# Patient Record
Sex: MALE | Race: WHITE | HISPANIC OR LATINO
[De-identification: names, ages, dates, MRNs, and addresses within clinical notes are randomized per-mention and may not be internally consistent; named-entity substitution may affect disease eponyms.]

---

## 2020-02-06 PROBLEM — Z00.00 ENCOUNTER FOR PREVENTIVE HEALTH EXAMINATION: Noted: 2020-02-06

## 2020-02-10 ENCOUNTER — RESULT CHARGE (OUTPATIENT)
Age: 55
End: 2020-02-10

## 2020-02-10 ENCOUNTER — OUTPATIENT (OUTPATIENT)
Dept: OUTPATIENT SERVICES | Facility: HOSPITAL | Age: 55
LOS: 1 days | End: 2020-02-10
Payer: SELF-PAY

## 2020-02-10 ENCOUNTER — MED ADMIN CHARGE (OUTPATIENT)
Age: 55
End: 2020-02-10

## 2020-02-10 ENCOUNTER — APPOINTMENT (OUTPATIENT)
Dept: FAMILY MEDICINE | Facility: HOSPITAL | Age: 55
End: 2020-02-10

## 2020-02-10 VITALS
RESPIRATION RATE: 14 BRPM | BODY MASS INDEX: 26.08 KG/M2 | WEIGHT: 149 LBS | DIASTOLIC BLOOD PRESSURE: 93 MMHG | SYSTOLIC BLOOD PRESSURE: 143 MMHG | HEART RATE: 88 BPM | HEIGHT: 63.5 IN | TEMPERATURE: 98 F | OXYGEN SATURATION: 98 %

## 2020-02-10 DIAGNOSIS — Z78.9 OTHER SPECIFIED HEALTH STATUS: ICD-10-CM

## 2020-02-10 DIAGNOSIS — Z00.00 ENCOUNTER FOR GENERAL ADULT MEDICAL EXAMINATION WITHOUT ABNORMAL FINDINGS: ICD-10-CM

## 2020-02-10 DIAGNOSIS — Z83.3 FAMILY HISTORY OF DIABETES MELLITUS: ICD-10-CM

## 2020-02-10 LAB
BILIRUB UR QL STRIP: NORMAL
CLARITY UR: CLEAR
COLLECTION METHOD: NORMAL
GLUCOSE UR-MCNC: NORMAL
HCG UR QL: 0.2 EU/DL
HGB UR QL STRIP.AUTO: NORMAL
KETONES UR-MCNC: NORMAL
LEUKOCYTE ESTERASE UR QL STRIP: NORMAL
NITRITE UR QL STRIP: NORMAL
PH UR STRIP: 5.5
PROT UR STRIP-MCNC: >=300
SP GR UR STRIP: 1.02

## 2020-02-10 PROCEDURE — 90471 IMMUNIZATION ADMIN: CPT

## 2020-02-10 PROCEDURE — G0463: CPT

## 2020-02-10 PROCEDURE — 87086 URINE CULTURE/COLONY COUNT: CPT

## 2020-02-10 NOTE — PHYSICAL EXAM
[No Acute Distress] : no acute distress [Well Developed] : well developed [Well Nourished] : well nourished [Normal Sclera/Conjunctiva] : normal sclera/conjunctiva [Well-Appearing] : well-appearing [Normal Oropharynx] : the oropharynx was normal [EOMI] : extraocular movements intact [PERRL] : pupils equal round and reactive to light [No Lymphadenopathy] : no lymphadenopathy [Supple] : supple [Thyroid Normal, No Nodules] : the thyroid was normal and there were no nodules present [No Respiratory Distress] : no respiratory distress  [No Accessory Muscle Use] : no accessory muscle use [Normal Rate] : normal rate  [Clear to Auscultation] : lungs were clear to auscultation bilaterally [Regular Rhythm] : with a regular rhythm [Normal S1, S2] : normal S1 and S2 [No Murmur] : no murmur heard [No Edema] : there was no peripheral edema [Non Tender] : non-tender [Soft] : abdomen soft [Non-distended] : non-distended [No Masses] : no abdominal mass palpated [Normal Bowel Sounds] : normal bowel sounds [Normal Posterior Cervical Nodes] : no posterior cervical lymphadenopathy [Normal Anterior Cervical Nodes] : no anterior cervical lymphadenopathy [Grossly Normal Strength/Tone] : grossly normal strength/tone [No Spinal Tenderness] : no spinal tenderness [Coordination Grossly Intact] : coordination grossly intact [No Focal Deficits] : no focal deficits [Normal Affect] : the affect was normal [Normal Gait] : normal gait [Normal Insight/Judgement] : insight and judgment were intact

## 2020-02-11 DIAGNOSIS — N18.3 CHRONIC KIDNEY DISEASE, STAGE 3 (MODERATE): ICD-10-CM

## 2020-02-11 DIAGNOSIS — R07.89 OTHER CHEST PAIN: ICD-10-CM

## 2020-02-11 DIAGNOSIS — R13.10 DYSPHAGIA, UNSPECIFIED: ICD-10-CM

## 2020-02-11 DIAGNOSIS — Z23 ENCOUNTER FOR IMMUNIZATION: ICD-10-CM

## 2020-02-11 DIAGNOSIS — R30.0 DYSURIA: ICD-10-CM

## 2020-02-14 LAB
BACTERIA UR CULT: NORMAL
C TRACH RRNA SPEC QL NAA+PROBE: NOT DETECTED
N GONORRHOEA RRNA SPEC QL NAA+PROBE: NOT DETECTED
SOURCE AMPLIFICATION: NORMAL

## 2020-02-19 ENCOUNTER — APPOINTMENT (OUTPATIENT)
Dept: FAMILY MEDICINE | Facility: HOSPITAL | Age: 55
End: 2020-02-19

## 2020-03-19 ENCOUNTER — RESULT CHARGE (OUTPATIENT)
Age: 55
End: 2020-03-19

## 2020-03-20 ENCOUNTER — APPOINTMENT (OUTPATIENT)
Dept: CARDIOLOGY | Facility: HOSPITAL | Age: 55
End: 2020-03-20
Payer: COMMERCIAL

## 2020-03-20 ENCOUNTER — OUTPATIENT (OUTPATIENT)
Dept: OUTPATIENT SERVICES | Facility: HOSPITAL | Age: 55
LOS: 1 days | End: 2020-03-20
Payer: SELF-PAY

## 2020-03-20 VITALS
TEMPERATURE: 98.6 F | DIASTOLIC BLOOD PRESSURE: 82 MMHG | RESPIRATION RATE: 15 BRPM | SYSTOLIC BLOOD PRESSURE: 124 MMHG | WEIGHT: 146 LBS | HEART RATE: 76 BPM | OXYGEN SATURATION: 98 % | BODY MASS INDEX: 25.46 KG/M2

## 2020-03-20 DIAGNOSIS — R45.89 OTHER SYMPTOMS AND SIGNS INVOLVING EMOTIONAL STATE: ICD-10-CM

## 2020-03-20 DIAGNOSIS — Z00.00 ENCOUNTER FOR GENERAL ADULT MEDICAL EXAMINATION WITHOUT ABNORMAL FINDINGS: ICD-10-CM

## 2020-03-23 DIAGNOSIS — R07.89 OTHER CHEST PAIN: ICD-10-CM

## 2020-03-31 ENCOUNTER — OUTPATIENT (OUTPATIENT)
Dept: OUTPATIENT SERVICES | Facility: HOSPITAL | Age: 55
LOS: 1 days | End: 2020-03-31
Payer: SELF-PAY

## 2020-03-31 ENCOUNTER — RESULT REVIEW (OUTPATIENT)
Age: 55
End: 2020-03-31

## 2020-03-31 DIAGNOSIS — R07.89 OTHER CHEST PAIN: ICD-10-CM

## 2020-03-31 PROCEDURE — G0463: CPT

## 2020-03-31 PROCEDURE — 93017 CV STRESS TEST TRACING ONLY: CPT

## 2020-03-31 PROCEDURE — 71045 X-RAY EXAM CHEST 1 VIEW: CPT | Mod: 26

## 2020-03-31 PROCEDURE — 93005 ELECTROCARDIOGRAM TRACING: CPT

## 2020-03-31 PROCEDURE — C8929: CPT

## 2020-03-31 PROCEDURE — 71045 X-RAY EXAM CHEST 1 VIEW: CPT

## 2020-03-31 RX ORDER — NITROGLYCERIN 0.4 MG/1
0.4 TABLET SUBLINGUAL
Qty: 30 | Refills: 0 | Status: ACTIVE | COMMUNITY

## 2020-05-27 NOTE — HISTORY OF PRESENT ILLNESS
[[employer] ] : provided by [employer]   [TWNoteComboBox1] : [place] [FreeTextEntry1] : [id number] [de-identified] : 55M w/ hx of heart problems which he is referring to the chest pain and prostate problem (pt unable to elaborate)presenting for his annual visit. Pt has complaint states that he was sent in by pcp to get a referral for the cardiologist. Pt states that2 years ago the pt started getting substernal chest pain that radiates up to his throat and burns. The pain is worse when he lays flat and the pain wakes him up at night. The medication that his pcp gave him made it better(likely nitroglycerin but pt is unsure) and does not know if anything makes it worse.\par Complaint of headache that began 3 weeks ago, frontal, no associated photophobia, blurry vision, or aura, no neurological symptoms, denies nausea or vomiting\par dysuria-comes and goes with associated difficulty urinating at night\par Dysphasia of solid food for 2 years as per pt. Pt states he had endoscopy done when he had his gallbladder removed but none since

## 2020-05-27 NOTE — ASSESSMENT
[FreeTextEntry1] : Health maintenance\par -Pt has his own PCP and brought in blood work. Will scan blood work into system\par -blood work showed ckd stage 3, tsh wnl, psa wnl\par \par atypical chest pain\par -likely 2/2 GERD but will give cardio referral as well as GI referral as pt complaining of things getting stuck in his throat\par -ekg done on 2/6. Will scan in ekg\par -cardiology referral given\par \par dysuria\par -poct UA trace blood and protein\par -sent for GC chlamydia and urine culture\par \par PHQ9 \par -12 on sheet\par -denies SI or HI \par -social work referral given\par \par Spoke to pcp who states that the patient was having chest pain and they sent them here for the cardiology referral. States pt is currently on losartan 25mg, amlodipine 5mg, atorvatstatin 20mg and nitroglycerin 0.4mg.\par \par rtc 2 weeks after seeing cardiologist, nephrologist, ophthalmologist, and gastroenterologist \par \par  above discussed w/ Dr. Russell

## 2024-01-03 ENCOUNTER — INPATIENT (INPATIENT)
Facility: HOSPITAL | Age: 59
LOS: 8 days | Discharge: ANOTHER HOSPITAL | DRG: 682 | End: 2024-01-12
Attending: FAMILY MEDICINE | Admitting: INTERNAL MEDICINE
Payer: MEDICAID

## 2024-01-03 VITALS
HEIGHT: 64 IN | TEMPERATURE: 98 F | RESPIRATION RATE: 18 BRPM | HEART RATE: 102 BPM | DIASTOLIC BLOOD PRESSURE: 107 MMHG | SYSTOLIC BLOOD PRESSURE: 215 MMHG | WEIGHT: 140.43 LBS | OXYGEN SATURATION: 97 %

## 2024-01-03 DIAGNOSIS — Z71.89 OTHER SPECIFIED COUNSELING: ICD-10-CM

## 2024-01-03 DIAGNOSIS — N17.9 ACUTE KIDNEY FAILURE, UNSPECIFIED: ICD-10-CM

## 2024-01-03 DIAGNOSIS — I16.1 HYPERTENSIVE EMERGENCY: ICD-10-CM

## 2024-01-03 DIAGNOSIS — R19.7 DIARRHEA, UNSPECIFIED: ICD-10-CM

## 2024-01-03 DIAGNOSIS — R11.2 NAUSEA WITH VOMITING, UNSPECIFIED: ICD-10-CM

## 2024-01-03 DIAGNOSIS — Z29.9 ENCOUNTER FOR PROPHYLACTIC MEASURES, UNSPECIFIED: ICD-10-CM

## 2024-01-03 DIAGNOSIS — I24.9 ACUTE ISCHEMIC HEART DISEASE, UNSPECIFIED: ICD-10-CM

## 2024-01-03 DIAGNOSIS — A41.9 SEPSIS, UNSPECIFIED ORGANISM: ICD-10-CM

## 2024-01-03 DIAGNOSIS — D64.9 ANEMIA, UNSPECIFIED: ICD-10-CM

## 2024-01-03 LAB
A1C WITH ESTIMATED AVERAGE GLUCOSE RESULT: 5.6 % — SIGNIFICANT CHANGE UP (ref 4–5.6)
A1C WITH ESTIMATED AVERAGE GLUCOSE RESULT: 5.6 % — SIGNIFICANT CHANGE UP (ref 4–5.6)
ALBUMIN SERPL ELPH-MCNC: 2.3 G/DL — LOW (ref 3.3–5)
ALBUMIN SERPL ELPH-MCNC: 2.3 G/DL — LOW (ref 3.3–5)
ALBUMIN SERPL ELPH-MCNC: 2.7 G/DL — LOW (ref 3.3–5)
ALBUMIN SERPL ELPH-MCNC: 2.7 G/DL — LOW (ref 3.3–5)
ALP SERPL-CCNC: 108 U/L — SIGNIFICANT CHANGE UP (ref 40–120)
ALP SERPL-CCNC: 108 U/L — SIGNIFICANT CHANGE UP (ref 40–120)
ALP SERPL-CCNC: 99 U/L — SIGNIFICANT CHANGE UP (ref 40–120)
ALP SERPL-CCNC: 99 U/L — SIGNIFICANT CHANGE UP (ref 40–120)
ALT FLD-CCNC: 16 U/L — SIGNIFICANT CHANGE UP (ref 10–45)
ALT FLD-CCNC: 16 U/L — SIGNIFICANT CHANGE UP (ref 10–45)
ALT FLD-CCNC: 19 U/L — SIGNIFICANT CHANGE UP (ref 10–45)
ALT FLD-CCNC: 19 U/L — SIGNIFICANT CHANGE UP (ref 10–45)
ANION GAP SERPL CALC-SCNC: 15 MMOL/L — SIGNIFICANT CHANGE UP (ref 5–17)
ANION GAP SERPL CALC-SCNC: 15 MMOL/L — SIGNIFICANT CHANGE UP (ref 5–17)
ANION GAP SERPL CALC-SCNC: 18 MMOL/L — HIGH (ref 5–17)
ANION GAP SERPL CALC-SCNC: 18 MMOL/L — HIGH (ref 5–17)
APPEARANCE UR: CLEAR — SIGNIFICANT CHANGE UP
APTT BLD: 30.9 SEC — SIGNIFICANT CHANGE UP (ref 24.5–35.6)
APTT BLD: 30.9 SEC — SIGNIFICANT CHANGE UP (ref 24.5–35.6)
AST SERPL-CCNC: 10 U/L — SIGNIFICANT CHANGE UP (ref 10–40)
AST SERPL-CCNC: 10 U/L — SIGNIFICANT CHANGE UP (ref 10–40)
AST SERPL-CCNC: 12 U/L — SIGNIFICANT CHANGE UP (ref 10–40)
AST SERPL-CCNC: 12 U/L — SIGNIFICANT CHANGE UP (ref 10–40)
BACTERIA # UR AUTO: NEGATIVE /HPF — SIGNIFICANT CHANGE UP
BASOPHILS # BLD AUTO: 0 K/UL — SIGNIFICANT CHANGE UP (ref 0–0.2)
BASOPHILS # BLD AUTO: 0 K/UL — SIGNIFICANT CHANGE UP (ref 0–0.2)
BASOPHILS # BLD AUTO: 0.02 K/UL — SIGNIFICANT CHANGE UP (ref 0–0.2)
BASOPHILS # BLD AUTO: 0.02 K/UL — SIGNIFICANT CHANGE UP (ref 0–0.2)
BASOPHILS NFR BLD AUTO: 0 % — SIGNIFICANT CHANGE UP (ref 0–2)
BASOPHILS NFR BLD AUTO: 0 % — SIGNIFICANT CHANGE UP (ref 0–2)
BASOPHILS NFR BLD AUTO: 0.3 % — SIGNIFICANT CHANGE UP (ref 0–2)
BASOPHILS NFR BLD AUTO: 0.3 % — SIGNIFICANT CHANGE UP (ref 0–2)
BILIRUB SERPL-MCNC: 0.4 MG/DL — SIGNIFICANT CHANGE UP (ref 0.2–1.2)
BILIRUB UR-MCNC: NEGATIVE — SIGNIFICANT CHANGE UP
BLD GP AB SCN SERPL QL: SIGNIFICANT CHANGE UP
BLD GP AB SCN SERPL QL: SIGNIFICANT CHANGE UP
BUN SERPL-MCNC: 92 MG/DL — HIGH (ref 7–23)
BUN SERPL-MCNC: 92 MG/DL — HIGH (ref 7–23)
BUN SERPL-MCNC: 94 MG/DL — HIGH (ref 7–23)
BUN SERPL-MCNC: 94 MG/DL — HIGH (ref 7–23)
C3 SERPL-MCNC: 83 MG/DL — SIGNIFICANT CHANGE UP (ref 81–157)
C3 SERPL-MCNC: 83 MG/DL — SIGNIFICANT CHANGE UP (ref 81–157)
C4 SERPL-MCNC: 26 MG/DL — SIGNIFICANT CHANGE UP (ref 13–39)
C4 SERPL-MCNC: 26 MG/DL — SIGNIFICANT CHANGE UP (ref 13–39)
CALCIUM SERPL-MCNC: 6.6 MG/DL — LOW (ref 8.4–10.5)
CALCIUM SERPL-MCNC: 6.6 MG/DL — LOW (ref 8.4–10.5)
CALCIUM SERPL-MCNC: 6.7 MG/DL — LOW (ref 8.4–10.5)
CALCIUM SERPL-MCNC: 6.7 MG/DL — LOW (ref 8.4–10.5)
CHLORIDE SERPL-SCNC: 97 MMOL/L — SIGNIFICANT CHANGE UP (ref 96–108)
CHLORIDE SERPL-SCNC: 97 MMOL/L — SIGNIFICANT CHANGE UP (ref 96–108)
CHLORIDE SERPL-SCNC: 98 MMOL/L — SIGNIFICANT CHANGE UP (ref 96–108)
CHLORIDE SERPL-SCNC: 98 MMOL/L — SIGNIFICANT CHANGE UP (ref 96–108)
CHOLEST SERPL-MCNC: 103 MG/DL — SIGNIFICANT CHANGE UP
CHOLEST SERPL-MCNC: 103 MG/DL — SIGNIFICANT CHANGE UP
CO2 SERPL-SCNC: 16 MMOL/L — LOW (ref 22–31)
CO2 SERPL-SCNC: 16 MMOL/L — LOW (ref 22–31)
CO2 SERPL-SCNC: 19 MMOL/L — LOW (ref 22–31)
CO2 SERPL-SCNC: 19 MMOL/L — LOW (ref 22–31)
COLOR SPEC: YELLOW — SIGNIFICANT CHANGE UP
CREAT SERPL-MCNC: 16.41 MG/DL — HIGH (ref 0.5–1.3)
CREAT SERPL-MCNC: 16.41 MG/DL — HIGH (ref 0.5–1.3)
CREAT SERPL-MCNC: 17.09 MG/DL — HIGH (ref 0.5–1.3)
CREAT SERPL-MCNC: 17.09 MG/DL — HIGH (ref 0.5–1.3)
DIFF PNL FLD: ABNORMAL
EGFR: 3 ML/MIN/1.73M2 — LOW
EOSINOPHIL # BLD AUTO: 0 K/UL — SIGNIFICANT CHANGE UP (ref 0–0.5)
EOSINOPHIL # BLD AUTO: 0 K/UL — SIGNIFICANT CHANGE UP (ref 0–0.5)
EOSINOPHIL # BLD AUTO: 0.1 K/UL — SIGNIFICANT CHANGE UP (ref 0–0.5)
EOSINOPHIL # BLD AUTO: 0.1 K/UL — SIGNIFICANT CHANGE UP (ref 0–0.5)
EOSINOPHIL NFR BLD AUTO: 0 % — SIGNIFICANT CHANGE UP (ref 0–6)
EOSINOPHIL NFR BLD AUTO: 0 % — SIGNIFICANT CHANGE UP (ref 0–6)
EOSINOPHIL NFR BLD AUTO: 1.3 % — SIGNIFICANT CHANGE UP (ref 0–6)
EOSINOPHIL NFR BLD AUTO: 1.3 % — SIGNIFICANT CHANGE UP (ref 0–6)
EPI CELLS # UR: 4 — SIGNIFICANT CHANGE UP
EPI CELLS # UR: 4 — SIGNIFICANT CHANGE UP
EPI CELLS # UR: 5 — SIGNIFICANT CHANGE UP
EPI CELLS # UR: 5 — SIGNIFICANT CHANGE UP
ESTIMATED AVERAGE GLUCOSE: 114 MG/DL — SIGNIFICANT CHANGE UP (ref 68–114)
ESTIMATED AVERAGE GLUCOSE: 114 MG/DL — SIGNIFICANT CHANGE UP (ref 68–114)
FERRITIN SERPL-MCNC: 478 NG/ML — HIGH (ref 30–400)
FERRITIN SERPL-MCNC: 478 NG/ML — HIGH (ref 30–400)
GLUCOSE SERPL-MCNC: 110 MG/DL — HIGH (ref 70–99)
GLUCOSE SERPL-MCNC: 110 MG/DL — HIGH (ref 70–99)
GLUCOSE SERPL-MCNC: 116 MG/DL — HIGH (ref 70–99)
GLUCOSE SERPL-MCNC: 116 MG/DL — HIGH (ref 70–99)
GLUCOSE UR QL: 100 MG/DL
HBV CORE IGM SER-ACNC: SIGNIFICANT CHANGE UP
HBV CORE IGM SER-ACNC: SIGNIFICANT CHANGE UP
HBV SURFACE AG SER-ACNC: SIGNIFICANT CHANGE UP
HBV SURFACE AG SER-ACNC: SIGNIFICANT CHANGE UP
HCT VFR BLD CALC: 19.1 % — CRITICAL LOW (ref 39–50)
HCT VFR BLD CALC: 19.1 % — CRITICAL LOW (ref 39–50)
HCT VFR BLD CALC: 19.4 % — CRITICAL LOW (ref 39–50)
HCT VFR BLD CALC: 19.4 % — CRITICAL LOW (ref 39–50)
HCT VFR BLD CALC: 20.2 % — CRITICAL LOW (ref 39–50)
HCT VFR BLD CALC: 20.2 % — CRITICAL LOW (ref 39–50)
HCT VFR BLD CALC: 22.3 % — LOW (ref 39–50)
HCT VFR BLD CALC: 22.3 % — LOW (ref 39–50)
HCV AB S/CO SERPL IA: 0.2 S/CO — SIGNIFICANT CHANGE UP (ref 0–0.99)
HCV AB S/CO SERPL IA: 0.2 S/CO — SIGNIFICANT CHANGE UP (ref 0–0.99)
HCV AB SERPL-IMP: SIGNIFICANT CHANGE UP
HCV AB SERPL-IMP: SIGNIFICANT CHANGE UP
HDLC SERPL-MCNC: 60 MG/DL — SIGNIFICANT CHANGE UP
HDLC SERPL-MCNC: 60 MG/DL — SIGNIFICANT CHANGE UP
HGB BLD-MCNC: 7 G/DL — CRITICAL LOW (ref 13–17)
HGB BLD-MCNC: 7 G/DL — CRITICAL LOW (ref 13–17)
HGB BLD-MCNC: 7.2 G/DL — LOW (ref 13–17)
HGB BLD-MCNC: 8.2 G/DL — LOW (ref 13–17)
HGB BLD-MCNC: 8.2 G/DL — LOW (ref 13–17)
HYALINE CASTS # UR AUTO: PRESENT
HYALINE CASTS # UR AUTO: PRESENT
HYALINE CASTS # UR AUTO: SIGNIFICANT CHANGE UP
HYALINE CASTS # UR AUTO: SIGNIFICANT CHANGE UP
HYPOCHROMIA BLD QL: SIGNIFICANT CHANGE UP
HYPOCHROMIA BLD QL: SIGNIFICANT CHANGE UP
IMM GRANULOCYTES NFR BLD AUTO: 0.3 % — SIGNIFICANT CHANGE UP (ref 0–0.9)
IMM GRANULOCYTES NFR BLD AUTO: 0.3 % — SIGNIFICANT CHANGE UP (ref 0–0.9)
INR BLD: 1.03 RATIO — SIGNIFICANT CHANGE UP (ref 0.85–1.18)
INR BLD: 1.03 RATIO — SIGNIFICANT CHANGE UP (ref 0.85–1.18)
IRON SATN MFR SERPL: 20 UG/DL — LOW (ref 45–165)
IRON SATN MFR SERPL: 20 UG/DL — LOW (ref 45–165)
KETONES UR-MCNC: NEGATIVE MG/DL — SIGNIFICANT CHANGE UP
LACTATE SERPL-SCNC: 0.7 MMOL/L — SIGNIFICANT CHANGE UP (ref 0.7–2)
LACTATE SERPL-SCNC: 0.7 MMOL/L — SIGNIFICANT CHANGE UP (ref 0.7–2)
LEUKOCYTE ESTERASE UR-ACNC: NEGATIVE — SIGNIFICANT CHANGE UP
LIDOCAIN IGE QN: 137 U/L — HIGH (ref 16–77)
LIDOCAIN IGE QN: 137 U/L — HIGH (ref 16–77)
LIPID PNL WITH DIRECT LDL SERPL: 30 MG/DL — SIGNIFICANT CHANGE UP
LIPID PNL WITH DIRECT LDL SERPL: 30 MG/DL — SIGNIFICANT CHANGE UP
LYMPHOCYTES # BLD AUTO: 0.3 K/UL — LOW (ref 1–3.3)
LYMPHOCYTES # BLD AUTO: 0.3 K/UL — LOW (ref 1–3.3)
LYMPHOCYTES # BLD AUTO: 0.37 K/UL — LOW (ref 1–3.3)
LYMPHOCYTES # BLD AUTO: 0.37 K/UL — LOW (ref 1–3.3)
LYMPHOCYTES # BLD AUTO: 4 % — LOW (ref 13–44)
LYMPHOCYTES # BLD AUTO: 4 % — LOW (ref 13–44)
LYMPHOCYTES # BLD AUTO: 6 % — LOW (ref 13–44)
LYMPHOCYTES # BLD AUTO: 6 % — LOW (ref 13–44)
MANUAL SMEAR VERIFICATION: SIGNIFICANT CHANGE UP
MANUAL SMEAR VERIFICATION: SIGNIFICANT CHANGE UP
MCHC RBC-ENTMCNC: 28.1 PG — SIGNIFICANT CHANGE UP (ref 27–34)
MCHC RBC-ENTMCNC: 28.1 PG — SIGNIFICANT CHANGE UP (ref 27–34)
MCHC RBC-ENTMCNC: 28.5 PG — SIGNIFICANT CHANGE UP (ref 27–34)
MCHC RBC-ENTMCNC: 28.5 PG — SIGNIFICANT CHANGE UP (ref 27–34)
MCHC RBC-ENTMCNC: 36.8 GM/DL — HIGH (ref 32–36)
MCHC RBC-ENTMCNC: 36.8 GM/DL — HIGH (ref 32–36)
MCHC RBC-ENTMCNC: 37.7 GM/DL — HIGH (ref 32–36)
MCHC RBC-ENTMCNC: 37.7 GM/DL — HIGH (ref 32–36)
MCV RBC AUTO: 75.5 FL — LOW (ref 80–100)
MCV RBC AUTO: 75.5 FL — LOW (ref 80–100)
MCV RBC AUTO: 76.4 FL — LOW (ref 80–100)
MCV RBC AUTO: 76.4 FL — LOW (ref 80–100)
MICROCYTES BLD QL: SIGNIFICANT CHANGE UP
MICROCYTES BLD QL: SIGNIFICANT CHANGE UP
MONOCYTES # BLD AUTO: 0.19 K/UL — SIGNIFICANT CHANGE UP (ref 0–0.9)
MONOCYTES # BLD AUTO: 0.19 K/UL — SIGNIFICANT CHANGE UP (ref 0–0.9)
MONOCYTES # BLD AUTO: 0.97 K/UL — HIGH (ref 0–0.9)
MONOCYTES # BLD AUTO: 0.97 K/UL — HIGH (ref 0–0.9)
MONOCYTES NFR BLD AUTO: 12.8 % — SIGNIFICANT CHANGE UP (ref 2–14)
MONOCYTES NFR BLD AUTO: 12.8 % — SIGNIFICANT CHANGE UP (ref 2–14)
MONOCYTES NFR BLD AUTO: 3 % — SIGNIFICANT CHANGE UP (ref 2–14)
MONOCYTES NFR BLD AUTO: 3 % — SIGNIFICANT CHANGE UP (ref 2–14)
NEUTROPHILS # BLD AUTO: 5.65 K/UL — SIGNIFICANT CHANGE UP (ref 1.8–7.4)
NEUTROPHILS # BLD AUTO: 5.65 K/UL — SIGNIFICANT CHANGE UP (ref 1.8–7.4)
NEUTROPHILS # BLD AUTO: 6.17 K/UL — SIGNIFICANT CHANGE UP (ref 1.8–7.4)
NEUTROPHILS # BLD AUTO: 6.17 K/UL — SIGNIFICANT CHANGE UP (ref 1.8–7.4)
NEUTROPHILS NFR BLD AUTO: 81.3 % — HIGH (ref 43–77)
NEUTROPHILS NFR BLD AUTO: 81.3 % — HIGH (ref 43–77)
NEUTROPHILS NFR BLD AUTO: 91 % — HIGH (ref 43–77)
NEUTROPHILS NFR BLD AUTO: 91 % — HIGH (ref 43–77)
NITRITE UR-MCNC: NEGATIVE — SIGNIFICANT CHANGE UP
NON HDL CHOLESTEROL: 43 MG/DL — SIGNIFICANT CHANGE UP
NON HDL CHOLESTEROL: 43 MG/DL — SIGNIFICANT CHANGE UP
NRBC # BLD: 0 /100 WBCS — SIGNIFICANT CHANGE UP (ref 0–0)
PH UR: 5.5 — SIGNIFICANT CHANGE UP (ref 5–8)
PH UR: 5.5 — SIGNIFICANT CHANGE UP (ref 5–8)
PH UR: 6 — SIGNIFICANT CHANGE UP (ref 5–8)
PH UR: 6 — SIGNIFICANT CHANGE UP (ref 5–8)
PLAT MORPH BLD: NORMAL — SIGNIFICANT CHANGE UP
PLAT MORPH BLD: NORMAL — SIGNIFICANT CHANGE UP
PLATELET # BLD AUTO: 204 K/UL — SIGNIFICANT CHANGE UP (ref 150–400)
PLATELET # BLD AUTO: 204 K/UL — SIGNIFICANT CHANGE UP (ref 150–400)
PLATELET # BLD AUTO: 244 K/UL — SIGNIFICANT CHANGE UP (ref 150–400)
PLATELET # BLD AUTO: 244 K/UL — SIGNIFICANT CHANGE UP (ref 150–400)
POTASSIUM SERPL-MCNC: 4.2 MMOL/L — SIGNIFICANT CHANGE UP (ref 3.5–5.3)
POTASSIUM SERPL-MCNC: 4.2 MMOL/L — SIGNIFICANT CHANGE UP (ref 3.5–5.3)
POTASSIUM SERPL-MCNC: 4.6 MMOL/L — SIGNIFICANT CHANGE UP (ref 3.5–5.3)
POTASSIUM SERPL-MCNC: 4.6 MMOL/L — SIGNIFICANT CHANGE UP (ref 3.5–5.3)
POTASSIUM SERPL-SCNC: 4.2 MMOL/L — SIGNIFICANT CHANGE UP (ref 3.5–5.3)
POTASSIUM SERPL-SCNC: 4.2 MMOL/L — SIGNIFICANT CHANGE UP (ref 3.5–5.3)
POTASSIUM SERPL-SCNC: 4.6 MMOL/L — SIGNIFICANT CHANGE UP (ref 3.5–5.3)
POTASSIUM SERPL-SCNC: 4.6 MMOL/L — SIGNIFICANT CHANGE UP (ref 3.5–5.3)
PROT SERPL-MCNC: 5.8 G/DL — LOW (ref 6–8.3)
PROT SERPL-MCNC: 5.8 G/DL — LOW (ref 6–8.3)
PROT SERPL-MCNC: 6.4 G/DL — SIGNIFICANT CHANGE UP (ref 6–8.3)
PROT SERPL-MCNC: 6.4 G/DL — SIGNIFICANT CHANGE UP (ref 6–8.3)
PROT SERPL-MCNC: 7.1 G/DL — SIGNIFICANT CHANGE UP (ref 6–8.3)
PROT SERPL-MCNC: 7.1 G/DL — SIGNIFICANT CHANGE UP (ref 6–8.3)
PROT UR-MCNC: 300 MG/DL
PROT UR-MCNC: 300 MG/DL
PROT UR-MCNC: >=1000 MG/DL
PROT UR-MCNC: >=1000 MG/DL
PROTHROM AB SERPL-ACNC: 12 SEC — SIGNIFICANT CHANGE UP (ref 9.5–13)
PROTHROM AB SERPL-ACNC: 12 SEC — SIGNIFICANT CHANGE UP (ref 9.5–13)
RBC # BLD: 2.53 M/UL — LOW (ref 4.2–5.8)
RBC # BLD: 2.53 M/UL — LOW (ref 4.2–5.8)
RBC # BLD: 2.92 M/UL — LOW (ref 4.2–5.8)
RBC # BLD: 2.92 M/UL — LOW (ref 4.2–5.8)
RBC # FLD: 12.8 % — SIGNIFICANT CHANGE UP (ref 10.3–14.5)
RBC # FLD: 12.8 % — SIGNIFICANT CHANGE UP (ref 10.3–14.5)
RBC # FLD: 12.9 % — SIGNIFICANT CHANGE UP (ref 10.3–14.5)
RBC # FLD: 12.9 % — SIGNIFICANT CHANGE UP (ref 10.3–14.5)
RBC BLD AUTO: ABNORMAL
RBC BLD AUTO: ABNORMAL
RBC CASTS # UR COMP ASSIST: 12 /HPF — HIGH (ref 0–4)
RBC CASTS # UR COMP ASSIST: 12 /HPF — HIGH (ref 0–4)
RBC CASTS # UR COMP ASSIST: 5 /HPF — HIGH (ref 0–4)
RBC CASTS # UR COMP ASSIST: 5 /HPF — HIGH (ref 0–4)
SODIUM SERPL-SCNC: 131 MMOL/L — LOW (ref 135–145)
SODIUM SERPL-SCNC: 131 MMOL/L — LOW (ref 135–145)
SODIUM SERPL-SCNC: 132 MMOL/L — LOW (ref 135–145)
SODIUM SERPL-SCNC: 132 MMOL/L — LOW (ref 135–145)
SP GR SPEC: 1.01 — SIGNIFICANT CHANGE UP (ref 1–1.03)
SP GR SPEC: 1.01 — SIGNIFICANT CHANGE UP (ref 1–1.03)
SP GR SPEC: 1.02 — SIGNIFICANT CHANGE UP (ref 1–1.03)
SP GR SPEC: 1.02 — SIGNIFICANT CHANGE UP (ref 1–1.03)
TRANSFERRIN SERPL-MCNC: 157 MG/DL — LOW (ref 200–360)
TRANSFERRIN SERPL-MCNC: 157 MG/DL — LOW (ref 200–360)
TRIGL SERPL-MCNC: 57 MG/DL — SIGNIFICANT CHANGE UP
TRIGL SERPL-MCNC: 57 MG/DL — SIGNIFICANT CHANGE UP
TROPONIN I, HIGH SENSITIVITY RESULT: 221 NG/L — HIGH
TROPONIN I, HIGH SENSITIVITY RESULT: 221 NG/L — HIGH
TROPONIN I, HIGH SENSITIVITY RESULT: 246.3 NG/L — HIGH
TROPONIN I, HIGH SENSITIVITY RESULT: 246.3 NG/L — HIGH
TROPONIN I, HIGH SENSITIVITY RESULT: 401.6 NG/L — HIGH
TROPONIN I, HIGH SENSITIVITY RESULT: 401.6 NG/L — HIGH
TROPONIN I, HIGH SENSITIVITY RESULT: 466.2 NG/L — HIGH
TROPONIN I, HIGH SENSITIVITY RESULT: 466.2 NG/L — HIGH
TROPONIN I, HIGH SENSITIVITY RESULT: 529.1 NG/L — HIGH
TROPONIN I, HIGH SENSITIVITY RESULT: 529.1 NG/L — HIGH
UROBILINOGEN FLD QL: 0.2 MG/DL — SIGNIFICANT CHANGE UP (ref 0.2–1)
WBC # BLD: 6.21 K/UL — SIGNIFICANT CHANGE UP (ref 3.8–10.5)
WBC # BLD: 6.21 K/UL — SIGNIFICANT CHANGE UP (ref 3.8–10.5)
WBC # BLD: 7.58 K/UL — SIGNIFICANT CHANGE UP (ref 3.8–10.5)
WBC # BLD: 7.58 K/UL — SIGNIFICANT CHANGE UP (ref 3.8–10.5)
WBC # FLD AUTO: 6.21 K/UL — SIGNIFICANT CHANGE UP (ref 3.8–10.5)
WBC # FLD AUTO: 6.21 K/UL — SIGNIFICANT CHANGE UP (ref 3.8–10.5)
WBC # FLD AUTO: 7.58 K/UL — SIGNIFICANT CHANGE UP (ref 3.8–10.5)
WBC # FLD AUTO: 7.58 K/UL — SIGNIFICANT CHANGE UP (ref 3.8–10.5)
WBC UR QL: 1 /HPF — SIGNIFICANT CHANGE UP (ref 0–5)
WBC UR QL: 1 /HPF — SIGNIFICANT CHANGE UP (ref 0–5)
WBC UR QL: 4 /HPF — SIGNIFICANT CHANGE UP (ref 0–5)
WBC UR QL: 4 /HPF — SIGNIFICANT CHANGE UP (ref 0–5)

## 2024-01-03 PROCEDURE — 74177 CT ABD & PELVIS W/CONTRAST: CPT | Mod: 26,ME

## 2024-01-03 PROCEDURE — 99223 1ST HOSP IP/OBS HIGH 75: CPT

## 2024-01-03 PROCEDURE — G1004: CPT

## 2024-01-03 PROCEDURE — 93010 ELECTROCARDIOGRAM REPORT: CPT

## 2024-01-03 PROCEDURE — 93308 TTE F-UP OR LMTD: CPT | Mod: 26

## 2024-01-03 PROCEDURE — 99223 1ST HOSP IP/OBS HIGH 75: CPT | Mod: GC

## 2024-01-03 PROCEDURE — 99222 1ST HOSP IP/OBS MODERATE 55: CPT

## 2024-01-03 PROCEDURE — 99285 EMERGENCY DEPT VISIT HI MDM: CPT

## 2024-01-03 PROCEDURE — 71045 X-RAY EXAM CHEST 1 VIEW: CPT | Mod: 26

## 2024-01-03 RX ORDER — HEPARIN SODIUM 5000 [USP'U]/ML
5000 INJECTION INTRAVENOUS; SUBCUTANEOUS EVERY 12 HOURS
Refills: 0 | Status: DISCONTINUED | OUTPATIENT
Start: 2024-01-03 | End: 2024-01-09

## 2024-01-03 RX ORDER — ACETAMINOPHEN 500 MG
650 TABLET ORAL EVERY 6 HOURS
Refills: 0 | Status: DISCONTINUED | OUTPATIENT
Start: 2024-01-03 | End: 2024-01-12

## 2024-01-03 RX ORDER — LANOLIN ALCOHOL/MO/W.PET/CERES
3 CREAM (GRAM) TOPICAL AT BEDTIME
Refills: 0 | Status: DISCONTINUED | OUTPATIENT
Start: 2024-01-03 | End: 2024-01-12

## 2024-01-03 RX ORDER — AMLODIPINE BESYLATE 2.5 MG/1
5 TABLET ORAL AT BEDTIME
Refills: 0 | Status: DISCONTINUED | OUTPATIENT
Start: 2024-01-03 | End: 2024-01-04

## 2024-01-03 RX ORDER — MORPHINE SULFATE 50 MG/1
2 CAPSULE, EXTENDED RELEASE ORAL ONCE
Refills: 0 | Status: DISCONTINUED | OUTPATIENT
Start: 2024-01-03 | End: 2024-01-03

## 2024-01-03 RX ORDER — ONDANSETRON 8 MG/1
4 TABLET, FILM COATED ORAL ONCE
Refills: 0 | Status: COMPLETED | OUTPATIENT
Start: 2024-01-03 | End: 2024-01-03

## 2024-01-03 RX ORDER — LABETALOL HCL 100 MG
100 TABLET ORAL
Refills: 0 | Status: DISCONTINUED | OUTPATIENT
Start: 2024-01-03 | End: 2024-01-04

## 2024-01-03 RX ORDER — SODIUM CHLORIDE 9 MG/ML
1000 INJECTION, SOLUTION INTRAVENOUS
Refills: 0 | Status: DISCONTINUED | OUTPATIENT
Start: 2024-01-03 | End: 2024-01-06

## 2024-01-03 RX ORDER — METRONIDAZOLE 500 MG
TABLET ORAL
Refills: 0 | Status: DISCONTINUED | OUTPATIENT
Start: 2024-01-03 | End: 2024-01-04

## 2024-01-03 RX ORDER — SODIUM CHLORIDE 9 MG/ML
1000 INJECTION INTRAMUSCULAR; INTRAVENOUS; SUBCUTANEOUS ONCE
Refills: 0 | Status: COMPLETED | OUTPATIENT
Start: 2024-01-03 | End: 2024-01-03

## 2024-01-03 RX ORDER — ONDANSETRON 8 MG/1
4 TABLET, FILM COATED ORAL EVERY 8 HOURS
Refills: 0 | Status: DISCONTINUED | OUTPATIENT
Start: 2024-01-03 | End: 2024-01-12

## 2024-01-03 RX ORDER — CEFTRIAXONE 500 MG/1
1000 INJECTION, POWDER, FOR SOLUTION INTRAMUSCULAR; INTRAVENOUS EVERY 24 HOURS
Refills: 0 | Status: DISCONTINUED | OUTPATIENT
Start: 2024-01-03 | End: 2024-01-04

## 2024-01-03 RX ORDER — METRONIDAZOLE 500 MG
500 TABLET ORAL EVERY 8 HOURS
Refills: 0 | Status: DISCONTINUED | OUTPATIENT
Start: 2024-01-03 | End: 2024-01-04

## 2024-01-03 RX ORDER — LABETALOL HCL 100 MG
10 TABLET ORAL ONCE
Refills: 0 | Status: COMPLETED | OUTPATIENT
Start: 2024-01-03 | End: 2024-01-03

## 2024-01-03 RX ORDER — HYDRALAZINE HCL 50 MG
10 TABLET ORAL ONCE
Refills: 0 | Status: COMPLETED | OUTPATIENT
Start: 2024-01-03 | End: 2024-01-03

## 2024-01-03 RX ORDER — METRONIDAZOLE 500 MG
500 TABLET ORAL ONCE
Refills: 0 | Status: COMPLETED | OUTPATIENT
Start: 2024-01-03 | End: 2024-01-03

## 2024-01-03 RX ADMIN — Medication 650 MILLIGRAM(S): at 09:45

## 2024-01-03 RX ADMIN — Medication 650 MILLIGRAM(S): at 19:42

## 2024-01-03 RX ADMIN — CEFTRIAXONE 100 MILLIGRAM(S): 500 INJECTION, POWDER, FOR SOLUTION INTRAMUSCULAR; INTRAVENOUS at 07:03

## 2024-01-03 RX ADMIN — MORPHINE SULFATE 2 MILLIGRAM(S): 50 CAPSULE, EXTENDED RELEASE ORAL at 02:05

## 2024-01-03 RX ADMIN — Medication 100 MILLIGRAM(S): at 09:44

## 2024-01-03 RX ADMIN — SODIUM CHLORIDE 1000 MILLILITER(S): 9 INJECTION INTRAMUSCULAR; INTRAVENOUS; SUBCUTANEOUS at 02:35

## 2024-01-03 RX ADMIN — Medication 100 MILLIGRAM(S): at 17:26

## 2024-01-03 RX ADMIN — AMLODIPINE BESYLATE 5 MILLIGRAM(S): 2.5 TABLET ORAL at 22:57

## 2024-01-03 RX ADMIN — HEPARIN SODIUM 5000 UNIT(S): 5000 INJECTION INTRAVENOUS; SUBCUTANEOUS at 07:08

## 2024-01-03 RX ADMIN — ONDANSETRON 4 MILLIGRAM(S): 8 TABLET, FILM COATED ORAL at 01:35

## 2024-01-03 RX ADMIN — SODIUM CHLORIDE 50 MILLILITER(S): 9 INJECTION, SOLUTION INTRAVENOUS at 16:16

## 2024-01-03 RX ADMIN — SODIUM CHLORIDE 50 MILLILITER(S): 9 INJECTION, SOLUTION INTRAVENOUS at 19:42

## 2024-01-03 RX ADMIN — Medication 650 MILLIGRAM(S): at 10:45

## 2024-01-03 RX ADMIN — Medication 10 MILLIGRAM(S): at 05:42

## 2024-01-03 RX ADMIN — HEPARIN SODIUM 5000 UNIT(S): 5000 INJECTION INTRAVENOUS; SUBCUTANEOUS at 17:26

## 2024-01-03 RX ADMIN — Medication 100 MILLIGRAM(S): at 22:57

## 2024-01-03 RX ADMIN — MORPHINE SULFATE 2 MILLIGRAM(S): 50 CAPSULE, EXTENDED RELEASE ORAL at 01:35

## 2024-01-03 RX ADMIN — Medication 10 MILLIGRAM(S): at 04:39

## 2024-01-03 RX ADMIN — Medication 100 MILLIGRAM(S): at 09:45

## 2024-01-03 RX ADMIN — Medication 650 MILLIGRAM(S): at 22:55

## 2024-01-03 RX ADMIN — SODIUM CHLORIDE 1000 MILLILITER(S): 9 INJECTION INTRAMUSCULAR; INTRAVENOUS; SUBCUTANEOUS at 01:35

## 2024-01-03 NOTE — CONSULT NOTE ADULT - SUBJECTIVE AND OBJECTIVE BOX
INTERVAL HPI/OVERNIGHT EVENTS:  HPI:    Patient is a 59yo M with pmhx CKD 3, HTN, lost to follow up with PCP (per chart review used to take medications for HTN, but patient denies) presenting for vomiting times 3 weeks. Per patient, he began having subjective fevers and vomiting 2-4x/ day for 3 weeks with occasional small amounts of blood  with inability to hold down solids or liquids along with diarrhea for 2 days x8 episodes. Patient also notes that for the past 4 years he has been getting chest pain while walking, which is getting worse. Currently endorsing worsening central chest  pain in the ED along with a headache. Denies shortness of breath or palpitations. Denies changes in urine output , frequency or dysuria.   Notes hx of renal failure in his brother at 71yo requiring transplant   Denied recent traveling, sick exposure, NSAID use, exposure to Hep C, Hep B, HIV, new foods.     GI consult:  Patient admits to nausea and vomiting for 3 weeks, with 1-3 episodes of emesis with trace of blood. Patient admits to having diarrhea for a "few days" but denies blood in stool. Patient has been unable to tolerate po intake for about 3 weeks. Denies abdominal pain. Denies having a PCP, described having an "endoscopy" for gallbladder stones and has never had a colonoscopy.     MEDICATIONS  (STANDING):  cefTRIAXone   IVPB 1000 milliGRAM(s) IV Intermittent every 24 hours  dextrose 5% 1000 milliLiter(s) (50 mL/Hr) IV Continuous <Continuous>  heparin   Injectable 5000 Unit(s) SubCutaneous every 12 hours  labetalol 100 milliGRAM(s) Oral two times a day  metroNIDAZOLE  IVPB      metroNIDAZOLE  IVPB 500 milliGRAM(s) IV Intermittent every 8 hours    MEDICATIONS  (PRN):  acetaminophen     Tablet .. 650 milliGRAM(s) Oral every 6 hours PRN Temp greater or equal to 38C (100.4F), Mild Pain (1 - 3)  aluminum hydroxide/magnesium hydroxide/simethicone Suspension 30 milliLiter(s) Oral every 4 hours PRN Dyspepsia  melatonin 3 milliGRAM(s) Oral at bedtime PRN Insomnia  ondansetron Injectable 4 milliGRAM(s) IV Push every 8 hours PRN Nausea and/or Vomiting      Allergies  No Known Allergies  Intolerances  PAST MEDICAL & SURGICAL HISTORY:  H/O gastroesophageal reflux (GERD)  Hypertension  S/P Cholecystectomy    REVIEW OF SYSTEMS- See HPI  PHYSICAL EXAM:   Vital Signs:  Vital Signs Last 24 Hrs  T(C): 39.4 (2024 10:28), Max: 39.4 (2024 10:28)  T(F): 102.9 (2024 10:28), Max: 102.9 (2024 10:28)  HR: 97 (2024 13:28) (97 - 107)  BP: 152/92 (2024 13:28) (151/89 - 218/113)  BP(mean): --  RR: 18 (2024 13:28) (18 - 21)  SpO2: 94% (2024 13:28) (91% - 99%)    Parameters below as of 2024 13:28  Patient On (Oxygen Delivery Method): room air      Daily Height in cm: 162.56 (2024 00:45)    Daily I&O's Summary      GENERAL:  Appears stated age, well-groomed, well-nourished, no distress  HEENT:  Moist and clear sclera and conjunctivae  CHEST:  Full & symmetric excursion, no increased effort, breath sounds clear  HEART:  Regular rhythm, S1, S2  ABDOMEN:  Soft, non-tender, non-distended, normoactive bowel sounds,  no masses   EXTREMITIES:  no edema  SKIN:  No rash  NEURO:  Alert, oriented, no tremor, no encephalopathy, Japanese speaking      LABS:                        7.0    x     )-----------( x        ( 2024 13:35 )             19.4     01-03    131<L>  |  97  |  92<H>  ----------------------------<  110<H>  4.6   |  16<L>  |  16.41<H>    Ca    6.6<L>      2024 11:15    TPro  6.4  /  Alb  2.3<L>  /  TBili  0.4  /  DBili  x   /  AST  10  /  ALT  16  /  AlkPhos  99  01-03    PT/INR - ( 2024 11:15 )   PT: 12.0 sec;   INR: 1.03 ratio         PTT - ( 2024 11:15 )  PTT:30.9 sec  Urinalysis Basic - ( 2024 12:05 )    Color: Yellow / Appearance: Clear / S.017 / pH: x  Gluc: x / Ketone: Negative mg/dL  / Bili: Negative / Urobili: 0.2 mg/dL   Blood: x / Protein: >=1000 mg/dL / Nitrite: Negative   Leuk Esterase: Negative / RBC: 12 /HPF / WBC 1 /HPF   Sq Epi: x / Non Sq Epi: x / Bacteria: Negative /HPF    Lipase: 137 U/L ( @ 01:30)    RADIOLOGY & ADDITIONAL TESTS:  CT abdomen 24  IMPRESSION:  Inadequately distended proximal colonic loop or query long segmental   colitis. No bowel obstruction. Nonemergent colonoscopy suggested.   INTERVAL HPI/OVERNIGHT EVENTS:  HPI:    Patient is a 59yo M with pmhx CKD 3, HTN, lost to follow up with PCP (per chart review used to take medications for HTN, but patient denies) presenting for vomiting times 3 weeks. Per patient, he began having subjective fevers and vomiting 2-4x/ day for 3 weeks with occasional small amounts of blood  with inability to hold down solids or liquids along with diarrhea for 2 days x8 episodes. Patient also notes that for the past 4 years he has been getting chest pain while walking, which is getting worse. Currently endorsing worsening central chest  pain in the ED along with a headache. Denies shortness of breath or palpitations. Denies changes in urine output , frequency or dysuria.   Notes hx of renal failure in his brother at 71yo requiring transplant   Denied recent traveling, sick exposure, NSAID use, exposure to Hep C, Hep B, HIV, new foods.     GI consult:  Patient admits to nausea and vomiting for 3 weeks, with 1-3 episodes of emesis with trace of blood. Patient admits to having diarrhea for a "few days" but denies blood in stool. Patient has been unable to tolerate po intake for about 3 weeks. Denies abdominal pain. Denies having a PCP, described having an "endoscopy" for gallbladder stones and has never had a colonoscopy.     MEDICATIONS  (STANDING):  cefTRIAXone   IVPB 1000 milliGRAM(s) IV Intermittent every 24 hours  dextrose 5% 1000 milliLiter(s) (50 mL/Hr) IV Continuous <Continuous>  heparin   Injectable 5000 Unit(s) SubCutaneous every 12 hours  labetalol 100 milliGRAM(s) Oral two times a day  metroNIDAZOLE  IVPB      metroNIDAZOLE  IVPB 500 milliGRAM(s) IV Intermittent every 8 hours    MEDICATIONS  (PRN):  acetaminophen     Tablet .. 650 milliGRAM(s) Oral every 6 hours PRN Temp greater or equal to 38C (100.4F), Mild Pain (1 - 3)  aluminum hydroxide/magnesium hydroxide/simethicone Suspension 30 milliLiter(s) Oral every 4 hours PRN Dyspepsia  melatonin 3 milliGRAM(s) Oral at bedtime PRN Insomnia  ondansetron Injectable 4 milliGRAM(s) IV Push every 8 hours PRN Nausea and/or Vomiting      Allergies  No Known Allergies  Intolerances  PAST MEDICAL & SURGICAL HISTORY:  H/O gastroesophageal reflux (GERD)  Hypertension  S/P Cholecystectomy    REVIEW OF SYSTEMS- See HPI  PHYSICAL EXAM:   Vital Signs:  Vital Signs Last 24 Hrs  T(C): 39.4 (2024 10:28), Max: 39.4 (2024 10:28)  T(F): 102.9 (2024 10:28), Max: 102.9 (2024 10:28)  HR: 97 (2024 13:28) (97 - 107)  BP: 152/92 (2024 13:28) (151/89 - 218/113)  BP(mean): --  RR: 18 (2024 13:28) (18 - 21)  SpO2: 94% (2024 13:28) (91% - 99%)    Parameters below as of 2024 13:28  Patient On (Oxygen Delivery Method): room air      Daily Height in cm: 162.56 (2024 00:45)    Daily I&O's Summary      GENERAL:  Appears stated age, well-groomed, well-nourished, no distress  HEENT:  Moist and clear sclera and conjunctivae  CHEST:  Full & symmetric excursion, no increased effort, breath sounds clear  HEART:  Regular rhythm, S1, S2  ABDOMEN:  Soft, non-tender, non-distended, normoactive bowel sounds,  no masses   EXTREMITIES:  no edema  SKIN:  No rash  NEURO:  Alert, oriented, no tremor, no encephalopathy, Tajik speaking      LABS:                        7.0    x     )-----------( x        ( 2024 13:35 )             19.4     01-03    131<L>  |  97  |  92<H>  ----------------------------<  110<H>  4.6   |  16<L>  |  16.41<H>    Ca    6.6<L>      2024 11:15    TPro  6.4  /  Alb  2.3<L>  /  TBili  0.4  /  DBili  x   /  AST  10  /  ALT  16  /  AlkPhos  99  01-03    PT/INR - ( 2024 11:15 )   PT: 12.0 sec;   INR: 1.03 ratio         PTT - ( 2024 11:15 )  PTT:30.9 sec  Urinalysis Basic - ( 2024 12:05 )    Color: Yellow / Appearance: Clear / S.017 / pH: x  Gluc: x / Ketone: Negative mg/dL  / Bili: Negative / Urobili: 0.2 mg/dL   Blood: x / Protein: >=1000 mg/dL / Nitrite: Negative   Leuk Esterase: Negative / RBC: 12 /HPF / WBC 1 /HPF   Sq Epi: x / Non Sq Epi: x / Bacteria: Negative /HPF    Lipase: 137 U/L ( @ 01:30)    RADIOLOGY & ADDITIONAL TESTS:  CT abdomen 24  IMPRESSION:  Inadequately distended proximal colonic loop or query long segmental   colitis. No bowel obstruction. Nonemergent colonoscopy suggested.   INTERVAL HPI / OVERNIGHT EVENTS:  HPI:    Patient is a 57yo M with pmhx CKD 3, HTN, lost to follow up with PCP (per chart review used to take medications for HTN, but patient denies) presenting for vomiting times 3 weeks. Per patient, he began having subjective fevers and vomiting 2-4x/ day for 3 weeks with occasional small amounts of blood  with inability to hold down solids or liquids along with diarrhea for 2 days x8 episodes. Patient also notes that for the past 4 years he has been getting chest pain while walking, which is getting worse. Currently endorsing worsening central chest  pain in the ED along with a headache. Denies shortness of breath or palpitations. Denies changes in urine output , frequency or dysuria.   Notes hx of renal failure in his brother at 67yo requiring transplant   Denied recent traveling, sick exposure, NSAID use, exposure to Hep C, Hep B, HIV, new foods.     GI consult:  Patient admits to nausea and vomiting for 3 weeks, with 1-3 episodes of emesis with trace of blood. Patient admits to having diarrhea for a "few days" but denies blood in stool. Patient has been unable to tolerate po intake for about 3 weeks. Denies abdominal pain. Denies having a PCP, described having an "endoscopy" for gallbladder stones and has never had a colonoscopy.     MEDICATIONS  (STANDING):  cefTRIAXone   IVPB 1000 milliGRAM(s) IV Intermittent every 24 hours  dextrose 5% 1000 milliLiter(s) (50 mL/Hr) IV Continuous <Continuous>  heparin   Injectable 5000 Unit(s) SubCutaneous every 12 hours  labetalol 100 milliGRAM(s) Oral two times a day  metroNIDAZOLE  IVPB      metroNIDAZOLE  IVPB 500 milliGRAM(s) IV Intermittent every 8 hours    MEDICATIONS  (PRN):  acetaminophen     Tablet .. 650 milliGRAM(s) Oral every 6 hours PRN Temp greater or equal to 38C (100.4F), Mild Pain (1 - 3)  aluminum hydroxide/magnesium hydroxide/simethicone Suspension 30 milliLiter(s) Oral every 4 hours PRN Dyspepsia  melatonin 3 milliGRAM(s) Oral at bedtime PRN Insomnia  ondansetron Injectable 4 milliGRAM(s) IV Push every 8 hours PRN Nausea and/or Vomiting      Allergies  No Known Allergies  Intolerances  PAST MEDICAL & SURGICAL HISTORY:  H/O gastroesophageal reflux (GERD)  Hypertension  S/P Cholecystectomy    REVIEW OF SYSTEMS- See HPI  PHYSICAL EXAM:   Vital Signs:  Vital Signs Last 24 Hrs  T(C): 39.4 (2024 10:28), Max: 39.4 (2024 10:28)  T(F): 102.9 (2024 10:28), Max: 102.9 (2024 10:28)  HR: 97 (2024 13:28) (97 - 107)  BP: 152/92 (2024 13:28) (151/89 - 218/113)  BP(mean): --  RR: 18 (2024 13:28) (18 - 21)  SpO2: 94% (2024 13:28) (91% - 99%)    Parameters below as of 2024 13:28  Patient On (Oxygen Delivery Method): room air      Daily Height in cm: 162.56 (2024 00:45)    Daily I&O's Summary      GENERAL:  Appears stated age, well-groomed, well-nourished, no distress  HEENT:  Moist and clear sclera and conjunctivae  CHEST:  Full & symmetric excursion, no increased effort, breath sounds clear  HEART:  Regular rhythm, S1, S2  ABDOMEN:  Soft, non-tender, non-distended, normoactive bowel sounds,  no masses   EXTREMITIES:  no edema  SKIN:  No rash  NEURO:  Alert, oriented, no tremor, no encephalopathy, Upper sorbian speaking      LABS:                        7.0    x     )-----------( x        ( 2024 13:35 )             19.4     01-03    131<L>  |  97  |  92<H>  ----------------------------<  110<H>  4.6   |  16<L>  |  16.41<H>    Ca    6.6<L>      2024 11:15    TPro  6.4  /  Alb  2.3<L>  /  TBili  0.4  /  DBili  x   /  AST  10  /  ALT  16  /  AlkPhos  99  01-03    PT/INR - ( 2024 11:15 )   PT: 12.0 sec;   INR: 1.03 ratio         PTT - ( 2024 11:15 )  PTT:30.9 sec  Urinalysis Basic - ( 2024 12:05 )    Color: Yellow / Appearance: Clear / S.017 / pH: x  Gluc: x / Ketone: Negative mg/dL  / Bili: Negative / Urobili: 0.2 mg/dL   Blood: x / Protein: >=1000 mg/dL / Nitrite: Negative   Leuk Esterase: Negative / RBC: 12 /HPF / WBC 1 /HPF   Sq Epi: x / Non Sq Epi: x / Bacteria: Negative /HPF    Lipase: 137 U/L ( @ 01:30)    RADIOLOGY & ADDITIONAL TESTS:  CT abdomen 24  IMPRESSION:  Inadequately distended proximal colonic loop or query long segmental   colitis. No bowel obstruction. Nonemergent colonoscopy suggested.   INTERVAL HPI / OVERNIGHT EVENTS:  HPI:    Patient is a 57yo M with pmhx CKD 3, HTN, lost to follow up with PCP (per chart review used to take medications for HTN, but patient denies) presenting for vomiting times 3 weeks. Per patient, he began having subjective fevers and vomiting 2-4x/ day for 3 weeks with occasional small amounts of blood  with inability to hold down solids or liquids along with diarrhea for 2 days x8 episodes. Patient also notes that for the past 4 years he has been getting chest pain while walking, which is getting worse. Currently endorsing worsening central chest  pain in the ED along with a headache. Denies shortness of breath or palpitations. Denies changes in urine output , frequency or dysuria.   Notes hx of renal failure in his brother at 71yo requiring transplant   Denied recent traveling, sick exposure, NSAID use, exposure to Hep C, Hep B, HIV, new foods.     GI consult:  Patient admits to nausea and vomiting for 3 weeks, with 1-3 episodes of emesis with trace of blood. Patient admits to having diarrhea for a "few days" but denies blood in stool. Patient has been unable to tolerate po intake for about 3 weeks. Denies abdominal pain. Denies having a PCP, described having an "endoscopy" for gallbladder stones and has never had a colonoscopy.     MEDICATIONS  (STANDING):  cefTRIAXone   IVPB 1000 milliGRAM(s) IV Intermittent every 24 hours  dextrose 5% 1000 milliLiter(s) (50 mL/Hr) IV Continuous <Continuous>  heparin   Injectable 5000 Unit(s) SubCutaneous every 12 hours  labetalol 100 milliGRAM(s) Oral two times a day  metroNIDAZOLE  IVPB      metroNIDAZOLE  IVPB 500 milliGRAM(s) IV Intermittent every 8 hours    MEDICATIONS  (PRN):  acetaminophen     Tablet .. 650 milliGRAM(s) Oral every 6 hours PRN Temp greater or equal to 38C (100.4F), Mild Pain (1 - 3)  aluminum hydroxide/magnesium hydroxide/simethicone Suspension 30 milliLiter(s) Oral every 4 hours PRN Dyspepsia  melatonin 3 milliGRAM(s) Oral at bedtime PRN Insomnia  ondansetron Injectable 4 milliGRAM(s) IV Push every 8 hours PRN Nausea and/or Vomiting      Allergies  No Known Allergies  Intolerances  PAST MEDICAL & SURGICAL HISTORY:  H/O gastroesophageal reflux (GERD)  Hypertension  S/P Cholecystectomy    REVIEW OF SYSTEMS- See HPI  PHYSICAL EXAM:   Vital Signs:  Vital Signs Last 24 Hrs  T(C): 39.4 (2024 10:28), Max: 39.4 (2024 10:28)  T(F): 102.9 (2024 10:28), Max: 102.9 (2024 10:28)  HR: 97 (2024 13:28) (97 - 107)  BP: 152/92 (2024 13:28) (151/89 - 218/113)  BP(mean): --  RR: 18 (2024 13:28) (18 - 21)  SpO2: 94% (2024 13:28) (91% - 99%)    Parameters below as of 2024 13:28  Patient On (Oxygen Delivery Method): room air      Daily Height in cm: 162.56 (2024 00:45)    Daily I&O's Summary      GENERAL:  Appears stated age, well-groomed, well-nourished, no distress  HEENT:  Moist and clear sclera and conjunctivae  CHEST:  Full & symmetric excursion, no increased effort, breath sounds clear  HEART:  Regular rhythm, S1, S2  ABDOMEN:  Soft, non-tender, non-distended, normoactive bowel sounds,  no masses   EXTREMITIES:  no edema  SKIN:  No rash  NEURO:  Alert, oriented, no tremor, no encephalopathy, Albanian speaking      LABS:                        7.0    x     )-----------( x        ( 2024 13:35 )             19.4     01-03    131<L>  |  97  |  92<H>  ----------------------------<  110<H>  4.6   |  16<L>  |  16.41<H>    Ca    6.6<L>      2024 11:15    TPro  6.4  /  Alb  2.3<L>  /  TBili  0.4  /  DBili  x   /  AST  10  /  ALT  16  /  AlkPhos  99  01-03    PT/INR - ( 2024 11:15 )   PT: 12.0 sec;   INR: 1.03 ratio         PTT - ( 2024 11:15 )  PTT:30.9 sec  Urinalysis Basic - ( 2024 12:05 )    Color: Yellow / Appearance: Clear / S.017 / pH: x  Gluc: x / Ketone: Negative mg/dL  / Bili: Negative / Urobili: 0.2 mg/dL   Blood: x / Protein: >=1000 mg/dL / Nitrite: Negative   Leuk Esterase: Negative / RBC: 12 /HPF / WBC 1 /HPF   Sq Epi: x / Non Sq Epi: x / Bacteria: Negative /HPF    Lipase: 137 U/L ( @ 01:30)    RADIOLOGY & ADDITIONAL TESTS:  CT abdomen 24  IMPRESSION:  Inadequately distended proximal colonic loop or query long segmental   colitis. No bowel obstruction. Nonemergent colonoscopy suggested.   GI Consult    Patient is a 59yo M with pmhx CKD 3, HTN, lost to follow up with PCP (per chart review used to take medications for HTN, but patient denies) presenting for vomiting times 3 weeks.   Per patient, he began having subjective fevers and vomiting 2-4x/ day for 3 weeks with occasional small amounts of blood  with inability to hold down solids or liquids along with diarrhea for 2 days x8 episodes. Patient also notes that for the past 4 years he has been getting chest pain while walking, which is getting worse. Currently endorsing worsening central chest  pain in the ED along with a headache. Denies shortness of breath or palpitations. Denies changes in urine output , frequency or dysuria.   Notes hx of renal failure in his brother at 71yo requiring transplant   Denied recent traveling, sick exposure, NSAID use, exposure to Hep C, Hep B, HIV, new foods.     GI consult:  Nausea and vomiting for 3 weeks, with 1-3 episodes of emesis with trace of blood. Patient admits to having diarrhea for a "few days" but denies blood in stool. Patient has been unable to tolerate po intake for about 3 weeks. Denies abdominal pain. Denies having a PCP, described having an "endoscopy" for gallbladder stones ([place] review showed ERCP report) and he denies ever having a colonoscopy.         MEDICATIONS  (STANDING):  cefTRIAXone   IVPB 1000 milliGRAM(s) IV Intermittent every 24 hours  dextrose 5% 1000 milliLiter(s) (50 mL/Hr) IV Continuous <Continuous>  heparin   Injectable 5000 Unit(s) SubCutaneous every 12 hours  labetalol 100 milliGRAM(s) Oral two times a day  metroNIDAZOLE  IVPB      metroNIDAZOLE  IVPB 500 milliGRAM(s) IV Intermittent every 8 hours    MEDICATIONS  (PRN):  acetaminophen     Tablet .. 650 milliGRAM(s) Oral every 6 hours PRN Temp greater or equal to 38C (100.4F), Mild Pain (1 - 3)  aluminum hydroxide/magnesium hydroxide/simethicone Suspension 30 milliLiter(s) Oral every 4 hours PRN Dyspepsia  melatonin 3 milliGRAM(s) Oral at bedtime PRN Insomnia  ondansetron Injectable 4 milliGRAM(s) IV Push every 8 hours PRN Nausea and/or Vomiting      Allergies  No Known Allergies  Intolerances      PAST MEDICAL & SURGICAL HISTORY:  H/O gastroesophageal reflux (GERD)  Hypertension  S/P Cholecystectomy        REVIEW OF SYSTEMS- See HPI        PHYSICAL EXAM:   Vital Signs:  Vital Signs Last 24 Hrs  T(C): 39.4 (2024 10:28), Max: 39.4 (2024 10:28)  T(F): 102.9 (2024 10:28), Max: 102.9 (2024 10:28)  HR: 97 (2024 13:28) (97 - 107)  BP: 152/92 (2024 13:28) (151/89 - 218/113)  BP(mean): --  RR: 18 (2024 13:28) (18 - 21)  SpO2: 94% (2024 13:28) (91% - 99%)    Parameters below as of 2024 13:28  Patient On (Oxygen Delivery Method): room air      Daily Height in cm: 162.56 (2024 00:45)    Daily I&O's Summary      GENERAL:  NAD  HEENT:  MMM. EOMI. Anicteric sclerae  CHEST:  Full & symmetric excursion, no increased effort, breath sounds clear  HEART:  Regular rhythm, S1, S2  ABDOMEN:  Soft, non-tender, non-distended, + bowel sounds,  no masses   EXTREMITIES:  no edema  SKIN:  No rash or jaundice  NEURO:  Alert, oriented, no tremor, no encephalopathy, French speaking      LABS:                        7.0    x     )-----------( x        ( 2024 13:35 )             19.4     01-03    131<L>  |  97  |  92<H>  ----------------------------<  110<H>  4.6   |  16<L>  |  16.41<H>    Ca    6.6<L>      2024 11:15      TPro  6.4  /  Alb  2.3<L>  /  TBili  0.4  /  DBili  x   /  AST  10  /  ALT  16  /  AlkPhos  99  01-03      PT/INR - ( 2024 11:15 )   PT: 12.0 sec;   INR: 1.03 ratio    PTT - ( 2024 11:15 )  PTT:30.9 sec        Urinalysis Basic - ( 2024 12:05 )    Color: Yellow / Appearance: Clear / S.017 / pH: x  Gluc: x / Ketone: Negative mg/dL  / Bili: Negative / Urobili: 0.2 mg/dL   Blood: x / Protein: >=1000 mg/dL / Nitrite: Negative   Leuk Esterase: Negative / RBC: 12 /HPF / WBC 1 /HPF   Sq Epi: x / Non Sq Epi: x / Bacteria: Negative /HPF    Lipase: 137 U/L ( @ 01:30)        RADIOLOGY & ADDITIONAL TESTS:  CT abdomen 24  IMPRESSION:  Inadequately distended proximal colonic loop or query long segmental   colitis. No bowel obstruction. Nonemergent colonoscopy suggested.   GI Consult    Patient is a 59yo M with pmhx CKD 3, HTN, lost to follow up with PCP (per chart review used to take medications for HTN, but patient denies) presenting for vomiting times 3 weeks.   Per patient, he began having subjective fevers and vomiting 2-4x/ day for 3 weeks with occasional small amounts of blood  with inability to hold down solids or liquids along with diarrhea for 2 days x8 episodes. Patient also notes that for the past 4 years he has been getting chest pain while walking, which is getting worse. Currently endorsing worsening central chest  pain in the ED along with a headache. Denies shortness of breath or palpitations. Denies changes in urine output , frequency or dysuria.   Notes hx of renal failure in his brother at 69yo requiring transplant   Denied recent traveling, sick exposure, NSAID use, exposure to Hep C, Hep B, HIV, new foods.     GI consult:  Nausea and vomiting for 3 weeks, with 1-3 episodes of emesis with trace of blood. Patient admits to having diarrhea for a "few days" but denies blood in stool. Patient has been unable to tolerate po intake for about 3 weeks. Denies abdominal pain. Denies having a PCP, described having an "endoscopy" for gallbladder stones ([place] review showed ERCP report) and he denies ever having a colonoscopy.         MEDICATIONS  (STANDING):  cefTRIAXone   IVPB 1000 milliGRAM(s) IV Intermittent every 24 hours  dextrose 5% 1000 milliLiter(s) (50 mL/Hr) IV Continuous <Continuous>  heparin   Injectable 5000 Unit(s) SubCutaneous every 12 hours  labetalol 100 milliGRAM(s) Oral two times a day  metroNIDAZOLE  IVPB      metroNIDAZOLE  IVPB 500 milliGRAM(s) IV Intermittent every 8 hours    MEDICATIONS  (PRN):  acetaminophen     Tablet .. 650 milliGRAM(s) Oral every 6 hours PRN Temp greater or equal to 38C (100.4F), Mild Pain (1 - 3)  aluminum hydroxide/magnesium hydroxide/simethicone Suspension 30 milliLiter(s) Oral every 4 hours PRN Dyspepsia  melatonin 3 milliGRAM(s) Oral at bedtime PRN Insomnia  ondansetron Injectable 4 milliGRAM(s) IV Push every 8 hours PRN Nausea and/or Vomiting      Allergies  No Known Allergies  Intolerances      PAST MEDICAL & SURGICAL HISTORY:  H/O gastroesophageal reflux (GERD)  Hypertension  S/P Cholecystectomy        REVIEW OF SYSTEMS- See HPI        PHYSICAL EXAM:   Vital Signs:  Vital Signs Last 24 Hrs  T(C): 39.4 (2024 10:28), Max: 39.4 (2024 10:28)  T(F): 102.9 (2024 10:28), Max: 102.9 (2024 10:28)  HR: 97 (2024 13:28) (97 - 107)  BP: 152/92 (2024 13:28) (151/89 - 218/113)  BP(mean): --  RR: 18 (2024 13:28) (18 - 21)  SpO2: 94% (2024 13:28) (91% - 99%)    Parameters below as of 2024 13:28  Patient On (Oxygen Delivery Method): room air      Daily Height in cm: 162.56 (2024 00:45)    Daily I&O's Summary      GENERAL:  NAD  HEENT:  MMM. EOMI. Anicteric sclerae  CHEST:  Full & symmetric excursion, no increased effort, breath sounds clear  HEART:  Regular rhythm, S1, S2  ABDOMEN:  Soft, non-tender, non-distended, + bowel sounds,  no masses   EXTREMITIES:  no edema  SKIN:  No rash or jaundice  NEURO:  Alert, oriented, no tremor, no encephalopathy, Turkish speaking      LABS:                        7.0    x     )-----------( x        ( 2024 13:35 )             19.4     01-03    131<L>  |  97  |  92<H>  ----------------------------<  110<H>  4.6   |  16<L>  |  16.41<H>    Ca    6.6<L>      2024 11:15      TPro  6.4  /  Alb  2.3<L>  /  TBili  0.4  /  DBili  x   /  AST  10  /  ALT  16  /  AlkPhos  99  01-03      PT/INR - ( 2024 11:15 )   PT: 12.0 sec;   INR: 1.03 ratio    PTT - ( 2024 11:15 )  PTT:30.9 sec        Urinalysis Basic - ( 2024 12:05 )    Color: Yellow / Appearance: Clear / S.017 / pH: x  Gluc: x / Ketone: Negative mg/dL  / Bili: Negative / Urobili: 0.2 mg/dL   Blood: x / Protein: >=1000 mg/dL / Nitrite: Negative   Leuk Esterase: Negative / RBC: 12 /HPF / WBC 1 /HPF   Sq Epi: x / Non Sq Epi: x / Bacteria: Negative /HPF    Lipase: 137 U/L ( @ 01:30)        RADIOLOGY & ADDITIONAL TESTS:  CT abdomen 24  IMPRESSION:  Inadequately distended proximal colonic loop or query long segmental   colitis. No bowel obstruction. Nonemergent colonoscopy suggested.

## 2024-01-03 NOTE — CONSULT NOTE ADULT - ASSESSMENT
Patient is a 57yo M with pmhx CKD 3, HTN, no PCP. Currently, last vomiting episode overnight, no N/D at this time, NPO but tolerating po meds as per RN.    GI consult:  sepsis, vomiting x 3 weeks  Colonoscopy- Never  ERCP- 2006 as per patient.    Hemoglobin: 7.0 g/dL (01.03.24 @ 13:35)   Hemoglobin: 7.2 g/dL (01.03.24 @ 11:15)   Hemoglobin: 8.2 g/dL (01.03.24 @ 01:30)     Hematocrit: 19.4 % (01.03.24 @ 13:35)   Hematocrit: 19.1 % (01.03.24 @ 11:15)   Hematocrit: 22.3 % (01.03.24 @ 01:30)

## 2024-01-03 NOTE — PROGRESS NOTE ADULT - PROBLEM SELECTOR PLAN 3
-Pt not meeting sepsis criteria on admission, found to be meeting sepsis criteria today with , temp 102.9, RR 22, and suspected GI infection  -Blood cultures  -UA  -urine cultures  -lactate  -pt already on ceftriaxone and metronidazole, continue in setting of suspected GI source  -consulted nephrology Dr. Laird regarding fluids in setting of worsening CKD, recommended holding fluids for now  -Tylenol PRN for fever  -Will continue to monitor labs and vitals

## 2024-01-03 NOTE — PATIENT PROFILE ADULT - FALL HARM RISK - HARM RISK INTERVENTIONS
Assistance with ambulation/Assistance OOB with selected safe patient handling equipment/Communicate Risk of Fall with Harm to all staff/Reinforce activity limits and safety measures with patient and family/Tailored Fall Risk Interventions/Visual Cue: Yellow wristband and red socks/Bed in lowest position, wheels locked, appropriate side rails in place/Call bell, personal items and telephone in reach/Instruct patient to call for assistance before getting out of bed or chair/Non-slip footwear when patient is out of bed/[place] to call system/Physically safe environment - no spills, clutter or unnecessary equipment/Purposeful Proactive Rounding/Room/bathroom lighting operational, light cord in reach Assistance with ambulation/Assistance OOB with selected safe patient handling equipment/Communicate Risk of Fall with Harm to all staff/Reinforce activity limits and safety measures with patient and family/Tailored Fall Risk Interventions/Visual Cue: Yellow wristband and red socks/Bed in lowest position, wheels locked, appropriate side rails in place/Call bell, personal items and telephone in reach/Instruct patient to call for assistance before getting out of bed or chair/Non-slip footwear when patient is out of bed/[place] to call system/Physically safe environment - no spills, clutter or unnecessary equipment/Purposeful Proactive Rounding/Room/bathroom lighting operational, light cord in reach

## 2024-01-03 NOTE — PROGRESS NOTE ADULT - PROBLEM SELECTOR PLAN 1
-S/p CTAP with IV contrast  -History of HTN, not on medications (poor medical follow up)  -urine with 300 protein, small blood, 100 glucose  -S/p 1L fluid in ED  -S/p 10mg hydralazine IV push  -C/w 100mg BID labetalol  -F/u A1c, lipid panel  -Appreciate cardiology recommendations

## 2024-01-03 NOTE — ED PROVIDER NOTE - CLINICAL SUMMARY MEDICAL DECISION MAKING FREE TEXT BOX
58-year-old male presenting with 3 weeks of epigastric abdominal pain.  Differential is broad and includes but not limited to liver disease, pancreatitis, kidney disease, atypical ACS.  Based on the degree of symptoms we will get a CT scan as well as laboratory studies to help differentiate the diagnosis.  Will give some IV fluids as well.

## 2024-01-03 NOTE — H&P ADULT - NSHPPHYSICALEXAM_GEN_ALL_CORE
Vital Signs Last 24 Hrs  T(C): 36.9 (03 Jan 2024 00:45), Max: 36.9 (03 Jan 2024 00:45)  T(F): 98.5 (03 Jan 2024 00:45), Max: 98.5 (03 Jan 2024 00:45)  HR: 100 (03 Jan 2024 04:36) (100 - 102)  BP: 206/115 (03 Jan 2024 04:36) (206/115 - 218/113)  RR: 18 (03 Jan 2024 04:36) (18 - 18)  SpO2: 99% (03 Jan 2024 04:36) (97% - 99%)    O2 Parameters below as of 03 Jan 2024 04:36  Patient On (Oxygen Delivery Method): room air    PHYSICAL EXAM:    GENERAL: Appears diaphoretic, confused per niece  HEAD:  Atraumatic, Normocephalic  EYES: EOMI, PERRLA, conjunctiva and sclera clear  ENT: Moist mucous membranes  NECK: Supple  CHEST/LUNG: Clear to auscultation bilaterally, good air entry bilaterally; No wheezing, rales, or rhonchi. Unlabored respirations  HEART: Regular rate and rhythm. S1 and S2. No murmurs, rubs, or gallops  ABDOMEN: Soft,  Nondistended. Bowel sounds present x4 quadrants; No palpable hepatomegaly.   EXTREMITIES:  2+ Peripheral Pulses. Capillary refill <2 seconds. No clubbing, cyanosis, or edema  NERVOUS SYSTEM:  Alert & Oriented X3, speech clear. No deficits. No asterixis   MSK: FROM all 4 extremities, full and equal strength  SKIN: No rashes, bruises, or other lesions

## 2024-01-03 NOTE — H&P ADULT - NSHPREVIEWOFSYSTEMS_GEN_ALL_CORE
Gen: + fever, decr appetite  Eyes: No eye irritation or discharge  ENT: No ear pain, congestion, sore throat  Resp: No cough or trouble breathing  Cardiovascular: + chest pain No palpitation  Gastroenteric: + nausea/vomiting, diarrhea  :  No change in urine output; no dysuria  MS: No joint or muscle pain  Skin: No rashes  Neuro: + headache; no abnormal movements  Remainder negative, except as per the HPI

## 2024-01-03 NOTE — H&P ADULT - ATTENDING COMMENTS
I have personally seen and examined patient on the above date.  I discussed the case with Dr Lentz and I agree with findings and plan as detailed per note above, which I have amended where appropriate.    Superseding the above.  58 Malawian speaking M with hx of HTN, HLD (off meds, no PCP) with hx of CKD3 [facility] pw nausea, vomiting of small amts, abd pain for the past several weeks. Diarrhea with some loose stools for the past 2 days. +subjective fevers. +chronic HAs, +CP after walking 5-6 blocks. Denied SOB, lower extremity edema. +normal urine output. +occ dysuria. No NSAID use. Pt poor historian, Niece Jen Garcia at bedside states he is more confused than usual. In ED afebrile P: 102 BP: 215/107 sat 97% on RA.   FMhx: +brother with HTN, DM with ESRD s/p renal tx.   Sochx: denied tob, ETOH or illicit drug use.   PShx: cholecytectomy  NKDA  PE:   NAD  CV: S1S2, RRR, no mgr  CL: CTA bl   Abd: soft some epigastric tenderness on palp. no eliel, no rigidity.   Ext; neg CCE  Labs:   WBC: 7.58 hgb: 8.2 plt: 244 81%N CO2: 19 BUN/cr: 94/17/1 B lip: 137  EKG: personally rev. sinus tachy at 102bpm LAD, inc RBBB.no acute ST changes  CXR: personally rev. NAPD but somewhat increased markings throughout  CTAP:   IMPRESSION:    Inadequately distended proximal colonic loop or query long segmental   colitis. No bowel obstruction. Nonemergent colonoscopy suggested.    AP: 58M hx of HTN, HLD noncompliant with FU off meds with renal failure, hypertensive urgency, chest pain.    #hypertensive urgency with chest pain  IV hydralazine x 1  start labetalol 100mg bid and titrate upwards  trend trops, serial EKGs, ECHO, cardiology consult.   #Renal failure with uremic sxs of mild confusion, NV  s/p 1L IVFs in ED  probably likely sec to longstanding uncontrolled HTN  CTAP with reported normal kidneys, UA with proteinuria.   check 24hr urine for prot/cr, UPEP, UIF  check SPEP, SIF, C3, C4, DAVID, dsDNA, ANCAs, HIV, HepB, HCV  to be complete.   check hgbA1c, lipid panel  renal consult. s/p CTAP with IV contrast  #?colitis vs underdistention.  IV cipro and flagyl for now  GI pcr panel, stool cxs, o/p   #Anemia ?sec to renal disease  check anemia profile.     Niece Jen Garcia at bedside updated.   Shelbi SHEPHERD reviewed  D/W Dr Villanueva I have personally seen and examined patient on the above date.  I discussed the case with Dr Lentz and I agree with findings and plan as detailed per note above, which I have amended where appropriate.    Superseding the above.  58 Slovak speaking M with hx of HTN, HLD (off meds, no PCP) with hx of CKD3 as per [facility] pw nausea, vomiting of small amts, abd pain for the past several weeks. Diarrhea with some loose stools for the past 2 days. +subjective fevers. +chronic HAs, +CP after walking 5-6 blocks. Denied SOB, lower extremity edema. +normal urine output. +occ dysuria. No NSAID use. Pt poor historian, Niece Jen Garcia at bedside states he is more confused than usual. In ED afebrile P: 102 BP: 215/107 sat 97% on RA.   FMhx: +brother with HTN, DM with ESRD s/p renal tx.   Sochx: denied tob, ETOH or illicit drug use.   PShx: cholecytectomy  NKDA  PE:   NAD  CV: S1S2, RRR, no mgr  CL: CTA bl   Abd: soft some epigastric tenderness on palp. no eliel, no rigidity.   Ext; neg CCE  Labs:   WBC: 7.58 hgb: 8.2 plt: 244 81%N CO2: 19 BUN/cr: 94/17/1 B lip: 137  EKG: personally rev. sinus tachy at 102bpm LAD, inc RBBB.no acute ST changes  CXR: personally rev. NAPD but somewhat increased markings throughout  CTAP:   IMPRESSION:    Inadequately distended proximal colonic loop or query long segmental   colitis. No bowel obstruction. Nonemergent colonoscopy suggested.    AP: 58M hx of HTN, HLD noncompliant with FU off meds with renal failure, hypertensive urgency, chest pain.    #hypertensive urgency with chest pain  IV hydralazine x 1  start labetalol 100mg bid and titrate upwards  trend trops, serial EKGs, ECHO, cardiology consult.   #Renal failure with uremic sxs of mild confusion, NV  s/p 1L IVFs in ED  probably likely sec to longstanding uncontrolled HTN  CTAP with reported normal kidneys, UA with proteinuria.   check 24hr urine for prot/cr, UPEP, UIF  check SPEP, SIF, C3, C4, DAVID, dsDNA, ANCAs, HIV, HepB, HCV  to be complete.   check hgbA1c, lipid panel  renal consult. s/p CTAP with IV contrast  #?colitis vs underdistention.  IV cipro and flagyl for now  GI pcr panel, stool cxs, o/p   #Anemia ?sec to renal disease  check anemia profile.     Niece Jen Garcia at bedside updated.   Shelbi HSEPHERD reviewed  D/W Dr Villanueva I have personally seen and examined patient on the above date.  I discussed the case with Dr Lentz and I agree with findings and plan as detailed per note above, which I have amended where appropriate.    Superseding the above.  58 Lithuanian speaking M with hx of HTN, HLD (off meds, no PCP) with [facility]x of CKD3 [facility] pw nausea, vomiting of small amts, abd pain for the past several weeks. Diarrhea with some loose stools for the past 2 days. +subjective fevers. +chronic HAs, +CP after walking 5-6 blocks. Denied SOB, lower extremity edema. +normal urine output. +occ dysuria. No NSAID use. Pt poor historian, Niece Jen Garcia at bedside states he is more confused than usual. In ED afebrile P: 102 BP: 215/107 sat 97% on RA.   FMhx: +brother with HTN, DM with ESRD s/p renal tx.   Sochx: denied tob, ETOH or illicit drug use.   PShx: cholecytectomy  NKDA  PE:   NAD  CV: S1S2, RRR, no mgr  CL: CTA bl   Abd: soft some epigastric tenderness on palp. no eliel, no rigidity.   Ext; neg CCE  Labs:   WBC: 7.58 hgb: 8.2 plt: 244 81%N CO2: 19 BUN/cr: 94/17/1 B lip: 137  EKG: personally rev. sinus tachy at 102bpm LAD, inc RBBB.no acute ST changes  CXR: personally rev. NAPD but somewhat increased markings throughout  CTAP:   IMPRESSION:    Inadequately distended proximal colonic loop or query long segmental   colitis. No bowel obstruction. Nonemergent colonoscopy suggested.    AP: 58M hx of HTN, HLD noncompliant with FU off meds with renal failure, hypertensive urgency, chest pain.    #hypertensive urgency with chest pain  IV hydralazine x 1  start labetalol 100mg bid and titrate upwards  trend trops, serial EKGs, ECHO, cardiology consult.   #Renal failure with uremic sxs of mild confusion, NV  s/p 1L IVFs in ED  probably likely sec to longstanding uncontrolled HTN  CTAP with reported normal kidneys, UA with proteinuria.   check 24hr urine for prot/cr, UPEP, UIF  check SPEP, SIF, C3, C4, DAVID, dsDNA, ANCAs, HIV, HepB, HCV  to be complete.   check hgbA1c, lipid panel  renal consult. s/p CTAP with IV contrast  #?colitis vs underdistention.  IV ceftriaxone and flagyl for now  GI pcr panel, stool cxs, o/p   #Anemia ?sec to renal disease  check anemia profile.     Niece Jen Garcia at bedside updated.   Shelbi SHEPHERD reviewed  D/W Dr Villanueva I have personally seen and examined patient on the above date.  I discussed the case with Dr Lentz and I agree with findings and plan as detailed per note above, which I have amended where appropriate.    Superseding the above.  58 Qatari speaking M with hx of HTN, HLD (off meds, no PCP) with [facility]x of CKD[facility] pw nausea, vomiting of small amts, abd pain for the past several weeks. Diarrhea with some loose stools for the past 2 days. +subjective fevers. +chronic HAs, +CP after walking 5-6 blocks. Denied SOB, lower extremity edema. +normal urine output. +occ dysuria. No NSAID use. Pt poor historian, Niece Jen Garcia at bedside states he is more confused than usual. In ED afebrile P: 102 BP: 215/107 sat 97% on RA.   FMhx: +brother with HTN, DM with ESRD s/p renal tx.   Sochx: denied tob, ETOH or illicit drug use.   PShx: cholecytectomy  NKDA  PE:   NAD  CV: S1S2, RRR, no mgr  CL: CTA bl   Abd: soft some epigastric tenderness on palp. no eliel, no rigidity.   Ext; neg CCE  Labs:   WBC: 7.58 hgb: 8.2 plt: 244 81%N CO2: 19 BUN/cr: 94/17/1 B lip: 137  EKG: personally rev. sinus tachy at 102bpm LAD, inc RBBB.no acute ST changes  CXR: personally rev. NAPD but somewhat increased markings throughout  CTAP:   IMPRESSION:    Inadequately distended proximal colonic loop or query long segmental   colitis. No bowel obstruction. Nonemergent colonoscopy suggested.    AP: 58M hx of HTN, HLD noncompliant with FU off meds with renal failure, hypertensive urgency, chest pain.    #hypertensive urgency with chest pain  IV hydralazine x 1  start labetalol 100mg bid and titrate upwards  trend trops, serial EKGs, ECHO, cardiology consult.   #Renal failure with uremic sxs of mild confusion, NV  s/p 1L IVFs in ED  probably likely sec to longstanding uncontrolled HTN  CTAP with reported normal kidneys, UA with proteinuria.   check 24hr urine for prot/cr, UPEP, UIF  check SPEP, SIF, C3, C4, DAVID, dsDNA, ANCAs, HIV, HepB, HCV  to be complete.   check hgbA1c, lipid panel  renal consult. s/p CTAP with IV contrast  #?colitis vs underdistention.  IV ceftriaxone and flagyl for now  GI pcr panel, stool cxs, o/p   #Anemia ?sec to renal disease  check anemia profile.     Niece Jen Garcia at bedside updated.   Shelbi SHEPHERD reviewed  D/W Dr Villanueva

## 2024-01-03 NOTE — H&P ADULT - HISTORY OF PRESENT ILLNESS
Patient is a 57yo M with pmhx CKD 3, HTN, lost to follow up with PCP (per chart review used to take medications for HTN, but patient denies) presenting for vomiting times 3 weeks. Per patient, he began having subjective fevers and vomiting 2-4x/ day for 3 weeks with occasional small amounts of blood  with inability to hold down solids or liquids along with diarrhea for 2 days x8 episodes. Patient also notes that for the past 4 years he has been getting chest pain while walking, which is getting worse. Currently endorsing worsening central chest  pain in the ED along with a headache. Denies shortness of breath or palpitations. Denies changes in urine output , frequency or dysuria.   Notes hx of renal failure in his brother at 71yo requiring transplant   Denied recent traveling, sick exposure, NSAID use, exposure to Hep C, Hep B, HIV, new foods.     In ED afebrile, /115, , RR 18 satting 99%RA  WBC 7.58, Hb 8.2, BUN 94, Cr 17.09  CTAP with Inadequately distended proximal colonic loop or query long segmental   colitis. No bowel obstruction. Nonemergent colonoscopy suggested.  1L fluids given in ED  Shelbi SHEPHERD personally reviewed

## 2024-01-03 NOTE — CONSULT NOTE ADULT - NS ATTEND AMEND GEN_ALL_CORE FT
Patient seen and examined independently.    patient presents with n/v and chronic exertional chest discomfort    markedly elevated bp on admission at 210/105    troponin elevations most probably due to renal insufficiency    initial ekg no acute abn      echo without wall motion abnormalities      suggest    repeat ekg  troponin today and tomw    currently on labetolol 100 mg q 12 with some improvement in bp to 160/100  continue to titrate b blocker with guidance from renal service eepending on whether or not dialysis to be considered
Patient seen and examined at the bedside. Agree with the assessment and plan of PETER Coombs.

## 2024-01-03 NOTE — ED PROVIDER NOTE - OBJECTIVE STATEMENT
58-year-old male presents emerged from today with 3 weeks of epigastric abdominal pain.  It is associated with some nausea vomiting and there is some diarrhea as well.  However it is mostly related to the epigastric abdominal pain.  The patient was found to be hypertensive here in the emergency department however he has no chest pain no shortness of breath no visual changes and no headache.  States he has not seen a doctor in over 4 years and is not taking any medication for his known blood pressure problems.

## 2024-01-03 NOTE — ED PROVIDER NOTE - PHYSICAL EXAMINATION
Vitals: I have reviewed the patients vital signs  General: nontoxic appearing  HEENT: Atraumatic, normocephalic, airway patent  Eyes: EOMI, tracking appropriately  Neck: no tracheal deviation  Chest/Lungs: no trauma, symmetric chest rise, speaking in complete sentences,  no resp distress  Heart: skin and extremities well perfused, regular rate and rhythm  Neuro: A+Ox3, appears non focal  MSK: no deformities  Skin: no cyanosis, no jaundice   Psych:  Normal mood and affect  Abdomen: Soft mild tenderness to palpation in the epigastric region no rebound no guarding

## 2024-01-03 NOTE — PROGRESS NOTE ADULT - ASSESSMENT
Mr. Garcia is a 57yo M with PMHx of CKD 3, HTN, lost to follow up with PCP (per chart review used to take medications for HTN, but patient denies) presenting for vomiting times 3 weeks, admitted for renal failure and r/o ACS.       Nichristy Jen Garcia, updated [phone].       Case d/w Dr. Peralta Mr. Garcia is a 57yo M with PMHx of CKD 3, HTN, lost to follow up with PCP (per chart review used to take medications for HTN, but patient denies) presenting for vomiting times 3 weeks, admitted for renal failure and r/o ACS.       Nichristy Jen Garcia, updated [phone].       Case d/w Dr. Peralta

## 2024-01-03 NOTE — CONSULT NOTE ADULT - SUBJECTIVE AND OBJECTIVE BOX
MARLEY TRIMBLE  [id number]      HPI: 57yo M with pmhx CKD 3, HTN, lost to follow up with PCP (per chart review used to take medications for HTN, but patient denies) presenting for vomiting times 3 weeks. Per patient, he began having subjective fevers and vomiting 2-4x/ day for 3 weeks with occasional small amounts of blood  with inability to hold down solids or liquids along with diarrhea for 2 days x8 episodes. Patient also notes that for the past 4 years he has been getting chest pain while walking, which is getting worse. Currently endorsing worsening central chest  pain in the ED along with a headache. Denies shortness of breath or palpitations. Denies changes in urine output , frequency or dysuria.   Notes hx of renal failure in his brother at 71yo requiring transplant   Denied recent traveling, sick exposure, NSAID use, exposure to Hep C, Hep B, HIV, new foods.     ALLERGIES:  No Known Allergies      PAST MEDICAL & SURGICAL HISTORY:  H/O gastroesophageal reflux (GERD)      Hypertension      S/P Cholecystectomy            CURRENT MEDICATIONS:  MEDICATIONS  (STANDING):  cefTRIAXone   IVPB 1000 milliGRAM(s) IV Intermittent every 24 hours  heparin   Injectable 5000 Unit(s) SubCutaneous every 12 hours  labetalol 100 milliGRAM(s) Oral two times a day  metroNIDAZOLE  IVPB      metroNIDAZOLE  IVPB 500 milliGRAM(s) IV Intermittent every 8 hours    MEDICATIONS  (PRN):  acetaminophen     Tablet .. 650 milliGRAM(s) Oral every 6 hours PRN Temp greater or equal to 38C (100.4F), Mild Pain (1 - 3)  aluminum hydroxide/magnesium hydroxide/simethicone Suspension 30 milliLiter(s) Oral every 4 hours PRN Dyspepsia  melatonin 3 milliGRAM(s) Oral at bedtime PRN Insomnia  ondansetron Injectable 4 milliGRAM(s) IV Push every 8 hours PRN Nausea and/or Vomiting      SOCIAL HISTORY:  denies etoh and tobacco use    FAMILY HISTORY:      ROS:  All 10 systems reviewed and positives noted in HPI    OBJECTIVE:    VITAL SIGNS:  Vital Signs Last 24 Hrs  T(C): 39.4 (03 Jan 2024 10:28), Max: 39.4 (03 Jan 2024 10:28)  T(F): 102.9 (03 Jan 2024 10:28), Max: 102.9 (03 Jan 2024 10:28)  HR: 107 (03 Jan 2024 10:28) (100 - 107)  BP: 177/96 (03 Jan 2024 10:28) (177/96 - 218/113)  BP(mean): --  RR: 21 (03 Jan 2024 10:28) (18 - 21)  SpO2: 91% (03 Jan 2024 10:28) (91% - 99%)    Parameters below as of 03 Jan 2024 10:28  Patient On (Oxygen Delivery Method): room air        PHYSICAL EXAM:  General: well appearing, no distress  HEENT: sclera anicteric  Neck: supple, no carotid bruits b/l  CVS: JVP ~ 7 cm H20, RRR, s1, s2, no murmurs/rubs/gallops  Chest: unlabored respirations, clear to auscultation b/l  Abdomen: non-distended  Extremities: no lower extremity edema b/l  Neuro: awake, alert & oriented x 3  Psych: normal affect      LABS:                        8.2    7.58  )-----------( 244      ( 03 Jan 2024 01:30 )             22.3     01-03    132<L>  |  98  |  94<H>  ----------------------------<  116<H>  4.2   |  19<L>  |  17.09<H>    Ca    6.7<L>      03 Jan 2024 01:30    TPro  7.1  /  Alb  2.7<L>  /  TBili  0.4  /  DBili  x   /  AST  12  /  ALT  19  /  AlkPhos  108  01-03              ECG: Sinus tach       TTE: < from: TTE Echo Limited or F/U (01.03.24 @ 08:02) >   1. Biatrial enlargement   2. Left ventricular ejection fraction, by visual estimation, is 65 to   70%.   3. Normal global left ventricular systolic function.   4. Increased LV wall thickness.   5. Normal left ventricular internal cavity size.   6. Spectral Doppler shows impaired relaxation pattern of left   ventricular myocardial filling (Grade I diastolic dysfunction).   7. Trivial pericardial effusion.   8. Mild to moderate mitral valve regurgitation.   9. Mild-moderate tricuspid regurgitation.  10. Estimated pulmonary artery systolic pressure is 54.8 mmHg assuming a   right atrial pressure of 3 mmHg, which is consistent with moderate   pulmonary hypertension.       MARLEY TRIMBLE  [id number]      HPI: 57yo M with pmhx CKD 3, HTN, lost to follow up with PCP (per chart review used to take medications for HTN, but patient denies) presenting for vomiting times 3 weeks. Per patient, he began having subjective fevers and vomiting 2-4x/ day for 3 weeks with occasional small amounts of blood  with inability to hold down solids or liquids along with diarrhea for 2 days x8 episodes. Patient also notes that for the past 4 years he has been getting chest pain while walking, which is getting worse. Currently endorsing worsening central chest  pain in the ED along with a headache. Denies shortness of breath or palpitations. Denies changes in urine output , frequency or dysuria.   Notes hx of renal failure in his brother at 69yo requiring transplant   Denied recent traveling, sick exposure, NSAID use, exposure to Hep C, Hep B, HIV, new foods.     ALLERGIES:  No Known Allergies      PAST MEDICAL & SURGICAL HISTORY:  H/O gastroesophageal reflux (GERD)      Hypertension      S/P Cholecystectomy            CURRENT MEDICATIONS:  MEDICATIONS  (STANDING):  cefTRIAXone   IVPB 1000 milliGRAM(s) IV Intermittent every 24 hours  heparin   Injectable 5000 Unit(s) SubCutaneous every 12 hours  labetalol 100 milliGRAM(s) Oral two times a day  metroNIDAZOLE  IVPB      metroNIDAZOLE  IVPB 500 milliGRAM(s) IV Intermittent every 8 hours    MEDICATIONS  (PRN):  acetaminophen     Tablet .. 650 milliGRAM(s) Oral every 6 hours PRN Temp greater or equal to 38C (100.4F), Mild Pain (1 - 3)  aluminum hydroxide/magnesium hydroxide/simethicone Suspension 30 milliLiter(s) Oral every 4 hours PRN Dyspepsia  melatonin 3 milliGRAM(s) Oral at bedtime PRN Insomnia  ondansetron Injectable 4 milliGRAM(s) IV Push every 8 hours PRN Nausea and/or Vomiting      SOCIAL HISTORY:  denies etoh and tobacco use    FAMILY HISTORY:      ROS:  All 10 systems reviewed and positives noted in HPI    OBJECTIVE:    VITAL SIGNS:  Vital Signs Last 24 Hrs  T(C): 39.4 (03 Jan 2024 10:28), Max: 39.4 (03 Jan 2024 10:28)  T(F): 102.9 (03 Jan 2024 10:28), Max: 102.9 (03 Jan 2024 10:28)  HR: 107 (03 Jan 2024 10:28) (100 - 107)  BP: 177/96 (03 Jan 2024 10:28) (177/96 - 218/113)  BP(mean): --  RR: 21 (03 Jan 2024 10:28) (18 - 21)  SpO2: 91% (03 Jan 2024 10:28) (91% - 99%)    Parameters below as of 03 Jan 2024 10:28  Patient On (Oxygen Delivery Method): room air        PHYSICAL EXAM:  General: well appearing, no distress  HEENT: sclera anicteric  Neck: supple, no carotid bruits b/l  CVS: JVP ~ 7 cm H20, RRR, s1, s2, no murmurs/rubs/gallops  Chest: unlabored respirations, clear to auscultation b/l  Abdomen: non-distended  Extremities: no lower extremity edema b/l  Neuro: awake, alert & oriented x 3  Psych: normal affect      LABS:                        8.2    7.58  )-----------( 244      ( 03 Jan 2024 01:30 )             22.3     01-03    132<L>  |  98  |  94<H>  ----------------------------<  116<H>  4.2   |  19<L>  |  17.09<H>    Ca    6.7<L>      03 Jan 2024 01:30    TPro  7.1  /  Alb  2.7<L>  /  TBili  0.4  /  DBili  x   /  AST  12  /  ALT  19  /  AlkPhos  108  01-03              ECG: Sinus tach       TTE: < from: TTE Echo Limited or F/U (01.03.24 @ 08:02) >   1. Biatrial enlargement   2. Left ventricular ejection fraction, by visual estimation, is 65 to   70%.   3. Normal global left ventricular systolic function.   4. Increased LV wall thickness.   5. Normal left ventricular internal cavity size.   6. Spectral Doppler shows impaired relaxation pattern of left   ventricular myocardial filling (Grade I diastolic dysfunction).   7. Trivial pericardial effusion.   8. Mild to moderate mitral valve regurgitation.   9. Mild-moderate tricuspid regurgitation.  10. Estimated pulmonary artery systolic pressure is 54.8 mmHg assuming a   right atrial pressure of 3 mmHg, which is consistent with moderate   pulmonary hypertension.

## 2024-01-03 NOTE — PROGRESS NOTE ADULT - SUBJECTIVE AND OBJECTIVE BOX
Mr. Garcia is a 57yo M with PMHx of CKD 3, HTN, lost to follow up with PCP (per chart review used to take medications for HTN, but patient denies) presenting for vomiting times 3 weeks. Per patient, he began having subjective fevers and vomiting 2-4x/ day for 3 weeks with occasional small amounts of blood  with inability to hold down solids or liquids along with diarrhea for 2 days x8 episodes. Patient also notes that for the past 4 years he has been getting chest pain while walking, which is getting worse. Currently endorsing worsening central chest  pain in the ED along with a headache. Denies shortness of breath or palpitations. Denies changes in urine output , frequency or dysuria. Notes hx of renal failure in his brother at 71yo requiring transplant. Denied recent traveling, sick exposure, NSAID use, exposure to Hep C, Hep B, HIV, new foods.     In ED vitals: afebrile, /115, , RR 18 satting 99%RA. ED Labs were notable for: WBC 7.58, Hb 8.2, BUN 94, Cr 17.09  CTAP: Inadequately distended proximal colonic loop or query long segmental colitis. No bowel obstruction. Nonemergent colonoscopy suggested.  1L fluids given in ED.    1/3/24 AM: Pt seen and examined at bedside by me. Laying bed. Denies abdominal pain. States that he feels better than yesterday.    REVIEW OF SYSTEMS:  CONSTITUTIONAL: (-) weakness, (-) fevers, (-) chills  RESPIRATORY:  (-) shortness of breath, (-) cough,  (-) wheezing,  (-) hemoptysis   CARDIOVASCULAR:  (-) chest pain, (-) palpitations  GASTROINTESTINAL:  (-) abdominal or epigastric pain, (-) nausea, (-) vomiting, (-) diarrhea, (-) constipation, (-) melena,  (-) hematemesis,  (-) hematochezia  GENITOURINARY: (-) dysuria, (-) frequency, (-) hematuria  NEUROLOGICAL: (-) numbness, (-) weakness  SKIN: (-) itching, (-) rashes, (-) lesions    PHYSICAL EXAM:  Vital Signs Last 24 Hrs  T(C): 39.4 (2024 10:28), Max: 39.4 (2024 10:28)  T(F): 102.9 (2024 10:28), Max: 102.9 (2024 10:28)  HR: 97 (:) (97 - 107)  BP: 152/92 (2024 13:28) (151/89 - 218/113)  RR: 18 (2024 13:28) (18 - 21)  SpO2: 94% (:) (91% - 99%)    Parameters below as of 2024 13:28  Patient On (Oxygen Delivery Method): room air      PHYSICAL EXAM:  GENERAL: NAD, lying in bed comfortably  HEAD:  Atraumatic, Normocephalic  RESP: Clear to auscultation bilaterally, good air entry bilaterally; No wheezing, rales, or rhonchi. Unlabored respirations  CARDIAC: Regular rate and rhythm. S1 and S2. No murmurs, rubs, or gallops  GI:  Soft, Nontender, Nondistended. Bowel sounds present x4 quadrants; No hepatomegaly. No splenomegaly.  EXTREMITIES:  2+ Peripheral Pulses. Capillary refill <2 seconds. No clubbing, cyanosis, or edema  NERVOUS SYSTEM:  Alert & Oriented X3, speech clear. No deficits   MSK: FROM all 4 extremities, full and equal strength  SKIN: No rashes, bruises, or other lesions      MEDICATIONS  (STANDING):  cefTRIAXone   IVPB 1000 milliGRAM(s) IV Intermittent every 24 hours  dextrose 5% 1000 milliLiter(s) (50 mL/Hr) IV Continuous <Continuous>  heparin   Injectable 5000 Unit(s) SubCutaneous every 12 hours  labetalol 100 milliGRAM(s) Oral two times a day  metroNIDAZOLE  IVPB 500 milliGRAM(s) IV Intermittent every 8 hours  metroNIDAZOLE  IVPB        MEDICATIONS  (PRN):  acetaminophen     Tablet .. 650 milliGRAM(s) Oral every 6 hours PRN Temp greater or equal to 38C (100.4F), Mild Pain (1 - 3)  aluminum hydroxide/magnesium hydroxide/simethicone Suspension 30 milliLiter(s) Oral every 4 hours PRN Dyspepsia  melatonin 3 milliGRAM(s) Oral at bedtime PRN Insomnia  ondansetron Injectable 4 milliGRAM(s) IV Push every 8 hours PRN Nausea and/or Vomiting                                7.0    x     )-----------( x        ( 2024 13:35 )             19.4     2024 11:15    131    |  97     |  92     ----------------------------<  110    4.6     |  16     |  16.41    Ca    6.6        2024 11:15    TPro  6.4    /  Alb  2.3    /  TBili  0.4    /  DBili  x      /  AST  10     /  ALT  16     /  AlkPhos  99     2024 11:15    PT/INR - ( 2024 11:15 )   PT: 12.0 sec;   INR: 1.03 ratio         PTT - ( 2024 11:15 )  PTT:30.9 sec  CAPILLARY BLOOD GLUCOSE        LIVER FUNCTIONS - ( 2024 11:15 )  Alb: 2.3 g/dL / Pro: 6.4 g/dL / ALK PHOS: 99 U/L / ALT: 16 U/L / AST: 10 U/L / GGT: x           Urinalysis Basic - ( 2024 12:05 )    Color: Yellow / Appearance: Clear / S.017 / pH: x  Gluc: x / Ketone: Negative mg/dL  / Bili: Negative / Urobili: 0.2 mg/dL   Blood: x / Protein: >=1000 mg/dL / Nitrite: Negative   Leuk Esterase: Negative / RBC: 12 /HPF / WBC 1 /HPF   Sq Epi: x / Non Sq Epi: x / Bacteria: Negative /HPF      CT ABDOMEN AND PELVIS  IMPRESSION:    Inadequately distended proximal colonic loop or query long segmental   colitis. No bowel obstruction. Nonemergent colonoscopy suggested.    --- End of Report ---    MARIANO COPELAND MD; Attending Radiologist  This document has been electronically signed. Froy  3 2024  2:58AM         Mr. Garcia is a 57yo M with PMHx of CKD 3, HTN, lost to follow up with PCP (per chart review used to take medications for HTN, but patient denies) presenting for vomiting times 3 weeks. Per patient, he began having subjective fevers and vomiting 2-4x/ day for 3 weeks with occasional small amounts of blood  with inability to hold down solids or liquids along with diarrhea for 2 days x8 episodes. Patient also notes that for the past 4 years he has been getting chest pain while walking, which is getting worse. Currently endorsing worsening central chest  pain in the ED along with a headache. Denies shortness of breath or palpitations. Denies changes in urine output , frequency or dysuria. Notes hx of renal failure in his brother at 69yo requiring transplant. Denied recent traveling, sick exposure, NSAID use, exposure to Hep C, Hep B, HIV, new foods.     In ED vitals: afebrile, /115, , RR 18 satting 99%RA. ED Labs were notable for: WBC 7.58, Hb 8.2, BUN 94, Cr 17.09  CTAP: Inadequately distended proximal colonic loop or query long segmental colitis. No bowel obstruction. Nonemergent colonoscopy suggested.  1L fluids given in ED.    1/3/24 AM: Pt seen and examined at bedside by me. Laying bed. Denies abdominal pain. States that he feels better than yesterday.    REVIEW OF SYSTEMS:  CONSTITUTIONAL: (-) weakness, (-) fevers, (-) chills  RESPIRATORY:  (-) shortness of breath, (-) cough,  (-) wheezing,  (-) hemoptysis   CARDIOVASCULAR:  (-) chest pain, (-) palpitations  GASTROINTESTINAL:  (-) abdominal or epigastric pain, (-) nausea, (-) vomiting, (-) diarrhea, (-) constipation, (-) melena,  (-) hematemesis,  (-) hematochezia  GENITOURINARY: (-) dysuria, (-) frequency, (-) hematuria  NEUROLOGICAL: (-) numbness, (-) weakness  SKIN: (-) itching, (-) rashes, (-) lesions    PHYSICAL EXAM:  Vital Signs Last 24 Hrs  T(C): 39.4 (2024 10:28), Max: 39.4 (2024 10:28)  T(F): 102.9 (2024 10:28), Max: 102.9 (2024 10:28)  HR: 97 (:) (97 - 107)  BP: 152/92 (2024 13:28) (151/89 - 218/113)  RR: 18 (2024 13:28) (18 - 21)  SpO2: 94% (:) (91% - 99%)    Parameters below as of 2024 13:28  Patient On (Oxygen Delivery Method): room air      PHYSICAL EXAM:  GENERAL: NAD, lying in bed comfortably  HEAD:  Atraumatic, Normocephalic  RESP: Clear to auscultation bilaterally, good air entry bilaterally; No wheezing, rales, or rhonchi. Unlabored respirations  CARDIAC: Regular rate and rhythm. S1 and S2. No murmurs, rubs, or gallops  GI:  Soft, Nontender, Nondistended. Bowel sounds present x4 quadrants; No hepatomegaly. No splenomegaly.  EXTREMITIES:  2+ Peripheral Pulses. Capillary refill <2 seconds. No clubbing, cyanosis, or edema  NERVOUS SYSTEM:  Alert & Oriented X3, speech clear. No deficits   MSK: FROM all 4 extremities, full and equal strength  SKIN: No rashes, bruises, or other lesions      MEDICATIONS  (STANDING):  cefTRIAXone   IVPB 1000 milliGRAM(s) IV Intermittent every 24 hours  dextrose 5% 1000 milliLiter(s) (50 mL/Hr) IV Continuous <Continuous>  heparin   Injectable 5000 Unit(s) SubCutaneous every 12 hours  labetalol 100 milliGRAM(s) Oral two times a day  metroNIDAZOLE  IVPB 500 milliGRAM(s) IV Intermittent every 8 hours  metroNIDAZOLE  IVPB        MEDICATIONS  (PRN):  acetaminophen     Tablet .. 650 milliGRAM(s) Oral every 6 hours PRN Temp greater or equal to 38C (100.4F), Mild Pain (1 - 3)  aluminum hydroxide/magnesium hydroxide/simethicone Suspension 30 milliLiter(s) Oral every 4 hours PRN Dyspepsia  melatonin 3 milliGRAM(s) Oral at bedtime PRN Insomnia  ondansetron Injectable 4 milliGRAM(s) IV Push every 8 hours PRN Nausea and/or Vomiting                                7.0    x     )-----------( x        ( 2024 13:35 )             19.4     2024 11:15    131    |  97     |  92     ----------------------------<  110    4.6     |  16     |  16.41    Ca    6.6        2024 11:15    TPro  6.4    /  Alb  2.3    /  TBili  0.4    /  DBili  x      /  AST  10     /  ALT  16     /  AlkPhos  99     2024 11:15    PT/INR - ( 2024 11:15 )   PT: 12.0 sec;   INR: 1.03 ratio         PTT - ( 2024 11:15 )  PTT:30.9 sec  CAPILLARY BLOOD GLUCOSE        LIVER FUNCTIONS - ( 2024 11:15 )  Alb: 2.3 g/dL / Pro: 6.4 g/dL / ALK PHOS: 99 U/L / ALT: 16 U/L / AST: 10 U/L / GGT: x           Urinalysis Basic - ( 2024 12:05 )    Color: Yellow / Appearance: Clear / S.017 / pH: x  Gluc: x / Ketone: Negative mg/dL  / Bili: Negative / Urobili: 0.2 mg/dL   Blood: x / Protein: >=1000 mg/dL / Nitrite: Negative   Leuk Esterase: Negative / RBC: 12 /HPF / WBC 1 /HPF   Sq Epi: x / Non Sq Epi: x / Bacteria: Negative /HPF      CT ABDOMEN AND PELVIS  IMPRESSION:    Inadequately distended proximal colonic loop or query long segmental   colitis. No bowel obstruction. Nonemergent colonoscopy suggested.    --- End of Report ---    MARIANO COPELAND MD; Attending Radiologist  This document has been electronically signed. Froy  3 2024  2:58AM

## 2024-01-03 NOTE — PROGRESS NOTE ADULT - PROBLEM SELECTOR PLAN 4
-Likely 2/2 kidney failure  -F/u iron panel  -H&H trending down  -Continue to monitor  -Type and screen   -Transfuse Hgb <7.0

## 2024-01-03 NOTE — CONSULT NOTE ADULT - PROBLEM SELECTOR RECOMMENDATION 3
- Zofran prn   - Monitor Electrolytes    - Monitor hydration status. - Zofran prn   - Monitor electrolytes    - Monitor hydration status.

## 2024-01-03 NOTE — PROGRESS NOTE ADULT - PROBLEM SELECTOR PLAN 5
-Chest pain likely 2/2 hypertension, r/o ACS  -Trops trending up [id number], will continue to trend  - TTE with normal EF 65-70 and mod pulm htn  -Appreciate cardiology recommendations, continue to trend troponin, cw labetolol for BP control   -F/up nephrology consult for HTN and CKD

## 2024-01-03 NOTE — CONSULT NOTE ADULT - PROBLEM SELECTOR RECOMMENDATION 9
r/o pathogenic cause: CTAP Inadequately distended proximal colonic loop or query long segmental   colitis. No bowel obstruction. Nonemergent colonoscopy suggested.  - GI PCR was ordered   - f/u stool culture, ova + parasite  - Continue antibiotic   - Monitor stool movements r/o pathogenic cause: CTAP Inadequately distended proximal colonic loop or query long segmental   colitis. No bowel obstruction. Nonemergent colonoscopy suggested.  - f/u GI PCR   - f/u stool culture, ova + parasite   - Continue antibiotic   - Monitor stool movements

## 2024-01-03 NOTE — CONSULT NOTE ADULT - SUBJECTIVE AND OBJECTIVE BOX
NEPHROLOGY CONSULTATION    CHIEF COMPLAINT: N/V    HPI:  Patient is a 59 yo M with pmh CKD, HTN, lost to follow up with PCP (per chart review used to take medications for HTN, but patient denies) presenting for vomiting times 3 weeks. No shortness of breath or palpitations. No changes in urine output , frequency or dysuria. Hx of renal failure in his brother at 69 yo requiring transplant. No recent travel, sick exposure, NSAID use. Got CT A/P w/IV dye in ED, noted to have fever and HTN.     ROS:  as above    Allergies:  No Known Allergies    PAST MEDICAL & SURGICAL HISTORY:  H/O gastroesophageal reflux (GERD)  Hypertension  S/P Cholecystectomy    SOCIAL HISTORY:  negative    FAMILY HISTORY:  as above    MEDICATIONS  (STANDING):  cefTRIAXone   IVPB 1000 milliGRAM(s) IV Intermittent every 24 hours  dextrose 5% 1000 milliLiter(s) (50 mL/Hr) IV Continuous <Continuous>  heparin   Injectable 5000 Unit(s) SubCutaneous every 12 hours  labetalol 100 milliGRAM(s) Oral two times a day  metroNIDAZOLE  IVPB      metroNIDAZOLE  IVPB 500 milliGRAM(s) IV Intermittent every 8 hours    Vital Signs Last 24 Hrs  T(C): 39.4 (24 @ 10:28), Max: 39.4 (24 @ 10:28)  T(F): 102.9 (24 @ 10:28), Max: 102.9 (24 @ 10:28)  HR: 97 (24 @ 13:28) (97 - 107)  BP: 152/92 (24 @ 13:28) (152/92 - 218/113)  RR: 18 (24 @ 13:28) (18 - 21)  SpO2: 94% (24 @ 13:28) (91% - 99%)    LABS:                        7.2    6.21  )-----------( 204      ( 2024 11:15 )             19.1         131<L>  |  97  |  92<H>  ----------------------------<  110<H>  4.6   |  16<L>  |  16.41<H>    Ca    6.6<L>      2024 11:15    TPro  6.4  /  Alb  2.3<L>  /  TBili  0.4  /  DBili  x   /  AST  10  /  ALT  16  /  AlkPhos  99  01-03    Urinalysis Basic - ( 2024 12:05 )    Color: Yellow / Appearance: Clear / S.017 / pH: x  Gluc: x / Ketone: Negative mg/dL  / Bili: Negative / Urobili: 0.2 mg/dL   Blood: x / Protein: >=1000 mg/dL / Nitrite: Negative   Leuk Esterase: Negative / RBC: 12 /HPF / WBC 1 /HPF   Sq Epi: x / Non Sq Epi: x / Bacteria: Negative /HPF    LIVER FUNCTIONS - ( 2024 11:15 )  Alb: 2.3 g/dL / Pro: 6.4 g/dL / ALK PHOS: 99 U/L / ALT: 16 U/L / AST: 10 U/L / GGT: x           PT/INR - ( 2024 11:15 )   PT: 12.0 sec;   INR: 1.03 ratio      A/P:    full consult to follow    [phone]       NEPHROLOGY CONSULTATION    CHIEF COMPLAINT: N/V    HPI:  Patient is a 57 yo M with pmh CKD, HTN, lost to follow up with PCP (per chart review used to take medications for HTN, but patient denies) presenting for vomiting times 3 weeks. No shortness of breath or palpitations. No changes in urine output , frequency or dysuria. Hx of renal failure in his brother at 69 yo requiring transplant. No recent travel, sick exposure, NSAID use. Got CT A/P w/IV dye in ED, noted to have fever and HTN.     ROS:  as above    Allergies:  No Known Allergies    PAST MEDICAL & SURGICAL HISTORY:  H/O gastroesophageal reflux (GERD)  Hypertension  S/P Cholecystectomy    SOCIAL HISTORY:  negative    FAMILY HISTORY:  as above    MEDICATIONS  (STANDING):  cefTRIAXone   IVPB 1000 milliGRAM(s) IV Intermittent every 24 hours  dextrose 5% 1000 milliLiter(s) (50 mL/Hr) IV Continuous <Continuous>  heparin   Injectable 5000 Unit(s) SubCutaneous every 12 hours  labetalol 100 milliGRAM(s) Oral two times a day  metroNIDAZOLE  IVPB      metroNIDAZOLE  IVPB 500 milliGRAM(s) IV Intermittent every 8 hours    Vital Signs Last 24 Hrs  T(C): 39.4 (24 @ 10:28), Max: 39.4 (24 @ 10:28)  T(F): 102.9 (24 @ 10:28), Max: 102.9 (24 @ 10:28)  HR: 97 (24 @ 13:28) (97 - 107)  BP: 152/92 (24 @ 13:28) (152/92 - 218/113)  RR: 18 (24 @ 13:28) (18 - 21)  SpO2: 94% (24 @ 13:28) (91% - 99%)    LABS:                        7.2    6.21  )-----------( 204      ( 2024 11:15 )             19.1         131<L>  |  97  |  92<H>  ----------------------------<  110<H>  4.6   |  16<L>  |  16.41<H>    Ca    6.6<L>      2024 11:15    TPro  6.4  /  Alb  2.3<L>  /  TBili  0.4  /  DBili  x   /  AST  10  /  ALT  16  /  AlkPhos  99  01-03    Urinalysis Basic - ( 2024 12:05 )    Color: Yellow / Appearance: Clear / S.017 / pH: x  Gluc: x / Ketone: Negative mg/dL  / Bili: Negative / Urobili: 0.2 mg/dL   Blood: x / Protein: >=1000 mg/dL / Nitrite: Negative   Leuk Esterase: Negative / RBC: 12 /HPF / WBC 1 /HPF   Sq Epi: x / Non Sq Epi: x / Bacteria: Negative /HPF    LIVER FUNCTIONS - ( 2024 11:15 )  Alb: 2.3 g/dL / Pro: 6.4 g/dL / ALK PHOS: 99 U/L / ALT: 16 U/L / AST: 10 U/L / GGT: x           PT/INR - ( 2024 11:15 )   PT: 12.0 sec;   INR: 1.03 ratio      A/P:    full consult to follow    [phone]       NEPHROLOGY CONSULTATION    CHIEF COMPLAINT: N/V    HPI:  Patient is a 57 yo M with pmh CKD, HTN, lost to follow up with PCP (per chart review used to take medications for HTN, but patient denies) presenting for vomiting times 3 weeks. No shortness of breath or palpitations. No changes in urine output , frequency or dysuria. Hx of renal failure in his brother at 69 yo requiring transplant. No recent travel, sick exposure, NSAID use. Got CT A/P w/IV dye in ED, noted to have fever and HTN.     ROS:  as above    Allergies:  No Known Allergies    PAST MEDICAL & SURGICAL HISTORY:  H/O gastroesophageal reflux (GERD)  Hypertension  S/P Cholecystectomy    SOCIAL HISTORY:  negative    FAMILY HISTORY:  as above    MEDICATIONS  (STANDING):  cefTRIAXone   IVPB 1000 milliGRAM(s) IV Intermittent every 24 hours  dextrose 5% 1000 milliLiter(s) (50 mL/Hr) IV Continuous <Continuous>  heparin   Injectable 5000 Unit(s) SubCutaneous every 12 hours  labetalol 100 milliGRAM(s) Oral two times a day  metroNIDAZOLE  IVPB      metroNIDAZOLE  IVPB 500 milliGRAM(s) IV Intermittent every 8 hours    Vital Signs Last 24 Hrs  T(C): 38 (24 @ 19:37), Max: 39.4 (24 @ 10:28)  T(F): 100.4 (24 @ 19:37), Max: 102.9 (24 @ 10:28)  HR: 88 (24 @ 19:37) (88 - 107)  BP: 174/85 (24 @ 19:37) (151/89 - 218/113)  RR: 17 (24 @ 19:37) (16 - 21)  SpO2: 95% (24 @ 19:37) (91% - 99%)    I&O's Detail    2024 07:01  -  2024 21:57  --------------------------------------------------------  IN:    Oral Fluid: 240 mL  Total IN: 240 mL    OUT:    Indwelling Catheter - Urethral (mL): 500 mL  Total OUT: 500 mL    s1s2  b/l air entry  soft, ND  no edema    LABS:                        7.2    6.21  )-----------( 204      ( 2024 11:15 )             19.1     -    131<L>  |  97  |  92<H>  ----------------------------<  110<H>  4.6   |  16<L>  |  16.41<H>    Ca    6.6<L>      2024 11:15    TPro  6.4  /  Alb  2.3<L>  /  TBili  0.4  /  DBili  x   /  AST  10  /  ALT  16  /  AlkPhos  99      Urinalysis Basic - ( 2024 12:05 )    Color: Yellow / Appearance: Clear / S.017 / pH: x  Gluc: x / Ketone: Negative mg/dL  / Bili: Negative / Urobili: 0.2 mg/dL   Blood: x / Protein: >=1000 mg/dL / Nitrite: Negative   Leuk Esterase: Negative / RBC: 12 /HPF / WBC 1 /HPF   Sq Epi: x / Non Sq Epi: x / Bacteria: Negative /HPF    LIVER FUNCTIONS - ( 2024 11:15 )  Alb: 2.3 g/dL / Pro: 6.4 g/dL / ALK PHOS: 99 U/L / ALT: 16 U/L / AST: 10 U/L / GGT: x           PT/INR - ( 2024 11:15 )   PT: 12.0 sec;   INR: 1.03 ratio      A/P:    Adm w/N/V, fever, anemia, markedly abnormal renal fx (baseline renal fx is unknown)  S/p CT w/IV dye today, which places him at risk of further deterioration of renal fx  Pro, I/Os  Met acidosis iso severe renal dysfx  Gentle IVF w/Bicarb  Will send SPEP, renal serologies  CT A/P w/o hydro   Will add Norvasc for HTN  May need to start RRT on this adm   No urgent indications today  Will follow     [phone]       NEPHROLOGY CONSULTATION    CHIEF COMPLAINT: N/V    HPI:  Patient is a 57 yo M with pmh CKD, HTN, lost to follow up with PCP (per chart review used to take medications for HTN, but patient denies) presenting for vomiting times 3 weeks. No shortness of breath or palpitations. No changes in urine output , frequency or dysuria. Hx of renal failure in his brother at 71 yo requiring transplant. No recent travel, sick exposure, NSAID use. Got CT A/P w/IV dye in ED, noted to have fever and HTN.     ROS:  as above    Allergies:  No Known Allergies    PAST MEDICAL & SURGICAL HISTORY:  H/O gastroesophageal reflux (GERD)  Hypertension  S/P Cholecystectomy    SOCIAL HISTORY:  negative    FAMILY HISTORY:  as above    MEDICATIONS  (STANDING):  cefTRIAXone   IVPB 1000 milliGRAM(s) IV Intermittent every 24 hours  dextrose 5% 1000 milliLiter(s) (50 mL/Hr) IV Continuous <Continuous>  heparin   Injectable 5000 Unit(s) SubCutaneous every 12 hours  labetalol 100 milliGRAM(s) Oral two times a day  metroNIDAZOLE  IVPB      metroNIDAZOLE  IVPB 500 milliGRAM(s) IV Intermittent every 8 hours    Vital Signs Last 24 Hrs  T(C): 38 (24 @ 19:37), Max: 39.4 (24 @ 10:28)  T(F): 100.4 (24 @ 19:37), Max: 102.9 (24 @ 10:28)  HR: 88 (24 @ 19:37) (88 - 107)  BP: 174/85 (24 @ 19:37) (151/89 - 218/113)  RR: 17 (24 @ 19:37) (16 - 21)  SpO2: 95% (24 @ 19:37) (91% - 99%)    I&O's Detail    2024 07:01  -  2024 21:57  --------------------------------------------------------  IN:    Oral Fluid: 240 mL  Total IN: 240 mL    OUT:    Indwelling Catheter - Urethral (mL): 500 mL  Total OUT: 500 mL    s1s2  b/l air entry  soft, ND  no edema    LABS:                        7.2    6.21  )-----------( 204      ( 2024 11:15 )             19.1     -    131<L>  |  97  |  92<H>  ----------------------------<  110<H>  4.6   |  16<L>  |  16.41<H>    Ca    6.6<L>      2024 11:15    TPro  6.4  /  Alb  2.3<L>  /  TBili  0.4  /  DBili  x   /  AST  10  /  ALT  16  /  AlkPhos  99      Urinalysis Basic - ( 2024 12:05 )    Color: Yellow / Appearance: Clear / S.017 / pH: x  Gluc: x / Ketone: Negative mg/dL  / Bili: Negative / Urobili: 0.2 mg/dL   Blood: x / Protein: >=1000 mg/dL / Nitrite: Negative   Leuk Esterase: Negative / RBC: 12 /HPF / WBC 1 /HPF   Sq Epi: x / Non Sq Epi: x / Bacteria: Negative /HPF    LIVER FUNCTIONS - ( 2024 11:15 )  Alb: 2.3 g/dL / Pro: 6.4 g/dL / ALK PHOS: 99 U/L / ALT: 16 U/L / AST: 10 U/L / GGT: x           PT/INR - ( 2024 11:15 )   PT: 12.0 sec;   INR: 1.03 ratio      A/P:    Adm w/N/V, fever, anemia, markedly abnormal renal fx (baseline renal fx is unknown)  S/p CT w/IV dye today, which places him at risk of further deterioration of renal fx  Pro, I/Os  Met acidosis iso severe renal dysfx  Gentle IVF w/Bicarb  Will send SPEP, renal serologies  CT A/P w/o hydro   Will add Norvasc for HTN  May need to start RRT on this adm   No urgent indications today  Will follow     [phone]

## 2024-01-03 NOTE — PROGRESS NOTE ADULT - PROBLEM SELECTOR PLAN 6
-Pt reporting 3 weeks of nausea and vomiting   -CTAP Inadequately distended proximal colonic loop or query long segmental   colitis. No bowel obstruction. Nonemergent colonoscopy suggested  -F/up GI consult  -Holding hydration in setting of DESIREE on CKD

## 2024-01-03 NOTE — ED PROVIDER NOTE - PROGRESS NOTE DETAILS
Patient found to be in renal failure.  Did have a CT scan prior to creatinine having resulted.  Patient is not in need of emergent dialysis at this time but will need dialysis.  Will be admitted to the hospital at this time

## 2024-01-03 NOTE — PROGRESS NOTE ADULT - PROBLEM SELECTOR PLAN 7
-Diarrhea likely 2/2 uremia  -R/o pathogenic cause  -CTAP Inadequately distended proximal colonic loop or query long segmental   colitis. No bowel obstruction. Nonemergent colonoscopy suggested.  -S/p 1 L fluid in ED, due to severe hypertension no further fluids  -F/u stool culture, ova + parasite, GI PCR  -Add ceftriaxone and flagyl, low threshold to discontinue  -F/up GI consult

## 2024-01-03 NOTE — PROGRESS NOTE ADULT - PROBLEM SELECTOR PLAN 2
-r/o other causes of other intrinsic renal disease ie nephritic/nephrotic syndrome, hepatitis  -s/p CTAP with IV contrast  -History of HTN, not on medications (poor medical follow up)  -BUN 94, Cr 17.09  -urine with 300 protein, small blood, 100 glucose  -s/p 1L fluid in ED  -s/p 10mg hydralazine IV push  -c/w 100mg BID labetalol  -f/u SPEP, SIF, C3, C4, DAVID, dsDNA, ANCAs, HIV, HepB, HCV  to be complete  -f/u 24hr urine for prot/cr, UPEP, UIF  -f/u A1c, lipid panel  -F/up nephrology consult

## 2024-01-03 NOTE — ED PROVIDER NOTE - NS ED MD TWO NIGHTS YN
-CT ab/pelvis showed perihepatic fluid collection, mild hepatomegaly, diffuse fatty infiltration  -cont zosyn for fluid collection around the liver seen on CT. Has elevated wbc count, which might also be from her betamethasone usage but last dose was last Friday.   -consult ID tomorrow  - IR Yes -CT ab/pelvis showed perihepatic fluid collection, mild hepatomegaly, diffuse fatty infiltration  -cont zosyn for fluid collection around the liver seen on CT. Has elevated wbc count, which might also be from her betamethasone usage but last dose was last Friday.   -consult ID tomorrow  - IR defer drainage, f/u o/p to monitor size and for sx, available if becomes sx

## 2024-01-03 NOTE — PROGRESS NOTE ADULT - ATTENDING COMMENTS
59yo M with PMHx of CKD 3, HTN, lost to follow up with PCP (per chart review used to take medications for HTN, but patient denies) presenting for vomiting times 3 weeks, admitted for renal failure and r/o ACS. Plan: dehydration likely led to renal failure, apprec renal recs, consider GI for vomiting etiology may need scope, monitor clinical course 57yo M with PMHx of CKD 3, HTN, lost to follow up with PCP (per chart review used to take medications for HTN, but patient denies) presenting for vomiting times 3 weeks, admitted for renal failure and r/o ACS. Plan: dehydration likely led to renal failure, apprec renal recs, consider GI for vomiting etiology may need scope, monitor clinical course

## 2024-01-03 NOTE — PATIENT PROFILE ADULT - MST SCORE
Consultation - [facility]  Jerardo Paredes,  4/3/1928, MRN [id number]    PCP: Gloria Deshpande MD, [phone]    Assessment and Plan: Daytime sleepiness, frequent ventricular ectopy and nonsustained VT with normal left ventricular function.  Recommendations: Patient is asymptomatic with regards to his rhythm.  He has no symptoms of lightheadedness or presyncope.  The daytime sleepiness I do not think of is a cardiac arrhythmia associated symptoms but could possibly reflect underlying sleep apnea.  Remarkably left ventricular function is normal and so he seems to be well tolerating his ectopy.  He is symptomatically completely unaware of the arrhythmia.  For now continue with current medical therapy and observation.  My suggestion would be to repeat a cardiac echo and Holter monitor in 6 months.  If he should develop symptoms lightheadedness or weakness he should contact us for evaluation at that point.  A sleep study could be helpful in assessing whether or not apnea might be creating daytime sleepiness.  At present he is elected not to take aspirin therapy because of difficulty taking the medication and his current age    Chief Complaint: Ventricular ectopy    HPI:  We have been requested by daytime sleepiness to evaluate Jerardo Paredes for consultation who is a  89 y.o. year old male for Dr. Deshpande.   Hx: 89 M fatigue, recent irregular, told requent GABRIELA.  No presyncope, syncope, lightheaded.  Primarily faigue sleepy for 2 months.  No known history of sleep apnea.  No history of coronary artery disease or myocardial infarction no chest pain pressure tightness or anginal symptom.  Breathing has been stable orthopnea or peripheral edema.  His daughter is a nurse who worked at [employer] for many years.  She is well familiar with issues with cardiac care and symptoms and signs of heart disease.  We discussed many of these and she has been unaware of observing any of these  manifestations of heart disease  BBB hx in past.  Previous CVA.  No hx HLD, HTN, MI CAD.    Medical History  Active Ambulatory (Non-Hospital) Problems    Diagnosis     Cough     MICHELLE (acute kidney injury) (H)     Past Medical History:   Diagnosis Date     Anemia      BPH (benign prostatic hyperplasia)      Cataract      Chronic kidney disease      CVA (cerebral vascular accident) (H) 2014     Finger amputation, traumatic      Hearing loss      Osteoarthritis      Overactive bladder      Skin cancer        Surgical History  He  has a past surgical history that includes Transurethral resection of prostate; Cholecystectomy (2005); Skin cancer excision; Cervical laminectomy (1972); Tibia fracture surgery (Left, 1948); Stage 1 Interstim (05/17/2017); and pr implant periph/gastric neurostim/ (N/A, 6/5/2017).    Social History  Reviewed, and he  reports that he quit smoking about 38 years ago. He has never used smokeless tobacco. He reports that he drinks alcohol. He reports that he does not use illicit drugs.  Smoking status reviewed.  Social history othrwise not contributory to HPI.  Allergies  No Known Allergies    Family History  Reviewed, and family history includes Alcoholism in his father; Stroke in his mother.  Extended Emergency Contact Information  Primary Emergency Contact: Jose Miguel Smith   [facility]  Home Phone: [phone]  Relation: Child  Family history otherwise negative or not conributory to HPI.    Psychosocial Needs  Social History     Social History Narrative     Additional psychosocial needs reviewed per nursing assessment.    Prior to Admission Medications    (Not in a hospital admission)    Review of Systems:  Pertinent items are noted in HPI.  A 12 point comprehensive review of systems was negative except as noted.  Review of systems is negative except for HPI  Physical Exam:  Less than 10 mL of urine  Vitals:    08/23/18 1053   BP: 110/60   Pulse: (!) 58   Resp: 24      Head and neck without focal cranial neurologic defects.  JVD not distended.  Carotid upstroke normal without bruit.  External eye exam normal without icterus.  External ear exam normal.  Neck without cervical lymphadenopathy or thyromegaly.  Lungs: clear to auscultation bilaterally  Heart: S1, S2 normal frequent ectopy identified on exam with a 1/6 mild systolic murmur at the upper left sternal border  Abdomen with normal bowel tones.  Skin without rash, ecchymosis, lesions.  Neuromuscular tone normal.  Peripheral pulse intact and equal.  Joints without swelling or erythema.      [unfilled]    Pertinent Labs  Lab Results: personally reviewed.   Lab Results   Component Value Date     08/01/2018     04/19/2016     06/24/2014    K 4.3 08/01/2018    K 4.2 05/17/2017    K 3.6 04/19/2016    CO2 29 08/01/2018    CO2 27 04/19/2016    CO2 30 06/24/2014    BUN 20 08/01/2018    BUN 18 04/19/2016    BUN 15 06/24/2014    CREATININE 1.25 08/01/2018    CREATININE 0.92 04/19/2016    CREATININE 1.05 06/24/2014    CALCIUM 8.7 08/01/2018    CALCIUM 8.0 (L) 04/19/2016    CALCIUM 9.5 06/24/2014     Lab Results   Component Value Date    CHOL 156 03/13/2014    TRIG 78 03/13/2014    HDL 31 (L) 03/13/2014       Pertinent Radiology  Radiology Results: See Report  EKG Results: See Report     No current outpatient prescriptions on file.  No current facility-administered medications for this visit.                2

## 2024-01-03 NOTE — CONSULT NOTE ADULT - ASSESSMENT
Assessment: 57yo M with pmhx CKD 3, HTN, lost to follow up with PCP (per chart review used to take medications for HTN, but patient denies) presenting for vomiting times 3 weeks, admitted for renal failure, htn urgency and r/o ACS.     Recommendations:  - ekg non ischemic, trop x 2 pos however with Cr 17, likely iso CKD +/- demand iso htn urgency, doubt ACS  - pt s/p hydralazine and labetolol ivp in ED, currently on labetolol BID with good result  - recc renal consult for htn and CKD  - TTE with normal EF 65-70 and mod pulm htn  - continue tele monitoring    Nohelia SEGURA-BC

## 2024-01-03 NOTE — H&P ADULT - ASSESSMENT
Patient is a 59yo M with pmhx CKD 3, HTN, lost to follow up with PCP (per chart review used to take medications for HTN, but patient denies) presenting for vomiting times 3 weeks, admitted for renal failure and r/o ACS.     #renal failure on CKD stage 3 likely 2/2 dehydration and longstanding hypertensive nephropathy  #hypertensive emergency   -r/o other causes of other intrinsic renal disease ie nephritic/nephrotic syndrome, hepatitis  -s/p CTAP with IV contrast  -History of HTN, not on medications (poor medical follow up)  -BUN 94, Cr 17.09  -urine with 300 protein, small blood, 100 glucose  -s/p 1L fluid in ED  -s/p 10mg hydralazine IV push  -c/w 100mg BID labetalol  -f/u SPEP, SIF, C3, C4, DAVID, dsDNA, ANCAs, HIV, HepB, HCV  to be complete  -f/u 24hr urine for prot/cr, UPEP, UIF  -f/u A1c, lipid panel  -renal consult    #Chest pain likely 2/2 hypertension, r/o ACS  -Trops 221>246, f/u AM trop  -f/u AM echo  -cardiology consult    #Diarrhea likely 2/2 uremia  r/o pathogenic cause  -CTAP Inadequately distended proximal colonic loop or query long segmental   colitis. No bowel obstruction. Nonemergent colonoscopy suggested.  -Does not meet sepsis criteria  -s/p 1 L fluid in ED, due to severe hypertension no further fluids  -f/u stool culture, ova + parasite, GI PCR  -add ciprofloxacin, renal dosing    #Anemia likely 2/2 renal failure  -f/u iron panel  -Monitor H+H    #DVT PPx   -Heparin    FULL CODE  Niece Jen Garcia at bedside updated [phone] (for future)  Shelbi SHEPHERD reviewed  d/w Dr. Mcfarland  Patient is a 57yo M with pmhx CKD 3, HTN, lost to follow up with PCP (per chart review used to take medications for HTN, but patient denies) presenting for vomiting times 3 weeks, admitted for renal failure and r/o ACS.     #renal failure on CKD stage 3 likely 2/2 dehydration and longstanding hypertensive nephropathy  #hypertensive emergency   -r/o other causes of other intrinsic renal disease ie nephritic/nephrotic syndrome, hepatitis  -s/p CTAP with IV contrast  -History of HTN, not on medications (poor medical follow up)  -BUN 94, Cr 17.09  -urine with 300 protein, small blood, 100 glucose  -s/p 1L fluid in ED  -s/p 10mg hydralazine IV push  -c/w 100mg BID labetalol  -f/u SPEP, SIF, C3, C4, DAVID, dsDNA, ANCAs, HIV, HepB, HCV  to be complete  -f/u 24hr urine for prot/cr, UPEP, UIF  -f/u A1c, lipid panel  -renal consult    #Chest pain likely 2/2 hypertension, r/o ACS  -Trops 221>246, f/u AM trop  -f/u AM echo  -cardiology consult    #Diarrhea likely 2/2 uremia  r/o pathogenic cause  -CTAP Inadequately distended proximal colonic loop or query long segmental   colitis. No bowel obstruction. Nonemergent colonoscopy suggested.  -Does not meet sepsis criteria  -s/p 1 L fluid in ED, due to severe hypertension no further fluids  -f/u stool culture, ova + parasite, GI PCR  -add ciprofloxacin, renal dosing    #Anemia likely 2/2 renal failure  -f/u iron panel  -Monitor H+H    #DVT PPx   -Heparin    FULL CODE  Niece Jen Garcia at bedside updated [phone] (for future)  Shelbi SHEPHERD reviewed  d/w Dr. Mcfarland  Patient is a 59yo M with pmhx CKD 3, HTN, lost to follow up with PCP (per chart review used to take medications for HTN, but patient denies) presenting for vomiting times 3 weeks, admitted for renal failure and r/o ACS.     #renal failure on CKD stage 3 likely 2/2 dehydration and longstanding hypertensive nephropathy  #hypertensive emergency   -r/o other causes of other intrinsic renal disease ie nephritic/nephrotic syndrome, hepatitis  -s/p CTAP with IV contrast  -History of HTN, not on medications (poor medical follow up)  -BUN 94, Cr 17.09  -urine with 300 protein, small blood, 100 glucose  -s/p 1L fluid in ED  -s/p 10mg hydralazine IV push  -c/w 100mg BID labetalol  -f/u SPEP, SIF, C3, C4, DAVID, dsDNA, ANCAs, HIV, HepB, HCV  to be complete  -f/u 24hr urine for prot/cr, UPEP, UIF  -f/u A1c, lipid panel  -renal consult    #Chest pain likely 2/2 hypertension, r/o ACS  -Trops 221>246, f/u AM trop  -f/u AM echo  -cardiology consult    #Diarrhea likely 2/2 uremia  r/o pathogenic cause  -CTAP Inadequately distended proximal colonic loop or query long segmental   colitis. No bowel obstruction. Nonemergent colonoscopy suggested.  -Does not meet sepsis criteria  -s/p 1 L fluid in ED, due to severe hypertension no further fluids  -f/u stool culture, ova + parasite, GI PCR  -add ceftriaxone and flagyl, low threshold to discontinue.     #Anemia likely 2/2 renal failure  -f/u iron panel  -Monitor H+H    #DVT PPx   -Heparin    FULL CODE  Nichristy Garcia at bedside updated [phone] (for future)  Shelbi SHEPHERD reviewed  d/w Dr. Mcfarland  Patient is a 59yo M with pmhx CKD 3, HTN, lost to follow up with PCP (per chart review used to take medications for HTN, but patient denies) presenting for vomiting times 3 weeks, admitted for renal failure and r/o ACS.     #renal failure on CKD stage 3 likely 2/2 dehydration and longstanding hypertensive nephropathy  #hypertensive emergency   -r/o other causes of other intrinsic renal disease ie nephritic/nephrotic syndrome, hepatitis  -s/p CTAP with IV contrast  -History of HTN, not on medications (poor medical follow up)  -BUN 94, Cr 17.09  -urine with 300 protein, small blood, 100 glucose  -s/p 1L fluid in ED  -s/p 10mg hydralazine IV push  -c/w 100mg BID labetalol  -f/u SPEP, SIF, C3, C4, DAVID, dsDNA, ANCAs, HIV, HepB, HCV  to be complete  -f/u 24hr urine for prot/cr, UPEP, UIF  -f/u A1c, lipid panel  -renal consult    #Chest pain likely 2/2 hypertension, r/o ACS  -Trops 221>246, f/u AM trop  -f/u AM echo  -cardiology consult    #Diarrhea likely 2/2 uremia  r/o pathogenic cause  -CTAP Inadequately distended proximal colonic loop or query long segmental   colitis. No bowel obstruction. Nonemergent colonoscopy suggested.  -Does not meet sepsis criteria  -s/p 1 L fluid in ED, due to severe hypertension no further fluids  -f/u stool culture, ova + parasite, GI PCR  -add ceftriaxone and flagyl, low threshold to discontinue.     #Anemia likely 2/2 renal failure  -f/u iron panel  -Monitor H+H    #DVT PPx   -Heparin    FULL CODE  Nichristy Garcia at bedside updated [phone] (for future)  Shelbi SHEPHERD reviewed  d/w Dr. Mcfarland

## 2024-01-03 NOTE — CONSULT NOTE ADULT - PROBLEM SELECTOR RECOMMENDATION 2
Most likely renal pathology   - Iron panel ordered   - Monitor H+H  - Keep active T/S   - Most likely renal pathology   - f/u Iron panel   - Monitor H+H  - Keep active T/S   - Monitor s/s of active bleeding Most likely secondary to chronic disease, likely renal origin   - f/u Iron panel   - Monitor H+H  - Keep active T/S   - Monitor s/s of active bleeding

## 2024-01-04 LAB
ABO RH CONFIRMATION: SIGNIFICANT CHANGE UP
ABO RH CONFIRMATION: SIGNIFICANT CHANGE UP
ALBUMIN SERPL ELPH-MCNC: 2.3 G/DL — LOW (ref 3.3–5)
ALBUMIN SERPL ELPH-MCNC: 2.3 G/DL — LOW (ref 3.3–5)
ALP SERPL-CCNC: 89 U/L — SIGNIFICANT CHANGE UP (ref 40–120)
ALP SERPL-CCNC: 89 U/L — SIGNIFICANT CHANGE UP (ref 40–120)
ALT FLD-CCNC: 15 U/L — SIGNIFICANT CHANGE UP (ref 10–45)
ALT FLD-CCNC: 15 U/L — SIGNIFICANT CHANGE UP (ref 10–45)
ANA PAT FLD IF-IMP: ABNORMAL
ANA PAT FLD IF-IMP: ABNORMAL
ANA TITR SER: ABNORMAL
ANA TITR SER: ABNORMAL
ANION GAP SERPL CALC-SCNC: 19 MMOL/L — HIGH (ref 5–17)
ANION GAP SERPL CALC-SCNC: 19 MMOL/L — HIGH (ref 5–17)
AST SERPL-CCNC: 10 U/L — SIGNIFICANT CHANGE UP (ref 10–40)
AST SERPL-CCNC: 10 U/L — SIGNIFICANT CHANGE UP (ref 10–40)
AUTO DIFF PNL BLD: ABNORMAL
AUTO DIFF PNL BLD: ABNORMAL
BILIRUB SERPL-MCNC: 0.4 MG/DL — SIGNIFICANT CHANGE UP (ref 0.2–1.2)
BILIRUB SERPL-MCNC: 0.4 MG/DL — SIGNIFICANT CHANGE UP (ref 0.2–1.2)
BUN SERPL-MCNC: 105 MG/DL — HIGH (ref 7–23)
BUN SERPL-MCNC: 105 MG/DL — HIGH (ref 7–23)
C-ANCA SER-ACNC: NEGATIVE — SIGNIFICANT CHANGE UP
C-ANCA SER-ACNC: NEGATIVE — SIGNIFICANT CHANGE UP
C3 SERPL-MCNC: 83 MG/DL — SIGNIFICANT CHANGE UP (ref 81–157)
C3 SERPL-MCNC: 83 MG/DL — SIGNIFICANT CHANGE UP (ref 81–157)
C4 SERPL-MCNC: 23 MG/DL — SIGNIFICANT CHANGE UP (ref 13–39)
C4 SERPL-MCNC: 23 MG/DL — SIGNIFICANT CHANGE UP (ref 13–39)
CALCIUM SERPL-MCNC: 6.3 MG/DL — CRITICAL LOW (ref 8.4–10.5)
CALCIUM SERPL-MCNC: 6.3 MG/DL — CRITICAL LOW (ref 8.4–10.5)
CHLORIDE SERPL-SCNC: 93 MMOL/L — LOW (ref 96–108)
CHLORIDE SERPL-SCNC: 93 MMOL/L — LOW (ref 96–108)
CO2 SERPL-SCNC: 18 MMOL/L — LOW (ref 22–31)
CO2 SERPL-SCNC: 18 MMOL/L — LOW (ref 22–31)
CREAT SERPL-MCNC: 17.88 MG/DL — HIGH (ref 0.5–1.3)
CREAT SERPL-MCNC: 17.88 MG/DL — HIGH (ref 0.5–1.3)
DSDNA AB SER-ACNC: <12 IU/ML — SIGNIFICANT CHANGE UP
DSDNA AB SER-ACNC: <12 IU/ML — SIGNIFICANT CHANGE UP
EGFR: 3 ML/MIN/1.73M2 — LOW
EGFR: 3 ML/MIN/1.73M2 — LOW
FLUAV H3 RNA SPEC QL NAA+PROBE: DETECTED
FLUAV H3 RNA SPEC QL NAA+PROBE: DETECTED
GLUCOSE SERPL-MCNC: 116 MG/DL — HIGH (ref 70–99)
GLUCOSE SERPL-MCNC: 116 MG/DL — HIGH (ref 70–99)
HAPTOGLOB SERPL-MCNC: 256 MG/DL — HIGH (ref 34–200)
HAPTOGLOB SERPL-MCNC: 256 MG/DL — HIGH (ref 34–200)
HCT VFR BLD CALC: 18.8 % — CRITICAL LOW (ref 39–50)
HCT VFR BLD CALC: 18.8 % — CRITICAL LOW (ref 39–50)
HGB BLD-MCNC: 6.8 G/DL — CRITICAL LOW (ref 13–17)
HGB BLD-MCNC: 6.8 G/DL — CRITICAL LOW (ref 13–17)
LDH SERPL L TO P-CCNC: 250 U/L — HIGH (ref 50–242)
LDH SERPL L TO P-CCNC: 250 U/L — HIGH (ref 50–242)
MCHC RBC-ENTMCNC: 27.8 PG — SIGNIFICANT CHANGE UP (ref 27–34)
MCHC RBC-ENTMCNC: 27.8 PG — SIGNIFICANT CHANGE UP (ref 27–34)
MCHC RBC-ENTMCNC: 36.2 GM/DL — HIGH (ref 32–36)
MCHC RBC-ENTMCNC: 36.2 GM/DL — HIGH (ref 32–36)
MCV RBC AUTO: 76.7 FL — LOW (ref 80–100)
MCV RBC AUTO: 76.7 FL — LOW (ref 80–100)
NRBC # BLD: 0 /100 WBCS — SIGNIFICANT CHANGE UP (ref 0–0)
NRBC # BLD: 0 /100 WBCS — SIGNIFICANT CHANGE UP (ref 0–0)
P-ANCA SER-ACNC: NEGATIVE — SIGNIFICANT CHANGE UP
P-ANCA SER-ACNC: NEGATIVE — SIGNIFICANT CHANGE UP
PLATELET # BLD AUTO: 223 K/UL — SIGNIFICANT CHANGE UP (ref 150–400)
PLATELET # BLD AUTO: 223 K/UL — SIGNIFICANT CHANGE UP (ref 150–400)
POTASSIUM SERPL-MCNC: 4.3 MMOL/L — SIGNIFICANT CHANGE UP (ref 3.5–5.3)
POTASSIUM SERPL-MCNC: 4.3 MMOL/L — SIGNIFICANT CHANGE UP (ref 3.5–5.3)
POTASSIUM SERPL-SCNC: 4.3 MMOL/L — SIGNIFICANT CHANGE UP (ref 3.5–5.3)
POTASSIUM SERPL-SCNC: 4.3 MMOL/L — SIGNIFICANT CHANGE UP (ref 3.5–5.3)
PROT SERPL-MCNC: 5.4 G/DL — LOW (ref 6–8.3)
PROT SERPL-MCNC: 5.4 G/DL — LOW (ref 6–8.3)
PROT SERPL-MCNC: 6.2 G/DL — SIGNIFICANT CHANGE UP (ref 6–8.3)
PROT SERPL-MCNC: 6.2 G/DL — SIGNIFICANT CHANGE UP (ref 6–8.3)
RAPID RVP RESULT: DETECTED
RAPID RVP RESULT: DETECTED
RBC # BLD: 2.45 M/UL — LOW (ref 4.2–5.8)
RBC # BLD: 2.45 M/UL — LOW (ref 4.2–5.8)
RBC # FLD: 13.1 % — SIGNIFICANT CHANGE UP (ref 10.3–14.5)
RBC # FLD: 13.1 % — SIGNIFICANT CHANGE UP (ref 10.3–14.5)
SARS-COV-2 RNA SPEC QL NAA+PROBE: SIGNIFICANT CHANGE UP
SARS-COV-2 RNA SPEC QL NAA+PROBE: SIGNIFICANT CHANGE UP
SODIUM SERPL-SCNC: 130 MMOL/L — LOW (ref 135–145)
SODIUM SERPL-SCNC: 130 MMOL/L — LOW (ref 135–145)
WBC # BLD: 4.29 K/UL — SIGNIFICANT CHANGE UP (ref 3.8–10.5)
WBC # BLD: 4.29 K/UL — SIGNIFICANT CHANGE UP (ref 3.8–10.5)
WBC # FLD AUTO: 4.29 K/UL — SIGNIFICANT CHANGE UP (ref 3.8–10.5)
WBC # FLD AUTO: 4.29 K/UL — SIGNIFICANT CHANGE UP (ref 3.8–10.5)

## 2024-01-04 PROCEDURE — 99233 SBSQ HOSP IP/OBS HIGH 50: CPT

## 2024-01-04 PROCEDURE — 93010 ELECTROCARDIOGRAM REPORT: CPT

## 2024-01-04 RX ORDER — ASPIRIN/CALCIUM CARB/MAGNESIUM 324 MG
81 TABLET ORAL DAILY
Refills: 0 | Status: DISCONTINUED | OUTPATIENT
Start: 2024-01-04 | End: 2024-01-09

## 2024-01-04 RX ORDER — CLOPIDOGREL BISULFATE 75 MG/1
75 TABLET, FILM COATED ORAL DAILY
Refills: 0 | Status: DISCONTINUED | OUTPATIENT
Start: 2024-01-04 | End: 2024-01-09

## 2024-01-04 RX ORDER — AMLODIPINE BESYLATE 2.5 MG/1
10 TABLET ORAL AT BEDTIME
Refills: 0 | Status: DISCONTINUED | OUTPATIENT
Start: 2024-01-04 | End: 2024-01-06

## 2024-01-04 RX ORDER — ATORVASTATIN CALCIUM 80 MG/1
40 TABLET, FILM COATED ORAL AT BEDTIME
Refills: 0 | Status: DISCONTINUED | OUTPATIENT
Start: 2024-01-04 | End: 2024-01-12

## 2024-01-04 RX ORDER — LABETALOL HCL 100 MG
200 TABLET ORAL EVERY 8 HOURS
Refills: 0 | Status: DISCONTINUED | OUTPATIENT
Start: 2024-01-04 | End: 2024-01-12

## 2024-01-04 RX ADMIN — Medication 100 MILLIGRAM(S): at 17:49

## 2024-01-04 RX ADMIN — AMLODIPINE BESYLATE 10 MILLIGRAM(S): 2.5 TABLET ORAL at 23:01

## 2024-01-04 RX ADMIN — SODIUM CHLORIDE 50 MILLILITER(S): 9 INJECTION, SOLUTION INTRAVENOUS at 21:09

## 2024-01-04 RX ADMIN — Medication 650 MILLIGRAM(S): at 15:45

## 2024-01-04 RX ADMIN — Medication 200 MILLIGRAM(S): at 23:00

## 2024-01-04 RX ADMIN — CEFTRIAXONE 100 MILLIGRAM(S): 500 INJECTION, POWDER, FOR SOLUTION INTRAMUSCULAR; INTRAVENOUS at 05:35

## 2024-01-04 RX ADMIN — HEPARIN SODIUM 5000 UNIT(S): 5000 INJECTION INTRAVENOUS; SUBCUTANEOUS at 05:36

## 2024-01-04 RX ADMIN — AMLODIPINE BESYLATE 5 MILLIGRAM(S): 2.5 TABLET ORAL at 21:09

## 2024-01-04 RX ADMIN — ATORVASTATIN CALCIUM 40 MILLIGRAM(S): 80 TABLET, FILM COATED ORAL at 21:09

## 2024-01-04 RX ADMIN — Medication 100 MILLIGRAM(S): at 05:35

## 2024-01-04 RX ADMIN — HEPARIN SODIUM 5000 UNIT(S): 5000 INJECTION INTRAVENOUS; SUBCUTANEOUS at 17:53

## 2024-01-04 RX ADMIN — Medication 650 MILLIGRAM(S): at 14:49

## 2024-01-04 NOTE — PROGRESS NOTE ADULT - SUBJECTIVE AND OBJECTIVE BOX
INTERVAL HPI / OVERNIGHT EVENTS:  Patient seen and examined at bedside. Denies abdominal pain, denies N/V/D, no hematemesis and hematochezia   Last bm:1/4, tolerating DASH diet      MEDICATIONS  (STANDING):  amLODIPine   Tablet 5 milliGRAM(s) Oral at bedtime  aspirin  chewable 81 milliGRAM(s) Oral daily  atorvastatin 40 milliGRAM(s) Oral at bedtime  clopidogrel Tablet 75 milliGRAM(s) Oral daily  dextrose 5% 1000 milliLiter(s) (50 mL/Hr) IV Continuous <Continuous>  heparin   Injectable 5000 Unit(s) SubCutaneous every 12 hours  labetalol 100 milliGRAM(s) Oral two times a day    MEDICATIONS  (PRN):  acetaminophen     Tablet .. 650 milliGRAM(s) Oral every 6 hours PRN Temp greater or equal to 38C (100.4F), Mild Pain (1 - 3)  aluminum hydroxide/magnesium hydroxide/simethicone Suspension 30 milliLiter(s) Oral every 4 hours PRN Dyspepsia  melatonin 3 milliGRAM(s) Oral at bedtime PRN Insomnia  ondansetron Injectable 4 milliGRAM(s) IV Push every 8 hours PRN Nausea and/or Vomiting      Allergies    No Known Allergies    Intolerances          Vital Signs Last 24 Hrs  T(C): 38.2 (04 Jan 2024 14:55), Max: 39.2 (03 Jan 2024 19:01)  T(F): 100.8 (04 Jan 2024 14:55), Max: 102.5 (03 Jan 2024 19:01)  HR: 82 (04 Jan 2024 14:55) (81 - 91)  BP: 158/82 (04 Jan 2024 14:55) (152/77 - 197/93)  BP(mean): --  RR: 16 (04 Jan 2024 14:55) (16 - 18)  SpO2: 95% (04 Jan 2024 14:55) (93% - 95%)    Parameters below as of 04 Jan 2024 14:55  Patient On (Oxygen Delivery Method): room air        PHYSICAL EXAM:  Constitutional: Well-developed  ENTT: clear conjunctivae and sclera  Respiratory: clear to auscultation  Cardiovascular: S1 and S2  Gastrointestinal: +Bowel Sounds all quadrants, soft, Non tender, non distended  Extremities: No peripheral edema, neg clubbing, cyanosis  Neurological: A/O x 3  Skin: warm and dry      LABS:                        6.8    4.29  )-----------( 223      ( 04 Jan 2024 06:55 )             18.8     01-04    130<L>  |  93<L>  |  105<H>  ----------------------------<  116<H>  4.3   |  18<L>  |  17.88<H>    Ca    6.3<LL>      04 Jan 2024 06:55    TPro  5.4<L>  /  Alb  2.3<L>  /  TBili  0.4  /  DBili  x   /  AST  10  /  ALT  15  /  AlkPhos  89  01-04    PT/INR - ( 03 Jan 2024 11:15 )   PT: 12.0 sec;   INR: 1.03 ratio         PTT - ( 03 Jan 2024 11:15 )  PTT:30.9 sec  Urinalysis Basic - ( 04 Jan 2024 06:55 )    Color: x / Appearance: x / SG: x / pH: x  Gluc: 116 mg/dL / Ketone: x  / Bili: x / Urobili: x   Blood: x / Protein: x / Nitrite: x   Leuk Esterase: x / RBC: x / WBC x   Sq Epi: x / Non Sq Epi: x / Bacteria: x      LIVER FUNCTIONS - ( 04 Jan 2024 06:55 )  Alb: 2.3 g/dL / Pro: 5.4 g/dL / ALK PHOS: 89 U/L / ALT: 15 U/L / AST: 10 U/L / GGT: x             RADIOLOGY & ADDITIONAL TESTS:   GI follow up  Patient seen and examined at bedside. Denies abdominal pain, denies N/V/D, hematemesis and hematochezia   Last bm:1/4, tolerating DASH diet      MEDICATIONS  (STANDING):  amLODIPine   Tablet 5 milliGRAM(s) Oral at bedtime  aspirin  chewable 81 milliGRAM(s) Oral daily  atorvastatin 40 milliGRAM(s) Oral at bedtime  clopidogrel Tablet 75 milliGRAM(s) Oral daily  dextrose 5% 1000 milliLiter(s) (50 mL/Hr) IV Continuous <Continuous>  heparin   Injectable 5000 Unit(s) SubCutaneous every 12 hours  labetalol 100 milliGRAM(s) Oral two times a day    MEDICATIONS  (PRN):  acetaminophen     Tablet .. 650 milliGRAM(s) Oral every 6 hours PRN Temp greater or equal to 38C (100.4F), Mild Pain (1 - 3)  aluminum hydroxide/magnesium hydroxide/simethicone Suspension 30 milliLiter(s) Oral every 4 hours PRN Dyspepsia  melatonin 3 milliGRAM(s) Oral at bedtime PRN Insomnia  ondansetron Injectable 4 milliGRAM(s) IV Push every 8 hours PRN Nausea and/or Vomiting      Allergies    No Known Allergies    Intolerances          Vital Signs Last 24 Hrs  T(C): 38.2 (04 Jan 2024 14:55), Max: 39.2 (03 Jan 2024 19:01)  T(F): 100.8 (04 Jan 2024 14:55), Max: 102.5 (03 Jan 2024 19:01)  HR: 82 (04 Jan 2024 14:55) (81 - 91)  BP: 158/82 (04 Jan 2024 14:55) (152/77 - 197/93)  BP(mean): --  RR: 16 (04 Jan 2024 14:55) (16 - 18)  SpO2: 95% (04 Jan 2024 14:55) (93% - 95%)    Parameters below as of 04 Jan 2024 14:55  Patient On (Oxygen Delivery Method): room air        PHYSICAL EXAM:  Constitutional: Well-developed, NAD  ENTT: clear conjunctivae and sclera  Respiratory: clear to auscultation  Cardiovascular: S1 and S2  Gastrointestinal: +Bowel Sounds all quadrants, soft, Non tender, non distended  Extremities: No peripheral edema, neg clubbing, cyanosis  Neurological: A/O x 3  Skin: warm and dry      LABS:                        6.8    4.29  )-----------( 223      ( 04 Jan 2024 06:55 )             18.8     01-04    130<L>  |  93<L>  |  105<H>  ----------------------------<  116<H>  4.3   |  18<L>  |  17.88<H>    Ca    6.3<LL>      04 Jan 2024 06:55    TPro  5.4<L>  /  Alb  2.3<L>  /  TBili  0.4  /  DBili  x   /  AST  10  /  ALT  15  /  AlkPhos  89  01-04    PT/INR - ( 03 Jan 2024 11:15 )   PT: 12.0 sec;   INR: 1.03 ratio    PTT - ( 03 Jan 2024 11:15 )  PTT:30.9 sec      Urinalysis Basic - ( 04 Jan 2024 06:55 )    Color: x / Appearance: x / SG: x / pH: x  Gluc: 116 mg/dL / Ketone: x  / Bili: x / Urobili: x   Blood: x / Protein: x / Nitrite: x   Leuk Esterase: x / RBC: x / WBC x   Sq Epi: x / Non Sq Epi: x / Bacteria: x      LIVER FUNCTIONS - ( 04 Jan 2024 06:55 )  Alb: 2.3 g/dL / Pro: 5.4 g/dL / ALK PHOS: 89 U/L / ALT: 15 U/L / AST: 10 U/L / GGT: x

## 2024-01-04 NOTE — PROGRESS NOTE ADULT - PROBLEM SELECTOR PLAN 7
-Diarrhea likely 2/2 uremia  -R/o pathogenic cause  -CTAP Inadequately distended proximal colonic loop or query long segmental   colitis. No bowel obstruction. Nonemergent colonoscopy suggested.  -S/p 1 L fluid in ED, due to severe hypertension no further fluids  -F/u stool culture, ova + parasite, GI PCR  -Add ceftriaxone and flagyl, low threshold to discontinue  -F/up GI consult -Diarrhea likely 2/2 uremia  -R/o pathogenic cause  -CTAP Inadequately distended proximal colonic loop or query long segmental   colitis. No bowel obstruction. Nonemergent colonoscopy suggested.  -S/p 1 L fluid in ED, due to severe hypertension no further fluids  -F/u stool culture, ova + parasite, GI PCR  -Add ceftriaxone and flagyl  -Appreciate GI recommendations, will cw abx -Diarrhea likely 2/2 uremia  -R/o pathogenic cause  -CTAP: Inadequately distended proximal colonic loop or query long segmental   colitis. No bowel obstruction. Nonemergent colonoscopy suggested.  -S/p 1 L fluid in ED, due to severe hypertension no further fluids  -F/u stool culture, ova + parasite, GI PCR  -Add ceftriaxone and flagyl  -Appreciate GI recommendations, will cw abx

## 2024-01-04 NOTE — DIETITIAN INITIAL EVALUATION ADULT - PERSON TAUGHT/METHOD
optimal nutrition, adequate hydration, HTN nutrition therapy/verbal instruction/teach back - (Patient repeats in own words)/patient instructed

## 2024-01-04 NOTE — PROGRESS NOTE ADULT - PROBLEM SELECTOR PLAN 2
-r/o other causes of other intrinsic renal disease ie nephritic/nephrotic syndrome, hepatitis  -s/p CTAP with IV contrast  -History of HTN, not on medications (poor medical follow up)  -BUN 94, Cr 17.09  -urine with 300 protein, small blood, 100 glucose  -a1c and lipid panel wnl  -s/p 1L fluid in ED  -s/p 10mg hydralazine IV push  -c/w 100mg BID labetalol  - C3, C4 wnl, f/u SPEP, SIF,  DAVID, dsDNA, ANCAs, HIV, HepB, HCV  to be complete  -f/u 24hr urine for prot/cr, UPEP, UIF  -Nephrology following -r/o other causes of other intrinsic renal disease ie nephritic/nephrotic syndrome, hepatitis  -s/p CTAP with IV contrast  -History of HTN, not on medications (poor medical follow up)  -BUN 94, Cr 17.09  -urine with 300 protein, small blood, 100 glucose  -a1c and lipid panel wnl  -s/p 1L fluid in ED  -s/p 10mg hydralazine IV push  -c/w 100mg BID labetalol  - C3, C4 wnl, f/u SPEP, SIF,  DAVID, dsDNA, ANCAs, HIV, HepB, HCV  to be complete  -f/u 24hr urine for prot/cr, UPEP, UIF  -Nephrology following, recommend gentle hydration with IVF and bicarb

## 2024-01-04 NOTE — PROGRESS NOTE ADULT - SUBJECTIVE AND OBJECTIVE BOX
MARLEY TRIMBLE  [id number]    Chief Complaint: HTN emergency  Interval events:  [id number] used. pt seen and examined, labs and chart reviewed. pt denies cp or ha. Sinus 80s bpm on tele    ALLERGIES:  No Known Allergies      PAST MEDICAL & SURGICAL HISTORY:  H/O gastroesophageal reflux (GERD)      Hypertension      S/P Cholecystectomy            CURRENT MEDICATIONS:  MEDICATIONS  (STANDING):  cefTRIAXone   IVPB 1000 milliGRAM(s) IV Intermittent every 24 hours  heparin   Injectable 5000 Unit(s) SubCutaneous every 12 hours  labetalol 100 milliGRAM(s) Oral two times a day  metroNIDAZOLE  IVPB      metroNIDAZOLE  IVPB 500 milliGRAM(s) IV Intermittent every 8 hours    MEDICATIONS  (PRN):  acetaminophen     Tablet .. 650 milliGRAM(s) Oral every 6 hours PRN Temp greater or equal to 38C (100.4F), Mild Pain (1 - 3)  aluminum hydroxide/magnesium hydroxide/simethicone Suspension 30 milliLiter(s) Oral every 4 hours PRN Dyspepsia  melatonin 3 milliGRAM(s) Oral at bedtime PRN Insomnia  ondansetron Injectable 4 milliGRAM(s) IV Push every 8 hours PRN Nausea and/or Vomiting      SOCIAL HISTORY:  denies etoh and tobacco use    FAMILY HISTORY:      ROS:  All 10 systems reviewed and positives noted in HPI    OBJECTIVE:    VITAL SIGNS:  Vital Signs Last 24 Hrs  T(C): 39.4 (03 Jan 2024 10:28), Max: 39.4 (03 Jan 2024 10:28)  T(F): 102.9 (03 Jan 2024 10:28), Max: 102.9 (03 Jan 2024 10:28)  HR: 107 (03 Jan 2024 10:28) (100 - 107)  BP: 177/96 (03 Jan 2024 10:28) (177/96 - 218/113)  BP(mean): --  RR: 21 (03 Jan 2024 10:28) (18 - 21)  SpO2: 91% (03 Jan 2024 10:28) (91% - 99%)    Parameters below as of 03 Jan 2024 10:28  Patient On (Oxygen Delivery Method): room air        PHYSICAL EXAM:  General: well appearing, no distress  HEENT: sclera anicteric  Neck: supple, no carotid bruits b/l  CVS: JVP ~ 7 cm H20, RRR, s1, s2, no murmurs/rubs/gallops  Chest: unlabored respirations, clear to auscultation b/l  Abdomen: non-distended  Extremities: no lower extremity edema b/l  Neuro: awake, alert & oriented x 3  Psych: normal affect      LABS:                        8.2    7.58  )-----------( 244      ( 03 Jan 2024 01:30 )             22.3     01-03    132<L>  |  98  |  94<H>  ----------------------------<  116<H>  4.2   |  19<L>  |  17.09<H>    Ca    6.7<L>      03 Jan 2024 01:30    TPro  7.1  /  Alb  2.7<L>  /  TBili  0.4  /  DBili  x   /  AST  12  /  ALT  19  /  AlkPhos  108  01-03              ECG: Sinus tach       TTE: < from: TTE Echo Limited or F/U (01.03.24 @ 08:02) >   1. Biatrial enlargement   2. Left ventricular ejection fraction, by visual estimation, is 65 to   70%.   3. Normal global left ventricular systolic function.   4. Increased LV wall thickness.   5. Normal left ventricular internal cavity size.   6. Spectral Doppler shows impaired relaxation pattern of left   ventricular myocardial filling (Grade I diastolic dysfunction).   7. Trivial pericardial effusion.   8. Mild to moderate mitral valve regurgitation.   9. Mild-moderate tricuspid regurgitation.  10. Estimated pulmonary artery systolic pressure is 54.8 mmHg assuming a   right atrial pressure of 3 mmHg, which is consistent with moderate   pulmonary hypertension.       MARLEY TRIMBLE  [id number]    Chief Complaint: HTN emergency  Interval events:  [id number] used. pt seen and examined, labs and chart reviewed. pt denies cp or ha. Sinus 80s bpm on tele    ALLERGIES:  No Known Allergies      PAST MEDICAL & SURGICAL HISTORY:  H/O gastroesophageal reflux (GERD)      Hypertension      S/P Cholecystectomy            CURRENT MEDICATIONS:  MEDICATIONS  (STANDING):  cefTRIAXone   IVPB 1000 milliGRAM(s) IV Intermittent every 24 hours  heparin   Injectable 5000 Unit(s) SubCutaneous every 12 hours  labetalol 100 milliGRAM(s) Oral two times a day  metroNIDAZOLE  IVPB      metroNIDAZOLE  IVPB 500 milliGRAM(s) IV Intermittent every 8 hours    MEDICATIONS  (PRN):  acetaminophen     Tablet .. 650 milliGRAM(s) Oral every 6 hours PRN Temp greater or equal to 38C (100.4F), Mild Pain (1 - 3)  aluminum hydroxide/magnesium hydroxide/simethicone Suspension 30 milliLiter(s) Oral every 4 hours PRN Dyspepsia  melatonin 3 milliGRAM(s) Oral at bedtime PRN Insomnia  ondansetron Injectable 4 milliGRAM(s) IV Push every 8 hours PRN Nausea and/or Vomiting      SOCIAL HISTORY:  denies etoh and tobacco use    FAMILY HISTORY:      ROS:  All 10 systems reviewed and positives noted in HPI    OBJECTIVE:    VITAL SIGNS:  Vital Signs Last 24 Hrs  T(C): 39.4 (03 Jan 2024 10:28), Max: 39.4 (03 Jan 2024 10:28)  T(F): 102.9 (03 Jan 2024 10:28), Max: 102.9 (03 Jan 2024 10:28)  HR: 107 (03 Jan 2024 10:28) (100 - 107)  BP: 177/96 (03 Jan 2024 10:28) (177/96 - 218/113)  BP(mean): --  RR: 21 (03 Jan 2024 10:28) (18 - 21)  SpO2: 91% (03 Jan 2024 10:28) (91% - 99%)    Parameters below as of 03 Jan 2024 10:28  Patient On (Oxygen Delivery Method): room air        PHYSICAL EXAM:  General: well appearing, no distress  HEENT: sclera anicteric  Neck: supple, no carotid bruits b/l  CVS: JVP ~ 7 cm H20, RRR, s1, s2, no murmurs/rubs/gallops  Chest: unlabored respirations, clear to auscultation b/l  Abdomen: non-distended  Extremities: no lower extremity edema b/l  Neuro: awake, alert & oriented x 3  Psych: normal affect      LABS:                        8.2    7.58  )-----------( 244      ( 03 Jan 2024 01:30 )             22.3     01-03    132<L>  |  98  |  94<H>  ----------------------------<  116<H>  4.2   |  19<L>  |  17.09<H>    Ca    6.7<L>      03 Jan 2024 01:30    TPro  7.1  /  Alb  2.7<L>  /  TBili  0.4  /  DBili  x   /  AST  12  /  ALT  19  /  AlkPhos  108  01-03              ECG: Sinus tach       TTE: < from: TTE Echo Limited or F/U (01.03.24 @ 08:02) >   1. Biatrial enlargement   2. Left ventricular ejection fraction, by visual estimation, is 65 to   70%.   3. Normal global left ventricular systolic function.   4. Increased LV wall thickness.   5. Normal left ventricular internal cavity size.   6. Spectral Doppler shows impaired relaxation pattern of left   ventricular myocardial filling (Grade I diastolic dysfunction).   7. Trivial pericardial effusion.   8. Mild to moderate mitral valve regurgitation.   9. Mild-moderate tricuspid regurgitation.  10. Estimated pulmonary artery systolic pressure is 54.8 mmHg assuming a   right atrial pressure of 3 mmHg, which is consistent with moderate   pulmonary hypertension.

## 2024-01-04 NOTE — PROGRESS NOTE ADULT - NS ATTEND AMEND GEN_ALL_CORE FT
Seen and d/w patient and Ms. Villanueva    Has had c/p prior to admission. The rise Tn should be considered as Non STemi in this situation.  He has ESRd and no also FLu + diagnosed,   No chest pain for 2 days  Today's ekg without changes and lv function normal    would rx for non stemi including statin, b blocker aspirin plavix.  is receiving PRBC currently    would consider for tx for cath as well as RRT non urgently Seen and d/w patient and Ms. Walton    Has had c/p prior to admission. The rise Tn should be considered as Non STemi in this situation.  He has ESRd and no also FLu + diagnosed,   No chest pain for 2 days  Today's ekg without changes and lv function normal    would rx for non stemi including statin, b blocker aspirin plavix.  is receiving PRBC currently    would consider for tx for cath as well as RRT non urgently    d/w Dr. Jimenez.  Will need nephrology input as well.

## 2024-01-04 NOTE — PROGRESS NOTE ADULT - PROBLEM SELECTOR PLAN 2
Most likely secondary to chronic disease, likely renal origin   - f/u Iron panel   - Monitor Hb  - Keep active T/S   - Monitor s/s of active bleeding.

## 2024-01-04 NOTE — DIETITIAN INITIAL EVALUATION ADULT - ORAL INTAKE PTA/DIET HISTORY
[employer] used for Khmer translation. Pt endorses poor appetite x 3 weeks due to N/V. Typically tolerates a regular diet with report of good appetite. Does not eat most meat (small amount of beef). Avoids added salt. Denies food allergies. No chewing/swallowing issues.  [employer] used for Czech translation. Pt endorses poor appetite x 3 weeks due to N/V. Typically tolerates a regular diet with report of good appetite. Does not eat most meat (small amount of beef). Avoids added salt. Denies food allergies. No chewing/swallowing issues.

## 2024-01-04 NOTE — PROGRESS NOTE ADULT - ASSESSMENT
Mr. Garcia is a 59yo M with PMHx of CKD 3, HTN, lost to follow up with PCP (per chart review used to take medications for HTN, but patient denies) presenting for vomiting times 3 weeks, admitted for renal failure and r/o ACS.       Nichristy Jen Garcia, updated [phone].       Case d/w Dr. Peralta Mr. Garcia is a 59yo M with PMHx of CKD 3, HTN, lost to follow up with PCP (per chart review used to take medications for HTN, but patient denies) presenting for vomiting times 3 weeks, admitted for renal failure and r/o ACS.       Nichristy Jen Garcia, updated [phone].       Case d/w Dr. Peralta Mr. Garcia is a 59yo M with PMHx of CKD 3, HTN, lost to follow up with PCP (per chart review used to take medications for HTN, but patient denies) presenting for vomiting times 3 weeks, admitted for renal failure and r/o ACS.       Nichristy Li Jose, updated [phone].       Case d/w Barbara. Mr. Garcia is a 59yo M with PMHx of CKD 3, HTN, lost to follow up with PCP (per chart review used to take medications for HTN, but patient denies) presenting for vomiting times 3 weeks, admitted for renal failure and r/o ACS.       Nichristy Li Jose, updated [phone].       Case d/w Barbara.

## 2024-01-04 NOTE — DIETITIAN INITIAL EVALUATION ADULT - PROBLEM SELECTOR PROBLEM 2
Acute renal failure superimposed on chronic kidney disease, unspecified acute renal failure type, unspecified CKD stage

## 2024-01-04 NOTE — PROGRESS NOTE ADULT - ASSESSMENT
Assessment: 59yo M with pmhx CKD 3, HTN, lost to follow up with PCP (per chart review used to take medications for HTN, but patient denies) presenting for vomiting times 3 weeks, admitted for renal failure, htn urgency and r/o ACS.     Recommendations:  - ekg non ischemic, trops +, however with Cr 17, likely iso CKD +/- demand iso htn urgency, doubt ACS  - can consider ischemic eval if approved with renal  - currently on labetolol BID with good result  - TTE with normal EF 65-70 and mod pulm htn  - continue tele monitoring    Nohelia VELÁSQUEZCNP-BC Assessment: 57yo M with pmhx CKD 3, HTN, lost to follow up with PCP (per chart review used to take medications for HTN, but patient denies) presenting for vomiting times 3 weeks, admitted for renal failure, htn urgency and r/o ACS.     Recommendations:  - ekg non ischemic, trops +, however with Cr 17, likely iso CKD +/- demand iso htn urgency  - can consider ischemic eval if approved with renal  - currently on labetolol BID with good result  - TTE with normal EF 65-70 and mod pulm htn  - continue tele monitoring    Nohelia VELÁSQUEZCNP-BC Assessment: 59yo M with pmhx CKD 3, HTN, lost to follow up with PCP (per chart review used to take medications for HTN, but patient denies) presenting for vomiting times 3 weeks, admitted for renal failure, htn urgency and r/o ACS.     Recommendations:  - ekg non ischemic, trops +, however with Cr 17, likely iso CKD +/- demand iso htn urgency  - can consider ischemic eval if approved with renal  - currently on labetolol BID with good result  - TTE with normal EF 65-70 and mod pulm htn  - continue tele monitoring    Nohelia VELÁSQUEZCNP-BC

## 2024-01-04 NOTE — DIETITIAN INITIAL EVALUATION ADULT - NS FNS DIET ORDER
Diet, DASH/TLC:   Sodium & Cholesterol Restricted  For patients receiving Renal Replacement - No Protein Restr, No Conc K, No Conc Phos, Low Sodium (01-03-24 @ 19:08)

## 2024-01-04 NOTE — DIETITIAN INITIAL EVALUATION ADULT - PERTINENT MEDS FT
MEDICATIONS  (STANDING):  amLODIPine   Tablet 5 milliGRAM(s) Oral at bedtime  cefTRIAXone   IVPB 1000 milliGRAM(s) IV Intermittent every 24 hours  dextrose 5% 1000 milliLiter(s) (50 mL/Hr) IV Continuous <Continuous>  heparin   Injectable 5000 Unit(s) SubCutaneous every 12 hours  labetalol 100 milliGRAM(s) Oral two times a day  metroNIDAZOLE  IVPB      metroNIDAZOLE  IVPB 500 milliGRAM(s) IV Intermittent every 8 hours    MEDICATIONS  (PRN):  acetaminophen     Tablet .. 650 milliGRAM(s) Oral every 6 hours PRN Temp greater or equal to 38C (100.4F), Mild Pain (1 - 3)  aluminum hydroxide/magnesium hydroxide/simethicone Suspension 30 milliLiter(s) Oral every 4 hours PRN Dyspepsia  melatonin 3 milliGRAM(s) Oral at bedtime PRN Insomnia  ondansetron Injectable 4 milliGRAM(s) IV Push every 8 hours PRN Nausea and/or Vomiting

## 2024-01-04 NOTE — PROGRESS NOTE ADULT - ASSESSMENT
Patient is a 57yo M with pmhx CKD 3, HTN, no PCP.   Currently, last vomiting episode 1/3, no N/D at this time  NO active s/s of active bleeding    GI consult:  sepsis, vomiting x 3 weeks  Colonoscopy- Never  ERCP- 2006 as per patient.  One unit PRBC to be given 1/4    Hemoglobin: 6.8 g/dL (01.04.24 @ 06:55)  Hemoglobin: 7.0 g/dL (01.03.24 @ 13:35)   Hemoglobin: 7.2 g/dL (01.03.24 @ 11:15)     Hematocrit: 18.8 % (01.04.24 @ 06:55)   Hematocrit: 19.4 % (01.03.24 @ 13:35)   Hematocrit: 19.1 % (01.03.24 @ 11:15)      Patient is a 59yo M with pmhx CKD 3, HTN, no PCP.   Currently, last vomiting episode 1/3, no N/D at this time  NO active s/s of active bleeding    GI consult:  sepsis, vomiting x 3 weeks  Colonoscopy- Never  ERCP- 2006 as per patient.  One unit PRBC to be given 1/4    Hemoglobin: 6.8 g/dL (01.04.24 @ 06:55)  Hemoglobin: 7.0 g/dL (01.03.24 @ 13:35)   Hemoglobin: 7.2 g/dL (01.03.24 @ 11:15)     Hematocrit: 18.8 % (01.04.24 @ 06:55)   Hematocrit: 19.4 % (01.03.24 @ 13:35)   Hematocrit: 19.1 % (01.03.24 @ 11:15)      Patient is a 57yo M with pmhx CKD 3, HTN, no PCP.   Currently, last vomiting episode 1/3, no N/D at this time  NO active s/s of active bleeding, Hb noted to be decreased    GI consult:  sepsis, vomiting x 3 weeks  Colonoscopy- Never  ERCP- 2006 as per patient.       Patient is a 59yo M with pmhx CKD 3, HTN, no PCP.   Currently, last vomiting episode 1/3, no N/D at this time  NO active s/s of active bleeding, Hb noted to be decreased    GI consult:  sepsis, vomiting x 3 weeks  Colonoscopy- Never  ERCP- 2006 as per patient.

## 2024-01-04 NOTE — PROGRESS NOTE ADULT - SUBJECTIVE AND OBJECTIVE BOX
Not in distress, getting PRBCs    Vital Signs Last 24 Hrs  T(C): 36.7 (01-04-24 @ 20:23), Max: 38.2 (01-04-24 @ 14:55)  T(F): 98.1 (01-04-24 @ 20:23), Max: 100.8 (01-04-24 @ 14:55)  HR: 78 (01-04-24 @ 20:23) (78 - 88)  BP: 164/84 (01-04-24 @ 20:23) (152/77 - 164/84)  RR: 17 (01-04-24 @ 20:23) (16 - 18)  SpO2: 96% (01-04-24 @ 20:23) (94% - 96%)    I&O's Detail    03 Jan 2024 07:01  -  04 Jan 2024 07:00  --------------------------------------------------------  IN:    dextrose 5% w/ Additives: 650 mL    IV PiggyBack: 200 mL    IV PiggyBack: 50 mL    Oral Fluid: 240 mL  Total IN: 1140 mL    OUT:    Indwelling Catheter - Urethral (mL): 800 mL  Total OUT: 800 mL    04 Jan 2024 07:01  -  04 Jan 2024 21:03  --------------------------------------------------------  IN:    dextrose 5% w/ Additives: 50 mL    Oral Fluid: 460 mL  Total IN: 510 mL    OUT:    Indwelling Catheter - Urethral (mL): 1200 mL  Total OUT: 1200 mL    s1s2  b/l air entry  soft, ND  no edema                        6.8    4.29  )-----------( 223      ( 04 Jan 2024 06:55 )             18.8     04 Jan 2024 06:55    130    |  93     |  105    ----------------------------<  116    4.3     |  18     |  17.88    Ca    6.3        04 Jan 2024 06:55    TPro  5.4    /  Alb  2.3    /  TBili  0.4    /  DBili  x      /  AST  10     /  ALT  15     /  AlkPhos  89     04 Jan 2024 06:55    LIVER FUNCTIONS - ( 04 Jan 2024 06:55 )  Alb: 2.3 g/dL / Pro: 5.4 g/dL / ALK PHOS: 89 U/L / ALT: 15 U/L / AST: 10 U/L / GGT: x           PT/INR - ( 03 Jan 2024 11:15 )   PT: 12.0 sec;   INR: 1.03 ratio  \    Culture - Blood (collected 03 Jan 2024 12:01)  Source: .Blood Blood-Peripheral  Preliminary Report (04 Jan 2024 15:02):    No growth at 24 hours    Culture - Blood (collected 03 Jan 2024 11:56)  Source: .Blood Blood-Peripheral  Preliminary Report (04 Jan 2024 15:02):    No growth at 24 hours    acetaminophen     Tablet .. 650 milliGRAM(s) Oral every 6 hours PRN  aluminum hydroxide/magnesium hydroxide/simethicone Suspension 30 milliLiter(s) Oral every 4 hours PRN  amLODIPine   Tablet 5 milliGRAM(s) Oral at bedtime  aspirin  chewable 81 milliGRAM(s) Oral daily  atorvastatin 40 milliGRAM(s) Oral at bedtime  clopidogrel Tablet 75 milliGRAM(s) Oral daily  dextrose 5% 1000 milliLiter(s) IV Continuous <Continuous>  heparin   Injectable 5000 Unit(s) SubCutaneous every 12 hours  labetalol 100 milliGRAM(s) Oral two times a day  melatonin 3 milliGRAM(s) Oral at bedtime PRN  ondansetron Injectable 4 milliGRAM(s) IV Push every 8 hours PRN    A/P:    Pt w/no known PMH, no following w/doctors   Adm w/N/V, fever, anemia, markedly abnormal renal fx (baseline renal fx is unknown)  S/p CT w/IV dye yesterday, which places him at risk of further deterioration of renal fx  Pro w/good UO  Met acidosis iso severe renal dysfx  Gentle IVF w/Bicarb  Bld tx as needed  Will f/u SPEP, renal serologies  CT A/P w/o hydro   Norvasc for HTN  NSTEMI, per d/w cardiology plan for eventual cath  From renal pov, plan for HD access and to start RRT tomorrow   Will follow     [phone]       Not in distress, getting PRBCs    Vital Signs Last 24 Hrs  T(C): 36.7 (01-04-24 @ 20:23), Max: 38.2 (01-04-24 @ 14:55)  T(F): 98.1 (01-04-24 @ 20:23), Max: 100.8 (01-04-24 @ 14:55)  HR: 78 (01-04-24 @ 20:23) (78 - 88)  BP: 164/84 (01-04-24 @ 20:23) (152/77 - 164/84)  RR: 17 (01-04-24 @ 20:23) (16 - 18)  SpO2: 96% (01-04-24 @ 20:23) (94% - 96%)    I&O's Detail    03 Jan 2024 07:01  -  04 Jan 2024 07:00  --------------------------------------------------------  IN:    dextrose 5% w/ Additives: 650 mL    IV PiggyBack: 200 mL    IV PiggyBack: 50 mL    Oral Fluid: 240 mL  Total IN: 1140 mL    OUT:    Indwelling Catheter - Urethral (mL): 800 mL  Total OUT: 800 mL    04 Jan 2024 07:01  -  04 Jan 2024 21:03  --------------------------------------------------------  IN:    dextrose 5% w/ Additives: 50 mL    Oral Fluid: 460 mL  Total IN: 510 mL    OUT:    Indwelling Catheter - Urethral (mL): 1200 mL  Total OUT: 1200 mL    s1s2  b/l air entry  soft, ND  no edema                        6.8    4.29  )-----------( 223      ( 04 Jan 2024 06:55 )             18.8     04 Jan 2024 06:55    130    |  93     |  105    ----------------------------<  116    4.3     |  18     |  17.88    Ca    6.3        04 Jan 2024 06:55    TPro  5.4    /  Alb  2.3    /  TBili  0.4    /  DBili  x      /  AST  10     /  ALT  15     /  AlkPhos  89     04 Jan 2024 06:55    LIVER FUNCTIONS - ( 04 Jan 2024 06:55 )  Alb: 2.3 g/dL / Pro: 5.4 g/dL / ALK PHOS: 89 U/L / ALT: 15 U/L / AST: 10 U/L / GGT: x           PT/INR - ( 03 Jan 2024 11:15 )   PT: 12.0 sec;   INR: 1.03 ratio  \    Culture - Blood (collected 03 Jan 2024 12:01)  Source: .Blood Blood-Peripheral  Preliminary Report (04 Jan 2024 15:02):    No growth at 24 hours    Culture - Blood (collected 03 Jan 2024 11:56)  Source: .Blood Blood-Peripheral  Preliminary Report (04 Jan 2024 15:02):    No growth at 24 hours    acetaminophen     Tablet .. 650 milliGRAM(s) Oral every 6 hours PRN  aluminum hydroxide/magnesium hydroxide/simethicone Suspension 30 milliLiter(s) Oral every 4 hours PRN  amLODIPine   Tablet 5 milliGRAM(s) Oral at bedtime  aspirin  chewable 81 milliGRAM(s) Oral daily  atorvastatin 40 milliGRAM(s) Oral at bedtime  clopidogrel Tablet 75 milliGRAM(s) Oral daily  dextrose 5% 1000 milliLiter(s) IV Continuous <Continuous>  heparin   Injectable 5000 Unit(s) SubCutaneous every 12 hours  labetalol 100 milliGRAM(s) Oral two times a day  melatonin 3 milliGRAM(s) Oral at bedtime PRN  ondansetron Injectable 4 milliGRAM(s) IV Push every 8 hours PRN    A/P:    Pt w/no known PMH, no following w/doctors   Adm w/N/V, fever, anemia, markedly abnormal renal fx (baseline renal fx is unknown)  S/p CT w/IV dye yesterday, which places him at risk of further deterioration of renal fx  Pro w/good UO  Met acidosis iso severe renal dysfx  Gentle IVF w/Bicarb  Bld tx as needed  Will f/u SPEP, renal serologies  CT A/P w/o hydro   Norvasc for HTN  NSTEMI, per d/w cardiology plan for eventual cath  From renal pov, plan for HD access and to start RRT tomorrow   Will follow     [phone]       Not in distress, getting PRBCs    Vital Signs Last 24 Hrs  T(C): 36.7 (01-04-24 @ 20:23), Max: 38.2 (01-04-24 @ 14:55)  T(F): 98.1 (01-04-24 @ 20:23), Max: 100.8 (01-04-24 @ 14:55)  HR: 78 (01-04-24 @ 20:23) (78 - 88)  BP: 164/84 (01-04-24 @ 20:23) (152/77 - 164/84)  RR: 17 (01-04-24 @ 20:23) (16 - 18)  SpO2: 96% (01-04-24 @ 20:23) (94% - 96%)    I&O's Detail    03 Jan 2024 07:01  -  04 Jan 2024 07:00  --------------------------------------------------------  IN:    dextrose 5% w/ Additives: 650 mL    IV PiggyBack: 200 mL    IV PiggyBack: 50 mL    Oral Fluid: 240 mL  Total IN: 1140 mL    OUT:    Indwelling Catheter - Urethral (mL): 800 mL  Total OUT: 800 mL    04 Jan 2024 07:01  -  04 Jan 2024 21:03  --------------------------------------------------------  IN:    dextrose 5% w/ Additives: 50 mL    Oral Fluid: 460 mL  Total IN: 510 mL    OUT:    Indwelling Catheter - Urethral (mL): 1200 mL  Total OUT: 1200 mL    s1s2  b/l air entry  soft, ND  no edema                        6.8    4.29  )-----------( 223      ( 04 Jan 2024 06:55 )             18.8     04 Jan 2024 06:55    130    |  93     |  105    ----------------------------<  116    4.3     |  18     |  17.88    Ca    6.3        04 Jan 2024 06:55    TPro  5.4    /  Alb  2.3    /  TBili  0.4    /  DBili  x      /  AST  10     /  ALT  15     /  AlkPhos  89     04 Jan 2024 06:55    LIVER FUNCTIONS - ( 04 Jan 2024 06:55 )  Alb: 2.3 g/dL / Pro: 5.4 g/dL / ALK PHOS: 89 U/L / ALT: 15 U/L / AST: 10 U/L / GGT: x           PT/INR - ( 03 Jan 2024 11:15 )   PT: 12.0 sec;   INR: 1.03 ratio  \    Culture - Blood (collected 03 Jan 2024 12:01)  Source: .Blood Blood-Peripheral  Preliminary Report (04 Jan 2024 15:02):    No growth at 24 hours    Culture - Blood (collected 03 Jan 2024 11:56)  Source: .Blood Blood-Peripheral  Preliminary Report (04 Jan 2024 15:02):    No growth at 24 hours    acetaminophen     Tablet .. 650 milliGRAM(s) Oral every 6 hours PRN  aluminum hydroxide/magnesium hydroxide/simethicone Suspension 30 milliLiter(s) Oral every 4 hours PRN  amLODIPine   Tablet 5 milliGRAM(s) Oral at bedtime  aspirin  chewable 81 milliGRAM(s) Oral daily  atorvastatin 40 milliGRAM(s) Oral at bedtime  clopidogrel Tablet 75 milliGRAM(s) Oral daily  dextrose 5% 1000 milliLiter(s) IV Continuous <Continuous>  heparin   Injectable 5000 Unit(s) SubCutaneous every 12 hours  labetalol 100 milliGRAM(s) Oral two times a day  melatonin 3 milliGRAM(s) Oral at bedtime PRN  ondansetron Injectable 4 milliGRAM(s) IV Push every 8 hours PRN    A/P:    Pt w/no known PMH, no following w/doctors   Adm w/N/V, fever, anemia, markedly abnormal renal fx (baseline renal fx is unknown)  Dx w/Flu A  S/p CT w/IV dye yesterday, rising Cr can be due to dye injury as well   Pro w/good UO  Met acidosis iso severe renal dysfx  Gentle IVF w/Bicarb  Bld tx as needed  Will f/u SPEP, renal serologies  CT A/P w/o hydro   Norvasc for HTN  NSTEMI, per d/w cardiology plan for eventual cath  From renal pov, if rising BUN/Cr plan for HD access and to start RRT tomorrow   Will follow     [phone]       Not in distress, getting PRBCs    Vital Signs Last 24 Hrs  T(C): 36.7 (01-04-24 @ 20:23), Max: 38.2 (01-04-24 @ 14:55)  T(F): 98.1 (01-04-24 @ 20:23), Max: 100.8 (01-04-24 @ 14:55)  HR: 78 (01-04-24 @ 20:23) (78 - 88)  BP: 164/84 (01-04-24 @ 20:23) (152/77 - 164/84)  RR: 17 (01-04-24 @ 20:23) (16 - 18)  SpO2: 96% (01-04-24 @ 20:23) (94% - 96%)    I&O's Detail    03 Jan 2024 07:01  -  04 Jan 2024 07:00  --------------------------------------------------------  IN:    dextrose 5% w/ Additives: 650 mL    IV PiggyBack: 200 mL    IV PiggyBack: 50 mL    Oral Fluid: 240 mL  Total IN: 1140 mL    OUT:    Indwelling Catheter - Urethral (mL): 800 mL  Total OUT: 800 mL    04 Jan 2024 07:01  -  04 Jan 2024 21:03  --------------------------------------------------------  IN:    dextrose 5% w/ Additives: 50 mL    Oral Fluid: 460 mL  Total IN: 510 mL    OUT:    Indwelling Catheter - Urethral (mL): 1200 mL  Total OUT: 1200 mL    s1s2  b/l air entry  soft, ND  no edema                        6.8    4.29  )-----------( 223      ( 04 Jan 2024 06:55 )             18.8     04 Jan 2024 06:55    130    |  93     |  105    ----------------------------<  116    4.3     |  18     |  17.88    Ca    6.3        04 Jan 2024 06:55    TPro  5.4    /  Alb  2.3    /  TBili  0.4    /  DBili  x      /  AST  10     /  ALT  15     /  AlkPhos  89     04 Jan 2024 06:55    LIVER FUNCTIONS - ( 04 Jan 2024 06:55 )  Alb: 2.3 g/dL / Pro: 5.4 g/dL / ALK PHOS: 89 U/L / ALT: 15 U/L / AST: 10 U/L / GGT: x           PT/INR - ( 03 Jan 2024 11:15 )   PT: 12.0 sec;   INR: 1.03 ratio  \    Culture - Blood (collected 03 Jan 2024 12:01)  Source: .Blood Blood-Peripheral  Preliminary Report (04 Jan 2024 15:02):    No growth at 24 hours    Culture - Blood (collected 03 Jan 2024 11:56)  Source: .Blood Blood-Peripheral  Preliminary Report (04 Jan 2024 15:02):    No growth at 24 hours    acetaminophen     Tablet .. 650 milliGRAM(s) Oral every 6 hours PRN  aluminum hydroxide/magnesium hydroxide/simethicone Suspension 30 milliLiter(s) Oral every 4 hours PRN  amLODIPine   Tablet 5 milliGRAM(s) Oral at bedtime  aspirin  chewable 81 milliGRAM(s) Oral daily  atorvastatin 40 milliGRAM(s) Oral at bedtime  clopidogrel Tablet 75 milliGRAM(s) Oral daily  dextrose 5% 1000 milliLiter(s) IV Continuous <Continuous>  heparin   Injectable 5000 Unit(s) SubCutaneous every 12 hours  labetalol 100 milliGRAM(s) Oral two times a day  melatonin 3 milliGRAM(s) Oral at bedtime PRN  ondansetron Injectable 4 milliGRAM(s) IV Push every 8 hours PRN    A/P:    Pt w/no known PMH, no following w/doctors   Adm w/N/V, fever, anemia, markedly abnormal renal fx (baseline renal fx is unknown)  Dx w/Flu A  S/p CT w/IV dye yesterday, rising Cr can be due to dye injury as well   Pro w/good UO  Met acidosis iso severe renal dysfx  Gentle IVF w/Bicarb  Bld tx as needed  Will f/u SPEP, renal serologies  CT A/P w/o hydro   Norvasc for HTN  NSTEMI, per d/w cardiology plan for eventual cath  From renal pov, if rising BUN/Cr plan for HD access and to start RRT tomorrow   Will follow     [phone]

## 2024-01-04 NOTE — PROGRESS NOTE ADULT - SUBJECTIVE AND OBJECTIVE BOX
Mr. Garcia is a 59yo M with PMHx of CKD 3, HTN, lost to follow up with PCP (per chart review used to take medications for HTN, but patient denies) presenting for vomiting times 3 weeks. Per patient, he began having subjective fevers and vomiting 2-4x/ day for 3 weeks with occasional small amounts of blood  with inability to hold down solids or liquids along with diarrhea for 2 days x8 episodes. Patient also notes that for the past 4 years he has been getting chest pain while walking, which is getting worse. Currently endorsing worsening central chest  pain in the ED along with a headache. Denies shortness of breath or palpitations. Denies changes in urine output , frequency or dysuria. Notes hx of renal failure in his brother at 69yo requiring transplant. Denied recent traveling, sick exposure, NSAID use, exposure to Hep C, Hep B, HIV, new foods.     In ED vitals: afebrile, /115, , RR 18 satting 99%RA. ED Labs were notable for: WBC 7.58, Hb 8.2, BUN 94, Cr 17.09  CTAP: Inadequately distended proximal colonic loop or query long segmental colitis. No bowel obstruction. Nonemergent colonoscopy suggested.  1L fluids given in ED.    1/4/24 AM:     REVIEW OF SYSTEMS:  CONSTITUTIONAL: (-) weakness, (-) fevers, (-) chills  RESPIRATORY:  (-) shortness of breath, (-) cough,  (-) wheezing,  (-) hemoptysis   CARDIOVASCULAR:  (-) chest pain, (-) palpitations  GASTROINTESTINAL:  (-) abdominal or epigastric pain, (-) nausea, (-) vomiting, (-) diarrhea, (-) constipation, (-) melena,  (-) hematemesis,  (-) hematochezia  GENITOURINARY: (-) dysuria, (-) frequency, (-) hematuria  NEUROLOGICAL: (-) numbness, (-) weakness  SKIN: (-) itching, (-) rashes, (-) lesions    Vital Signs Last 24 Hrs  T(C): 36.7 (04 Jan 2024 05:27), Max: 39.4 (03 Jan 2024 10:28)  T(F): 98.1 (04 Jan 2024 05:27), Max: 102.9 (03 Jan 2024 10:28)  HR: 86 (04 Jan 2024 05:27) (86 - 107)  BP: 159/84 (04 Jan 2024 05:27) (151/89 - 197/93)  RR: 18 (04 Jan 2024 05:27) (16 - 21)  SpO2: 94% (04 Jan 2024 05:27) (91% - 96%)    Parameters below as of 04 Jan 2024 05:27  Patient On (Oxygen Delivery Method): room air    PHYSICAL EXAM:  GENERAL: NAD, lying in bed comfortably  HEAD:  Atraumatic, Normocephalic  RESP: Clear to auscultation bilaterally, good air entry bilaterally; No wheezing, rales, or rhonchi. Unlabored respirations  CARDIAC: Regular rate and rhythm. S1 and S2. No murmurs, rubs, or gallops  GI:  Soft, Nontender, Nondistended. Bowel sounds present x4 quadrants; No hepatomegaly. No splenomegaly.  EXTREMITIES:  2+ Peripheral Pulses. Capillary refill <2 seconds. No clubbing, cyanosis, or edema  NERVOUS SYSTEM:  Alert & Oriented X3, speech clear. No deficits   MSK: FROM all 4 extremities, full and equal strength  SKIN: No rashes, bruises, or other lesions    MEDICATIONS  (STANDING):  amLODIPine   Tablet 5 milliGRAM(s) Oral at bedtime  cefTRIAXone   IVPB 1000 milliGRAM(s) IV Intermittent every 24 hours  dextrose 5% 1000 milliLiter(s) (50 mL/Hr) IV Continuous <Continuous>  heparin   Injectable 5000 Unit(s) SubCutaneous every 12 hours  labetalol 100 milliGRAM(s) Oral two times a day  metroNIDAZOLE  IVPB      metroNIDAZOLE  IVPB 500 milliGRAM(s) IV Intermittent every 8 hours    MEDICATIONS  (PRN):  acetaminophen     Tablet .. 650 milliGRAM(s) Oral every 6 hours PRN Temp greater or equal to 38C (100.4F), Mild Pain (1 - 3)  aluminum hydroxide/magnesium hydroxide/simethicone Suspension 30 milliLiter(s) Oral every 4 hours PRN Dyspepsia  melatonin 3 milliGRAM(s) Oral at bedtime PRN Insomnia  ondansetron Injectable 4 milliGRAM(s) IV Push every 8 hours PRN Nausea and/or Vomiting        LABS                             CT ABDOMEN AND PELVIS  IMPRESSION:    Inadequately distended proximal colonic loop or query long segmental   colitis. No bowel obstruction. Nonemergent colonoscopy suggested.    --- End of Report ---    MARIANO COPELAND MD; Attending Radiologist  This document has been electronically signed. Froy  3 2024  2:58AM         Mr. Garcia is a 59yo M with PMHx of CKD 3, HTN, lost to follow up with PCP (per chart review used to take medications for HTN, but patient denies) presenting for vomiting times 3 weeks. Per patient, he began having subjective fevers and vomiting 2-4x/ day for 3 weeks with occasional small amounts of blood  with inability to hold down solids or liquids along with diarrhea for 2 days x8 episodes. Patient also notes that for the past 4 years he has been getting chest pain while walking, which is getting worse. Currently endorsing worsening central chest  pain in the ED along with a headache. Denies shortness of breath or palpitations. Denies changes in urine output , frequency or dysuria. Notes hx of renal failure in his brother at 71yo requiring transplant. Denied recent traveling, sick exposure, NSAID use, exposure to Hep C, Hep B, HIV, new foods.     In ED vitals: afebrile, /115, , RR 18 satting 99%RA. ED Labs were notable for: WBC 7.58, Hb 8.2, BUN 94, Cr 17.09  CTAP: Inadequately distended proximal colonic loop or query long segmental colitis. No bowel obstruction. Nonemergent colonoscopy suggested.  1L fluids given in ED.    1/4/24 AM:     REVIEW OF SYSTEMS:  CONSTITUTIONAL: (-) weakness, (-) fevers, (-) chills  RESPIRATORY:  (-) shortness of breath, (-) cough,  (-) wheezing,  (-) hemoptysis   CARDIOVASCULAR:  (-) chest pain, (-) palpitations  GASTROINTESTINAL:  (-) abdominal or epigastric pain, (-) nausea, (-) vomiting, (-) diarrhea, (-) constipation, (-) melena,  (-) hematemesis,  (-) hematochezia  GENITOURINARY: (-) dysuria, (-) frequency, (-) hematuria  NEUROLOGICAL: (-) numbness, (-) weakness  SKIN: (-) itching, (-) rashes, (-) lesions    Vital Signs Last 24 Hrs  T(C): 36.7 (04 Jan 2024 05:27), Max: 39.4 (03 Jan 2024 10:28)  T(F): 98.1 (04 Jan 2024 05:27), Max: 102.9 (03 Jan 2024 10:28)  HR: 86 (04 Jan 2024 05:27) (86 - 107)  BP: 159/84 (04 Jan 2024 05:27) (151/89 - 197/93)  RR: 18 (04 Jan 2024 05:27) (16 - 21)  SpO2: 94% (04 Jan 2024 05:27) (91% - 96%)    Parameters below as of 04 Jan 2024 05:27  Patient On (Oxygen Delivery Method): room air    PHYSICAL EXAM:  GENERAL: NAD, lying in bed comfortably  HEAD:  Atraumatic, Normocephalic  RESP: Clear to auscultation bilaterally, good air entry bilaterally; No wheezing, rales, or rhonchi. Unlabored respirations  CARDIAC: Regular rate and rhythm. S1 and S2. No murmurs, rubs, or gallops  GI:  Soft, Nontender, Nondistended. Bowel sounds present x4 quadrants; No hepatomegaly. No splenomegaly.  EXTREMITIES:  2+ Peripheral Pulses. Capillary refill <2 seconds. No clubbing, cyanosis, or edema  NERVOUS SYSTEM:  Alert & Oriented X3, speech clear. No deficits   MSK: FROM all 4 extremities, full and equal strength  SKIN: No rashes, bruises, or other lesions    MEDICATIONS  (STANDING):  amLODIPine   Tablet 5 milliGRAM(s) Oral at bedtime  cefTRIAXone   IVPB 1000 milliGRAM(s) IV Intermittent every 24 hours  dextrose 5% 1000 milliLiter(s) (50 mL/Hr) IV Continuous <Continuous>  heparin   Injectable 5000 Unit(s) SubCutaneous every 12 hours  labetalol 100 milliGRAM(s) Oral two times a day  metroNIDAZOLE  IVPB      metroNIDAZOLE  IVPB 500 milliGRAM(s) IV Intermittent every 8 hours    MEDICATIONS  (PRN):  acetaminophen     Tablet .. 650 milliGRAM(s) Oral every 6 hours PRN Temp greater or equal to 38C (100.4F), Mild Pain (1 - 3)  aluminum hydroxide/magnesium hydroxide/simethicone Suspension 30 milliLiter(s) Oral every 4 hours PRN Dyspepsia  melatonin 3 milliGRAM(s) Oral at bedtime PRN Insomnia  ondansetron Injectable 4 milliGRAM(s) IV Push every 8 hours PRN Nausea and/or Vomiting        LABS                             CT ABDOMEN AND PELVIS  IMPRESSION:    Inadequately distended proximal colonic loop or query long segmental   colitis. No bowel obstruction. Nonemergent colonoscopy suggested.    --- End of Report ---    MARIANO COPELAND MD; Attending Radiologist  This document has been electronically signed. Froy  3 2024  2:58AM         Mr. Garcia is a 57yo M with PMHx of CKD 3, HTN, lost to follow up with PCP (per chart review used to take medications for HTN, but patient denies) presenting for vomiting times 3 weeks. Per patient, he began having subjective fevers and vomiting 2-4x/ day for 3 weeks with occasional small amounts of blood  with inability to hold down solids or liquids along with diarrhea for 2 days x8 episodes. Patient also notes that for the past 4 years he has been getting chest pain while walking, which is getting worse. Currently endorsing worsening central chest  pain in the ED along with a headache. Denies shortness of breath or palpitations. Denies changes in urine output , frequency or dysuria. Notes hx of renal failure in his brother at 71yo requiring transplant. Denied recent traveling, sick exposure, NSAID use, exposure to Hep C, Hep B, HIV, new foods.     In ED vitals: afebrile, /115, , RR 18 satting 99%RA. ED Labs were notable for: WBC 7.58, Hb 8.2, BUN 94, Cr 17.09  CTAP: Inadequately distended proximal colonic loop or query long segmental colitis. No bowel obstruction. Nonemergent colonoscopy suggested.  1L fluids given in ED.    1/4/24 AM:     REVIEW OF SYSTEMS:  CONSTITUTIONAL: (-) weakness, (-) fevers, (-) chills  RESPIRATORY:  (-) shortness of breath, (-) cough,  (-) wheezing,  (-) hemoptysis   CARDIOVASCULAR:  (-) chest pain, (-) palpitations  GASTROINTESTINAL:  (-) abdominal or epigastric pain, (-) nausea, (-) vomiting, (-) diarrhea, (-) constipation, (-) melena,  (-) hematemesis,  (-) hematochezia  GENITOURINARY: (-) dysuria, (-) frequency, (-) hematuria  NEUROLOGICAL: (-) numbness, (-) weakness  SKIN: (-) itching, (-) rashes, (-) lesions    Vital Signs Last 24 Hrs  T(C): 36.7 (04 Jan 2024 05:27), Max: 39.4 (03 Jan 2024 10:28)  T(F): 98.1 (04 Jan 2024 05:27), Max: 102.9 (03 Jan 2024 10:28)  HR: 86 (04 Jan 2024 05:27) (86 - 107)  BP: 159/84 (04 Jan 2024 05:27) (151/89 - 197/93)  RR: 18 (04 Jan 2024 05:27) (16 - 21)  SpO2: 94% (04 Jan 2024 05:27) (91% - 96%)    Parameters below as of 04 Jan 2024 05:27  Patient On (Oxygen Delivery Method): room air    PHYSICAL EXAM:  GENERAL: NAD, lying in bed comfortably  HEAD:  Atraumatic, Normocephalic  RESP: Clear to auscultation bilaterally, good air entry bilaterally; No wheezing, rales, or rhonchi. Unlabored respirations  CARDIAC: Regular rate and rhythm. S1 and S2. No murmurs, rubs, or gallops  GI:  Soft, Nontender, Nondistended. Bowel sounds present x4 quadrants; No hepatomegaly. No splenomegaly.  EXTREMITIES:  2+ Peripheral Pulses. Capillary refill <2 seconds. No clubbing, cyanosis, or edema  NERVOUS SYSTEM:  Alert & Oriented X3, speech clear. No deficits   MSK: FROM all 4 extremities, full and equal strength  SKIN: No rashes, bruises, or other lesions    MEDICATIONS  (STANDING):  amLODIPine   Tablet 5 milliGRAM(s) Oral at bedtime  cefTRIAXone   IVPB 1000 milliGRAM(s) IV Intermittent every 24 hours  dextrose 5% 1000 milliLiter(s) (50 mL/Hr) IV Continuous <Continuous>  heparin   Injectable 5000 Unit(s) SubCutaneous every 12 hours  labetalol 100 milliGRAM(s) Oral two times a day  metroNIDAZOLE  IVPB      metroNIDAZOLE  IVPB 500 milliGRAM(s) IV Intermittent every 8 hours    MEDICATIONS  (PRN):  acetaminophen     Tablet .. 650 milliGRAM(s) Oral every 6 hours PRN Temp greater or equal to 38C (100.4F), Mild Pain (1 - 3)  aluminum hydroxide/magnesium hydroxide/simethicone Suspension 30 milliLiter(s) Oral every 4 hours PRN Dyspepsia  melatonin 3 milliGRAM(s) Oral at bedtime PRN Insomnia  ondansetron Injectable 4 milliGRAM(s) IV Push every 8 hours PRN Nausea and/or Vomiting        LABS                                                           CT ABDOMEN AND PELVIS  IMPRESSION:    Inadequately distended proximal colonic loop or query long segmental   colitis. No bowel obstruction. Nonemergent colonoscopy suggested.    --- End of Report ---    MARIANO COPELAND MD; Attending Radiologist  This document has been electronically signed. Froy  3 2024  2:58AM         Mr. Garcia is a 59yo M with PMHx of CKD 3, HTN, lost to follow up with PCP (per chart review used to take medications for HTN, but patient denies) presenting for vomiting times 3 weeks. Per patient, he began having subjective fevers and vomiting 2-4x/ day for 3 weeks with occasional small amounts of blood  with inability to hold down solids or liquids along with diarrhea for 2 days x8 episodes. Patient also notes that for the past 4 years he has been getting chest pain while walking, which is getting worse. Currently endorsing worsening central chest  pain in the ED along with a headache. Denies shortness of breath or palpitations. Denies changes in urine output , frequency or dysuria. Notes hx of renal failure in his brother at 69yo requiring transplant. Denied recent traveling, sick exposure, NSAID use, exposure to Hep C, Hep B, HIV, new foods.     In ED vitals: afebrile, /115, , RR 18 satting 99%RA. ED Labs were notable for: WBC 7.58, Hb 8.2, BUN 94, Cr 17.09  CTAP: Inadequately distended proximal colonic loop or query long segmental colitis. No bowel obstruction. Nonemergent colonoscopy suggested.  1L fluids given in ED.    1/4/24 AM:     REVIEW OF SYSTEMS:  CONSTITUTIONAL: (-) weakness, (-) fevers, (-) chills  RESPIRATORY:  (-) shortness of breath, (-) cough,  (-) wheezing,  (-) hemoptysis   CARDIOVASCULAR:  (-) chest pain, (-) palpitations  GASTROINTESTINAL:  (-) abdominal or epigastric pain, (-) nausea, (-) vomiting, (-) diarrhea, (-) constipation, (-) melena,  (-) hematemesis,  (-) hematochezia  GENITOURINARY: (-) dysuria, (-) frequency, (-) hematuria  NEUROLOGICAL: (-) numbness, (-) weakness  SKIN: (-) itching, (-) rashes, (-) lesions    Vital Signs Last 24 Hrs  T(C): 36.7 (04 Jan 2024 05:27), Max: 39.4 (03 Jan 2024 10:28)  T(F): 98.1 (04 Jan 2024 05:27), Max: 102.9 (03 Jan 2024 10:28)  HR: 86 (04 Jan 2024 05:27) (86 - 107)  BP: 159/84 (04 Jan 2024 05:27) (151/89 - 197/93)  RR: 18 (04 Jan 2024 05:27) (16 - 21)  SpO2: 94% (04 Jan 2024 05:27) (91% - 96%)    Parameters below as of 04 Jan 2024 05:27  Patient On (Oxygen Delivery Method): room air    PHYSICAL EXAM:  GENERAL: NAD, lying in bed comfortably  HEAD:  Atraumatic, Normocephalic  RESP: Clear to auscultation bilaterally, good air entry bilaterally; No wheezing, rales, or rhonchi. Unlabored respirations  CARDIAC: Regular rate and rhythm. S1 and S2. No murmurs, rubs, or gallops  GI:  Soft, Nontender, Nondistended. Bowel sounds present x4 quadrants; No hepatomegaly. No splenomegaly.  EXTREMITIES:  2+ Peripheral Pulses. Capillary refill <2 seconds. No clubbing, cyanosis, or edema  NERVOUS SYSTEM:  Alert & Oriented X3, speech clear. No deficits   MSK: FROM all 4 extremities, full and equal strength  SKIN: No rashes, bruises, or other lesions    MEDICATIONS  (STANDING):  amLODIPine   Tablet 5 milliGRAM(s) Oral at bedtime  cefTRIAXone   IVPB 1000 milliGRAM(s) IV Intermittent every 24 hours  dextrose 5% 1000 milliLiter(s) (50 mL/Hr) IV Continuous <Continuous>  heparin   Injectable 5000 Unit(s) SubCutaneous every 12 hours  labetalol 100 milliGRAM(s) Oral two times a day  metroNIDAZOLE  IVPB      metroNIDAZOLE  IVPB 500 milliGRAM(s) IV Intermittent every 8 hours    MEDICATIONS  (PRN):  acetaminophen     Tablet .. 650 milliGRAM(s) Oral every 6 hours PRN Temp greater or equal to 38C (100.4F), Mild Pain (1 - 3)  aluminum hydroxide/magnesium hydroxide/simethicone Suspension 30 milliLiter(s) Oral every 4 hours PRN Dyspepsia  melatonin 3 milliGRAM(s) Oral at bedtime PRN Insomnia  ondansetron Injectable 4 milliGRAM(s) IV Push every 8 hours PRN Nausea and/or Vomiting      LABS                        6.8    4.29  )-----------( 223      ( 04 Jan 2024 06:55 )             18.8     04 Jan 2024 06:55    130    |  93     |  105    ----------------------------<  116    4.3     |  18     |  17.88    Ca    6.3        04 Jan 2024 06:55    TPro  5.4    /  Alb  2.3    /  TBili  0.4    /  DBili  x      /  AST  10     /  ALT  15     /  AlkPhos  89     04 Jan 2024 06:55    PT/INR - ( 03 Jan 2024 11:15 )   PT: 12.0 sec;   INR: 1.03 ratio         PTT - ( 03 Jan 2024 11:15 )  PTT:30.9 sec  CAPILLARY BLOOD GLUCOSE    LIVER FUNCTIONS - ( 04 Jan 2024 06:55 )  Alb: 2.3 g/dL / Pro: 5.4 g/dL / ALK PHOS: 89 U/L / ALT: 15 U/L / AST: 10 U/L / GGT: x           Urinalysis Basic - ( 04 Jan 2024 06:55 )    Color: x / Appearance: x / SG: x / pH: x  Gluc: 116 mg/dL / Ketone: x  / Bili: x / Urobili: x   Blood: x / Protein: x / Nitrite: x   Leuk Esterase: x / RBC: x / WBC x   Sq Epi: x / Non Sq Epi: x / Bacteria: x      CT ABDOMEN AND PELVIS  IMPRESSION:    Inadequately distended proximal colonic loop or query long segmental   colitis. No bowel obstruction. Nonemergent colonoscopy suggested.    --- End of Report ---    MARIANO COPELAND MD; Attending Radiologist  This document has been electronically signed. Froy  3 2024  2:58AM         Mr. Garcia is a 57yo M with PMHx of CKD 3, HTN, lost to follow up with PCP (per chart review used to take medications for HTN, but patient denies) presenting for vomiting times 3 weeks. Per patient, he began having subjective fevers and vomiting 2-4x/ day for 3 weeks with occasional small amounts of blood  with inability to hold down solids or liquids along with diarrhea for 2 days x8 episodes. Patient also notes that for the past 4 years he has been getting chest pain while walking, which is getting worse. Currently endorsing worsening central chest  pain in the ED along with a headache. Denies shortness of breath or palpitations. Denies changes in urine output , frequency or dysuria. Notes hx of renal failure in his brother at 71yo requiring transplant. Denied recent traveling, sick exposure, NSAID use, exposure to Hep C, Hep B, HIV, new foods.     In ED vitals: afebrile, /115, , RR 18 satting 99%RA. ED Labs were notable for: WBC 7.58, Hb 8.2, BUN 94, Cr 17.09  CTAP: Inadequately distended proximal colonic loop or query long segmental colitis. No bowel obstruction. Nonemergent colonoscopy suggested.  1L fluids given in ED.    1/4/24 AM:     REVIEW OF SYSTEMS:  CONSTITUTIONAL: (-) weakness, (-) fevers, (-) chills  RESPIRATORY:  (-) shortness of breath, (-) cough,  (-) wheezing,  (-) hemoptysis   CARDIOVASCULAR:  (-) chest pain, (-) palpitations  GASTROINTESTINAL:  (-) abdominal or epigastric pain, (-) nausea, (-) vomiting, (-) diarrhea, (-) constipation, (-) melena,  (-) hematemesis,  (-) hematochezia  GENITOURINARY: (-) dysuria, (-) frequency, (-) hematuria  NEUROLOGICAL: (-) numbness, (-) weakness  SKIN: (-) itching, (-) rashes, (-) lesions    Vital Signs Last 24 Hrs  T(C): 36.7 (04 Jan 2024 05:27), Max: 39.4 (03 Jan 2024 10:28)  T(F): 98.1 (04 Jan 2024 05:27), Max: 102.9 (03 Jan 2024 10:28)  HR: 86 (04 Jan 2024 05:27) (86 - 107)  BP: 159/84 (04 Jan 2024 05:27) (151/89 - 197/93)  RR: 18 (04 Jan 2024 05:27) (16 - 21)  SpO2: 94% (04 Jan 2024 05:27) (91% - 96%)    Parameters below as of 04 Jan 2024 05:27  Patient On (Oxygen Delivery Method): room air    PHYSICAL EXAM:  GENERAL: NAD, lying in bed comfortably  HEAD:  Atraumatic, Normocephalic  RESP: Clear to auscultation bilaterally, good air entry bilaterally; No wheezing, rales, or rhonchi. Unlabored respirations  CARDIAC: Regular rate and rhythm. S1 and S2. No murmurs, rubs, or gallops  GI:  Soft, Nontender, Nondistended. Bowel sounds present x4 quadrants; No hepatomegaly. No splenomegaly.  EXTREMITIES:  2+ Peripheral Pulses. Capillary refill <2 seconds. No clubbing, cyanosis, or edema  NERVOUS SYSTEM:  Alert & Oriented X3, speech clear. No deficits   MSK: FROM all 4 extremities, full and equal strength  SKIN: No rashes, bruises, or other lesions    MEDICATIONS  (STANDING):  amLODIPine   Tablet 5 milliGRAM(s) Oral at bedtime  cefTRIAXone   IVPB 1000 milliGRAM(s) IV Intermittent every 24 hours  dextrose 5% 1000 milliLiter(s) (50 mL/Hr) IV Continuous <Continuous>  heparin   Injectable 5000 Unit(s) SubCutaneous every 12 hours  labetalol 100 milliGRAM(s) Oral two times a day  metroNIDAZOLE  IVPB      metroNIDAZOLE  IVPB 500 milliGRAM(s) IV Intermittent every 8 hours    MEDICATIONS  (PRN):  acetaminophen     Tablet .. 650 milliGRAM(s) Oral every 6 hours PRN Temp greater or equal to 38C (100.4F), Mild Pain (1 - 3)  aluminum hydroxide/magnesium hydroxide/simethicone Suspension 30 milliLiter(s) Oral every 4 hours PRN Dyspepsia  melatonin 3 milliGRAM(s) Oral at bedtime PRN Insomnia  ondansetron Injectable 4 milliGRAM(s) IV Push every 8 hours PRN Nausea and/or Vomiting      LABS                        6.8    4.29  )-----------( 223      ( 04 Jan 2024 06:55 )             18.8     04 Jan 2024 06:55    130    |  93     |  105    ----------------------------<  116    4.3     |  18     |  17.88    Ca    6.3        04 Jan 2024 06:55    TPro  5.4    /  Alb  2.3    /  TBili  0.4    /  DBili  x      /  AST  10     /  ALT  15     /  AlkPhos  89     04 Jan 2024 06:55    PT/INR - ( 03 Jan 2024 11:15 )   PT: 12.0 sec;   INR: 1.03 ratio         PTT - ( 03 Jan 2024 11:15 )  PTT:30.9 sec  CAPILLARY BLOOD GLUCOSE    LIVER FUNCTIONS - ( 04 Jan 2024 06:55 )  Alb: 2.3 g/dL / Pro: 5.4 g/dL / ALK PHOS: 89 U/L / ALT: 15 U/L / AST: 10 U/L / GGT: x           Urinalysis Basic - ( 04 Jan 2024 06:55 )    Color: x / Appearance: x / SG: x / pH: x  Gluc: 116 mg/dL / Ketone: x  / Bili: x / Urobili: x   Blood: x / Protein: x / Nitrite: x   Leuk Esterase: x / RBC: x / WBC x   Sq Epi: x / Non Sq Epi: x / Bacteria: x      CT ABDOMEN AND PELVIS  IMPRESSION:    Inadequately distended proximal colonic loop or query long segmental   colitis. No bowel obstruction. Nonemergent colonoscopy suggested.    --- End of Report ---    MARIANO COPELAND MD; Attending Radiologist  This document has been electronically signed. Froy  3 2024  2:58AM

## 2024-01-04 NOTE — DIETITIAN INITIAL EVALUATION ADULT - OTHER INFO
Pt is a 57yo M with PMHx of CKD 3, HTN, lost to follow up with PCP (per chart review used to take medications for HTN, but patient denies) presenting for vomiting times 3 weeks, admitted for renal failure and r/o ACS.   Pt endorses improving appetite this am, stating that he feels hungry. Observed eating some bfast. Pt endorses weight loss over the last 3 weeks in setting of N/V. -150lbs.  Pt is a 57yo M with PMHx of CKD 3, HTN, lost to follow up with PCP (per chart review used to take medications for HTN, but patient denies) presenting for vomiting times 3 weeks, admitted for renal failure and r/o ACS.   Pt endorses improving appetite this am, stating that he feels hungry. Observed eating some bfast. +IV D5 +sodium bicarb. Pt endorsed weight loss over the last 3 weeks in setting of N/V. -150lbs. Current weight 140.4lbs. No pressure ulcers or edema. Pt denies N/V/D, constipation at present. Creatinine 17.88, noted hyponatremia. Pt continues on renal diet, K+ WDL. May liberalize renal restrictions with improvement in renal function.

## 2024-01-04 NOTE — PROGRESS NOTE ADULT - PROBLEM SELECTOR PLAN 4
-Likely 2/2 kidney failure  -F/u iron panel  -H&H trending down  -Continue to monitor  -Type and screen   -Transfuse Hgb <7.0 -Likely 2/2 kidney failure  -iron panel with ferritin 478, likely anemia of chronic disease  -H&H trending down  -Continue to monitor  -Keep type and screen current  -Transfusion consent forms signed and in chart  -Transfuse Hgb <7.0 -Likely 2/2 kidney failure  -iron panel with ferritin 478, likely anemia of chronic disease  -H&H trending down  -Transfusion consent forms signed and in chart  -Hgb 6.8 today, packed red blood cells ordered, f/up repeat cbc after transfusion  -Keep type and screen current  -Transfuse Hgb <7.0

## 2024-01-04 NOTE — PROGRESS NOTE ADULT - PROBLEM SELECTOR PLAN 6
-Pt reporting 3 weeks of nausea and vomiting   -CTAP Inadequately distended proximal colonic loop or query long segmental   colitis. No bowel obstruction. Nonemergent colonoscopy suggested  -F/up GI consult  -Holding hydration in setting of DESIREE on CKD -Pt reporting 3 weeks of nausea and vomiting   -CTAP Inadequately distended proximal colonic loop or query long segmental   colitis. No bowel obstruction. Nonemergent colonoscopy suggested  -Starting gentle hydration with IVF and bicarb  -Appreciate GI recommendations, will continue with symptomatic support

## 2024-01-04 NOTE — PROGRESS NOTE ADULT - PROBLEM SELECTOR PLAN 3
-Pt not meeting sepsis criteria on admission, found to be meeting sepsis criteria 1/3/24 with , temp 102.9, RR 22, and suspected GI infection  -UA with proteins, not concerning for UTI  -urine cultures   -lactate  -f/up Blood cultures  -pt already on ceftriaxone and metronidazole, continue in setting of suspected GI source  -Tylenol PRN for fever  -Will continue to monitor labs and vitals  -Gentle hydration with d5/bicarb -Pt not meeting sepsis criteria on admission, found to be meeting sepsis criteria 1/3/24 with , temp 102.9, RR 22, and suspected GI infection  -RVP + for Influenza A  -UA with proteins, not concerning for UTI  -lactate 0.7  -f/up Blood cultures  -f/up urine cultures   -pt already on ceftriaxone and metronidazole, continue in setting of suspected GI source  -Tylenol PRN for fever  -Will continue to monitor labs and vitals  -Per nephrology, gentle hydration with d5/bicarb -Pt not meeting sepsis criteria on admission, found to be meeting sepsis criteria 1/3/24 with , temp 102.9, RR 22, and suspected GI infection  -RVP + for Influenza A  -UA with proteins, not concerning for UTI  -lactate 0.7  -f/up Blood cultures  -f/up urine cultures   -pt already on ceftriaxone and metronidazole, continue in setting of suspected GI infection  -Tylenol PRN for fever  -Symptomatic support for Influenza  -Will continue to monitor labs and vitals  -Per nephrology, gentle hydration with d5/bicarb

## 2024-01-04 NOTE — DIETITIAN INITIAL EVALUATION ADULT - ADD RECOMMEND
1. Liberalize renal restrictions with improvement in renal fxn  2. Encouraged adequate oral hydration  3. Obtain and honor preferences as able

## 2024-01-04 NOTE — PROGRESS NOTE ADULT - ATTENDING COMMENTS
57yo M with PMHx of CKD 3, HTN, lost to follow up with PCP (per chart review used to take medications for HTN, but patient denies) presenting for vomiting times 3 weeks, admitted for renal failure and r/o ACS.     #HTN emergency  - not on meds at home, started labetalol 100mg BID with good response  - BP now better controlled   - also on amlodipine 5mg daily  - cardio recs appreciated- possible ischemic workup pending nephro clearance    #ARF  - likely chronic issue and 2/2 to uncontrolled BP  - follow up nephro studies  - continue IVF for now as per nephro  - patient still making urine  - nephro following    #anemia  - likely anemia of chronic kidney disease  - today noted to have drop in H/H  - ordered for 1U PRBC  - follow up occult    #NSTEMI  - trop noted to be elevated, could be in setting of HTN emergency but will likely need further workup given chest pain on admission  - echo without acute concerns besides mod pHTN  - discussed with cardio, Dr. Barrios- recommend starting DAPT even in setting of anemia, start atorvastatin, can continue labetalol. Will need ischemic workup however given that patient with ARF, would need nephro clearance for angio if needed in the future     #sepsis  #influenza  - BCx with NGTD  - will dc abx 59yo M with PMHx of CKD 3, HTN, lost to follow up with PCP (per chart review used to take medications for HTN, but patient denies) presenting for vomiting times 3 weeks, admitted for renal failure and r/o ACS.     #HTN emergency  - not on meds at home, started labetalol 100mg BID with good response  - BP now better controlled   - also on amlodipine 5mg daily  - cardio recs appreciated- possible ischemic workup pending nephro clearance    #ARF  - likely chronic issue and 2/2 to uncontrolled BP  - follow up nephro studies  - continue IVF for now as per nephro  - patient still making urine  - nephro following    #anemia  - likely anemia of chronic kidney disease  - today noted to have drop in H/H  - ordered for 1U PRBC  - follow up occult    #NSTEMI  - trop noted to be elevated, could be in setting of HTN emergency but will likely need further workup given chest pain on admission  - echo without acute concerns besides mod pHTN  - discussed with cardio, Dr. Barrios- recommend starting DAPT even in setting of anemia, start atorvastatin, can continue labetalol. Will need ischemic workup however given that patient with ARF, would need nephro clearance for angio if needed in the future     #sepsis  #influenza  - BCx with NGTD  - will dc abx

## 2024-01-04 NOTE — PROGRESS NOTE ADULT - PROBLEM SELECTOR PLAN 1
r/o pathogenic cause: CTAP Inadequately distended proximal colonic loop or query long segmental   colitis. No bowel obstruction. Nonemergent colonoscopy suggested.  - f/u GI PCR   - f/u stool culture, ova + parasite   - Continue antibiotic   - Monitor stool movements.

## 2024-01-04 NOTE — DIETITIAN INITIAL EVALUATION ADULT - PERTINENT LABORATORY DATA
01-04    130<L>  |  93<L>  |  105<H>  ----------------------------<  116<H>  4.3   |  18<L>  |  17.88<H>    Ca    6.3<LL>      04 Jan 2024 06:55    TPro  5.4<L>  /  Alb  2.3<L>  /  TBili  0.4  /  DBili  x   /  AST  10  /  ALT  15  /  AlkPhos  89  01-04  A1C with Estimated Average Glucose Result: 5.6 % (01-03-24 @ 11:15)

## 2024-01-04 NOTE — PROGRESS NOTE ADULT - PROBLEM SELECTOR PLAN 1
-S/p CTAP with IV contrast  -History of HTN, not on medications (poor medical follow up)  -urine with 300 protein, small blood, 100 glucose  -S/p 1L fluid in ED  -S/p 10mg hydralazine IV push  -C/w 100mg BID labetalol  -F/u A1c, lipid panel  -Appreciate cardiology recommendations -S/p CTAP with IV contrast  -History of HTN, not on medications (poor medical follow up)  -Urine with 300 protein, small blood, 100 glucose  -S/p 1L fluid in ED  -S/p 10mg hydralazine IV push  -C/w 100mg BID labetalol  -Appreciate cardiology recommendations, will continue tele monitoring  -Appreciate nephrology recommendations, Norvasc added for htn -S/p CTAP with IV contrast  -History of HTN, not on medications (poor medical follow up)  -Urine with 300 protein, small blood, 100 glucose  -S/p 1L fluid in ED  -S/p 10mg hydralazine IV push  -C/w 100mg BID labetalol  -Appreciate cardiology recommendations, will continue tele monitoring- consider ischemic workup pending nephro approval  -Appreciate nephrology recommendations, Norvasc added for htn -S/p CTAP with IV contrast  -History of HTN, not on medications (poor medical follow up)  -Urine with 300 protein, small blood, 100 glucose  -S/p 1L fluid in ED  -S/p 10mg hydralazine IV push  -C/w 100mg BID labetalol  -Appreciate cardiology recommendations, will continue tele monitoring- consider ischemic workup pending nephro approval  -Appreciate nephrology recommendations, Norvasc added for htn  -Continue to monitor BP

## 2024-01-04 NOTE — DIETITIAN NUTRITION RISK NOTIFICATION - TREATMENT: THE FOLLOWING DIET HAS BEEN RECOMMENDED
Diet, DASH/TLC:   Sodium & Cholesterol Restricted  For patients receiving Renal Replacement - No Protein Restr, No Conc K, No Conc Phos, Low Sodium (01-03-24 @ 19:08) [Active]

## 2024-01-04 NOTE — PROGRESS NOTE ADULT - PROBLEM SELECTOR PLAN 5
-Chest pain likely 2/2 hypertension, r/o ACS  -Trops trending up 221>[id number], will continue to trend  - TTE with normal EF 65-70 and mod pulm htn  -Appreciate cardiology recommendations, continue to trend troponin, cw labetolol for BP control   -F/up nephrology consult for HTN and CKD -Chest pain likely 2/2 hypertension, r/o ACS  -Trops trending up 221>246>[id number], will continue to trend  - TTE with normal EF 65-70 and mod pulm htn  -Appreciate cardiology recommendations, continue to trend troponin, cw labetolol for BP control   -Per nephrology, will start Norvasc -Chest pain likely 2/2 hypertension, r/o ACS  -Trops trending up 221>246>[id number], will continue to trend  - TTE with normal EF 65-70 and mod pulm htn  -Appreciate cardiology recommendations, continue to trend troponin, cw labetolol for BP control, can consider ischemic workup pending nephrology approval  -Per nephrology, will start [facility] -Chest pain likely 2/2 hypertension, r/o ACS  -Trops trending up [id number], will continue to trend  - TTE with normal EF 65-70 and mod pulm htn  -Appreciate cardiology recommendations, continue to trend troponin, cw labetolol for BP control, can consider ischemic workup pending nephrology approval  -Per nephrology, will start [facility]

## 2024-01-05 LAB
% ALBUMIN: 48.3 % — SIGNIFICANT CHANGE UP
% ALBUMIN: 48.3 % — SIGNIFICANT CHANGE UP
% ALBUMIN: 49.1 % — SIGNIFICANT CHANGE UP
% ALBUMIN: 49.1 % — SIGNIFICANT CHANGE UP
% ALPHA 1: 6.6 % — SIGNIFICANT CHANGE UP
% ALPHA 1: 6.6 % — SIGNIFICANT CHANGE UP
% ALPHA 1: 6.9 % — SIGNIFICANT CHANGE UP
% ALPHA 1: 6.9 % — SIGNIFICANT CHANGE UP
% ALPHA 2: 16.7 % — SIGNIFICANT CHANGE UP
% ALPHA 2: 16.7 % — SIGNIFICANT CHANGE UP
% ALPHA 2: 16.9 % — SIGNIFICANT CHANGE UP
% ALPHA 2: 16.9 % — SIGNIFICANT CHANGE UP
% BETA: 10.5 % — SIGNIFICANT CHANGE UP
% BETA: 10.5 % — SIGNIFICANT CHANGE UP
% BETA: 10.6 % — SIGNIFICANT CHANGE UP
% BETA: 10.6 % — SIGNIFICANT CHANGE UP
% GAMMA: 17.1 % — SIGNIFICANT CHANGE UP
% GAMMA: 17.1 % — SIGNIFICANT CHANGE UP
% GAMMA: 17.3 % — SIGNIFICANT CHANGE UP
% GAMMA: 17.3 % — SIGNIFICANT CHANGE UP
ALBUMIN SERPL ELPH-MCNC: 2.1 G/DL — LOW (ref 3.3–5)
ALBUMIN SERPL ELPH-MCNC: 2.1 G/DL — LOW (ref 3.3–5)
ALBUMIN SERPL ELPH-MCNC: 2.6 G/DL — LOW (ref 3.6–5.5)
ALBUMIN SERPL ELPH-MCNC: 2.6 G/DL — LOW (ref 3.6–5.5)
ALBUMIN SERPL ELPH-MCNC: 2.8 G/DL — LOW (ref 3.6–5.5)
ALBUMIN SERPL ELPH-MCNC: 2.8 G/DL — LOW (ref 3.6–5.5)
ALBUMIN/GLOB SERPL ELPH: 0.9 RATIO — SIGNIFICANT CHANGE UP
ALP SERPL-CCNC: 87 U/L — SIGNIFICANT CHANGE UP (ref 40–120)
ALP SERPL-CCNC: 87 U/L — SIGNIFICANT CHANGE UP (ref 40–120)
ALPHA1 GLOB SERPL ELPH-MCNC: 0.4 G/DL — SIGNIFICANT CHANGE UP (ref 0.1–0.4)
ALPHA2 GLOB SERPL ELPH-MCNC: 0.9 G/DL — SIGNIFICANT CHANGE UP (ref 0.5–1)
ALPHA2 GLOB SERPL ELPH-MCNC: 0.9 G/DL — SIGNIFICANT CHANGE UP (ref 0.5–1)
ALPHA2 GLOB SERPL ELPH-MCNC: 1 G/DL — SIGNIFICANT CHANGE UP (ref 0.5–1)
ALPHA2 GLOB SERPL ELPH-MCNC: 1 G/DL — SIGNIFICANT CHANGE UP (ref 0.5–1)
ALT FLD-CCNC: 13 U/L — SIGNIFICANT CHANGE UP (ref 10–45)
ALT FLD-CCNC: 13 U/L — SIGNIFICANT CHANGE UP (ref 10–45)
ANA PAT FLD IF-IMP: ABNORMAL
ANA PAT FLD IF-IMP: ABNORMAL
ANA TITR SER: ABNORMAL
ANA TITR SER: ABNORMAL
ANION GAP SERPL CALC-SCNC: 15 MMOL/L — SIGNIFICANT CHANGE UP (ref 5–17)
ANION GAP SERPL CALC-SCNC: 15 MMOL/L — SIGNIFICANT CHANGE UP (ref 5–17)
AST SERPL-CCNC: 15 U/L — SIGNIFICANT CHANGE UP (ref 10–40)
AST SERPL-CCNC: 15 U/L — SIGNIFICANT CHANGE UP (ref 10–40)
B-GLOBULIN SERPL ELPH-MCNC: 0.6 G/DL — SIGNIFICANT CHANGE UP (ref 0.5–1)
BILIRUB SERPL-MCNC: 0.4 MG/DL — SIGNIFICANT CHANGE UP (ref 0.2–1.2)
BILIRUB SERPL-MCNC: 0.4 MG/DL — SIGNIFICANT CHANGE UP (ref 0.2–1.2)
BUN SERPL-MCNC: 115 MG/DL — HIGH (ref 7–23)
BUN SERPL-MCNC: 115 MG/DL — HIGH (ref 7–23)
CALCIUM SERPL-MCNC: 5.1 MG/DL — CRITICAL LOW (ref 8.4–10.5)
CALCIUM SERPL-MCNC: 5.1 MG/DL — CRITICAL LOW (ref 8.4–10.5)
CHLORIDE SERPL-SCNC: 91 MMOL/L — LOW (ref 96–108)
CHLORIDE SERPL-SCNC: 91 MMOL/L — LOW (ref 96–108)
CO2 SERPL-SCNC: 22 MMOL/L — SIGNIFICANT CHANGE UP (ref 22–31)
CO2 SERPL-SCNC: 22 MMOL/L — SIGNIFICANT CHANGE UP (ref 22–31)
CREAT SERPL-MCNC: 18.14 MG/DL — HIGH (ref 0.5–1.3)
CREAT SERPL-MCNC: 18.14 MG/DL — HIGH (ref 0.5–1.3)
EGFR: 3 ML/MIN/1.73M2 — LOW
EGFR: 3 ML/MIN/1.73M2 — LOW
ETEC DNA STL QL NAA+PROBE: DETECTED
ETEC DNA STL QL NAA+PROBE: DETECTED
GAMMA GLOBULIN: 0.9 G/DL — SIGNIFICANT CHANGE UP (ref 0.6–1.6)
GAMMA GLOBULIN: 0.9 G/DL — SIGNIFICANT CHANGE UP (ref 0.6–1.6)
GAMMA GLOBULIN: 1 G/DL — SIGNIFICANT CHANGE UP (ref 0.6–1.6)
GAMMA GLOBULIN: 1 G/DL — SIGNIFICANT CHANGE UP (ref 0.6–1.6)
GBM IGG SER-ACNC: <0.2 — SIGNIFICANT CHANGE UP (ref 0–0.9)
GBM IGG SER-ACNC: <0.2 — SIGNIFICANT CHANGE UP (ref 0–0.9)
GI PCR PANEL: DETECTED
GI PCR PANEL: DETECTED
GLUCOSE SERPL-MCNC: 117 MG/DL — HIGH (ref 70–99)
GLUCOSE SERPL-MCNC: 117 MG/DL — HIGH (ref 70–99)
HCT VFR BLD CALC: 21 % — CRITICAL LOW (ref 39–50)
HCT VFR BLD CALC: 21 % — CRITICAL LOW (ref 39–50)
HCT VFR BLD CALC: 21.4 % — LOW (ref 39–50)
HCT VFR BLD CALC: 21.4 % — LOW (ref 39–50)
HGB BLD-MCNC: 7.8 G/DL — LOW (ref 13–17)
HGB BLD-MCNC: 7.8 G/DL — LOW (ref 13–17)
HGB BLD-MCNC: 7.9 G/DL — LOW (ref 13–17)
HGB BLD-MCNC: 7.9 G/DL — LOW (ref 13–17)
INTERPRETATION SERPL IFE-IMP: SIGNIFICANT CHANGE UP
INTERPRETATION SERPL IFE-IMP: SIGNIFICANT CHANGE UP
MCHC RBC-ENTMCNC: 27.8 PG — SIGNIFICANT CHANGE UP (ref 27–34)
MCHC RBC-ENTMCNC: 27.8 PG — SIGNIFICANT CHANGE UP (ref 27–34)
MCHC RBC-ENTMCNC: 28.3 PG — SIGNIFICANT CHANGE UP (ref 27–34)
MCHC RBC-ENTMCNC: 28.3 PG — SIGNIFICANT CHANGE UP (ref 27–34)
MCHC RBC-ENTMCNC: 36.9 GM/DL — HIGH (ref 32–36)
MCHC RBC-ENTMCNC: 36.9 GM/DL — HIGH (ref 32–36)
MCHC RBC-ENTMCNC: 37.1 GM/DL — HIGH (ref 32–36)
MCHC RBC-ENTMCNC: 37.1 GM/DL — HIGH (ref 32–36)
MCV RBC AUTO: 75.4 FL — LOW (ref 80–100)
MCV RBC AUTO: 75.4 FL — LOW (ref 80–100)
MCV RBC AUTO: 76.1 FL — LOW (ref 80–100)
MCV RBC AUTO: 76.1 FL — LOW (ref 80–100)
NRBC # BLD: 0 /100 WBCS — SIGNIFICANT CHANGE UP (ref 0–0)
OB PNL STL: NEGATIVE — SIGNIFICANT CHANGE UP
OB PNL STL: NEGATIVE — SIGNIFICANT CHANGE UP
PLATELET # BLD AUTO: 221 K/UL — SIGNIFICANT CHANGE UP (ref 150–400)
PLATELET # BLD AUTO: 221 K/UL — SIGNIFICANT CHANGE UP (ref 150–400)
PLATELET # BLD AUTO: 228 K/UL — SIGNIFICANT CHANGE UP (ref 150–400)
PLATELET # BLD AUTO: 228 K/UL — SIGNIFICANT CHANGE UP (ref 150–400)
POTASSIUM SERPL-MCNC: 4.4 MMOL/L — SIGNIFICANT CHANGE UP (ref 3.5–5.3)
POTASSIUM SERPL-MCNC: 4.4 MMOL/L — SIGNIFICANT CHANGE UP (ref 3.5–5.3)
POTASSIUM SERPL-SCNC: 4.4 MMOL/L — SIGNIFICANT CHANGE UP (ref 3.5–5.3)
POTASSIUM SERPL-SCNC: 4.4 MMOL/L — SIGNIFICANT CHANGE UP (ref 3.5–5.3)
PROT PATTERN SERPL ELPH-IMP: SIGNIFICANT CHANGE UP
PROT SERPL-MCNC: 5.4 G/DL — LOW (ref 6–8.3)
PROT SERPL-MCNC: 5.4 G/DL — LOW (ref 6–8.3)
PROT SERPL-MCNC: 5.8 G/DL — LOW (ref 6–8.3)
PROT SERPL-MCNC: 5.8 G/DL — LOW (ref 6–8.3)
PROT SERPL-MCNC: 6 G/DL — SIGNIFICANT CHANGE UP (ref 6–8.3)
PROT SERPL-MCNC: 6 G/DL — SIGNIFICANT CHANGE UP (ref 6–8.3)
RBC # BLD: 2.76 M/UL — LOW (ref 4.2–5.8)
RBC # BLD: 2.76 M/UL — LOW (ref 4.2–5.8)
RBC # BLD: 2.84 M/UL — LOW (ref 4.2–5.8)
RBC # BLD: 2.84 M/UL — LOW (ref 4.2–5.8)
RBC # FLD: 13.1 % — SIGNIFICANT CHANGE UP (ref 10.3–14.5)
SODIUM SERPL-SCNC: 128 MMOL/L — LOW (ref 135–145)
SODIUM SERPL-SCNC: 128 MMOL/L — LOW (ref 135–145)
TROPONIN I, HIGH SENSITIVITY RESULT: 715.4 NG/L — HIGH
TROPONIN I, HIGH SENSITIVITY RESULT: 715.4 NG/L — HIGH
WBC # BLD: 4.88 K/UL — SIGNIFICANT CHANGE UP (ref 3.8–10.5)
WBC # BLD: 4.88 K/UL — SIGNIFICANT CHANGE UP (ref 3.8–10.5)
WBC # BLD: 5.58 K/UL — SIGNIFICANT CHANGE UP (ref 3.8–10.5)
WBC # BLD: 5.58 K/UL — SIGNIFICANT CHANGE UP (ref 3.8–10.5)
WBC # FLD AUTO: 4.88 K/UL — SIGNIFICANT CHANGE UP (ref 3.8–10.5)
WBC # FLD AUTO: 4.88 K/UL — SIGNIFICANT CHANGE UP (ref 3.8–10.5)
WBC # FLD AUTO: 5.58 K/UL — SIGNIFICANT CHANGE UP (ref 3.8–10.5)
WBC # FLD AUTO: 5.58 K/UL — SIGNIFICANT CHANGE UP (ref 3.8–10.5)

## 2024-01-05 PROCEDURE — 99232 SBSQ HOSP IP/OBS MODERATE 35: CPT

## 2024-01-05 PROCEDURE — 99233 SBSQ HOSP IP/OBS HIGH 50: CPT

## 2024-01-05 RX ADMIN — Medication 81 MILLIGRAM(S): at 13:19

## 2024-01-05 RX ADMIN — Medication 100 MILLIGRAM(S): at 13:27

## 2024-01-05 RX ADMIN — Medication 200 MILLIGRAM(S): at 13:23

## 2024-01-05 RX ADMIN — Medication 200 MILLIGRAM(S): at 05:17

## 2024-01-05 RX ADMIN — SODIUM CHLORIDE 50 MILLILITER(S): 9 INJECTION, SOLUTION INTRAVENOUS at 15:16

## 2024-01-05 RX ADMIN — HEPARIN SODIUM 5000 UNIT(S): 5000 INJECTION INTRAVENOUS; SUBCUTANEOUS at 17:49

## 2024-01-05 RX ADMIN — Medication 100 MILLIGRAM(S): at 05:16

## 2024-01-05 RX ADMIN — HEPARIN SODIUM 5000 UNIT(S): 5000 INJECTION INTRAVENOUS; SUBCUTANEOUS at 05:27

## 2024-01-05 RX ADMIN — ONDANSETRON 4 MILLIGRAM(S): 8 TABLET, FILM COATED ORAL at 15:16

## 2024-01-05 RX ADMIN — AMLODIPINE BESYLATE 10 MILLIGRAM(S): 2.5 TABLET ORAL at 21:20

## 2024-01-05 RX ADMIN — Medication 200 MILLIGRAM(S): at 21:19

## 2024-01-05 RX ADMIN — ATORVASTATIN CALCIUM 40 MILLIGRAM(S): 80 TABLET, FILM COATED ORAL at 21:19

## 2024-01-05 RX ADMIN — Medication 100 MILLIGRAM(S): at 02:14

## 2024-01-05 RX ADMIN — CLOPIDOGREL BISULFATE 75 MILLIGRAM(S): 75 TABLET, FILM COATED ORAL at 13:20

## 2024-01-05 NOTE — PROGRESS NOTE ADULT - PROBLEM SELECTOR PLAN 2
-r/o other causes of other intrinsic renal disease ie nephritic/nephrotic syndrome, hepatitis  -s/p CTAP with IV contrast  -History of HTN, not on medications (poor medical follow up)  -BUN 94, Cr 17.09  -urine with 300 protein, small blood, 100 glucose  -a1c and lipid panel wnl  -s/p 1L fluid in ED  -s/p 10mg hydralazine IV push  -c/w 100mg BID labetalol  - Autoimmune work up unrevealing   -Hep B and hep C neg  -f/u 24hr urine for prot/cr, UPEP, UIF  -Nephrology following, recommend gentle hydration with IVF and bicarb -r/o other causes of other intrinsic renal disease ie nephritic/nephrotic syndrome, hepatitis  -s/p CTAP with IV contrast  -History of HTN, not on medications (poor medical follow up)  -BUN 94, Cr 17.09  -urine with 300 protein, small blood, 100 glucose  -a1c and lipid panel wnl  -s/p 1L fluid in ED  -s/p 10mg hydralazine IV push  -c/w 100mg BID labetalol  - Autoimmune work up unrevealing   -Hep B and hep C neg  -F/up renal serologies  -Nephrology following, recommend gentle hydration with IVF and bicarb -r/o other causes of other intrinsic renal disease ie nephritic/nephrotic syndrome, hepatitis  -s/p CTAP with IV contrast  -History of HTN, not on medications (poor medical follow up)  -BUN 94, Cr 17.09  -urine with 300 protein, small blood, 100 glucose  -a1c and lipid panel wnl  -s/p 1L fluid in ED  -s/p 10mg hydralazine IV push  -c/w 100mg BID labetalol  - Autoimmune work up unrevealing   -Hep B and hep C neg  -F/up renal serologies  -Nephrology following, recommend gentle hydration with IVF and bicarb, recommending HD access and RRT if BUN/Cr remains high  -Close communication with nephro and cardio regarding need for both ischemic workup and possible HD, if need for both can consider transfer to facility with IR -r/o other causes of other intrinsic renal disease ie nephritic/nephrotic syndrome, hepatitis  -s/p CTAP with IV contrast  -History of HTN, not on medications (poor medical follow up)  -BUN 94, Cr 17.09  -urine with 300 protein, small blood, 100 glucose  -a1c and lipid panel wnl  -s/p 1L fluid in ED  -s/p 10mg hydralazine IV push  -c/w 100mg BID labetalol  - Autoimmune work up unrevealing   -Hep B and hep C neg  -F/up renal serologies  -Nephrology following, recommend gentle hydration with IVF and bicarb, recommending HD access and RRT if BUN/Cr remains high  -Pro remain in until 24 urine protein collection complete per nephrology, remove Sunday 1/7  -Close communication with nephro and cardio regarding need for both ischemic workup and possible HD, if need for both can consider transfer to facility with IR

## 2024-01-05 NOTE — PROGRESS NOTE ADULT - SUBJECTIVE AND OBJECTIVE BOX
MARLEY TRIMBLE  [id number]    Chief Complaint: HTN emergency  Interval events:  [id number] used. pt seen and examined, labs and chart reviewed. pt reports cp and ab pain today, no sob. Sinus 70-80s bpm on tele    ALLERGIES:  No Known Allergies      PAST MEDICAL & SURGICAL HISTORY:  H/O gastroesophageal reflux (GERD)      Hypertension      S/P Cholecystectomy            CURRENT MEDICATIONS:  MEDICATIONS  (STANDING):  cefTRIAXone   IVPB 1000 milliGRAM(s) IV Intermittent every 24 hours  heparin   Injectable 5000 Unit(s) SubCutaneous every 12 hours  labetalol 100 milliGRAM(s) Oral two times a day  metroNIDAZOLE  IVPB      metroNIDAZOLE  IVPB 500 milliGRAM(s) IV Intermittent every 8 hours    MEDICATIONS  (PRN):  acetaminophen     Tablet .. 650 milliGRAM(s) Oral every 6 hours PRN Temp greater or equal to 38C (100.4F), Mild Pain (1 - 3)  aluminum hydroxide/magnesium hydroxide/simethicone Suspension 30 milliLiter(s) Oral every 4 hours PRN Dyspepsia  melatonin 3 milliGRAM(s) Oral at bedtime PRN Insomnia  ondansetron Injectable 4 milliGRAM(s) IV Push every 8 hours PRN Nausea and/or Vomiting      SOCIAL HISTORY:  denies etoh and tobacco use    FAMILY HISTORY:      ROS:  All 10 systems reviewed and positives noted in HPI    OBJECTIVE:    VITAL SIGNS:  Vital Signs Last 24 Hrs  T(C): 39.4 (03 Jan 2024 10:28), Max: 39.4 (03 Jan 2024 10:28)  T(F): 102.9 (03 Jan 2024 10:28), Max: 102.9 (03 Jan 2024 10:28)  HR: 107 (03 Jan 2024 10:28) (100 - 107)  BP: 177/96 (03 Jan 2024 10:28) (177/96 - 218/113)  BP(mean): --  RR: 21 (03 Jan 2024 10:28) (18 - 21)  SpO2: 91% (03 Jan 2024 10:28) (91% - 99%)    Parameters below as of 03 Jan 2024 10:28  Patient On (Oxygen Delivery Method): room air        PHYSICAL EXAM:  General: well appearing, no distress  HEENT: sclera anicteric  Neck: supple, no carotid bruits b/l  CVS: JVP ~ 7 cm H20, RRR, s1, s2, no murmurs/rubs/gallops  Chest: unlabored respirations, clear to auscultation b/l  Abdomen: non-distended  Extremities: no lower extremity edema b/l  Neuro: awake, alert & oriented x 3  Psych: normal affect      LABS:                        8.2    7.58  )-----------( 244      ( 03 Jan 2024 01:30 )             22.3     01-03    132<L>  |  98  |  94<H>  ----------------------------<  116<H>  4.2   |  19<L>  |  17.09<H>    Ca    6.7<L>      03 Jan 2024 01:30    TPro  7.1  /  Alb  2.7<L>  /  TBili  0.4  /  DBili  x   /  AST  12  /  ALT  19  /  AlkPhos  108  01-03              ECG: Sinus tach       TTE: < from: TTE Echo Limited or F/U (01.03.24 @ 08:02) >   1. Biatrial enlargement   2. Left ventricular ejection fraction, by visual estimation, is 65 to   70%.   3. Normal global left ventricular systolic function.   4. Increased LV wall thickness.   5. Normal left ventricular internal cavity size.   6. Spectral Doppler shows impaired relaxation pattern of left   ventricular myocardial filling (Grade I diastolic dysfunction).   7. Trivial pericardial effusion.   8. Mild to moderate mitral valve regurgitation.   9. Mild-moderate tricuspid regurgitation.  10. Estimated pulmonary artery systolic pressure is 54.8 mmHg assuming a   right atrial pressure of 3 mmHg, which is consistent with moderate   pulmonary hypertension.       MARLEY TRIMBLE  [id number]    Chief Complaint: HTN emergency  Interval events:  550230 used. pt seen and examined, labs and chart reviewed. pt reports cp and ab pain today, no sob. Sinus 70-80s bpm on tele    ALLERGIES:  No Known Allergies      PAST MEDICAL & SURGICAL HISTORY:  H/O gastroesophageal reflux (GERD)      Hypertension      S/P Cholecystectomy            CURRENT MEDICATIONS:  MEDICATIONS  (STANDING):  cefTRIAXone   IVPB 1000 milliGRAM(s) IV Intermittent every 24 hours  heparin   Injectable 5000 Unit(s) SubCutaneous every 12 hours  labetalol 100 milliGRAM(s) Oral two times a day  metroNIDAZOLE  IVPB      metroNIDAZOLE  IVPB 500 milliGRAM(s) IV Intermittent every 8 hours    MEDICATIONS  (PRN):  acetaminophen     Tablet .. 650 milliGRAM(s) Oral every 6 hours PRN Temp greater or equal to 38C (100.4F), Mild Pain (1 - 3)  aluminum hydroxide/magnesium hydroxide/simethicone Suspension 30 milliLiter(s) Oral every 4 hours PRN Dyspepsia  melatonin 3 milliGRAM(s) Oral at bedtime PRN Insomnia  ondansetron Injectable 4 milliGRAM(s) IV Push every 8 hours PRN Nausea and/or Vomiting      SOCIAL HISTORY:  denies etoh and tobacco use    FAMILY HISTORY:      ROS:  All 10 systems reviewed and positives noted in HPI    OBJECTIVE:    VITAL SIGNS:  Vital Signs Last 24 Hrs  T(C): 39.4 (03 Jan 2024 10:28), Max: 39.4 (03 Jan 2024 10:28)  T(F): 102.9 (03 Jan 2024 10:28), Max: 102.9 (03 Jan 2024 10:28)  HR: 107 (03 Jan 2024 10:28) (100 - 107)  BP: 177/96 (03 Jan 2024 10:28) (177/96 - 218/113)  BP(mean): --  RR: 21 (03 Jan 2024 10:28) (18 - 21)  SpO2: 91% (03 Jan 2024 10:28) (91% - 99%)    Parameters below as of 03 Jan 2024 10:28  Patient On (Oxygen Delivery Method): room air        PHYSICAL EXAM:  General: well appearing, no distress  HEENT: sclera anicteric  Neck: supple, no carotid bruits b/l  CVS: JVP ~ 7 cm H20, RRR, s1, s2, no murmurs/rubs/gallops  Chest: unlabored respirations, clear to auscultation b/l  Abdomen: non-distended  Extremities: no lower extremity edema b/l  Neuro: awake, alert & oriented x 3  Psych: normal affect      LABS:                        8.2    7.58  )-----------( 244      ( 03 Jan 2024 01:30 )             22.3     01-03    132<L>  |  98  |  94<H>  ----------------------------<  116<H>  4.2   |  19<L>  |  17.09<H>    Ca    6.7<L>      03 Jan 2024 01:30    TPro  7.1  /  Alb  2.7<L>  /  TBili  0.4  /  DBili  x   /  AST  12  /  ALT  19  /  AlkPhos  108  01-03              ECG: Sinus tach       TTE: < from: TTE Echo Limited or F/U (01.03.24 @ 08:02) >   1. Biatrial enlargement   2. Left ventricular ejection fraction, by visual estimation, is 65 to   70%.   3. Normal global left ventricular systolic function.   4. Increased LV wall thickness.   5. Normal left ventricular internal cavity size.   6. Spectral Doppler shows impaired relaxation pattern of left   ventricular myocardial filling (Grade I diastolic dysfunction).   7. Trivial pericardial effusion.   8. Mild to moderate mitral valve regurgitation.   9. Mild-moderate tricuspid regurgitation.  10. Estimated pulmonary artery systolic pressure is 54.8 mmHg assuming a   right atrial pressure of 3 mmHg, which is consistent with moderate   pulmonary hypertension.

## 2024-01-05 NOTE — PROGRESS NOTE ADULT - PROBLEM SELECTOR PLAN 3
-Pt not meeting sepsis criteria on admission, found to be meeting sepsis criteria 1/3/24 with , temp 102.9, RR 22, and suspected GI infection  -RVP + for Influenza A  -UA with proteins, not concerning for UTI  -lactate 0.7  -f/up Blood cultures  -f/up urine cultures   -pt already on ceftriaxone and metronidazole, continue in setting of suspected GI infection  -Tylenol PRN for fever  -Symptomatic support for Influenza  -Will continue to monitor labs and vitals  -Per nephrology, gentle hydration with d5/bicarb -Pt not meeting sepsis criteria on admission, found to be meeting sepsis criteria 1/3/24 with , temp 102.9, RR 22, and suspected GI infection--> 1/5/24 improved, not currently meeting Sepsis criteria  -RVP + for Influenza A  -UA with proteins, not concerning for UTI  -lactate 0.7  -f/up Blood cultures  -f/up urine cultures   -pt already on ceftriaxone and metronidazole, continue in setting of suspected GI infection  -Tylenol PRN for fever  -Symptomatic support for Influenza  -Will continue to monitor labs and vitals  -Per nephrology, gentle hydration with d5/bicarb

## 2024-01-05 NOTE — PROGRESS NOTE ADULT - SUBJECTIVE AND OBJECTIVE BOX
INTERVAL HPI / OVERNIGHT EVENTS:    Patient seen and examined at bedside. Denies abdominal pain, denies N/V/D, no hematemesis and hematochezia Last bm: 1/5, tolerating dash diet      MEDICATIONS  (STANDING):  amLODIPine   Tablet 10 milliGRAM(s) Oral at bedtime  aspirin  chewable 81 milliGRAM(s) Oral daily  atorvastatin 40 milliGRAM(s) Oral at bedtime  clopidogrel Tablet 75 milliGRAM(s) Oral daily  dextrose 5% 1000 milliLiter(s) (50 mL/Hr) IV Continuous <Continuous>  heparin   Injectable 5000 Unit(s) SubCutaneous every 12 hours  labetalol 200 milliGRAM(s) Oral every 8 hours    MEDICATIONS  (PRN):  acetaminophen     Tablet .. 650 milliGRAM(s) Oral every 6 hours PRN Temp greater or equal to 38C (100.4F), Mild Pain (1 - 3)  aluminum hydroxide/magnesium hydroxide/simethicone Suspension 30 milliLiter(s) Oral every 4 hours PRN Dyspepsia  benzonatate 100 milliGRAM(s) Oral three times a day PRN Cough  melatonin 3 milliGRAM(s) Oral at bedtime PRN Insomnia  ondansetron Injectable 4 milliGRAM(s) IV Push every 8 hours PRN Nausea and/or Vomiting      Allergies    No Known Allergies    Intolerances    Vital Signs Last 24 Hrs  T(C): 36.9 (05 Jan 2024 08:30), Max: 36.9 (05 Jan 2024 05:26)  T(F): 98.5 (05 Jan 2024 08:30), Max: 98.5 (05 Jan 2024 05:26)  HR: 69 (05 Jan 2024 15:18) (69 - 81)  BP: 115/69 (05 Jan 2024 15:18) (115/69 - 164/84)  BP(mean): --  RR: 18 (05 Jan 2024 15:18) (17 - 18)  SpO2: 95% (05 Jan 2024 15:18) (94% - 96%)    Parameters below as of 05 Jan 2024 15:18  Patient On (Oxygen Delivery Method): room air        PHYSICAL EXAM:  Constitutional: Well-developed  ENTT: clear conjunctivae and sclera  Respiratory: clear to auscultation  Cardiovascular: S1 and S2  Gastrointestinal: +Bowel Sounds all quadrants, soft, Non tender, non distended  Extremities: No peripheral edema, neg clubbing, cyanosis  Neurological: A/O x 3  Skin: warm and dry      LABS:                        7.8    4.88  )-----------( 228      ( 05 Jan 2024 06:19 )             21.0     01-05    128<L>  |  91<L>  |  115<H>  ----------------------------<  117<H>  4.4   |  22  |  18.14<H>    Ca    5.1<LL>      05 Jan 2024 06:19    TPro  6.0  /  Alb  2.1<L>  /  TBili  0.4  /  DBili  x   /  AST  15  /  ALT  13  /  AlkPhos  87  01-05      Urinalysis Basic - ( 05 Jan 2024 06:19 )    Color: x / Appearance: x / SG: x / pH: x  Gluc: 117 mg/dL / Ketone: x  / Bili: x / Urobili: x   Blood: x / Protein: x / Nitrite: x   Leuk Esterase: x / RBC: x / WBC x   Sq Epi: x / Non Sq Epi: x / Bacteria: x      LIVER FUNCTIONS - ( 05 Jan 2024 06:19 )  Alb: 2.1 g/dL / Pro: 6.0 g/dL / ALK PHOS: 87 U/L / ALT: 13 U/L / AST: 15 U/L / GGT: x             RADIOLOGY & ADDITIONAL TESTS:  Influenza AH1 2009 (RapRVP): Detected (01.03.24 @ 20:00)    GI Follow up:    Patient seen and examined at bedside. Denies abdominal pain, denies N/V/D, hematemesis, hematochezia. Last bm: 1/5, tolerating dash diet      MEDICATIONS  (STANDING):  amLODIPine   Tablet 10 milliGRAM(s) Oral at bedtime  aspirin  chewable 81 milliGRAM(s) Oral daily  atorvastatin 40 milliGRAM(s) Oral at bedtime  clopidogrel Tablet 75 milliGRAM(s) Oral daily  dextrose 5% 1000 milliLiter(s) (50 mL/Hr) IV Continuous <Continuous>  heparin   Injectable 5000 Unit(s) SubCutaneous every 12 hours  labetalol 200 milliGRAM(s) Oral every 8 hours    MEDICATIONS  (PRN):  acetaminophen     Tablet .. 650 milliGRAM(s) Oral every 6 hours PRN Temp greater or equal to 38C (100.4F), Mild Pain (1 - 3)  aluminum hydroxide/magnesium hydroxide/simethicone Suspension 30 milliLiter(s) Oral every 4 hours PRN Dyspepsia  benzonatate 100 milliGRAM(s) Oral three times a day PRN Cough  melatonin 3 milliGRAM(s) Oral at bedtime PRN Insomnia  ondansetron Injectable 4 milliGRAM(s) IV Push every 8 hours PRN Nausea and/or Vomiting      Allergies    No Known Allergies    Intolerances    Vital Signs Last 24 Hrs  T(C): 36.9 (05 Jan 2024 08:30), Max: 36.9 (05 Jan 2024 05:26)  T(F): 98.5 (05 Jan 2024 08:30), Max: 98.5 (05 Jan 2024 05:26)  HR: 69 (05 Jan 2024 15:18) (69 - 81)  BP: 115/69 (05 Jan 2024 15:18) (115/69 - 164/84)  BP(mean): --  RR: 18 (05 Jan 2024 15:18) (17 - 18)  SpO2: 95% (05 Jan 2024 15:18) (94% - 96%)    Parameters below as of 05 Jan 2024 15:18  Patient On (Oxygen Delivery Method): room air        PHYSICAL EXAM:  Constitutional: Well-developed  ENTT: clear conjunctivae, anicteric sclerae  Respiratory: clear  Cardiovascular: S1 and S2  Gastrointestinal: +Bowel Sounds all quadrants, soft, Non tender, non distended  Extremities: No peripheral edema, neg clubbing, cyanosis  Neurological: A/O x 3  Skin: warm and dry      LABS:                        7.8    4.88  )-----------( 228      ( 05 Jan 2024 06:19 )             21.0     01-05    128<L>  |  91<L>  |  115<H>  ----------------------------<  117<H>  4.4   |  22  |  18.14<H>    Ca    5.1<LL>      05 Jan 2024 06:19    TPro  6.0  /  Alb  2.1<L>  /  TBili  0.4  /  DBili  x   /  AST  15  /  ALT  13  /  AlkPhos  87  01-05      Urinalysis Basic - ( 05 Jan 2024 06:19 )    Color: x / Appearance: x / SG: x / pH: x  Gluc: 117 mg/dL / Ketone: x  / Bili: x / Urobili: x   Blood: x / Protein: x / Nitrite: x   Leuk Esterase: x / RBC: x / WBC x   Sq Epi: x / Non Sq Epi: x / Bacteria: x      LIVER FUNCTIONS - ( 05 Jan 2024 06:19 )  Alb: 2.1 g/dL / Pro: 6.0 g/dL / ALK PHOS: 87 U/L / ALT: 13 U/L / AST: 15 U/L / GGT: x             RADIOLOGY & ADDITIONAL TESTS:  Influenza AH1 2009 (RapRVP): Detected (01.03.24 @ 20:00)

## 2024-01-05 NOTE — PROGRESS NOTE ADULT - ASSESSMENT
Mr. Garcia is a 59yo M with PMHx of CKD 3, HTN, lost to follow up with PCP (per chart review used to take medications for HTN, but patient denies) presenting for vomiting times 3 weeks, admitted for renal failure and r/o ACS.       Nichristy Jen Garcia, updated [phone].       Case d/w Dr. Jimenez. Mr. Garcia is a 57yo M with PMHx of CKD 3, HTN, lost to follow up with PCP (per chart review used to take medications for HTN, but patient denies) presenting for vomiting times 3 weeks, admitted for renal failure and r/o ACS.       Nichristy Jen Garcia, updated [phone].       Case d/w Dr. Jimenez.

## 2024-01-05 NOTE — PROGRESS NOTE ADULT - ATTENDING COMMENTS
57yo M with PMHx of CKD 3, HTN, lost to follow up with PCP (per chart review used to take medications for HTN, but patient denies) presenting for vomiting times 3 weeks, admitted for renal failure and r/o ACS.     #HTN emergency  - not on meds at home  - continue labetalol 100mg BID and amlodipine 5mg daily  - cardio recs appreciated- will likely need ischemic workup with angio pending renal clearance, discussed at length with cardio NP Nohelia    #ARF  - likely chronic issue and 2/2 to uncontrolled BP  - follow up nephro studies  - continue IVF for now as per nephro  - patient still making urine  - nephro following- discussed at length with Dr. Laird. Given elevating Cr levels, recommend RRT to be initiated, could monitor patient as well given good UOP however if ischemic eval is to be done then would likely need HD performed. Nephro to discuss with patient and then if agreeable, discussed already with ICU PA to have temp cath placed for dialysis, see if patient can tolerate and then can transfer for angio  - continue leblanc for now, pending shiley placement    #anemia  - likely anemia of chronic kidney disease  - s/p PRBC on 1/4 with appropriate response  - continue to monitor H/H  - follow up occult    #NSTEMI  - trop noted to be elevated, could be in setting of HTN emergency but will likely need further workup given chest pain on admission  - echo without acute concerns besides mod pHTN  - started DAPT even in setting of anemia, atorvastatin, continue labetalol as per cardio   - cardio recs appreciated- will likely need ischemic workup with angio pending renal clearance, discussed at length with cardio NP Nohelia, see above    #sepsis  #influenza  - BCx with NGTD  - noted to have fever overnight  - continue to monitor off abx

## 2024-01-05 NOTE — PROGRESS NOTE ADULT - PROBLEM SELECTOR PLAN 1
-S/p CTAP with IV contrast  -History of HTN, not on medications (poor medical follow up)  -Urine with 300 protein, small blood, 100 glucose  -S/p 1L fluid in ED  -S/p 10mg hydralazine IV push  -C/w 100mg BID labetalol  -Appreciate cardiology recommendations, will continue tele monitoring- consider ischemic workup pending nephro approval  -Appreciate nephrology recommendations, Norvasc added for htn  -Continue to monitor BP

## 2024-01-05 NOTE — PROGRESS NOTE ADULT - PROBLEM SELECTOR PLAN 6
-Pt reporting 3 weeks of nausea and vomiting   -CTAP Inadequately distended proximal colonic loop or query long segmental   colitis. No bowel obstruction. Nonemergent colonoscopy suggested  -Starting gentle hydration with IVF and bicarb  -Appreciate GI recommendations, will continue with symptomatic support

## 2024-01-05 NOTE — PROGRESS NOTE ADULT - PROBLEM SELECTOR PLAN 7
-Diarrhea likely 2/2 uremia  -R/o pathogenic cause  -CTAP: Inadequately distended proximal colonic loop or query long segmental   colitis. No bowel obstruction. Nonemergent colonoscopy suggested.  -S/p 1 L fluid in ED, due to severe hypertension no further fluids  -F/u stool culture, ova + parasite, GI PCR  -Add ceftriaxone and flagyl  -Appreciate GI recommendations, will cw abx -Diarrhea likely 2/2 uremia  -R/o pathogenic cause  -CTAP: Inadequately distended proximal colonic loop or query long segmental   colitis. No bowel obstruction. Nonemergent colonoscopy suggested.  -S/p 1 L fluid in ED, due to severe hypertension no further fluids  -F/u stool culture, ova + parasite, GI PCR  -S/p ceftriaxone and flagyl  -Appreciate GI recommendations, will cw abx

## 2024-01-05 NOTE — PROGRESS NOTE ADULT - PROBLEM SELECTOR PLAN 5
-Chest pain likely 2/2 hypertension, r/o ACS  -Trops trending up 221>246>401>>466, will continue to trend  - TTE with normal EF 65-70 and mod pulm htn  -Appreciate cardiology recommendations, continue to trend troponin, cw labetolol for BP control, can consider ischemic workup pending nephrology approval  -Per nephrology, will start [facility] -Chest pain likely 2/2 hypertension, r/o ACS  -Trops trending up 221>246>401>>466, will continue to trend  - TTE with normal EF 65-70 and mod pulm htn  -Appreciate cardiology recommendations, continue to trend troponin, cw labetolol for BP control, can consider ischemic workup pending nephrology approval  -Per nephrology, will start [facility] -NSTEMI  -Chest pain likely 2/2 hypertension  -Trops trending up 221>246>401>>466, 529.1, per cardio likely 2/2 demand ischemia in setting of renal function  - TTE with normal EF 65-70 and mod pulm htn  -Appreciate cardiology recommendations,  cw labetolol for BP control, plan for eventual ischemic workup once kidney function stable  -Per nephrology, will cw [facility] -NSTEMI  -Chest pain likely 2/2 hypertension  -Trops trending up 221>246>401>>466, 529.1, per cardio likely 2/2 demand ischemia in setting of renal function  - TTE with normal EF 65-70 and mod pulm htn  -Appreciate cardiology recommendations,  cw labetolol for BP control, plan for eventual ischemic workup once kidney function stable  -Per nephrology, will cw [facility] -NSTEMI  -Chest pain likely 2/2 hypertension  -Trops trending up 221>246>401>>466, 529.1, per cardio likely 2/2 demand ischemia in setting of renal function  - TTE with normal EF 65-70 and mod pulm htn  -Appreciate cardiology recommendations,  cw labetolol for BP control, plan for eventual ischemic workup once kidney function stable  -Per nephrology, will cw Norvasc  -Close communication with nephro and cardio regarding need for both ischemic workup and possible HD, if need for both can consider transfer to facility with IR

## 2024-01-05 NOTE — PROGRESS NOTE ADULT - ASSESSMENT
Assessment: 57yo M with pmhx CKD 3, HTN, lost to follow up with PCP (per chart review used to take medications for HTN, but patient denies) presenting for vomiting times 3 weeks, admitted for renal failure, htn urgency and r/o ACS.     Recommendations:  - ekg non ischemic, trops +, however with Cr 17, can also be a component of CKD +/- demand iso htn urgency  - cont bb, asa, plavix and statin  - TTE with normal EF 65-70 and mod pulm htn  - continue tele monitoring  - dw hospitalist possible transfer for dialysis and ischemic workup    Nohelia KEANE-BC Assessment: 59yo M with pmhx CKD 3, HTN, lost to follow up with PCP (per chart review used to take medications for HTN, but patient denies) presenting for vomiting times 3 weeks, admitted for renal failure, htn urgency and r/o ACS.     Recommendations:  - ekg non ischemic, trops +, however with Cr 17, can also be a component of CKD +/- demand iso htn urgency  - cont bb, asa, plavix and statin  - TTE with normal EF 65-70 and mod pulm htn  - continue tele monitoring  -  hospitalist possible transfer for dialysis and ischemic workup    Nohelia SEGURA-BC    cardio attending  Patient with nSTEMI COvId positive with the elevated troponin 401/466/29 creatinine 16/17/18 hb 7.8 hematocrit 21 platelets 228 hypertensive urgency and elevation troponin cp consttuting nstemi  .considering [facility] or [facility] transfer ultimately will require dyaliic prescription with an eye toaward fistula placement and eventual cardiac catheterization     Dr. Laird from renal service Assessment: 59yo M with pmhx CKD 3, HTN, lost to follow up with PCP (per chart review used to take medications for HTN, but patient denies) presenting for vomiting times 3 weeks, admitted for renal failure, htn urgency and r/o ACS.     Recommendations:  - ekg non ischemic, trops +, however with Cr 17, can also be a component of CKD +/- demand iso htn urgency  - cont bb, asa, plavix and statin  - TTE with normal EF 65-70 and mod pulm htn  - continue tele monitoring  -  hospitalist possible transfer for dialysis and ischemic workup    Nohelia SEGURA-BC    cardio attending  Patient with nSTEMI COvId positive with the elevated troponin 401/466/29 creatinine 16/17/18 hb 7.8 hematocrit 21 platelets 228 hypertensive urgency and elevation troponin cp consttuting nstemi  .considering [facility] or [facility] transfer ultimately will require dyaliic prescription with an eye toaward fistula placement and eventual cardiac catheterization     Dr. Laird from renal service

## 2024-01-05 NOTE — PROGRESS NOTE ADULT - SUBJECTIVE AND OBJECTIVE BOX
Not in distress    Vital Signs Last 24 Hrs  T(C): 36.9 (01-05-24 @ 20:32), Max: 36.9 (01-05-24 @ 05:26)  T(F): 98.4 (01-05-24 @ 20:32), Max: 98.5 (01-05-24 @ 05:26)  HR: 76 (01-05-24 @ 20:32) (69 - 81)  BP: 129/72 (01-05-24 @ 20:32) (115/69 - 143/80)  RR: 17 (01-05-24 @ 20:32) (17 - 18)  SpO2: 94% (01-05-24 @ 20:32) (94% - 95%)    I&O's Detail    04 Jan 2024 07:01  -  05 Jan 2024 07:00  --------------------------------------------------------  IN:    dextrose 5% w/ Additives: 650 mL    Oral Fluid: 940 mL  Total IN: 1590 mL    OUT:    Indwelling Catheter - Urethral (mL): 2800 mL  Total OUT: 2800 mL    05 Jan 2024 07:01  -  05 Jan 2024 20:52  --------------------------------------------------------  IN:    Oral Fluid: 620 mL  Total IN: 620 mL    OUT:    Indwelling Catheter - Urethral (mL): 625 mL  Total OUT: 625 mL    s1s2  b/l air entry  soft, ND  no edema                                7.8    4.88  )-----------( 228      ( 05 Jan 2024 06:19 )             21.0     05 Jan 2024 06:19    128    |  91     |  115    ----------------------------<  117    4.4     |  22     |  18.14    Ca    5.1        05 Jan 2024 06:19    TPro  6.0    /  Alb  2.1    /  TBili  0.4    /  DBili  x      /  AST  15     /  ALT  13     /  AlkPhos  87     05 Jan 2024 06:19    LIVER FUNCTIONS - ( 05 Jan 2024 06:19 )  Alb: 2.1 g/dL / Pro: 6.0 g/dL / ALK PHOS: 87 U/L / ALT: 13 U/L / AST: 15 U/L / GGT: x           Culture - Blood (collected 03 Jan 2024 12:01)  Source: .Blood Blood-Peripheral  Preliminary Report (05 Jan 2024 15:02):    No growth at 48 Hours    Culture - Blood (collected 03 Jan 2024 11:56)  Source: .Blood Blood-Peripheral  Preliminary Report (05 Jan 2024 15:02):    No growth at 48 Hours    acetaminophen     Tablet .. 650 milliGRAM(s) Oral every 6 hours PRN  aluminum hydroxide/magnesium hydroxide/simethicone Suspension 30 milliLiter(s) Oral every 4 hours PRN  amLODIPine   Tablet 10 milliGRAM(s) Oral at bedtime  aspirin  chewable 81 milliGRAM(s) Oral daily  atorvastatin 40 milliGRAM(s) Oral at bedtime  benzonatate 100 milliGRAM(s) Oral three times a day PRN  clopidogrel Tablet 75 milliGRAM(s) Oral daily  dextrose 5% 1000 milliLiter(s) IV Continuous <Continuous>  heparin   Injectable 5000 Unit(s) SubCutaneous every 12 hours  labetalol 200 milliGRAM(s) Oral every 8 hours  melatonin 3 milliGRAM(s) Oral at bedtime PRN  ondansetron Injectable 4 milliGRAM(s) IV Push every 8 hours PRN    A/P:    Pt w/no known PMH, not following w/doctors, possibly hx of HTN, brother w/hx ESRD (pt does not know the etiology)   Adm w/N/V, fever, anemia, markedly abnormal renal fx (baseline renal fx is unknown)  Dx w/Flu A  S/p CT w/IV dye 1/3/23, dye injury can be contributing to rising Cr   Pro w/good UO  Gentle IVF w/Bicarb  F/u 24hr urine collection for protein   SPEP, renal serologies are negative   CT A/P w/o hydro   NSTEMI, per d/w cardiology plan for eventual cath  D/w pt at length impending need for RRT chad if cath is planned  Plan for HD access placement and initiation of RRT tomorrow  Will f/u BMP, UO     [phone]       Not in distress    Vital Signs Last 24 Hrs  T(C): 36.9 (01-05-24 @ 20:32), Max: 36.9 (01-05-24 @ 05:26)  T(F): 98.4 (01-05-24 @ 20:32), Max: 98.5 (01-05-24 @ 05:26)  HR: 76 (01-05-24 @ 20:32) (69 - 81)  BP: 129/72 (01-05-24 @ 20:32) (115/69 - 143/80)  RR: 17 (01-05-24 @ 20:32) (17 - 18)  SpO2: 94% (01-05-24 @ 20:32) (94% - 95%)    I&O's Detail    04 Jan 2024 07:01  -  05 Jan 2024 07:00  --------------------------------------------------------  IN:    dextrose 5% w/ Additives: 650 mL    Oral Fluid: 940 mL  Total IN: 1590 mL    OUT:    Indwelling Catheter - Urethral (mL): 2800 mL  Total OUT: 2800 mL    05 Jan 2024 07:01  -  05 Jan 2024 20:52  --------------------------------------------------------  IN:    Oral Fluid: 620 mL  Total IN: 620 mL    OUT:    Indwelling Catheter - Urethral (mL): 625 mL  Total OUT: 625 mL    s1s2  b/l air entry  soft, ND  no edema                                7.8    4.88  )-----------( 228      ( 05 Jan 2024 06:19 )             21.0     05 Jan 2024 06:19    128    |  91     |  115    ----------------------------<  117    4.4     |  22     |  18.14    Ca    5.1        05 Jan 2024 06:19    TPro  6.0    /  Alb  2.1    /  TBili  0.4    /  DBili  x      /  AST  15     /  ALT  13     /  AlkPhos  87     05 Jan 2024 06:19    LIVER FUNCTIONS - ( 05 Jan 2024 06:19 )  Alb: 2.1 g/dL / Pro: 6.0 g/dL / ALK PHOS: 87 U/L / ALT: 13 U/L / AST: 15 U/L / GGT: x           Culture - Blood (collected 03 Jan 2024 12:01)  Source: .Blood Blood-Peripheral  Preliminary Report (05 Jan 2024 15:02):    No growth at 48 Hours    Culture - Blood (collected 03 Jan 2024 11:56)  Source: .Blood Blood-Peripheral  Preliminary Report (05 Jan 2024 15:02):    No growth at 48 Hours    acetaminophen     Tablet .. 650 milliGRAM(s) Oral every 6 hours PRN  aluminum hydroxide/magnesium hydroxide/simethicone Suspension 30 milliLiter(s) Oral every 4 hours PRN  amLODIPine   Tablet 10 milliGRAM(s) Oral at bedtime  aspirin  chewable 81 milliGRAM(s) Oral daily  atorvastatin 40 milliGRAM(s) Oral at bedtime  benzonatate 100 milliGRAM(s) Oral three times a day PRN  clopidogrel Tablet 75 milliGRAM(s) Oral daily  dextrose 5% 1000 milliLiter(s) IV Continuous <Continuous>  heparin   Injectable 5000 Unit(s) SubCutaneous every 12 hours  labetalol 200 milliGRAM(s) Oral every 8 hours  melatonin 3 milliGRAM(s) Oral at bedtime PRN  ondansetron Injectable 4 milliGRAM(s) IV Push every 8 hours PRN    A/P:    Pt w/no known PMH, not following w/doctors, possibly hx of HTN, brother w/hx ESRD (pt does not know the etiology)   Adm w/N/V, fever, anemia, markedly abnormal renal fx (baseline renal fx is unknown)  Dx w/Flu A  S/p CT w/IV dye 1/3/23, dye injury can be contributing to rising Cr   Pro w/good UO  Gentle IVF w/Bicarb  F/u 24hr urine collection for protein   SPEP, renal serologies are negative   CT A/P w/o hydro   NSTEMI, per d/w cardiology plan for eventual cath  D/w pt at length impending need for RRT chad if cath is planned  Plan for HD access placement and initiation of RRT tomorrow  Will f/u BMP, UO     [phone]

## 2024-01-05 NOTE — PROGRESS NOTE ADULT - PROBLEM SELECTOR PLAN 4
-Likely 2/2 kidney failure  -iron panel with ferritin 478, likely anemia of chronic disease  -H&H trending down  -Transfusion consent forms signed and in chart  -Hgb 6.8 today, packed red blood cells ordered, f/up repeat cbc after transfusion  -Keep type and screen current  -Transfuse Hgb <7.0 -Likely 2/2 kidney failure  -iron panel with ferritin 478, likely anemia of chronic disease  -H&H trending down  -Transfusion consent forms signed and in chart  -Hgb 6.8 1/4/24, s/p PRBC  -Keep type and screen current  -Transfuse Hgb <7.0

## 2024-01-05 NOTE — PROGRESS NOTE ADULT - NS ATTEND AMEND GEN_ALL_CORE FT
Patient seen and examined at the bedside. Agree with the assessment and plan of PETER Coombs.  Will need eventual endoscopic, colonoscopic testing when renal issues stabilized.  GI will sign off. Please reconsult as needed.

## 2024-01-05 NOTE — CHART NOTE - NSCHARTNOTEFT_GEN_A_CORE
Disposition discussion had at bedside with Dr. Laird, myself and Dr. Shukla with Dr. Shukla providing Tamazight translation. Explained to patient that the etiology of his current renal failure is unclear with creatinine continuing to rise, compounded with cardiology's recommendation for ischemic work up in setting of elevated troponin and chest pain. Explained to patient current need for HD while kidney function continues to decline. Advised patient that [facility] of [facility], and dialyze patient tomorrow and Monday to optimize patient for transfer to tertiary center that can pursue cardiac ischemic workup and other possible cardiac or renal interventions with IR. Patient expressed understanding and agreement. All questions answered. Disposition discussion had at bedside with Dr. Laird, myself and Dr. Shukla with Dr. Shukla providing Yi translation. Explained to patient that the etiology of his current renal failure is unclear with creatinine continuing to rise, compounded with cardiology's recommendation for ischemic work up in setting of elevated troponin and chest pain. Explained to patient current need for HD while kidney function continues to decline. Advised patient that [facility]est course of action would [facility] and dialyze patient tomorrow and Monday to optimize patient for transfer to tertiary center that can pursue cardiac ischemic workup and other possible cardiac or renal interventions with IR. Patient expressed understanding and agreement. All questions answered. Disposition discussion had at bedside with Dr. Laird, myself and Dr. Shukla with Dr. Shukla providing Sami translation. Explained to patient that the etiology of his current renal failure is unclear with creatinine continuing to rise, compounded with cardiology's recommendation for ischemic work up in setting of elevated troponin and chest pain. Explained to patient current need for HD while kidney function continues to decline. Advised patient that safest course of action would [facility], and dialyze patient tomorrow and Monday to optimize patient for transfer to tertiary center that can pursue cardiac ischemic workup and other possible cardiac or renal interventions with IR. Patient expressed understanding and agreement. All questions answered Disposition discussion had at bedside with Dr. Laird, myself and Dr. Shukla with Dr. Shukla providing Yi translation. Explained to patient that the etiology of his current renal failure is unclear with creatinine continuing to rise, compounded with cardiology's recommendation for ischemic work up in setting of elevated troponin and chest pain. Explained to patient current need for HD while kidney function continues to decline. Advised patient that safest course of action would [facility], and dialyze patient tomorrow and Monday to optimize patient for transfer to tertiary center that can pursue cardiac ischemic workup and other possible cardiac or renal interventions with IR. Patient expressed understanding and agreement. All questions answered

## 2024-01-05 NOTE — PROGRESS NOTE ADULT - ASSESSMENT
Patient is a 59yo M with pmhx CKD 3, HTN, no PCP.   Currently, last vomiting episode 1/3  no N/V/D at this time  NO active s/s of active bleeding, Hb noted to be decreased    GI consult:  sepsis, vomiting x 3 weeks  Colonoscopy- Never  ERCP- 2006 as per patient.                 Patient is a 57yo M with pmhx CKD 3, HTN, no PCP.   Currently, last vomiting episode 1/3  no N/V/D at this time  NO active s/s of active bleeding, Hb noted to be decreased    GI consult:  sepsis, vomiting x 3 weeks  Colonoscopy- Never  ERCP- 2006 as per patient.

## 2024-01-06 LAB
ALBUMIN SERPL ELPH-MCNC: 2.1 G/DL — LOW (ref 3.3–5)
ALBUMIN SERPL ELPH-MCNC: 2.1 G/DL — LOW (ref 3.3–5)
ALP SERPL-CCNC: 86 U/L — SIGNIFICANT CHANGE UP (ref 40–120)
ALP SERPL-CCNC: 86 U/L — SIGNIFICANT CHANGE UP (ref 40–120)
ALT FLD-CCNC: 14 U/L — SIGNIFICANT CHANGE UP (ref 10–45)
ALT FLD-CCNC: 14 U/L — SIGNIFICANT CHANGE UP (ref 10–45)
ANION GAP SERPL CALC-SCNC: 16 MMOL/L — SIGNIFICANT CHANGE UP (ref 5–17)
ANION GAP SERPL CALC-SCNC: 16 MMOL/L — SIGNIFICANT CHANGE UP (ref 5–17)
AST SERPL-CCNC: 18 U/L — SIGNIFICANT CHANGE UP (ref 10–40)
AST SERPL-CCNC: 18 U/L — SIGNIFICANT CHANGE UP (ref 10–40)
AUTO DIFF PNL BLD: ABNORMAL
AUTO DIFF PNL BLD: ABNORMAL
BASOPHILS # BLD AUTO: 0.02 K/UL — SIGNIFICANT CHANGE UP (ref 0–0.2)
BASOPHILS # BLD AUTO: 0.02 K/UL — SIGNIFICANT CHANGE UP (ref 0–0.2)
BASOPHILS NFR BLD AUTO: 0.5 % — SIGNIFICANT CHANGE UP (ref 0–2)
BASOPHILS NFR BLD AUTO: 0.5 % — SIGNIFICANT CHANGE UP (ref 0–2)
BILIRUB SERPL-MCNC: 0.5 MG/DL — SIGNIFICANT CHANGE UP (ref 0.2–1.2)
BILIRUB SERPL-MCNC: 0.5 MG/DL — SIGNIFICANT CHANGE UP (ref 0.2–1.2)
BUN SERPL-MCNC: 116 MG/DL — HIGH (ref 7–23)
BUN SERPL-MCNC: 116 MG/DL — HIGH (ref 7–23)
C-ANCA SER-ACNC: NEGATIVE — SIGNIFICANT CHANGE UP
C-ANCA SER-ACNC: NEGATIVE — SIGNIFICANT CHANGE UP
CALCIUM SERPL-MCNC: <5 MG/DL — CRITICAL LOW (ref 8.4–10.5)
CALCIUM SERPL-MCNC: <5 MG/DL — CRITICAL LOW (ref 8.4–10.5)
CHLORIDE SERPL-SCNC: 88 MMOL/L — LOW (ref 96–108)
CHLORIDE SERPL-SCNC: 88 MMOL/L — LOW (ref 96–108)
CO2 SERPL-SCNC: 25 MMOL/L — SIGNIFICANT CHANGE UP (ref 22–31)
CO2 SERPL-SCNC: 25 MMOL/L — SIGNIFICANT CHANGE UP (ref 22–31)
CREAT SERPL-MCNC: 18 MG/DL — HIGH (ref 0.5–1.3)
CREAT SERPL-MCNC: 18 MG/DL — HIGH (ref 0.5–1.3)
EGFR: 3 ML/MIN/1.73M2 — LOW
EGFR: 3 ML/MIN/1.73M2 — LOW
EOSINOPHIL # BLD AUTO: 0.04 K/UL — SIGNIFICANT CHANGE UP (ref 0–0.5)
EOSINOPHIL # BLD AUTO: 0.04 K/UL — SIGNIFICANT CHANGE UP (ref 0–0.5)
EOSINOPHIL NFR BLD AUTO: 1 % — SIGNIFICANT CHANGE UP (ref 0–6)
EOSINOPHIL NFR BLD AUTO: 1 % — SIGNIFICANT CHANGE UP (ref 0–6)
GLUCOSE SERPL-MCNC: 120 MG/DL — HIGH (ref 70–99)
GLUCOSE SERPL-MCNC: 120 MG/DL — HIGH (ref 70–99)
HCT VFR BLD CALC: 20.8 % — CRITICAL LOW (ref 39–50)
HCT VFR BLD CALC: 20.8 % — CRITICAL LOW (ref 39–50)
HGB BLD-MCNC: 7.7 G/DL — LOW (ref 13–17)
HGB BLD-MCNC: 7.7 G/DL — LOW (ref 13–17)
IMM GRANULOCYTES NFR BLD AUTO: 0.5 % — SIGNIFICANT CHANGE UP (ref 0–0.9)
IMM GRANULOCYTES NFR BLD AUTO: 0.5 % — SIGNIFICANT CHANGE UP (ref 0–0.9)
LYMPHOCYTES # BLD AUTO: 0.44 K/UL — LOW (ref 1–3.3)
LYMPHOCYTES # BLD AUTO: 0.44 K/UL — LOW (ref 1–3.3)
LYMPHOCYTES # BLD AUTO: 11.3 % — LOW (ref 13–44)
LYMPHOCYTES # BLD AUTO: 11.3 % — LOW (ref 13–44)
MAGNESIUM SERPL-MCNC: 1.9 MG/DL — SIGNIFICANT CHANGE UP (ref 1.6–2.6)
MAGNESIUM SERPL-MCNC: 1.9 MG/DL — SIGNIFICANT CHANGE UP (ref 1.6–2.6)
MCHC RBC-ENTMCNC: 28 PG — SIGNIFICANT CHANGE UP (ref 27–34)
MCHC RBC-ENTMCNC: 28 PG — SIGNIFICANT CHANGE UP (ref 27–34)
MCHC RBC-ENTMCNC: 37 GM/DL — HIGH (ref 32–36)
MCHC RBC-ENTMCNC: 37 GM/DL — HIGH (ref 32–36)
MCV RBC AUTO: 75.6 FL — LOW (ref 80–100)
MCV RBC AUTO: 75.6 FL — LOW (ref 80–100)
MONOCYTES # BLD AUTO: 0.57 K/UL — SIGNIFICANT CHANGE UP (ref 0–0.9)
MONOCYTES # BLD AUTO: 0.57 K/UL — SIGNIFICANT CHANGE UP (ref 0–0.9)
MONOCYTES NFR BLD AUTO: 14.6 % — HIGH (ref 2–14)
MONOCYTES NFR BLD AUTO: 14.6 % — HIGH (ref 2–14)
MPO AB + PR3 PNL SER: SIGNIFICANT CHANGE UP
MPO AB + PR3 PNL SER: SIGNIFICANT CHANGE UP
NEUTROPHILS # BLD AUTO: 2.82 K/UL — SIGNIFICANT CHANGE UP (ref 1.8–7.4)
NEUTROPHILS # BLD AUTO: 2.82 K/UL — SIGNIFICANT CHANGE UP (ref 1.8–7.4)
NEUTROPHILS NFR BLD AUTO: 72.1 % — SIGNIFICANT CHANGE UP (ref 43–77)
NEUTROPHILS NFR BLD AUTO: 72.1 % — SIGNIFICANT CHANGE UP (ref 43–77)
NRBC # BLD: 0 /100 WBCS — SIGNIFICANT CHANGE UP (ref 0–0)
NRBC # BLD: 0 /100 WBCS — SIGNIFICANT CHANGE UP (ref 0–0)
P-ANCA SER-ACNC: NEGATIVE — SIGNIFICANT CHANGE UP
P-ANCA SER-ACNC: NEGATIVE — SIGNIFICANT CHANGE UP
PHOSPHATE SERPL-MCNC: 11.9 MG/DL — HIGH (ref 2.5–4.5)
PHOSPHATE SERPL-MCNC: 11.9 MG/DL — HIGH (ref 2.5–4.5)
PLATELET # BLD AUTO: 239 K/UL — SIGNIFICANT CHANGE UP (ref 150–400)
PLATELET # BLD AUTO: 239 K/UL — SIGNIFICANT CHANGE UP (ref 150–400)
POTASSIUM SERPL-MCNC: 4.1 MMOL/L — SIGNIFICANT CHANGE UP (ref 3.5–5.3)
POTASSIUM SERPL-MCNC: 4.1 MMOL/L — SIGNIFICANT CHANGE UP (ref 3.5–5.3)
POTASSIUM SERPL-SCNC: 4.1 MMOL/L — SIGNIFICANT CHANGE UP (ref 3.5–5.3)
POTASSIUM SERPL-SCNC: 4.1 MMOL/L — SIGNIFICANT CHANGE UP (ref 3.5–5.3)
PROT SERPL-MCNC: 6 G/DL — SIGNIFICANT CHANGE UP (ref 6–8.3)
PROT SERPL-MCNC: 6 G/DL — SIGNIFICANT CHANGE UP (ref 6–8.3)
RBC # BLD: 2.75 M/UL — LOW (ref 4.2–5.8)
RBC # BLD: 2.75 M/UL — LOW (ref 4.2–5.8)
RBC # FLD: 13 % — SIGNIFICANT CHANGE UP (ref 10.3–14.5)
RBC # FLD: 13 % — SIGNIFICANT CHANGE UP (ref 10.3–14.5)
SODIUM SERPL-SCNC: 129 MMOL/L — LOW (ref 135–145)
SODIUM SERPL-SCNC: 129 MMOL/L — LOW (ref 135–145)
WBC # BLD: 3.91 K/UL — SIGNIFICANT CHANGE UP (ref 3.8–10.5)
WBC # BLD: 3.91 K/UL — SIGNIFICANT CHANGE UP (ref 3.8–10.5)
WBC # FLD AUTO: 3.91 K/UL — SIGNIFICANT CHANGE UP (ref 3.8–10.5)
WBC # FLD AUTO: 3.91 K/UL — SIGNIFICANT CHANGE UP (ref 3.8–10.5)

## 2024-01-06 PROCEDURE — 99233 SBSQ HOSP IP/OBS HIGH 50: CPT

## 2024-01-06 PROCEDURE — 99232 SBSQ HOSP IP/OBS MODERATE 35: CPT

## 2024-01-06 PROCEDURE — 71045 X-RAY EXAM CHEST 1 VIEW: CPT | Mod: 26

## 2024-01-06 RX ORDER — CHLORHEXIDINE GLUCONATE 213 G/1000ML
1 SOLUTION TOPICAL
Refills: 0 | Status: DISCONTINUED | OUTPATIENT
Start: 2024-01-06 | End: 2024-01-12

## 2024-01-06 RX ORDER — CALCIUM ACETATE 667 MG
1334 TABLET ORAL
Refills: 0 | Status: DISCONTINUED | OUTPATIENT
Start: 2024-01-06 | End: 2024-01-10

## 2024-01-06 RX ORDER — SODIUM CHLORIDE 9 MG/ML
10 INJECTION INTRAMUSCULAR; INTRAVENOUS; SUBCUTANEOUS
Refills: 0 | Status: DISCONTINUED | OUTPATIENT
Start: 2024-01-06 | End: 2024-01-12

## 2024-01-06 RX ADMIN — HEPARIN SODIUM 5000 UNIT(S): 5000 INJECTION INTRAVENOUS; SUBCUTANEOUS at 05:30

## 2024-01-06 RX ADMIN — Medication 81 MILLIGRAM(S): at 12:47

## 2024-01-06 RX ADMIN — ATORVASTATIN CALCIUM 40 MILLIGRAM(S): 80 TABLET, FILM COATED ORAL at 23:09

## 2024-01-06 RX ADMIN — CHLORHEXIDINE GLUCONATE 1 APPLICATION(S): 213 SOLUTION TOPICAL at 12:47

## 2024-01-06 RX ADMIN — Medication 100 MILLIGRAM(S): at 06:37

## 2024-01-06 RX ADMIN — CLOPIDOGREL BISULFATE 75 MILLIGRAM(S): 75 TABLET, FILM COATED ORAL at 12:47

## 2024-01-06 RX ADMIN — Medication 200 MILLIGRAM(S): at 23:09

## 2024-01-06 RX ADMIN — Medication 100 MILLIGRAM(S): at 23:10

## 2024-01-06 RX ADMIN — Medication 200 MILLIGRAM(S): at 05:30

## 2024-01-06 RX ADMIN — Medication 200 MILLIGRAM(S): at 16:14

## 2024-01-06 RX ADMIN — HEPARIN SODIUM 5000 UNIT(S): 5000 INJECTION INTRAVENOUS; SUBCUTANEOUS at 17:24

## 2024-01-06 RX ADMIN — Medication 1334 MILLIGRAM(S): at 17:23

## 2024-01-06 NOTE — PROGRESS NOTE ADULT - PROBLEM SELECTOR PLAN 1
-S/p CTAP with IV contrast  -History of HTN, not on medications (poor medical follow up)  -Urine with 300 protein, small blood, 100 glucose  -S/p 1L fluid in ED  -S/p 10mg hydralazine IV push  -C/w 100mg BID labetalol  -Appreciate cardiology recommendations, will continue tele monitoring- consider ischemic workup pending nephro approval  -Appreciate nephrology recommendations, Norvasc added for htn  -Continue to monitor BP -S/p CTAP with IV contrast  -History of HTN, not on medications (poor medical follow up)  -Urine with 300 protein, small blood, 100 glucose  -S/p 1L fluid in ED  -S/p 10mg hydralazine IV push  -C/w 100mg BID labetalol  -Continue to monitor BP  -Appreciate nephrology recommendations, Norvasc added for htn  -Appreciate cardiology recommendations, will continue tele monitoring- plan for transfer to tertiary center for ischemic work up once renally stable -S/p CTAP with IV contrast  -History of HTN, not on medications (poor medical follow up)  -Urine with 300 protein, small blood, 100 glucose  -S/p 1L fluid in ED  -S/p 10mg hydralazine IV push  -C/w 200mg labetolol q8hrs  -Continue to monitor BP  -Appreciate nephrology recommendations, Norvasc added for htn  -Appreciate cardiology recommendations, will continue tele monitoring- plan for transfer to tertiary center for ischemic work up once renally stable

## 2024-01-06 NOTE — PROGRESS NOTE ADULT - ASSESSMENT
Mr. Garcia is a 57yo M with PMHx of CKD 3, HTN, lost to follow up with PCP (per chart review used to take medications for HTN, but patient denies) presenting for vomiting times 3 weeks, admitted for renal failure and r/o ACS.       Nichristy Jen Garcia, updated [phone].       Case d/w Dr. Jimenez. Mr. Garcia is a 59yo M with PMHx of CKD 3, HTN, lost to follow up with PCP (per chart review used to take medications for HTN, but patient denies) presenting for vomiting times 3 weeks, admitted for renal failure and r/o ACS.       Nichristy Jen Garcia, updated [phone].       Case d/w Dr. Jimenez. Mr. Garcia is a 59yo M with PMHx of CKD 3, HTN, lost to follow up with PCP (per chart review used to take medications for HTN, but patient denies) presenting for vomiting times 3 weeks, admitted for renal failure and r/o ACS.       Case d/w Dr. Jimenez. Mr. Garcia is a 59yo M with PMHx of CKD 3, HTN, lost to follow up with PCP (per chart review used to take medications for HTN, but patient denies) presenting for vomiting times 3 weeks, admitted for renal failure and r/o ACS.       Conversation had yesterday with Dr. Laird, myself and Dr. Shukla providing Hebrew translation. Informed patient about need for shiley placement and HD. Informed patient of need to be transferred once renally stable to tertiary center for further work up. Patient expressed agreement and understanding. All questions answered.   Shiley placed today, HD today and 1/8/24 with probably transfer early next week.     Case d/w Dr. Jimenez. Mr. Garcia is a 59yo M with PMHx of CKD 3, HTN, lost to follow up with PCP (per chart review used to take medications for HTN, but patient denies) presenting for vomiting times 3 weeks, admitted for renal failure and r/o ACS.       Conversation had yesterday with Dr. Laird, myself and Dr. Shukla providing Welsh translation. Informed patient about need for shiley placement and HD. Informed patient of need to be transferred once renally stable to tertiary center for further work up. Patient expressed agreement and understanding. All questions answered.   Shiley placed today, HD today and 1/8/24 with probably transfer early next week.     Case d/w Dr. Jimenez.

## 2024-01-06 NOTE — PROGRESS NOTE ADULT - PROBLEM SELECTOR PLAN 3
-Pt not meeting sepsis criteria on admission, found to be meeting sepsis criteria 1/3/24 with , temp 102.9, RR 22, and suspected GI infection--> 1/5/24 improved, not currently meeting Sepsis criteria  -RVP + for Influenza A  -UA with proteins, not concerning for UTI  -lactate 0.7  -f/up Blood cultures  -f/up urine cultures   -pt already on ceftriaxone and metronidazole, continue in setting of suspected GI infection  -Tylenol PRN for fever  -Symptomatic support for Influenza  -Will continue to monitor labs and vitals  -Per nephrology, gentle hydration with d5/bicarb

## 2024-01-06 NOTE — PROGRESS NOTE ADULT - PROBLEM SELECTOR PLAN 4
-Likely 2/2 kidney failure  -iron panel with ferritin 478, likely anemia of chronic disease  -H&H trending down  -Transfusion consent forms signed and in chart  -Hgb 6.8 1/4/24, s/p PRBC  -Keep type and screen current  -Transfuse Hgb <7.0

## 2024-01-06 NOTE — PROGRESS NOTE ADULT - PROBLEM SELECTOR PLAN 7
-Diarrhea likely 2/2 uremia  -R/o pathogenic cause  -CTAP: Inadequately distended proximal colonic loop or query long segmental   colitis. No bowel obstruction. Nonemergent colonoscopy suggested.  -S/p 1 L fluid in ED, due to severe hypertension no further fluids  -F/u stool culture, ova + parasite, GI PCR  -S/p ceftriaxone and flagyl  -Appreciate GI recommendations, will cw abx -Diarrhea likely 2/2 uremia  -R/o pathogenic cause  -CTAP: Inadequately distended proximal colonic loop or query long segmental   colitis. No bowel obstruction. Nonemergent colonoscopy suggested.  -S/p 1 L fluid in ED, due to severe hypertension no further fluids  -GI PCR showing ETEC, however s/p ceftriaxone and flagyl, avoid fluroquinolones and other nephrotoxic agents, will remain off further abx treatment for now  -Appreciate GI recommendations

## 2024-01-06 NOTE — PROGRESS NOTE ADULT - ASSESSMENT
58-year-old man admitted with hypertensive emergency.  He was found to have renal failure and elevated troponin.  Cardiology has been consulted for elevated troponin and concern for NSTEMI.   He has past history of CKD stage III, hypertension who has not had follow-up with physicians or was taking any medications prior to admission.  Severe anemia   Nephrology has been following, temporary catheter for dialysis been placed.  Echo with normal LV function, biatrial enlargement and increased LV wall thickness.   As per prior recommendations, ischemia evaluation with cardiac catheterization to be considered post renal optimization.     Recommendations  -Continue with current antihypertensive regimen.  -Continue with dual antiplatelet therapy and statin.  -Consider blood transfusion   -Continue to trend troponin until peak   -Eventual ischemia evaluation   -Discussed with patient and primary team

## 2024-01-06 NOTE — PROGRESS NOTE ADULT - SUBJECTIVE AND OBJECTIVE BOX
S/p 1st HD today    Vital Signs Last 24 Hrs  T(C): 37.1 (01-06-24 @ 20:30), Max: 37.1 (01-06-24 @ 20:30)  T(F): 98.7 (01-06-24 @ 20:30), Max: 98.7 (01-06-24 @ 20:30)  HR: 81 (01-06-24 @ 20:30) (70 - 82)  BP: 157/88 (01-06-24 @ 20:30) (116/72 - 162/84)  RR: 18 (01-06-24 @ 20:30) (18 - 19)  SpO2: 94% (01-06-24 @ 20:30) (94% - 95%)    I&O's Detail    05 Jan 2024 07:01  -  06 Jan 2024 07:00  --------------------------------------------------------  IN:    dextrose 5% w/ Additives: 600 mL    Oral Fluid: 980 mL  Total IN: 1580 mL    OUT:    Indwelling Catheter - Urethral (mL): 1725 mL  Total OUT: 1725 mL    06 Jan 2024 07:01  -  06 Jan 2024 23:13  --------------------------------------------------------  IN:    Other (mL): 700 mL  Total IN: 700 mL    OUT:    Indwelling Catheter - Urethral (mL): 1000 mL    Other (mL): 700 mL  Total OUT: 1700 mL    s1s2  b/l air entry  soft, ND  no edema                                        7.7    3.91  )-----------( 239      ( 06 Jan 2024 06:45 )             20.8     06 Jan 2024 06:45    129    |  88     |  116    ----------------------------<  120    4.1     |  25     |  18.00    Ca    <5.0       06 Jan 2024 06:45  Phos  11.9      06 Jan 2024 06:45  Mg     1.9       06 Jan 2024 06:45    TPro  6.0    /  Alb  2.1    /  TBili  0.5    /  DBili  x      /  AST  18     /  ALT  14     /  AlkPhos  86     06 Jan 2024 06:45    LIVER FUNCTIONS - ( 06 Jan 2024 06:45 )  Alb: 2.1 g/dL / Pro: 6.0 g/dL / ALK PHOS: 86 U/L / ALT: 14 U/L / AST: 18 U/L / GGT: x           Culture - Stool (collected 05 Jan 2024 15:15)  Source: .Stool Feces  Preliminary Report (06 Jan 2024 19:25):    No enteric pathogens to date: Final culture pending    acetaminophen     Tablet .. 650 milliGRAM(s) Oral every 6 hours PRN  aluminum hydroxide/magnesium hydroxide/simethicone Suspension 30 milliLiter(s) Oral every 4 hours PRN  aspirin  chewable 81 milliGRAM(s) Oral daily  atorvastatin 40 milliGRAM(s) Oral at bedtime  benzonatate 100 milliGRAM(s) Oral three times a day PRN  calcium acetate 1334 milliGRAM(s) Oral three times a day with meals  chlorhexidine 2% Cloths 1 Application(s) Topical <User Schedule>  clopidogrel Tablet 75 milliGRAM(s) Oral daily  heparin   Injectable 5000 Unit(s) SubCutaneous every 12 hours  labetalol 200 milliGRAM(s) Oral every 8 hours  melatonin 3 milliGRAM(s) Oral at bedtime PRN  ondansetron Injectable 4 milliGRAM(s) IV Push every 8 hours PRN  sodium chloride 0.9% lock flush 10 milliLiter(s) IV Push every 1 hour PRN    A/P:    Pt w/no known PMH, not following w/doctors, possibly hx of HTN, brother w/hx ESRD (pt does not know the etiology)   Adm w/N/V, fever, anemia, markedly abnormal renal fx (baseline renal fx is unknown)  Dx w/Flu A  S/p CT w/IV dye 1/3/23, dye injury can be contributing to rising Cr   Rpo w/good UO  F/u 24hr urine collection for protein   SPEP, renal serologies are negative   CT A/P w/o hydro   NSTEMI, per d/w cardiology plan for eventual cath  Started on HD today via temp HD cath  Next HD on Monday   Will f/u CBC, BMP, UO   No objection to cardiac cath from renal pov     [phone]       S/p 1st HD today    Vital Signs Last 24 Hrs  T(C): 37.1 (01-06-24 @ 20:30), Max: 37.1 (01-06-24 @ 20:30)  T(F): 98.7 (01-06-24 @ 20:30), Max: 98.7 (01-06-24 @ 20:30)  HR: 81 (01-06-24 @ 20:30) (70 - 82)  BP: 157/88 (01-06-24 @ 20:30) (116/72 - 162/84)  RR: 18 (01-06-24 @ 20:30) (18 - 19)  SpO2: 94% (01-06-24 @ 20:30) (94% - 95%)    I&O's Detail    05 Jan 2024 07:01  -  06 Jan 2024 07:00  --------------------------------------------------------  IN:    dextrose 5% w/ Additives: 600 mL    Oral Fluid: 980 mL  Total IN: 1580 mL    OUT:    Indwelling Catheter - Urethral (mL): 1725 mL  Total OUT: 1725 mL    06 Jan 2024 07:01  -  06 Jan 2024 23:13  --------------------------------------------------------  IN:    Other (mL): 700 mL  Total IN: 700 mL    OUT:    Indwelling Catheter - Urethral (mL): 1000 mL    Other (mL): 700 mL  Total OUT: 1700 mL    s1s2  b/l air entry  soft, ND  no edema                                        7.7    3.91  )-----------( 239      ( 06 Jan 2024 06:45 )             20.8     06 Jan 2024 06:45    129    |  88     |  116    ----------------------------<  120    4.1     |  25     |  18.00    Ca    <5.0       06 Jan 2024 06:45  Phos  11.9      06 Jan 2024 06:45  Mg     1.9       06 Jan 2024 06:45    TPro  6.0    /  Alb  2.1    /  TBili  0.5    /  DBili  x      /  AST  18     /  ALT  14     /  AlkPhos  86     06 Jan 2024 06:45    LIVER FUNCTIONS - ( 06 Jan 2024 06:45 )  Alb: 2.1 g/dL / Pro: 6.0 g/dL / ALK PHOS: 86 U/L / ALT: 14 U/L / AST: 18 U/L / GGT: x           Culture - Stool (collected 05 Jan 2024 15:15)  Source: .Stool Feces  Preliminary Report (06 Jan 2024 19:25):    No enteric pathogens to date: Final culture pending    acetaminophen     Tablet .. 650 milliGRAM(s) Oral every 6 hours PRN  aluminum hydroxide/magnesium hydroxide/simethicone Suspension 30 milliLiter(s) Oral every 4 hours PRN  aspirin  chewable 81 milliGRAM(s) Oral daily  atorvastatin 40 milliGRAM(s) Oral at bedtime  benzonatate 100 milliGRAM(s) Oral three times a day PRN  calcium acetate 1334 milliGRAM(s) Oral three times a day with meals  chlorhexidine 2% Cloths 1 Application(s) Topical <User Schedule>  clopidogrel Tablet 75 milliGRAM(s) Oral daily  heparin   Injectable 5000 Unit(s) SubCutaneous every 12 hours  labetalol 200 milliGRAM(s) Oral every 8 hours  melatonin 3 milliGRAM(s) Oral at bedtime PRN  ondansetron Injectable 4 milliGRAM(s) IV Push every 8 hours PRN  sodium chloride 0.9% lock flush 10 milliLiter(s) IV Push every 1 hour PRN    A/P:    Pt w/no known PMH, not following w/doctors, possibly hx of HTN, brother w/hx ESRD (pt does not know the etiology)   Adm w/N/V, fever, anemia, markedly abnormal renal fx (baseline renal fx is unknown)  Dx w/Flu A  S/p CT w/IV dye 1/3/23, dye injury can be contributing to rising Cr   Pro w/good UO  F/u 24hr urine collection for protein   SPEP, renal serologies are negative   CT A/P w/o hydro   NSTEMI, per d/w cardiology plan for eventual cath  Started on HD today via temp HD cath  Next HD on Monday   Will f/u CBC, BMP, UO   No objection to cardiac cath from renal pov     [phone]

## 2024-01-06 NOTE — PROGRESS NOTE ADULT - PROBLEM SELECTOR PLAN 5
-NSTEMI  -Chest pain likely 2/2 hypertension  -Trops trending up 221>246>401>>466, 529.1, per cardio likely 2/2 demand ischemia in setting of renal function  - TTE with normal EF 65-70 and mod pulm htn  -Appreciate cardiology recommendations,  cw labetolol for BP control, plan for eventual ischemic workup once kidney function stable  -Per nephrology, will cw Norvasc  -Close communication with nephro and cardio regarding need for both ischemic workup and possible HD, if need for both can consider transfer to facility with IR -NSTEMI  -Chest pain likely 2/2 hypertension  -Trops trending up 221>246>401>>466, 529.1, per cardio likely 2/2 demand ischemia in setting of renal function  - TTE with normal EF 65-70 and mod pulm htn  -Appreciate cardiology recommendations,  cw labetolol for BP control, plan for eventual ischemic workup once kidney function stable  -Cw heparin, plavix, aspirin, per cardio   -Per nephrology, will cw Norvasc  -Close communication with nephro and cardio regarding need for both ischemic workup and HD

## 2024-01-06 NOTE — PROGRESS NOTE ADULT - SUBJECTIVE AND OBJECTIVE BOX
SUBJ:  Patient is a 59y old  Male who presents with a chief complaint of Kidney failure (06 Jan 2024 08:03)  Patient is seen and examined.  Chart is reviewed.  Case was discussed with Dr. Ho.  Patient appears comfortable in bed.  No complaints of chest pain or shortness of breath.      PAST MEDICAL & SURGICAL HISTORY:  H/O gastroesophageal reflux (GERD)      Hypertension      S/P Cholecystectomy          MEDICATIONS  (STANDING):  aspirin  chewable 81 milliGRAM(s) Oral daily  atorvastatin 40 milliGRAM(s) Oral at bedtime  calcium acetate 1334 milliGRAM(s) Oral three times a day with meals  chlorhexidine 2% Cloths 1 Application(s) Topical <User Schedule>  clopidogrel Tablet 75 milliGRAM(s) Oral daily  heparin   Injectable 5000 Unit(s) SubCutaneous every 12 hours  labetalol 200 milliGRAM(s) Oral every 8 hours    MEDICATIONS  (PRN):  acetaminophen     Tablet .. 650 milliGRAM(s) Oral every 6 hours PRN Temp greater or equal to 38C (100.4F), Mild Pain (1 - 3)  aluminum hydroxide/magnesium hydroxide/simethicone Suspension 30 milliLiter(s) Oral every 4 hours PRN Dyspepsia  benzonatate 100 milliGRAM(s) Oral three times a day PRN Cough  melatonin 3 milliGRAM(s) Oral at bedtime PRN Insomnia  ondansetron Injectable 4 milliGRAM(s) IV Push every 8 hours PRN Nausea and/or Vomiting  sodium chloride 0.9% lock flush 10 milliLiter(s) IV Push every 1 hour PRN Pre/post blood products, medications, blood draw, and to maintain line patency          Vital Signs Last 24 Hrs  T(C): 36.8 (06 Jan 2024 05:35), Max: 36.9 (05 Jan 2024 20:32)  T(F): 98.2 (06 Jan 2024 05:35), Max: 98.4 (05 Jan 2024 20:32)  HR: 75 (06 Jan 2024 05:35) (69 - 76)  BP: 118/71 (06 Jan 2024 05:35) (115/69 - 135/70)  BP(mean): --  RR: 19 (06 Jan 2024 05:35) (17 - 19)  SpO2: 95% (06 Jan 2024 05:35) (94% - 95%)    Parameters below as of 06 Jan 2024 05:35  Patient On (Oxygen Delivery Method): room air        REVIEW OF SYSTEMS:  CONSTITUTIONAL: Weakness and fatigue.  RESPIRATORY: No cough, wheezing, chills or hemoptysis; No shortness of breath  CARDIOVASCULAR: No chest pain or chest pressure.  No shortness of breath or dyspnea on exertion.  No palpitations, dizziness, light headedness, syncope or near syncope.  No edema, no orthopnea.   NEUROLOGICAL: No headaches, memory loss, loss of strength, numbness, or tremors      PHYSICAL EXAM  Constitutional:  Older appearing man in no apparent distress.  HEENT: normocephalic, atraumatic.  PERRLA. EOMI  Neck : No JVD. no carotid bruits  Lungs:  clear to auscultation bilaterally. no rhonchi. no wheezing  Heart:  S1 and S2. No S3, S4. I/VI systolic murmur.  Abdomen:  soft, non tender.  Extremities: No clubbing, cyanoisis or edema  Nuerologic:  A+O x 3. No focal deficits  Skin:  no rashes        LABS:                        7.7    3.91  )-----------( 239      ( 06 Jan 2024 06:45 )             20.8     01-06    129<L>  |  88<L>  |  116<H>  ----------------------------<  120<H>  4.1   |  25  |  18.00<H>    Ca    <5.0<LL>      06 Jan 2024 06:45  Phos  11.9     01-06  Mg     1.9     01-06    TPro  6.0  /  Alb  2.1<L>  /  TBili  0.5  /  DBili  x   /  AST  18  /  ALT  14  /  AlkPhos  86  01-06    I&O's Summary    05 Jan 2024 07:01  -  06 Jan 2024 07:00  --------------------------------------------------------  IN: 1580 mL / OUT: 1725 mL / NET: -145 mL    < from: 12 Lead ECG (01.04.24 @ 08:59) >  Diagnosis Line Normal sinus rhythm  Possible Left atrial enlargement  Left axis deviation  Incomplete right bundle branch block  anteroseptal pattern  Abnormal ECG  When compared with ECG of 03-JAN-2024 04:47,  qs V2 may be lead position  clinical correlation suggested    < end of copied text >  < from: TTE Echo Limited or F/U (01.03.24 @ 08:02) >   1. Biatrial enlargement   2. Left ventricular ejection fraction, by visual estimation, is 65 to   70%.   3. Normal global left ventricular systolic function.   4. Increased LV wall thickness.   5. Normal left ventricular internal cavity size.   6. Spectral Doppler shows impaired relaxation pattern of left   ventricular myocardial filling (Grade I diastolic dysfunction).   7. Trivial pericardial effusion.   8. Mild to moderate mitral valve regurgitation.   9. Mild-moderate tricuspid regurgitation.  10. Estimated pulmonary artery systolic pressure is 54.8 mmHg assuming a   right atrial pressure of 3 mmHg, which is consistent with moderate   pulmonary hypertension.    < end of copied text >

## 2024-01-06 NOTE — PROGRESS NOTE ADULT - SUBJECTIVE AND OBJECTIVE BOX
Mr. Garcia is a 57yo M with PMHx of CKD 3, HTN, lost to follow up with PCP (per chart review used to take medications for HTN, but patient denies) presenting for vomiting times 3 weeks. Per patient, he began having subjective fevers and vomiting 2-4x/ day for 3 weeks with occasional small amounts of blood  with inability to hold down solids or liquids along with diarrhea for 2 days x8 episodes. Patient also notes that for the past 4 years he has been getting chest pain while walking, which is getting worse. Currently endorsing worsening central chest  pain in the ED along with a headache. Denies shortness of breath or palpitations. Denies changes in urine output , frequency or dysuria. Notes hx of renal failure in his brother at 71yo requiring transplant. Denied recent traveling, sick exposure, NSAID use, exposure to Hep C, Hep B, HIV, new foods. The patient was admitted for renal failure and r/o ACS.    1/6/24 AM: Patient seen and examined today by me. Resting comfortably in bed. States that he still has some throat, chest and abdominal pain. States that he feels warm.     REVIEW OF SYSTEMS:  CONSTITUTIONAL: (-) weakness, (-) fevers, (-) chills  RESPIRATORY:  (-) shortness of breath, (-) cough,  (-) wheezing,  (-) hemoptysis   CARDIOVASCULAR:  (+) chest pain, (-) palpitations  GASTROINTESTINAL:  (+) abdominal or epigastric pain, (-) nausea, (-) vomiting, (-) diarrhea, (-) constipation, (-) melena,  (-) hematemesis,  (-) hematochezia  GENITOURINARY: (-) dysuria, (-) frequency, (-) hematuria  NEUROLOGICAL: (-) numbness, (-) weakness  SKIN: (-) itching, (-) rashes, (-) lesions    Vital Signs Last 24 Hrs  T(C): 36.8 (06 Jan 2024 05:35), Max: 36.9 (05 Jan 2024 08:30)  T(F): 98.2 (06 Jan 2024 05:35), Max: 98.5 (05 Jan 2024 08:30)  HR: 75 (06 Jan 2024 05:35) (69 - 76)  BP: 118/71 (06 Jan 2024 05:35) (115/69 - 135/70)  RR: 19 (06 Jan 2024 05:35) (17 - 19)  SpO2: 95% (06 Jan 2024 05:35) (94% - 95%)    Parameters below as of 06 Jan 2024 05:35  Patient On (Oxygen Delivery Method): room air    PHYSICAL EXAM:  GENERAL: NAD, lying in bed, mildly diaphoretic  HEAD:  Atraumatic, Normocephalic  RESP: Clear to auscultation bilaterally, good air entry bilaterally; No wheezing, rales, or rhonchi. Unlabored respirations  CARDIAC: Regular rate and rhythm. S1 and S2. No murmurs, rubs, or gallops  GI:  Soft, Nontender, Nondistended. Bowel sounds present x4 quadrants; No hepatomegaly. No splenomegaly.  EXTREMITIES:  2+ Peripheral Pulses. Capillary refill <2 seconds. No clubbing, cyanosis, or edema  NERVOUS SYSTEM:  Alert & Oriented X3, speech clear. No deficits   MSK: FROM all 4 extremities, full and equal strength  SKIN: No rashes, bruises, or other lesions    MEDICATIONS  (STANDING):  amLODIPine   Tablet 10 milliGRAM(s) Oral at bedtime  aspirin  chewable 81 milliGRAM(s) Oral daily  atorvastatin 40 milliGRAM(s) Oral at bedtime  clopidogrel Tablet 75 milliGRAM(s) Oral daily  dextrose 5% 1000 milliLiter(s) (50 mL/Hr) IV Continuous <Continuous>  heparin   Injectable 5000 Unit(s) SubCutaneous every 12 hours  labetalol 200 milliGRAM(s) Oral every 8 hours    MEDICATIONS  (PRN):  acetaminophen     Tablet .. 650 milliGRAM(s) Oral every 6 hours PRN Temp greater or equal to 38C (100.4F), Mild Pain (1 - 3)  aluminum hydroxide/magnesium hydroxide/simethicone Suspension 30 milliLiter(s) Oral every 4 hours PRN Dyspepsia  benzonatate 100 milliGRAM(s) Oral three times a day PRN Cough  melatonin 3 milliGRAM(s) Oral at bedtime PRN Insomnia  ondansetron Injectable 4 milliGRAM(s) IV Push every 8 hours PRN Nausea and/or Vomiting          LABS                                              CT ABDOMEN AND PELVIS  IMPRESSION:    Inadequately distended proximal colonic loop or query long segmental   colitis. No bowel obstruction. Nonemergent colonoscopy suggested.    --- End of Report ---    MARIANO COPELAND MD; Attending Radiologist  This document has been electronically signed. Froy  3 2024  2:58AM         Mr. Garcia is a 57yo M with PMHx of CKD 3, HTN, lost to follow up with PCP (per chart review used to take medications for HTN, but patient denies) presenting for vomiting times 3 weeks. Per patient, he began having subjective fevers and vomiting 2-4x/ day for 3 weeks with occasional small amounts of blood  with inability to hold down solids or liquids along with diarrhea for 2 days x8 episodes. Patient also notes that for the past 4 years he has been getting chest pain while walking, which is getting worse. Currently endorsing worsening central chest  pain in the ED along with a headache. Denies shortness of breath or palpitations. Denies changes in urine output , frequency or dysuria. Notes hx of renal failure in his brother at 69yo requiring transplant. Denied recent traveling, sick exposure, NSAID use, exposure to Hep C, Hep B, HIV, new foods. The patient was admitted for renal failure and r/o ACS.    1/6/24 AM: Patient seen and examined today by me. Resting comfortably in bed. States that he still has some throat, chest and abdominal pain. States that he feels warm.     REVIEW OF SYSTEMS:  CONSTITUTIONAL: (-) weakness, (-) fevers, (-) chills  RESPIRATORY:  (-) shortness of breath, (-) cough,  (-) wheezing,  (-) hemoptysis   CARDIOVASCULAR:  (+) chest pain, (-) palpitations  GASTROINTESTINAL:  (+) abdominal or epigastric pain, (-) nausea, (-) vomiting, (-) diarrhea, (-) constipation, (-) melena,  (-) hematemesis,  (-) hematochezia  GENITOURINARY: (-) dysuria, (-) frequency, (-) hematuria  NEUROLOGICAL: (-) numbness, (-) weakness  SKIN: (-) itching, (-) rashes, (-) lesions    Vital Signs Last 24 Hrs  T(C): 36.8 (06 Jan 2024 05:35), Max: 36.9 (05 Jan 2024 08:30)  T(F): 98.2 (06 Jan 2024 05:35), Max: 98.5 (05 Jan 2024 08:30)  HR: 75 (06 Jan 2024 05:35) (69 - 76)  BP: 118/71 (06 Jan 2024 05:35) (115/69 - 135/70)  RR: 19 (06 Jan 2024 05:35) (17 - 19)  SpO2: 95% (06 Jan 2024 05:35) (94% - 95%)    Parameters below as of 06 Jan 2024 05:35  Patient On (Oxygen Delivery Method): room air    PHYSICAL EXAM:  GENERAL: NAD, lying in bed, mildly diaphoretic  HEAD:  Atraumatic, Normocephalic  RESP: Clear to auscultation bilaterally, good air entry bilaterally; No wheezing, rales, or rhonchi. Unlabored respirations  CARDIAC: Regular rate and rhythm. S1 and S2. No murmurs, rubs, or gallops  GI:  Soft, Nontender, Nondistended. Bowel sounds present x4 quadrants; No hepatomegaly. No splenomegaly.  EXTREMITIES:  2+ Peripheral Pulses. Capillary refill <2 seconds. No clubbing, cyanosis, or edema  NERVOUS SYSTEM:  Alert & Oriented X3, speech clear. No deficits   MSK: FROM all 4 extremities, full and equal strength  SKIN: No rashes, bruises, or other lesions    MEDICATIONS  (STANDING):  amLODIPine   Tablet 10 milliGRAM(s) Oral at bedtime  aspirin  chewable 81 milliGRAM(s) Oral daily  atorvastatin 40 milliGRAM(s) Oral at bedtime  clopidogrel Tablet 75 milliGRAM(s) Oral daily  dextrose 5% 1000 milliLiter(s) (50 mL/Hr) IV Continuous <Continuous>  heparin   Injectable 5000 Unit(s) SubCutaneous every 12 hours  labetalol 200 milliGRAM(s) Oral every 8 hours    MEDICATIONS  (PRN):  acetaminophen     Tablet .. 650 milliGRAM(s) Oral every 6 hours PRN Temp greater or equal to 38C (100.4F), Mild Pain (1 - 3)  aluminum hydroxide/magnesium hydroxide/simethicone Suspension 30 milliLiter(s) Oral every 4 hours PRN Dyspepsia  benzonatate 100 milliGRAM(s) Oral three times a day PRN Cough  melatonin 3 milliGRAM(s) Oral at bedtime PRN Insomnia  ondansetron Injectable 4 milliGRAM(s) IV Push every 8 hours PRN Nausea and/or Vomiting          LABS                                              CT ABDOMEN AND PELVIS  IMPRESSION:    Inadequately distended proximal colonic loop or query long segmental   colitis. No bowel obstruction. Nonemergent colonoscopy suggested.    --- End of Report ---    MARIANO COPELAND MD; Attending Radiologist  This document has been electronically signed. Froy  3 2024  2:58AM         Mr. Garcia is a 59yo M with PMHx of CKD 3, HTN, lost to follow up with PCP (per chart review used to take medications for HTN, but patient denies) presenting for vomiting times 3 weeks. Per patient, he began having subjective fevers and vomiting 2-4x/ day for 3 weeks with occasional small amounts of blood  with inability to hold down solids or liquids along with diarrhea for 2 days x8 episodes. Patient also notes that for the past 4 years he has been getting chest pain while walking, which is getting worse. Currently endorsing worsening central chest  pain in the ED along with a headache. Denies shortness of breath or palpitations. Denies changes in urine output , frequency or dysuria. Notes hx of renal failure in his brother at 69yo requiring transplant. Denied recent traveling, sick exposure, NSAID use, exposure to Hep C, Hep B, HIV, new foods. The patient was admitted for renal failure and r/o ACS.    1/6/24 AM: Patient seen and examined today by me. Reviewed care plan, pt expressed agreement and understanding. All questions answered.     REVIEW OF SYSTEMS:  CONSTITUTIONAL: (-) weakness, (-) fevers, (-) chills  RESPIRATORY:  (-) shortness of breath, (-) cough,  (-) wheezing,  (-) hemoptysis   CARDIOVASCULAR:  (+) chest pain, (-) palpitations  GASTROINTESTINAL:  (+) abdominal or epigastric pain, (-) nausea, (-) vomiting, (-) diarrhea, (-) constipation, (-) melena,  (-) hematemesis,  (-) hematochezia  GENITOURINARY: (-) dysuria, (-) frequency, (-) hematuria  NEUROLOGICAL: (-) numbness, (-) weakness  SKIN: (-) itching, (-) rashes, (-) lesions    Vital Signs Last 24 Hrs  T(C): 36.8 (06 Jan 2024 05:35), Max: 36.9 (05 Jan 2024 08:30)  T(F): 98.2 (06 Jan 2024 05:35), Max: 98.5 (05 Jan 2024 08:30)  HR: 75 (06 Jan 2024 05:35) (69 - 76)  BP: 118/71 (06 Jan 2024 05:35) (115/69 - 135/70)  RR: 19 (06 Jan 2024 05:35) (17 - 19)  SpO2: 95% (06 Jan 2024 05:35) (94% - 95%)    Parameters below as of 06 Jan 2024 05:35  Patient On (Oxygen Delivery Method): room air    PHYSICAL EXAM:  GENERAL: NAD, lying in bed, mildly diaphoretic  HEAD:  Atraumatic, Normocephalic  RESP: Clear to auscultation bilaterally, good air entry bilaterally; No wheezing, rales, or rhonchi. Unlabored respirations  CARDIAC: Regular rate and rhythm. S1 and S2. No murmurs, rubs, or gallops  GI:  Soft, Nontender, Nondistended. Bowel sounds present x4 quadrants; No hepatomegaly. No splenomegaly.  EXTREMITIES:  2+ Peripheral Pulses. Capillary refill <2 seconds. No clubbing, cyanosis, or edema  NERVOUS SYSTEM:  Alert & Oriented X3, speech clear. No deficits   MSK: FROM all 4 extremities, full and equal strength  SKIN: No rashes, bruises, or other lesions    MEDICATIONS  (STANDING):  amLODIPine   Tablet 10 milliGRAM(s) Oral at bedtime  aspirin  chewable 81 milliGRAM(s) Oral daily  atorvastatin 40 milliGRAM(s) Oral at bedtime  clopidogrel Tablet 75 milliGRAM(s) Oral daily  dextrose 5% 1000 milliLiter(s) (50 mL/Hr) IV Continuous <Continuous>  heparin   Injectable 5000 Unit(s) SubCutaneous every 12 hours  labetalol 200 milliGRAM(s) Oral every 8 hours    MEDICATIONS  (PRN):  acetaminophen     Tablet .. 650 milliGRAM(s) Oral every 6 hours PRN Temp greater or equal to 38C (100.4F), Mild Pain (1 - 3)  aluminum hydroxide/magnesium hydroxide/simethicone Suspension 30 milliLiter(s) Oral every 4 hours PRN Dyspepsia  benzonatate 100 milliGRAM(s) Oral three times a day PRN Cough  melatonin 3 milliGRAM(s) Oral at bedtime PRN Insomnia  ondansetron Injectable 4 milliGRAM(s) IV Push every 8 hours PRN Nausea and/or Vomiting        LABS                        7.7    3.91  )-----------( 239      ( 06 Jan 2024 06:45 )             20.8     06 Jan 2024 06:45    129    |  88     |  116    ----------------------------<  120    4.1     |  25     |  18.00    Ca    <5.0       06 Jan 2024 06:45  Phos  11.9      06 Jan 2024 06:45  Mg     1.9       06 Jan 2024 06:45    TPro  6.0    /  Alb  2.1    /  TBili  0.5    /  DBili  x      /  AST  18     /  ALT  14     /  AlkPhos  86     06 Jan 2024 06:45      CAPILLARY BLOOD GLUCOSE    LIVER FUNCTIONS - ( 06 Jan 2024 06:45 )  Alb: 2.1 g/dL / Pro: 6.0 g/dL / ALK PHOS: 86 U/L / ALT: 14 U/L / AST: 18 U/L / GGT: x           Urinalysis Basic - ( 06 Jan 2024 06:45 )    Color: x / Appearance: x / SG: x / pH: x  Gluc: 120 mg/dL / Ketone: x  / Bili: x / Urobili: x   Blood: x / Protein: x / Nitrite: x   Leuk Esterase: x / RBC: x / WBC x   Sq Epi: x / Non Sq Epi: x / Bacteria: x        CT ABDOMEN AND PELVIS  IMPRESSION:    Inadequately distended proximal colonic loop or query long segmental   colitis. No bowel obstruction. Nonemergent colonoscopy suggested.    --- End of Report ---    MARIANO COPELAND MD; Attending Radiologist  This document has been electronically signed. Froy  3 2024  2:58AM         Mr. Garcia is a 59yo M with PMHx of CKD 3, HTN, lost to follow up with PCP (per chart review used to take medications for HTN, but patient denies) presenting for vomiting times 3 weeks. Per patient, he began having subjective fevers and vomiting 2-4x/ day for 3 weeks with occasional small amounts of blood  with inability to hold down solids or liquids along with diarrhea for 2 days x8 episodes. Patient also notes that for the past 4 years he has been getting chest pain while walking, which is getting worse. Currently endorsing worsening central chest  pain in the ED along with a headache. Denies shortness of breath or palpitations. Denies changes in urine output , frequency or dysuria. Notes hx of renal failure in his brother at 71yo requiring transplant. Denied recent traveling, sick exposure, NSAID use, exposure to Hep C, Hep B, HIV, new foods. The patient was admitted for renal failure and r/o ACS.    1/6/24 AM: Patient seen and examined today by me. Reviewed care plan, pt expressed agreement and understanding. All questions answered.     REVIEW OF SYSTEMS:  CONSTITUTIONAL: (-) weakness, (-) fevers, (-) chills  RESPIRATORY:  (-) shortness of breath, (-) cough,  (-) wheezing,  (-) hemoptysis   CARDIOVASCULAR:  (+) chest pain, (-) palpitations  GASTROINTESTINAL:  (+) abdominal or epigastric pain, (-) nausea, (-) vomiting, (-) diarrhea, (-) constipation, (-) melena,  (-) hematemesis,  (-) hematochezia  GENITOURINARY: (-) dysuria, (-) frequency, (-) hematuria  NEUROLOGICAL: (-) numbness, (-) weakness  SKIN: (-) itching, (-) rashes, (-) lesions    Vital Signs Last 24 Hrs  T(C): 36.8 (06 Jan 2024 05:35), Max: 36.9 (05 Jan 2024 08:30)  T(F): 98.2 (06 Jan 2024 05:35), Max: 98.5 (05 Jan 2024 08:30)  HR: 75 (06 Jan 2024 05:35) (69 - 76)  BP: 118/71 (06 Jan 2024 05:35) (115/69 - 135/70)  RR: 19 (06 Jan 2024 05:35) (17 - 19)  SpO2: 95% (06 Jan 2024 05:35) (94% - 95%)    Parameters below as of 06 Jan 2024 05:35  Patient On (Oxygen Delivery Method): room air    PHYSICAL EXAM:  GENERAL: NAD, lying in bed, mildly diaphoretic  HEAD:  Atraumatic, Normocephalic  RESP: Clear to auscultation bilaterally, good air entry bilaterally; No wheezing, rales, or rhonchi. Unlabored respirations  CARDIAC: Regular rate and rhythm. S1 and S2. No murmurs, rubs, or gallops  GI:  Soft, Nontender, Nondistended. Bowel sounds present x4 quadrants; No hepatomegaly. No splenomegaly.  EXTREMITIES:  2+ Peripheral Pulses. Capillary refill <2 seconds. No clubbing, cyanosis, or edema  NERVOUS SYSTEM:  Alert & Oriented X3, speech clear. No deficits   MSK: FROM all 4 extremities, full and equal strength  SKIN: No rashes, bruises, or other lesions    MEDICATIONS  (STANDING):  amLODIPine   Tablet 10 milliGRAM(s) Oral at bedtime  aspirin  chewable 81 milliGRAM(s) Oral daily  atorvastatin 40 milliGRAM(s) Oral at bedtime  clopidogrel Tablet 75 milliGRAM(s) Oral daily  dextrose 5% 1000 milliLiter(s) (50 mL/Hr) IV Continuous <Continuous>  heparin   Injectable 5000 Unit(s) SubCutaneous every 12 hours  labetalol 200 milliGRAM(s) Oral every 8 hours    MEDICATIONS  (PRN):  acetaminophen     Tablet .. 650 milliGRAM(s) Oral every 6 hours PRN Temp greater or equal to 38C (100.4F), Mild Pain (1 - 3)  aluminum hydroxide/magnesium hydroxide/simethicone Suspension 30 milliLiter(s) Oral every 4 hours PRN Dyspepsia  benzonatate 100 milliGRAM(s) Oral three times a day PRN Cough  melatonin 3 milliGRAM(s) Oral at bedtime PRN Insomnia  ondansetron Injectable 4 milliGRAM(s) IV Push every 8 hours PRN Nausea and/or Vomiting        LABS                        7.7    3.91  )-----------( 239      ( 06 Jan 2024 06:45 )             20.8     06 Jan 2024 06:45    129    |  88     |  116    ----------------------------<  120    4.1     |  25     |  18.00    Ca    <5.0       06 Jan 2024 06:45  Phos  11.9      06 Jan 2024 06:45  Mg     1.9       06 Jan 2024 06:45    TPro  6.0    /  Alb  2.1    /  TBili  0.5    /  DBili  x      /  AST  18     /  ALT  14     /  AlkPhos  86     06 Jan 2024 06:45      CAPILLARY BLOOD GLUCOSE    LIVER FUNCTIONS - ( 06 Jan 2024 06:45 )  Alb: 2.1 g/dL / Pro: 6.0 g/dL / ALK PHOS: 86 U/L / ALT: 14 U/L / AST: 18 U/L / GGT: x           Urinalysis Basic - ( 06 Jan 2024 06:45 )    Color: x / Appearance: x / SG: x / pH: x  Gluc: 120 mg/dL / Ketone: x  / Bili: x / Urobili: x   Blood: x / Protein: x / Nitrite: x   Leuk Esterase: x / RBC: x / WBC x   Sq Epi: x / Non Sq Epi: x / Bacteria: x        CT ABDOMEN AND PELVIS  IMPRESSION:    Inadequately distended proximal colonic loop or query long segmental   colitis. No bowel obstruction. Nonemergent colonoscopy suggested.    --- End of Report ---    MARIANO COPELAND MD; Attending Radiologist  This document has been electronically signed. Froy  3 2024  2:58AM

## 2024-01-06 NOTE — PROGRESS NOTE ADULT - PROBLEM SELECTOR PLAN 2
-r/o other causes of other intrinsic renal disease ie nephritic/nephrotic syndrome, hepatitis  -s/p CTAP with IV contrast  -History of HTN, not on medications (poor medical follow up)  -BUN 94, Cr 17.09  -urine with 300 protein, small blood, 100 glucose  -a1c and lipid panel wnl  -s/p 1L fluid in ED  -s/p 10mg hydralazine IV push  -c/w 100mg BID labetalol  - Autoimmune work up unrevealing   -Hep B and hep C neg  -F/up renal serologies  -Nephrology following, recommend gentle hydration with IVF and bicarb, recommending HD access and RRT if BUN/Cr remains high  -Pro remain in until 24 urine protein collection complete per nephrology, remove Sunday 1/7  -Close communication with nephro and cardio regarding need for both ischemic workup and possible HD, if need for both can consider transfer to facility with IR -r/o other causes of other intrinsic renal disease ie nephritic/nephrotic syndrome, hepatitis  -s/p CTAP with IV contrast  -History of HTN, not on medications (poor medical follow up)  -BUN 94, Cr 17.09  -Cr conmt  -urine with 300 protein, small blood, 100 glucose  -a1c and lipid panel wnl  -s/p 1L fluid in ED  -s/p 10mg hydralazine IV push  -c/w 100mg BID labetalol  - Autoimmune work up unrevealing   -Hep B and hep C neg  -F/up renal serologies  -Nephrology following, recommend gentle hydration with IVF and bicarb, recommending HD access and RRT if BUN/Cr remains high  -Pro remain in until 24 urine protein collection complete per nephrology, remove Sunday 1/7 -r/o other causes of other intrinsic renal disease ie nephritic/nephrotic syndrome, hepatitis  -s/p CTAP with IV contrast  -History of HTN, not on medications (poor medical follow up)  -BUN 94, Cr 17.09  -Cr 18.0  -urine with 300 protein, small blood, 100 glucose  -a1c and lipid panel wnl  -s/p 1L fluid in ED  -s/p 10mg hydralazine IV push  -c/w 100mg BID labetalol  - Autoimmune work up unrevealing   -Hep B and hep C neg  -F/up renal serologies  -Nephrology following, recommend gentle hydration with IVF and bicarb  -Pro remain in until 24 urine protein collection complete per nephrology, remove Sunday 1/7  -Shiley placed today 1/6/24 by ICU PA, pt to get HD today and 1/8/24, once renally stable to be transferred to tertiary center for ischemic work up and possible further renal interventions

## 2024-01-06 NOTE — PROGRESS NOTE ADULT - ATTENDING COMMENTS
59yo M with PMHx of CKD 3, HTN, lost to follow up with PCP (per chart review used to take medications for HTN, but patient denies) presenting for vomiting times 3 weeks, admitted for renal failure and r/o ACS.     #HTN emergency  - not on meds at home  - continue labetalol 100mg BID and amlodipine 5mg daily  - cardio recs appreciated- will likely need ischemic workup with angio pending renal clearance    #ARF  - likely chronic issue and 2/2 to uncontrolled BP  - follow up nephro studies  - continue IVF for now as per nephro  - patient still making urine  - nephro following- discussed at length with Dr. Laird. Given elevating Cr levels and after discussion with patient, temp shiley to be placed for HD and then will monitor with dialysis today and Monday. If patient can tolerate and then can transfer for angio  - continue leblanc for now as per nephro    #anemia  - likely anemia of chronic kidney disease  - s/p PRBC on 1/4 with appropriate response  - continue to monitor H/H  - occult negative    #NSTEMI  - trop noted to be elevated, could be in setting of HTN emergency but will likely need further workup given chest pain on admission  - echo without acute concerns besides mod pHTN  - started DAPT even in setting of anemia, atorvastatin, continue labetalol as per cardio   - cardio recs appreciated- will likely need ischemic workup with angio pending renal clearance    #sepsis  #influenza  #ETEC  - BCx with NGTD  - stool culture with ETEC   - given resolution of diarrhea, will hold off on abx treatment at this time  - continue to monitor off abx 57yo M with PMHx of CKD 3, HTN, lost to follow up with PCP (per chart review used to take medications for HTN, but patient denies) presenting for vomiting times 3 weeks, admitted for renal failure and r/o ACS.     #HTN emergency  - not on meds at home  - continue labetalol 100mg BID and amlodipine 5mg daily  - cardio recs appreciated- will likely need ischemic workup with angio pending renal clearance    #ARF  - likely chronic issue and 2/2 to uncontrolled BP  - follow up nephro studies  - continue IVF for now as per nephro  - patient still making urine  - nephro following- discussed at length with Dr. Laird. Given elevating Cr levels and after discussion with patient, temp shiley to be placed for HD and then will monitor with dialysis today and Monday. If patient can tolerate and then can transfer for angio  - continue leblanc for now as per nephro    #anemia  - likely anemia of chronic kidney disease  - s/p PRBC on 1/4 with appropriate response  - continue to monitor H/H  - occult negative    #NSTEMI  - trop noted to be elevated, could be in setting of HTN emergency but will likely need further workup given chest pain on admission  - echo without acute concerns besides mod pHTN  - started DAPT even in setting of anemia, atorvastatin, continue labetalol as per cardio   - cardio recs appreciated- will likely need ischemic workup with angio pending renal clearance    #sepsis  #influenza  #ETEC  - BCx with NGTD  - stool culture with ETEC   - given resolution of diarrhea, will hold off on abx treatment at this time  - continue to monitor off abx

## 2024-01-07 LAB
ALBUMIN SERPL ELPH-MCNC: 2.1 G/DL — LOW (ref 3.3–5)
ALBUMIN SERPL ELPH-MCNC: 2.1 G/DL — LOW (ref 3.3–5)
ALP SERPL-CCNC: 88 U/L — SIGNIFICANT CHANGE UP (ref 40–120)
ALP SERPL-CCNC: 88 U/L — SIGNIFICANT CHANGE UP (ref 40–120)
ALT FLD-CCNC: 23 U/L — SIGNIFICANT CHANGE UP (ref 10–45)
ALT FLD-CCNC: 23 U/L — SIGNIFICANT CHANGE UP (ref 10–45)
ANION GAP SERPL CALC-SCNC: 10 MMOL/L — SIGNIFICANT CHANGE UP (ref 5–17)
ANION GAP SERPL CALC-SCNC: 10 MMOL/L — SIGNIFICANT CHANGE UP (ref 5–17)
AST SERPL-CCNC: 40 U/L — SIGNIFICANT CHANGE UP (ref 10–40)
AST SERPL-CCNC: 40 U/L — SIGNIFICANT CHANGE UP (ref 10–40)
BILIRUB SERPL-MCNC: 0.6 MG/DL — SIGNIFICANT CHANGE UP (ref 0.2–1.2)
BILIRUB SERPL-MCNC: 0.6 MG/DL — SIGNIFICANT CHANGE UP (ref 0.2–1.2)
BLD GP AB SCN SERPL QL: SIGNIFICANT CHANGE UP
BLD GP AB SCN SERPL QL: SIGNIFICANT CHANGE UP
BUN SERPL-MCNC: 75 MG/DL — HIGH (ref 7–23)
BUN SERPL-MCNC: 75 MG/DL — HIGH (ref 7–23)
CALCIUM SERPL-MCNC: 5.4 MG/DL — CRITICAL LOW (ref 8.4–10.5)
CALCIUM SERPL-MCNC: 5.4 MG/DL — CRITICAL LOW (ref 8.4–10.5)
CHLORIDE SERPL-SCNC: 96 MMOL/L — SIGNIFICANT CHANGE UP (ref 96–108)
CHLORIDE SERPL-SCNC: 96 MMOL/L — SIGNIFICANT CHANGE UP (ref 96–108)
CO2 SERPL-SCNC: 28 MMOL/L — SIGNIFICANT CHANGE UP (ref 22–31)
CO2 SERPL-SCNC: 28 MMOL/L — SIGNIFICANT CHANGE UP (ref 22–31)
COLLECT DURATION TIME UR: 24 HR — SIGNIFICANT CHANGE UP
COLLECT DURATION TIME UR: 24 HR — SIGNIFICANT CHANGE UP
CREAT SERPL-MCNC: 13.5 MG/DL — HIGH (ref 0.5–1.3)
CREAT SERPL-MCNC: 13.5 MG/DL — HIGH (ref 0.5–1.3)
CULTURE RESULTS: SIGNIFICANT CHANGE UP
EGFR: 4 ML/MIN/1.73M2 — LOW
EGFR: 4 ML/MIN/1.73M2 — LOW
GLUCOSE SERPL-MCNC: 99 MG/DL — SIGNIFICANT CHANGE UP (ref 70–99)
GLUCOSE SERPL-MCNC: 99 MG/DL — SIGNIFICANT CHANGE UP (ref 70–99)
HCT VFR BLD CALC: 20.8 % — CRITICAL LOW (ref 39–50)
HCT VFR BLD CALC: 20.8 % — CRITICAL LOW (ref 39–50)
HGB BLD-MCNC: 7.6 G/DL — LOW (ref 13–17)
HGB BLD-MCNC: 7.6 G/DL — LOW (ref 13–17)
MAGNESIUM SERPL-MCNC: 2 MG/DL — SIGNIFICANT CHANGE UP (ref 1.6–2.6)
MAGNESIUM SERPL-MCNC: 2 MG/DL — SIGNIFICANT CHANGE UP (ref 1.6–2.6)
MCHC RBC-ENTMCNC: 27.6 PG — SIGNIFICANT CHANGE UP (ref 27–34)
MCHC RBC-ENTMCNC: 27.6 PG — SIGNIFICANT CHANGE UP (ref 27–34)
MCHC RBC-ENTMCNC: 36.5 GM/DL — HIGH (ref 32–36)
MCHC RBC-ENTMCNC: 36.5 GM/DL — HIGH (ref 32–36)
MCV RBC AUTO: 75.6 FL — LOW (ref 80–100)
MCV RBC AUTO: 75.6 FL — LOW (ref 80–100)
NRBC # BLD: 0 /100 WBCS — SIGNIFICANT CHANGE UP (ref 0–0)
NRBC # BLD: 0 /100 WBCS — SIGNIFICANT CHANGE UP (ref 0–0)
PHOSPHATE SERPL-MCNC: 8.6 MG/DL — HIGH (ref 2.5–4.5)
PHOSPHATE SERPL-MCNC: 8.6 MG/DL — HIGH (ref 2.5–4.5)
PLATELET # BLD AUTO: 226 K/UL — SIGNIFICANT CHANGE UP (ref 150–400)
PLATELET # BLD AUTO: 226 K/UL — SIGNIFICANT CHANGE UP (ref 150–400)
POTASSIUM SERPL-MCNC: 4.1 MMOL/L — SIGNIFICANT CHANGE UP (ref 3.5–5.3)
POTASSIUM SERPL-MCNC: 4.1 MMOL/L — SIGNIFICANT CHANGE UP (ref 3.5–5.3)
POTASSIUM SERPL-SCNC: 4.1 MMOL/L — SIGNIFICANT CHANGE UP (ref 3.5–5.3)
POTASSIUM SERPL-SCNC: 4.1 MMOL/L — SIGNIFICANT CHANGE UP (ref 3.5–5.3)
PROT 24H UR-MRATE: 3646 MG/24 H — HIGH (ref 50–100)
PROT 24H UR-MRATE: 3646 MG/24 H — HIGH (ref 50–100)
PROT SERPL-MCNC: 5.9 G/DL — LOW (ref 6–8.3)
PROT SERPL-MCNC: 5.9 G/DL — LOW (ref 6–8.3)
RBC # BLD: 2.75 M/UL — LOW (ref 4.2–5.8)
RBC # BLD: 2.75 M/UL — LOW (ref 4.2–5.8)
RBC # FLD: 13.1 % — SIGNIFICANT CHANGE UP (ref 10.3–14.5)
RBC # FLD: 13.1 % — SIGNIFICANT CHANGE UP (ref 10.3–14.5)
SODIUM SERPL-SCNC: 134 MMOL/L — LOW (ref 135–145)
SODIUM SERPL-SCNC: 134 MMOL/L — LOW (ref 135–145)
SPECIMEN SOURCE: SIGNIFICANT CHANGE UP
TOTAL VOLUME - 24 HOUR: 1950 ML — SIGNIFICANT CHANGE UP
TOTAL VOLUME - 24 HOUR: 1950 ML — SIGNIFICANT CHANGE UP
TROPONIN I, HIGH SENSITIVITY RESULT: 475.8 NG/L — HIGH
TROPONIN I, HIGH SENSITIVITY RESULT: 475.8 NG/L — HIGH
URINE CREATININE CALCULATION: 0.8 G/24 H — LOW (ref 1–2)
URINE CREATININE CALCULATION: 0.8 G/24 H — LOW (ref 1–2)
WBC # BLD: 3.68 K/UL — LOW (ref 3.8–10.5)
WBC # BLD: 3.68 K/UL — LOW (ref 3.8–10.5)
WBC # FLD AUTO: 3.68 K/UL — LOW (ref 3.8–10.5)
WBC # FLD AUTO: 3.68 K/UL — LOW (ref 3.8–10.5)

## 2024-01-07 PROCEDURE — 99233 SBSQ HOSP IP/OBS HIGH 50: CPT

## 2024-01-07 PROCEDURE — 99232 SBSQ HOSP IP/OBS MODERATE 35: CPT

## 2024-01-07 RX ORDER — POLYETHYLENE GLYCOL 3350 17 G/17G
17 POWDER, FOR SOLUTION ORAL DAILY
Refills: 0 | Status: DISCONTINUED | OUTPATIENT
Start: 2024-01-07 | End: 2024-01-12

## 2024-01-07 RX ORDER — SENNA PLUS 8.6 MG/1
2 TABLET ORAL AT BEDTIME
Refills: 0 | Status: DISCONTINUED | OUTPATIENT
Start: 2024-01-07 | End: 2024-01-12

## 2024-01-07 RX ORDER — ERYTHROPOIETIN 10000 [IU]/ML
10000 INJECTION, SOLUTION INTRAVENOUS; SUBCUTANEOUS ONCE
Refills: 0 | Status: COMPLETED | OUTPATIENT
Start: 2024-01-08 | End: 2024-01-08

## 2024-01-07 RX ADMIN — Medication 200 MILLIGRAM(S): at 21:13

## 2024-01-07 RX ADMIN — HEPARIN SODIUM 5000 UNIT(S): 5000 INJECTION INTRAVENOUS; SUBCUTANEOUS at 06:26

## 2024-01-07 RX ADMIN — HEPARIN SODIUM 5000 UNIT(S): 5000 INJECTION INTRAVENOUS; SUBCUTANEOUS at 21:13

## 2024-01-07 RX ADMIN — Medication 100 MILLIGRAM(S): at 14:37

## 2024-01-07 RX ADMIN — Medication 200 MILLIGRAM(S): at 14:36

## 2024-01-07 RX ADMIN — Medication 200 MILLIGRAM(S): at 06:26

## 2024-01-07 RX ADMIN — CHLORHEXIDINE GLUCONATE 1 APPLICATION(S): 213 SOLUTION TOPICAL at 08:47

## 2024-01-07 RX ADMIN — ONDANSETRON 4 MILLIGRAM(S): 8 TABLET, FILM COATED ORAL at 08:48

## 2024-01-07 RX ADMIN — Medication 81 MILLIGRAM(S): at 14:36

## 2024-01-07 RX ADMIN — CLOPIDOGREL BISULFATE 75 MILLIGRAM(S): 75 TABLET, FILM COATED ORAL at 14:36

## 2024-01-07 RX ADMIN — Medication 1334 MILLIGRAM(S): at 14:37

## 2024-01-07 RX ADMIN — ATORVASTATIN CALCIUM 40 MILLIGRAM(S): 80 TABLET, FILM COATED ORAL at 21:13

## 2024-01-07 RX ADMIN — SENNA PLUS 2 TABLET(S): 8.6 TABLET ORAL at 21:13

## 2024-01-07 RX ADMIN — Medication 1334 MILLIGRAM(S): at 21:13

## 2024-01-07 NOTE — PROGRESS NOTE ADULT - SUBJECTIVE AND OBJECTIVE BOX
SUBJ:  Patient is a 59y old  Male who presents with a chief complaint of Kidney failure (07 Jan 2024 10:52)  patient seen and examined  chart is reviewed       PAST MEDICAL & SURGICAL HISTORY:  H/O gastroesophageal reflux (GERD)      Hypertension      S/P Cholecystectomy          MEDICATIONS  (STANDING):  aspirin  chewable 81 milliGRAM(s) Oral daily  atorvastatin 40 milliGRAM(s) Oral at bedtime  calcium acetate 1334 milliGRAM(s) Oral three times a day with meals  chlorhexidine 2% Cloths 1 Application(s) Topical <User Schedule>  clopidogrel Tablet 75 milliGRAM(s) Oral daily  heparin   Injectable 5000 Unit(s) SubCutaneous every 12 hours  labetalol 200 milliGRAM(s) Oral every 8 hours    MEDICATIONS  (PRN):  acetaminophen     Tablet .. 650 milliGRAM(s) Oral every 6 hours PRN Temp greater or equal to 38C (100.4F), Mild Pain (1 - 3)  aluminum hydroxide/magnesium hydroxide/simethicone Suspension 30 milliLiter(s) Oral every 4 hours PRN Dyspepsia  benzonatate 100 milliGRAM(s) Oral three times a day PRN Cough  melatonin 3 milliGRAM(s) Oral at bedtime PRN Insomnia  ondansetron Injectable 4 milliGRAM(s) IV Push every 8 hours PRN Nausea and/or Vomiting  sodium chloride 0.9% lock flush 10 milliLiter(s) IV Push every 1 hour PRN Pre/post blood products, medications, blood draw, and to maintain line patency          Vital Signs Last 24 Hrs  T(C): 37.1 (07 Jan 2024 05:35), Max: 37.1 (06 Jan 2024 20:30)  T(F): 98.7 (07 Jan 2024 05:35), Max: 98.7 (06 Jan 2024 20:30)  HR: 77 (07 Jan 2024 05:35) (77 - 82)  BP: 137/77 (07 Jan 2024 05:35) (137/77 - 162/84)  BP(mean): --  RR: 17 (07 Jan 2024 05:35) (17 - 19)  SpO2: 93% (07 Jan 2024 05:35) (93% - 95%)    Parameters below as of 07 Jan 2024 05:35  Patient On (Oxygen Delivery Method): room air        REVIEW OF SYSTEMS:  CONSTITUTIONAL: No fever, weight loss, or fatigue  RESPIRATORY: No cough, wheezing, chills or hemoptysis; No shortness of breath  CARDIOVASCULAR: No chest pain or chest pressure.  No shortness of breath or dyspnea on exertion.  No palpitations, dizziness, light headedness, syncope or near syncope.  No edema, no orthopnea.   NEUROLOGICAL: No headaches, memory loss, loss of strength, numbness, or tremors      PHYSICAL EXAM  Constitutional:  WDWN. No acute distress  HEENT: normocephalic, atraumatic.  PERRLA. EOMI  Neck : No JVD. no carotid bruits  Lungs:  decreased breath sounds at bases   Heart:  S1 and S2. No S3, S4. II/VI systolic murmur.  Abdomen:  soft, non tender.  Extremities: No clubbing, cyanoisis or edema  Nuerologic:  A+O x 3. No focal deficits  Skin:  no rashes        LABS:                        7.6    3.68  )-----------( 226      ( 07 Jan 2024 06:00 )             20.8     01-07    134<L>  |  96  |  75<H>  ----------------------------<  99  4.1   |  28  |  13.50<H>    Ca    5.4<LL>      07 Jan 2024 06:00  Phos  8.6     01-07  Mg     2.0     01-07    TPro  5.9<L>  /  Alb  2.1<L>  /  TBili  0.6  /  DBili  x   /  AST  40  /  ALT  23  /  AlkPhos  88  01-07            I&O's Summary    06 Jan 2024 07:01  -  07 Jan 2024 07:00  --------------------------------------------------------  IN: 700 mL / OUT: 2500 mL / NET: -1800 mL    < from: 12 Lead ECG (01.04.24 @ 08:59) >  Diagnosis Line Normal sinus rhythm  Possible Left atrial enlargement  Left axis deviation  Incomplete right bundle branch block  anteroseptal pattern  Abnormal ECG  When compared with ECG of 03-JAN-2024 04:47,  qs V2 may be lead position  clinical correlation suggested    < end of copied text >  < from: TTE Echo Limited or F/U (01.03.24 @ 08:02) >   1. Biatrial enlargement   2. Left ventricular ejection fraction, by visual estimation, is 65 to   70%.   3. Normal global left ventricular systolic function.   4. Increased LV wall thickness.   5. Normal left ventricular internal cavity size.   6. Spectral Doppler shows impaired relaxation pattern of left   ventricular myocardial filling (Grade I diastolic dysfunction).   7. Trivial pericardial effusion.   8. Mild to moderate mitral valve regurgitation.   9. Mild-moderate tricuspid regurgitation.  10. Estimated pulmonary artery systolic pressure is 54.8 mmHg assuming a   right atrial pressure of 3 mmHg, which is consistent with moderate   pulmonary hypertension.    < end of copied text >

## 2024-01-07 NOTE — PROGRESS NOTE ADULT - PROBLEM SELECTOR PLAN 2
-r/o other causes of other intrinsic renal disease ie nephritic/nephrotic syndrome, hepatitis  -s/p CTAP with IV contrast  -History of HTN, not on medications (poor medical follow up)  -BUN 94, Cr 17.09  -Cr 18.0  -urine with 300 protein, small blood, 100 glucose  -a1c and lipid panel wnl  -s/p 1L fluid in ED  -s/p 10mg hydralazine IV push  -c/w 100mg BID labetalol  - Autoimmune work up unrevealing   -Hep B and hep C neg  -F/up renal serologies  -Nephrology following, recommend gentle hydration with IVF and bicarb  -Pro remain in until 24 urine protein collection complete per nephrology, remove Sunday 1/7  -Shiley placed 1/6/24 by ICU PA, pt to get HD 1/6 and 1/8. Plan for possible xfer for angio vs shuttle for coronary CT pending Cardio recommendation.

## 2024-01-07 NOTE — PROGRESS NOTE ADULT - SUBJECTIVE AND OBJECTIVE BOX
Mr. Garcia is a 59yo M with PMHx of CKD 3, HTN, lost to follow up with PCP (per chart review used to take medications for HTN, but patient denies) presenting for vomiting times 3 weeks. Per patient, he began having subjective fevers and vomiting 2-4x/ day for 3 weeks with occasional small amounts of blood  with inability to hold down solids or liquids along with diarrhea for 2 days x8 episodes. Patient also notes that for the past 4 years he has been getting chest pain while walking, which is getting worse. Currently endorsing worsening central chest  pain in the ED along with a headache. Denies shortness of breath or palpitations. Denies changes in urine output , frequency or dysuria. Notes hx of renal failure in his brother at 71yo requiring transplant. Denied recent traveling, sick exposure, NSAID use, exposure to Hep C, Hep B, HIV, new foods. The patient was admitted for renal failure and r/o ACS.    1/7/24 AM: Patient retching this AM small amount of vomitus, given Zofran with improvement, otherwise feeling well.      REVIEW OF SYSTEMS:  CONSTITUTIONAL: (-) weakness, (-) fevers, (-) chills  RESPIRATORY:  (-) shortness of breath, (-) cough,  (-) wheezing,  (-) hemoptysis   CARDIOVASCULAR:  (+) chest pain, (-) palpitations  GASTROINTESTINAL:  (+) abdominal or epigastric pain, (-) nausea, (-) vomiting, (-) diarrhea, (-) constipation, (-) melena,  (-) hematemesis,  (-) hematochezia  GENITOURINARY: (-) dysuria, (-) frequency, (-) hematuria  NEUROLOGICAL: (-) numbness, (-) weakness  SKIN: (-) itching, (-) rashes, (-) lesions    PHYSICAL EXAM:  Vital Signs Last 24 Hrs  T(C): 37.1 (07 Jan 2024 05:35), Max: 37.1 (06 Jan 2024 20:30)  T(F): 98.7 (07 Jan 2024 05:35), Max: 98.7 (06 Jan 2024 20:30)  HR: 77 (07 Jan 2024 05:35) (70 - 82)  BP: 137/77 (07 Jan 2024 05:35) (116/72 - 162/84)  RR: 17 (07 Jan 2024 05:35) (17 - 19)  SpO2: 93% (07 Jan 2024 05:35) (93% - 95%)    Parameters below as of 07 Jan 2024 05:35  Patient On (Oxygen Delivery Method): room air        PHYSICAL EXAM:  GENERAL: NAD, lying in bed,  HEAD:  Atraumatic, Normocephalic  RESP: Clear to auscultation bilaterally, good air entry bilaterally; No wheezing, rales, or rhonchi. Unlabored respirations  CARDIAC: Regular rate and rhythm. S1 and S2. No murmurs, rubs, or gallops  GI:  Soft, Nontender, Nondistended. Bowel sounds present x4 quadrants; No hepatomegaly. No splenomegaly.  EXTREMITIES:  2+ Peripheral Pulses. Capillary refill <2 seconds. No clubbing, cyanosis, or edema  NERVOUS SYSTEM:  Alert & Oriented X3, speech clear. No deficits   MSK: FROM all 4 extremities, full and equal strength  SKIN: No rashes, bruises, or other lesions    MEDICATIONS  (STANDING):  amLODIPine   Tablet 10 milliGRAM(s) Oral at bedtime  aspirin  chewable 81 milliGRAM(s) Oral daily  atorvastatin 40 milliGRAM(s) Oral at bedtime  clopidogrel Tablet 75 milliGRAM(s) Oral daily  dextrose 5% 1000 milliLiter(s) (50 mL/Hr) IV Continuous <Continuous>  heparin   Injectable 5000 Unit(s) SubCutaneous every 12 hours  labetalol 200 milliGRAM(s) Oral every 8 hours    MEDICATIONS  (PRN):  acetaminophen     Tablet .. 650 milliGRAM(s) Oral every 6 hours PRN Temp greater or equal to 38C (100.4F), Mild Pain (1 - 3)  aluminum hydroxide/magnesium hydroxide/simethicone Suspension 30 milliLiter(s) Oral every 4 hours PRN Dyspepsia  benzonatate 100 milliGRAM(s) Oral three times a day PRN Cough  melatonin 3 milliGRAM(s) Oral at bedtime PRN Insomnia  ondansetron Injectable 4 milliGRAM(s) IV Push every 8 hours PRN Nausea and/or Vomiting        LABS                        7.7    3.91  )-----------( 239      ( 06 Jan 2024 06:45 )             20.8     06 Jan 2024 06:45    129    |  88     |  116    ----------------------------<  120    4.1     |  25     |  18.00    Ca    <5.0       06 Jan 2024 06:45  Phos  11.9      06 Jan 2024 06:45  Mg     1.9       06 Jan 2024 06:45    TPro  6.0    /  Alb  2.1    /  TBili  0.5    /  DBili  x      /  AST  18     /  ALT  14     /  AlkPhos  86     06 Jan 2024 06:45      CAPILLARY BLOOD GLUCOSE    LIVER FUNCTIONS - ( 06 Jan 2024 06:45 )  Alb: 2.1 g/dL / Pro: 6.0 g/dL / ALK PHOS: 86 U/L / ALT: 14 U/L / AST: 18 U/L / GGT: x           Urinalysis Basic - ( 06 Jan 2024 06:45 )    Color: x / Appearance: x / SG: x / pH: x  Gluc: 120 mg/dL / Ketone: x  / Bili: x / Urobili: x   Blood: x / Protein: x / Nitrite: x   Leuk Esterase: x / RBC: x / WBC x   Sq Epi: x / Non Sq Epi: x / Bacteria: x        CT ABDOMEN AND PELVIS  IMPRESSION:    Inadequately distended proximal colonic loop or query long segmental   colitis. No bowel obstruction. Nonemergent colonoscopy suggested.    --- End of Report ---    MARIANO COPELAND MD; Attending Radiologist  This document has been electronically signed. Froy  3 2024  2:58AM         Mr. Garcia is a 57yo M with PMHx of CKD 3, HTN, lost to follow up with PCP (per chart review used to take medications for HTN, but patient denies) presenting for vomiting times 3 weeks. Per patient, he began having subjective fevers and vomiting 2-4x/ day for 3 weeks with occasional small amounts of blood  with inability to hold down solids or liquids along with diarrhea for 2 days x8 episodes. Patient also notes that for the past 4 years he has been getting chest pain while walking, which is getting worse. Currently endorsing worsening central chest  pain in the ED along with a headache. Denies shortness of breath or palpitations. Denies changes in urine output , frequency or dysuria. Notes hx of renal failure in his brother at 69yo requiring transplant. Denied recent traveling, sick exposure, NSAID use, exposure to Hep C, Hep B, HIV, new foods. The patient was admitted for renal failure and r/o ACS.    1/7/24 AM: Patient retching this AM small amount of vomitus, given Zofran with improvement, otherwise feeling well.      REVIEW OF SYSTEMS:  CONSTITUTIONAL: (-) weakness, (-) fevers, (-) chills  RESPIRATORY:  (-) shortness of breath, (-) cough,  (-) wheezing,  (-) hemoptysis   CARDIOVASCULAR:  (+) chest pain, (-) palpitations  GASTROINTESTINAL:  (+) abdominal or epigastric pain, (-) nausea, (-) vomiting, (-) diarrhea, (-) constipation, (-) melena,  (-) hematemesis,  (-) hematochezia  GENITOURINARY: (-) dysuria, (-) frequency, (-) hematuria  NEUROLOGICAL: (-) numbness, (-) weakness  SKIN: (-) itching, (-) rashes, (-) lesions    PHYSICAL EXAM:  Vital Signs Last 24 Hrs  T(C): 37.1 (07 Jan 2024 05:35), Max: 37.1 (06 Jan 2024 20:30)  T(F): 98.7 (07 Jan 2024 05:35), Max: 98.7 (06 Jan 2024 20:30)  HR: 77 (07 Jan 2024 05:35) (70 - 82)  BP: 137/77 (07 Jan 2024 05:35) (116/72 - 162/84)  RR: 17 (07 Jan 2024 05:35) (17 - 19)  SpO2: 93% (07 Jan 2024 05:35) (93% - 95%)    Parameters below as of 07 Jan 2024 05:35  Patient On (Oxygen Delivery Method): room air        PHYSICAL EXAM:  GENERAL: NAD, lying in bed,  HEAD:  Atraumatic, Normocephalic  RESP: Clear to auscultation bilaterally, good air entry bilaterally; No wheezing, rales, or rhonchi. Unlabored respirations  CARDIAC: Regular rate and rhythm. S1 and S2. No murmurs, rubs, or gallops  GI:  Soft, Nontender, Nondistended. Bowel sounds present x4 quadrants; No hepatomegaly. No splenomegaly.  EXTREMITIES:  2+ Peripheral Pulses. Capillary refill <2 seconds. No clubbing, cyanosis, or edema  NERVOUS SYSTEM:  Alert & Oriented X3, speech clear. No deficits   MSK: FROM all 4 extremities, full and equal strength  SKIN: No rashes, bruises, or other lesions    MEDICATIONS  (STANDING):  amLODIPine   Tablet 10 milliGRAM(s) Oral at bedtime  aspirin  chewable 81 milliGRAM(s) Oral daily  atorvastatin 40 milliGRAM(s) Oral at bedtime  clopidogrel Tablet 75 milliGRAM(s) Oral daily  dextrose 5% 1000 milliLiter(s) (50 mL/Hr) IV Continuous <Continuous>  heparin   Injectable 5000 Unit(s) SubCutaneous every 12 hours  labetalol 200 milliGRAM(s) Oral every 8 hours    MEDICATIONS  (PRN):  acetaminophen     Tablet .. 650 milliGRAM(s) Oral every 6 hours PRN Temp greater or equal to 38C (100.4F), Mild Pain (1 - 3)  aluminum hydroxide/magnesium hydroxide/simethicone Suspension 30 milliLiter(s) Oral every 4 hours PRN Dyspepsia  benzonatate 100 milliGRAM(s) Oral three times a day PRN Cough  melatonin 3 milliGRAM(s) Oral at bedtime PRN Insomnia  ondansetron Injectable 4 milliGRAM(s) IV Push every 8 hours PRN Nausea and/or Vomiting        LABS                        7.7    3.91  )-----------( 239      ( 06 Jan 2024 06:45 )             20.8     06 Jan 2024 06:45    129    |  88     |  116    ----------------------------<  120    4.1     |  25     |  18.00    Ca    <5.0       06 Jan 2024 06:45  Phos  11.9      06 Jan 2024 06:45  Mg     1.9       06 Jan 2024 06:45    TPro  6.0    /  Alb  2.1    /  TBili  0.5    /  DBili  x      /  AST  18     /  ALT  14     /  AlkPhos  86     06 Jan 2024 06:45      CAPILLARY BLOOD GLUCOSE    LIVER FUNCTIONS - ( 06 Jan 2024 06:45 )  Alb: 2.1 g/dL / Pro: 6.0 g/dL / ALK PHOS: 86 U/L / ALT: 14 U/L / AST: 18 U/L / GGT: x           Urinalysis Basic - ( 06 Jan 2024 06:45 )    Color: x / Appearance: x / SG: x / pH: x  Gluc: 120 mg/dL / Ketone: x  / Bili: x / Urobili: x   Blood: x / Protein: x / Nitrite: x   Leuk Esterase: x / RBC: x / WBC x   Sq Epi: x / Non Sq Epi: x / Bacteria: x        CT ABDOMEN AND PELVIS  IMPRESSION:    Inadequately distended proximal colonic loop or query long segmental   colitis. No bowel obstruction. Nonemergent colonoscopy suggested.    --- End of Report ---    MARIANO COPELAND MD; Attending Radiologist  This document has been electronically signed. Froy  3 2024  2:58AM

## 2024-01-07 NOTE — PROGRESS NOTE ADULT - ASSESSMENT
Mr. Garcia is a 59yo M with PMHx of CKD 3, HTN, lost to follow up with PCP (per chart review used to take medications for HTN, but patient denies) presenting for vomiting times 3 weeks, admitted for renal failure and r/o ACS.

## 2024-01-07 NOTE — PROGRESS NOTE ADULT - PROBLEM SELECTOR PLAN 3
-Pt not meeting sepsis criteria on admission, found to be meeting sepsis criteria 1/3/24 with , temp 102.9, RR 22, and suspected GI infection  -RVP + for Influenza A, Stool culture +ETEC   -Blood/urine cx negative  -Off abx at this time (was on flagyl +CTX on admission)  -Tylenol PRN for fever  -Symptomatic support for Influenza and ETEC   -Will continue to monitor labs and vitals

## 2024-01-07 NOTE — PROGRESS NOTE ADULT - PROBLEM SELECTOR PLAN 1
-S/p CTAP with IV contrast  -History of HTN, not on medications (poor medical follow up)  -Urine with 300 protein, small blood, 100 glucose  -S/p 1L fluid in ED  -S/p 10mg hydralazine IV push  -C/w 200mg labetolol q8hrs  -Continue to monitor BP  -Appreciate nephrology recommendations  -Appreciate cardiology recommendations, will continue tele monitoring- plan for transfer to tertiary center for ischemic work up once renally stable

## 2024-01-07 NOTE — PROGRESS NOTE ADULT - ASSESSMENT
58-year-old man admitted with hypertensive emergency.  He was found to have renal failure and elevated troponin that has peaked   Cardiology has been consulted for elevated troponin and concern for NSTEMI.   He has past history of CKD stage III, hypertension who has not had follow-up with physicians or was taking any medications prior to admission.  Severe anemia   Nephrology has been following, temporary catheter for dialysis been placed.  Echo with normal LV function, biatrial enlargement and increased LV wall thickness.   As per prior recommendations, ischemia evaluation with cardiac catheterization to be considered post renal optimization.     Recommendations  -Continue with current antihypertensive regimen.  -Continue with dual antiplatelet therapy and statin.  -Consider blood transfusion   -Continue to trend troponin until peak   -Eventual ischemia evaluation  -Discussed with patient and primary team

## 2024-01-07 NOTE — PROGRESS NOTE ADULT - PROBLEM SELECTOR PLAN 5
-NSTEMI  -Chest pain likely 2/2 hypertension  -Trops trending up 221>246>401>>466, 529.1, per cardio likely 2/2 demand ischemia in setting of renal function  - TTE with normal EF 65-70 and mod pulm htn  -Appreciate cardiology recommendations,  cw labetolol for BP control, plan for eventual ischemic workup once kidney function stable  -Cw heparin, plavix, aspirin, per cardio   Appreciate cardiology recommendations, will continue tele monitoring- plan for transfer to tertiary center for ischemic work up once renally stable

## 2024-01-07 NOTE — PROGRESS NOTE ADULT - PROBLEM SELECTOR PLAN 4
-Likely 2/2 kidney failure  -iron panel with ferritin 478, likely anemia of chronic disease  -H&H trending down  -Transfusion consent forms signed and in chart  -Hgb 6.8 1/4/24, s/p PRBC  -Keep type and screen current  -Transfuse Hgb <7.0  -Mild downtrend over last 2-3 days today 7.6, may need PRBC with dialysis tomorrow 1/8

## 2024-01-07 NOTE — PROGRESS NOTE ADULT - SUBJECTIVE AND OBJECTIVE BOX
S/p 1st HD yesterday, w/o complaints     Vital Signs Last 24 Hrs  T(C): 36.8 (01-07-24 @ 21:01), Max: 37.1 (01-07-24 @ 05:35)  T(F): 98.2 (01-07-24 @ 21:01), Max: 98.7 (01-07-24 @ 05:35)  HR: 71 (01-07-24 @ 21:01) (71 - 77)  BP: 120/66 (01-07-24 @ 21:01) (120/66 - 137/77)  RR: 20 (01-07-24 @ 21:01) (17 - 20)  SpO2: 96% (01-07-24 @ 21:01) (93% - 96%)    I&O's Detail    06 Jan 2024 07:01  -  07 Jan 2024 07:00  --------------------------------------------------------  IN:    Other (mL): 700 mL  Total IN: 700 mL    OUT:    Indwelling Catheter - Urethral (mL): 1800 mL    Other (mL): 700 mL  Total OUT: 2500 mL    07 Jan 2024 07:01  -  07 Jan 2024 22:17  --------------------------------------------------------  OUT:    Indwelling Catheter - Urethral (mL): 800 mL  Total OUT: 800 mL    s1s2  b/l air entry  soft, ND  no edema                                               7.6    3.68  )-----------( 226      ( 07 Jan 2024 06:00 )             20.8     07 Jan 2024 06:00    134    |  96     |  75     ----------------------------<  99     4.1     |  28     |  13.50    Ca    5.4        07 Jan 2024 06:00  Phos  8.6       07 Jan 2024 06:00  Mg     2.0       07 Jan 2024 06:00    TPro  5.9    /  Alb  2.1    /  TBili  0.6    /  DBili  x      /  AST  40     /  ALT  23     /  AlkPhos  88     07 Jan 2024 06:00    LIVER FUNCTIONS - ( 07 Jan 2024 06:00 )  Alb: 2.1 g/dL / Pro: 5.9 g/dL / ALK PHOS: 88 U/L / ALT: 23 U/L / AST: 40 U/L / GGT: x           Culture - Stool (collected 05 Jan 2024 15:15)  Source: .Stool Feces  Final Report (07 Jan 2024 15:36):    No enteric pathogens isolated.    (Stool culture examined for Salmonella,    Shigella, Campylobacter, Aeromonas, Plesiomonas,    Vibrio, E.coli O157 and Yersinia)    acetaminophen     Tablet .. 650 milliGRAM(s) Oral every 6 hours PRN  aluminum hydroxide/magnesium hydroxide/simethicone Suspension 30 milliLiter(s) Oral every 4 hours PRN  aspirin  chewable 81 milliGRAM(s) Oral daily  atorvastatin 40 milliGRAM(s) Oral at bedtime  benzonatate 100 milliGRAM(s) Oral three times a day PRN  calcium acetate 1334 milliGRAM(s) Oral three times a day with meals  chlorhexidine 2% Cloths 1 Application(s) Topical <User Schedule>  clopidogrel Tablet 75 milliGRAM(s) Oral daily  epoetin stacie-epbx (RETACRIT) Injectable 25026 Unit(s) IV Push once  heparin   Injectable 5000 Unit(s) SubCutaneous every 12 hours  labetalol 200 milliGRAM(s) Oral every 8 hours  melatonin 3 milliGRAM(s) Oral at bedtime PRN  ondansetron Injectable 4 milliGRAM(s) IV Push every 8 hours PRN  polyethylene glycol 3350 17 Gram(s) Oral daily  senna 2 Tablet(s) Oral at bedtime  sodium chloride 0.9% lock flush 10 milliLiter(s) IV Push every 1 hour PRN    A/P:    Pt w/no known PMH, not following w/doctors, possibly hx of HTN, brother w/hx ESRD (pt does not know the etiology)   Adm w/N/V, fever, anemia, markedly abnormal renal fx (baseline renal fx is unknown)  Dx w/Flu A  S/p CT w/IV dye 1/3/23, dye injury can also be contributing to rising Cr   Leblanc w/good UO  F/u 24hr urine collection for protein   SPEP, renal serologies are negative   CT A/P w/o hydro   NSTEMI, per d/w cardiology plan for eventual cath  Started on HD yesterday via temp HD cath  Next HD on Monday   F/u CBC, BMP, UO   No objection to cardiac cath from renal pov   No objection to d/c leblanc and f/u PVR    [phone]       S/p 1st HD yesterday, w/o complaints     Vital Signs Last 24 Hrs  T(C): 36.8 (01-07-24 @ 21:01), Max: 37.1 (01-07-24 @ 05:35)  T(F): 98.2 (01-07-24 @ 21:01), Max: 98.7 (01-07-24 @ 05:35)  HR: 71 (01-07-24 @ 21:01) (71 - 77)  BP: 120/66 (01-07-24 @ 21:01) (120/66 - 137/77)  RR: 20 (01-07-24 @ 21:01) (17 - 20)  SpO2: 96% (01-07-24 @ 21:01) (93% - 96%)    I&O's Detail    06 Jan 2024 07:01  -  07 Jan 2024 07:00  --------------------------------------------------------  IN:    Other (mL): 700 mL  Total IN: 700 mL    OUT:    Indwelling Catheter - Urethral (mL): 1800 mL    Other (mL): 700 mL  Total OUT: 2500 mL    07 Jan 2024 07:01  -  07 Jan 2024 22:17  --------------------------------------------------------  OUT:    Indwelling Catheter - Urethral (mL): 800 mL  Total OUT: 800 mL    s1s2  b/l air entry  soft, ND  no edema                                               7.6    3.68  )-----------( 226      ( 07 Jan 2024 06:00 )             20.8     07 Jan 2024 06:00    134    |  96     |  75     ----------------------------<  99     4.1     |  28     |  13.50    Ca    5.4        07 Jan 2024 06:00  Phos  8.6       07 Jan 2024 06:00  Mg     2.0       07 Jan 2024 06:00    TPro  5.9    /  Alb  2.1    /  TBili  0.6    /  DBili  x      /  AST  40     /  ALT  23     /  AlkPhos  88     07 Jan 2024 06:00    LIVER FUNCTIONS - ( 07 Jan 2024 06:00 )  Alb: 2.1 g/dL / Pro: 5.9 g/dL / ALK PHOS: 88 U/L / ALT: 23 U/L / AST: 40 U/L / GGT: x           Culture - Stool (collected 05 Jan 2024 15:15)  Source: .Stool Feces  Final Report (07 Jan 2024 15:36):    No enteric pathogens isolated.    (Stool culture examined for Salmonella,    Shigella, Campylobacter, Aeromonas, Plesiomonas,    Vibrio, E.coli O157 and Yersinia)    acetaminophen     Tablet .. 650 milliGRAM(s) Oral every 6 hours PRN  aluminum hydroxide/magnesium hydroxide/simethicone Suspension 30 milliLiter(s) Oral every 4 hours PRN  aspirin  chewable 81 milliGRAM(s) Oral daily  atorvastatin 40 milliGRAM(s) Oral at bedtime  benzonatate 100 milliGRAM(s) Oral three times a day PRN  calcium acetate 1334 milliGRAM(s) Oral three times a day with meals  chlorhexidine 2% Cloths 1 Application(s) Topical <User Schedule>  clopidogrel Tablet 75 milliGRAM(s) Oral daily  epoetin stacie-epbx (RETACRIT) Injectable 47818 Unit(s) IV Push once  heparin   Injectable 5000 Unit(s) SubCutaneous every 12 hours  labetalol 200 milliGRAM(s) Oral every 8 hours  melatonin 3 milliGRAM(s) Oral at bedtime PRN  ondansetron Injectable 4 milliGRAM(s) IV Push every 8 hours PRN  polyethylene glycol 3350 17 Gram(s) Oral daily  senna 2 Tablet(s) Oral at bedtime  sodium chloride 0.9% lock flush 10 milliLiter(s) IV Push every 1 hour PRN    A/P:    Pt w/no known PMH, not following w/doctors, possibly hx of HTN, brother w/hx ESRD (pt does not know the etiology)   Adm w/N/V, fever, anemia, markedly abnormal renal fx (baseline renal fx is unknown)  Dx w/Flu A  S/p CT w/IV dye 1/3/23, dye injury can also be contributing to rising Cr   Leblanc w/good UO  F/u 24hr urine collection for protein   SPEP, renal serologies are negative   CT A/P w/o hydro   NSTEMI, per d/w cardiology plan for eventual cath  Started on HD yesterday via temp HD cath  Next HD on Monday   F/u CBC, BMP, UO   No objection to cardiac cath from renal pov   No objection to d/c leblanc and f/u PVR    [phone]

## 2024-01-07 NOTE — PROGRESS NOTE ADULT - PROBLEM SELECTOR PLAN 7
-Diarrhea likely 2/2 uremia and/or ETEC+  -CTAP: Inadequately distended proximal colonic loop or query long segmental   colitis. No bowel obstruction. Nonemergent colonoscopy suggested.  -GI PCR showing ETEC, however s/p ceftriaxone and flagyl, avoid fluroquinolones and other nephrotoxic agents, will remain off further abx treatment for now

## 2024-01-07 NOTE — PROGRESS NOTE ADULT - ATTENDING COMMENTS
57yo M with PMHx of CKD 3, HTN, lost to follow up with PCP (per chart review used to take medications for HTN, but patient denies) presenting for vomiting times 3 weeks, admitted for renal failure and r/o ACS.     #HTN emergency  - not on meds at home  - continue labetalol 100mg BID and amlodipine 5mg daily  - cardio recs appreciated, discussed with Dr. Jackson- will likely need ischemic workup, angio vs CTA coronary    #ARF  - likely chronic issue and 2/2 to uncontrolled BP  - follow up nephro studies  - now off IVF  - patient still making urine seen via leblanc  - s/p temp access placed by ICU PA on 1/6, s/p HD on 1/6 tolerated well  - continue leblanc for now as per nephro  - follow up orthostatics  - nephro following    #anemia  - likely anemia of chronic kidney disease  - s/p PRBC on 1/4 with appropriate response  - continue to monitor H/H  - occult negative  - will order for PRBC transfusion with HD tomorrow    #NSTEMI  - trop noted to be elevated, could be in setting of HTN emergency but will likely need further workup given chest pain on admission  - trop trending down, no need to further trend  - echo without acute concerns besides mod pHTN  - started DAPT even in setting of anemia, atorvastatin, continue labetalol as per cardio   - cardio recs appreciated, discussed with Dr. Jackson- will likely need ischemic workup, angio vs CTA coronary    #sepsis  #influenza  #ETEC  - BCx with NGTD  - stool culture with ETEC   - given resolution of diarrhea, will hold off on abx treatment at this time  - continue to monitor off abx

## 2024-01-08 ENCOUNTER — TRANSCRIPTION ENCOUNTER (OUTPATIENT)
Age: 59
End: 2024-01-08

## 2024-01-08 DIAGNOSIS — N19 UNSPECIFIED KIDNEY FAILURE: ICD-10-CM

## 2024-01-08 DIAGNOSIS — N17.9 ACUTE KIDNEY FAILURE, UNSPECIFIED: ICD-10-CM

## 2024-01-08 LAB
ALBUMIN SERPL ELPH-MCNC: 2.1 G/DL — LOW (ref 3.3–5)
ALBUMIN SERPL ELPH-MCNC: 2.1 G/DL — LOW (ref 3.3–5)
ALP SERPL-CCNC: 97 U/L — SIGNIFICANT CHANGE UP (ref 40–120)
ALP SERPL-CCNC: 97 U/L — SIGNIFICANT CHANGE UP (ref 40–120)
ALT FLD-CCNC: 38 U/L — SIGNIFICANT CHANGE UP (ref 10–45)
ALT FLD-CCNC: 38 U/L — SIGNIFICANT CHANGE UP (ref 10–45)
ANION GAP SERPL CALC-SCNC: 13 MMOL/L — SIGNIFICANT CHANGE UP (ref 5–17)
ANION GAP SERPL CALC-SCNC: 13 MMOL/L — SIGNIFICANT CHANGE UP (ref 5–17)
AST SERPL-CCNC: 60 U/L — HIGH (ref 10–40)
AST SERPL-CCNC: 60 U/L — HIGH (ref 10–40)
BILIRUB SERPL-MCNC: 0.7 MG/DL — SIGNIFICANT CHANGE UP (ref 0.2–1.2)
BILIRUB SERPL-MCNC: 0.7 MG/DL — SIGNIFICANT CHANGE UP (ref 0.2–1.2)
BUN SERPL-MCNC: 83 MG/DL — HIGH (ref 7–23)
BUN SERPL-MCNC: 83 MG/DL — HIGH (ref 7–23)
CALCIUM SERPL-MCNC: 5.5 MG/DL — CRITICAL LOW (ref 8.4–10.5)
CALCIUM SERPL-MCNC: 5.5 MG/DL — CRITICAL LOW (ref 8.4–10.5)
CHLORIDE SERPL-SCNC: 96 MMOL/L — SIGNIFICANT CHANGE UP (ref 96–108)
CHLORIDE SERPL-SCNC: 96 MMOL/L — SIGNIFICANT CHANGE UP (ref 96–108)
CO2 SERPL-SCNC: 25 MMOL/L — SIGNIFICANT CHANGE UP (ref 22–31)
CO2 SERPL-SCNC: 25 MMOL/L — SIGNIFICANT CHANGE UP (ref 22–31)
CREAT SERPL-MCNC: 14.54 MG/DL — HIGH (ref 0.5–1.3)
CREAT SERPL-MCNC: 14.54 MG/DL — HIGH (ref 0.5–1.3)
CULTURE RESULTS: SIGNIFICANT CHANGE UP
EGFR: 3 ML/MIN/1.73M2 — LOW
EGFR: 3 ML/MIN/1.73M2 — LOW
GLUCOSE SERPL-MCNC: 90 MG/DL — SIGNIFICANT CHANGE UP (ref 70–99)
GLUCOSE SERPL-MCNC: 90 MG/DL — SIGNIFICANT CHANGE UP (ref 70–99)
HAV IGM SER-ACNC: SIGNIFICANT CHANGE UP
HAV IGM SER-ACNC: SIGNIFICANT CHANGE UP
HBV CORE IGM SER-ACNC: SIGNIFICANT CHANGE UP
HBV CORE IGM SER-ACNC: SIGNIFICANT CHANGE UP
HBV SURFACE AB SER-ACNC: REACTIVE
HBV SURFACE AB SER-ACNC: REACTIVE
HBV SURFACE AG SER-ACNC: SIGNIFICANT CHANGE UP
HBV SURFACE AG SER-ACNC: SIGNIFICANT CHANGE UP
HCT VFR BLD CALC: 21.2 % — LOW (ref 39–50)
HCT VFR BLD CALC: 21.2 % — LOW (ref 39–50)
HCV AB S/CO SERPL IA: 0.2 S/CO — SIGNIFICANT CHANGE UP (ref 0–0.99)
HCV AB S/CO SERPL IA: 0.2 S/CO — SIGNIFICANT CHANGE UP (ref 0–0.99)
HCV AB SERPL-IMP: SIGNIFICANT CHANGE UP
HCV AB SERPL-IMP: SIGNIFICANT CHANGE UP
HGB BLD-MCNC: 7.5 G/DL — LOW (ref 13–17)
HGB BLD-MCNC: 7.5 G/DL — LOW (ref 13–17)
MCHC RBC-ENTMCNC: 27.8 PG — SIGNIFICANT CHANGE UP (ref 27–34)
MCHC RBC-ENTMCNC: 27.8 PG — SIGNIFICANT CHANGE UP (ref 27–34)
MCHC RBC-ENTMCNC: 35.4 GM/DL — SIGNIFICANT CHANGE UP (ref 32–36)
MCHC RBC-ENTMCNC: 35.4 GM/DL — SIGNIFICANT CHANGE UP (ref 32–36)
MCV RBC AUTO: 78.5 FL — LOW (ref 80–100)
MCV RBC AUTO: 78.5 FL — LOW (ref 80–100)
NRBC # BLD: 0 /100 WBCS — SIGNIFICANT CHANGE UP (ref 0–0)
NRBC # BLD: 0 /100 WBCS — SIGNIFICANT CHANGE UP (ref 0–0)
PHOSPHATE SERPL-MCNC: 9.4 MG/DL — HIGH (ref 2.5–4.5)
PHOSPHATE SERPL-MCNC: 9.4 MG/DL — HIGH (ref 2.5–4.5)
PLATELET # BLD AUTO: 213 K/UL — SIGNIFICANT CHANGE UP (ref 150–400)
PLATELET # BLD AUTO: 213 K/UL — SIGNIFICANT CHANGE UP (ref 150–400)
POTASSIUM SERPL-MCNC: 4.2 MMOL/L — SIGNIFICANT CHANGE UP (ref 3.5–5.3)
POTASSIUM SERPL-MCNC: 4.2 MMOL/L — SIGNIFICANT CHANGE UP (ref 3.5–5.3)
POTASSIUM SERPL-SCNC: 4.2 MMOL/L — SIGNIFICANT CHANGE UP (ref 3.5–5.3)
POTASSIUM SERPL-SCNC: 4.2 MMOL/L — SIGNIFICANT CHANGE UP (ref 3.5–5.3)
PROT SERPL-MCNC: 6 G/DL — SIGNIFICANT CHANGE UP (ref 6–8.3)
PROT SERPL-MCNC: 6 G/DL — SIGNIFICANT CHANGE UP (ref 6–8.3)
RBC # BLD: 2.7 M/UL — LOW (ref 4.2–5.8)
RBC # BLD: 2.7 M/UL — LOW (ref 4.2–5.8)
RBC # FLD: 13.1 % — SIGNIFICANT CHANGE UP (ref 10.3–14.5)
RBC # FLD: 13.1 % — SIGNIFICANT CHANGE UP (ref 10.3–14.5)
SODIUM SERPL-SCNC: 134 MMOL/L — LOW (ref 135–145)
SODIUM SERPL-SCNC: 134 MMOL/L — LOW (ref 135–145)
SPECIMEN SOURCE: SIGNIFICANT CHANGE UP
WBC # BLD: 4.02 K/UL — SIGNIFICANT CHANGE UP (ref 3.8–10.5)
WBC # BLD: 4.02 K/UL — SIGNIFICANT CHANGE UP (ref 3.8–10.5)
WBC # FLD AUTO: 4.02 K/UL — SIGNIFICANT CHANGE UP (ref 3.8–10.5)
WBC # FLD AUTO: 4.02 K/UL — SIGNIFICANT CHANGE UP (ref 3.8–10.5)

## 2024-01-08 PROCEDURE — 99232 SBSQ HOSP IP/OBS MODERATE 35: CPT

## 2024-01-08 PROCEDURE — 99233 SBSQ HOSP IP/OBS HIGH 50: CPT

## 2024-01-08 RX ORDER — LABETALOL HCL 100 MG
50 TABLET ORAL ONCE
Refills: 0 | Status: COMPLETED | OUTPATIENT
Start: 2024-01-08 | End: 2024-01-08

## 2024-01-08 RX ORDER — REGADENOSON 0.08 MG/ML
0.4 INJECTION, SOLUTION INTRAVENOUS ONCE
Refills: 0 | Status: DISCONTINUED | OUTPATIENT
Start: 2024-01-08 | End: 2024-01-12

## 2024-01-08 RX ORDER — TUBERCULIN PURIFIED PROTEIN DERIVATIVE 5 [IU]/.1ML
5 INJECTION, SOLUTION INTRADERMAL ONCE
Refills: 0 | Status: COMPLETED | OUTPATIENT
Start: 2024-01-08 | End: 2024-01-08

## 2024-01-08 RX ADMIN — Medication 1334 MILLIGRAM(S): at 17:29

## 2024-01-08 RX ADMIN — CLOPIDOGREL BISULFATE 75 MILLIGRAM(S): 75 TABLET, FILM COATED ORAL at 11:54

## 2024-01-08 RX ADMIN — Medication 81 MILLIGRAM(S): at 11:54

## 2024-01-08 RX ADMIN — Medication 200 MILLIGRAM(S): at 21:25

## 2024-01-08 RX ADMIN — HEPARIN SODIUM 5000 UNIT(S): 5000 INJECTION INTRAVENOUS; SUBCUTANEOUS at 17:30

## 2024-01-08 RX ADMIN — Medication 200 MILLIGRAM(S): at 14:15

## 2024-01-08 RX ADMIN — ATORVASTATIN CALCIUM 40 MILLIGRAM(S): 80 TABLET, FILM COATED ORAL at 21:25

## 2024-01-08 RX ADMIN — HEPARIN SODIUM 5000 UNIT(S): 5000 INJECTION INTRAVENOUS; SUBCUTANEOUS at 05:20

## 2024-01-08 RX ADMIN — Medication 200 MILLIGRAM(S): at 05:20

## 2024-01-08 RX ADMIN — ERYTHROPOIETIN 10000 UNIT(S): 10000 INJECTION, SOLUTION INTRAVENOUS; SUBCUTANEOUS at 12:56

## 2024-01-08 RX ADMIN — SENNA PLUS 2 TABLET(S): 8.6 TABLET ORAL at 21:25

## 2024-01-08 RX ADMIN — TUBERCULIN PURIFIED PROTEIN DERIVATIVE 5 UNIT(S): 5 INJECTION, SOLUTION INTRADERMAL at 17:24

## 2024-01-08 RX ADMIN — Medication 50 MILLIGRAM(S): at 17:24

## 2024-01-08 RX ADMIN — CHLORHEXIDINE GLUCONATE 1 APPLICATION(S): 213 SOLUTION TOPICAL at 05:20

## 2024-01-08 RX ADMIN — Medication 1334 MILLIGRAM(S): at 08:12

## 2024-01-08 RX ADMIN — Medication 1334 MILLIGRAM(S): at 11:54

## 2024-01-08 NOTE — PROGRESS NOTE ADULT - PROBLEM SELECTOR PLAN 5
-NSTEMI  -Chest pain likely 2/2 hypertension  -Trops trending up 221>246>401>>466, 529.1, per cardio likely 2/2 demand ischemia in setting of renal function  - TTE with normal EF 65-70 and mod pulm htn  -Appreciate cardiology recommendations,  cw labetolol for BP control, plan for eventual ischemic workup once kidney function stable  -Cw heparin, plavix, aspirin, per cardio   Appreciate cardiology recommendations, will continue tele monitoring- plan for transfer to tertiary center for ischemic work up once renally stable -NSTEMI  -Chest pain likely 2/2 hypertension  -Trops trending up 221>246>401>>466, 529.1, 715.14> now trending down 475.8  -Per cardio trops likely 2/2 demand ischemia in setting of renal function  - TTE with normal EF 65-70 and mod pulm htn  -Cw labetolol for BP control  -Cw heparin, plavix, aspirin, per cardio   -Appreciate cardiology recommendations, will continue tele monitoring- plan for nuclear stress test tomorrow, plan for transfer to tertiary center for cath pending results

## 2024-01-08 NOTE — PROGRESS NOTE ADULT - NS ATTEND AMEND GEN_ALL_CORE FT
I have seen and examined the patient. I have discussed case with PETER Walton.    [employer] [place] ID#: [id number]    Neri Garcia is a 59 year old man with past medical history of Hypertension and Chronic kidney disease who presented with nausea, vomiting as well as chronic episodes of exertional chest discomfort, found to have acute renal failure. ECG on admission consistent with sinus tachycardia, no acute ischemic ST abnormalities. Troponins elevated and have peaked which may be due to renal failure, however due to symptoms of angina, cannot rule out underlying ischemic heart disease. Chest xray consistent with CHF. Echo report consistent with normal LVEF 65-70%, LVH and moderate pulmonary hypertension. Overall, patient admitted with acute renal failure now receiving hemodialysis. Would recommend non-invasive ischemic evaluation such as pharmacologic nuclear stress test when patient is euvolemic as patient would be a poor candidate for coronary angiogram due to high risk for permanent contrast induced nephropathy in setting of this new acute renal failure. Continue CV meds; aspirin, statin and beta blocker.    Discussed with primary team. We will continue to follow along.    Preston Dolan MD  Cardiology I have seen and examined the patient. I have discussed case with PETER Walton.    [employer] [place] citizen ID#: [id number]    Neri Garcia is a 59 year old man with past medical history of Hypertension and Chronic kidney disease who presented with nausea, vomiting as well as chronic episodes of exertional chest discomfort, found to have acute renal failure. ECG on admission consistent with sinus tachycardia, no acute ischemic ST abnormalities. Troponins elevated and have peaked which may be due to renal failure, however due to symptoms of angina, cannot rule out underlying ischemic heart disease. Chest xray consistent with CHF. Echo report consistent with normal LVEF 65-70%, LVH and moderate pulmonary hypertension. Overall, patient admitted with acute renal failure now receiving hemodialysis. Would recommend non-invasive ischemic evaluation such as pharmacologic nuclear stress test when patient is euvolemic as patient would be a poor candidate for coronary angiogram due to high risk for permanent contrast induced nephropathy in setting of this new acute renal failure. Continue CV meds; aspirin, statin and beta blocker.    Discussed with primary team. We will continue to follow along.    Preston Dolan MD  Cardiology

## 2024-01-08 NOTE — PROGRESS NOTE ADULT - PROBLEM SELECTOR PLAN 4
-Likely 2/2 kidney failure  -iron panel with ferritin 478, likely anemia of chronic disease  -H&H trending down  -Transfusion consent forms signed and in chart  -Hgb 6.8 1/4/24, s/p PRBC  -Keep type and screen current  -Transfuse Hgb <7.0  -Mild downtrend over last 2-3 days today 7.6, may need PRBC with dialysis tomorrow 1/8 -Likely 2/2 kidney failure  -iron panel with ferritin 478, likely anemia of chronic disease  -H&H trending down  -Transfusion consent forms signed and in chart  -Hgb 6.8 1/4/24, s/p PRBC  -Keep type and screen current  -Transfuse Hgb <7.0  -Mild downtrend over last 2-3 days today 7.6, will receive 1unit pRBCs with HD today 1/8

## 2024-01-08 NOTE — PROGRESS NOTE ADULT - ASSESSMENT
Mr. Garcia is a 59yo M with PMHx of CKD 3, HTN, lost to follow up with PCP (per chart review used to take medications for HTN, but patient denies) presenting for vomiting times 3 weeks, admitted for renal failure and r/o ACS.        Mr. Garcia is a 57yo M with PMHx of CKD 3, HTN, lost to follow up with PCP (per chart review used to take medications for HTN, but patient denies) presenting for vomiting times 3 weeks, admitted for renal failure and r/o ACS.

## 2024-01-08 NOTE — PROVIDER CONTACT NOTE (CRITICAL VALUE NOTIFICATION) - NAME OF MD/NP/PA/DO NOTIFIED:
Dr. Mcfarland
Dr. BELINDA Olsen
Dr. Cyr
Dr Olsen
Dr Lentz

## 2024-01-08 NOTE — DISCHARGE NOTE PROVIDER - PROVIDER TOKENS
PROVIDER:[TOKEN:[[id number]],FOLLOWUP:[2 weeks]] PROVIDER:[TOKEN:[[id number]],FOLLOWUP:[2 weeks]]

## 2024-01-08 NOTE — DISCHARGE NOTE PROVIDER - HOSPITAL COURSE
Mr. Garcia is a 57yo M with PMHx of CKD 3, HTN, lost to follow up with PCP (per chart review used to take medications for HTN, but patient denies) who presented to the ED on 1/3/24. Per patient, he began having subjective fevers and vomiting 2-4x/ day for 3 weeks with occasional small amounts of blood with inability to hold down solids or liquids along with diarrhea for 2 days x8 episodes. Patient also noted that for the past 4 years he has been getting chest pain while walking, which is getting worse. In the ED he endorsed central chest pain along with headache. He denied shortness of breath or palpitations. He also denied changes in urine output, frequency or dysuria. He notes a history of renal failure in his brother at 71yo requiring transplant. He denied recent traveling, sick exposure, NSAID use, exposure to Hep C, Hep B, HIV, new foods.       In the ED he was found to be afebrile, with a BP of 206/115, , RR 18 satting 99%RA. Labs were notable for WBC 7.58, Hb 8.2, BUN 94, Cr 17.09, troponin 221.     CTAP showed inadequately distended proximal colonic loop or query long segmental colitis. No bowel obstruction. 1L fluid and 10mg IV push of hydralazine was given in the ED.      The patient was admitted for renal failure on CKD stage 3 likely secondary to dehydration and hypertensive emergency, rule out ACS. The patient was continued on 100mg labetolol bid. And started on ceftriaxone and metronidazole in setting of suspected GI infection.     Later on the third the patient was found to meet sepsis criteria with a HR of 107, temp of 102 and RR 22. Gentle hydration with d5+ bicarb was given and work up was sent. Stool cultures were positive for ETEC, pt s/p ceftriaxone and metronidazole with good coverage. The patient was also found to be + for influenza A and symptoms were managed conservatively.     The patient was seen and evaluated by cardiology throughout their hospital stay and their recommendations were appreciated. The patient was continued on 200mg labetolol q8hrs for hypertension.      The patient was also seen and evaluated by nephrology for increasing creatinine. Shared decision making with the patient regarding management of worsening kidney function. The decision was made to obtain temporary access for HD and a shiley was placed on 1/6. The patient received HD on 1/6 and again on 1/8, tolerated well.      The patient was also found to be anemic on admission with hemoglobin levels dropping as low as 6.5. The patient signed consent forms for transfusion and received 1unit of packed red blood cells on 01/4/24 and another on 1/8/24 with dialysis with appropriate response.       The patients troponin was noted to be elevated on admission and continued to rise to 715.4 until peaking and coming back down. The patient was started on DAPT by cardiology even in the setting of anemia and atorvastatin.  Echo was without acute concerns other than pulmonary hypertension.     Suspect secondary to hypertensive emergency, however in setting of chest pain on admission and patient noting chest pain for the past 4 years on exertion, ischemic work up is warranted. Close communication between cardiology and nephrology was maintained. Nuclear stress test was performed on 1/9/24 revealing:      Now that the patient is renally stable, cardiology is recommending transfer to [facility] for ischemic work up with cath. The patient will also need to be evaluated for possible permanent HD access. Mr. Garcia is a 57yo M with PMHx of CKD 3, HTN, lost to follow up with PCP (per chart review used to take medications for HTN, but patient denies) who presented to the ED on 1/3/24. Per patient, he began having subjective fevers and vomiting 2-4x/ day for 3 weeks with occasional small amounts of blood with inability to hold down solids or liquids along with diarrhea for 2 days x8 episodes. Patient also noted that for the past 4 years he has been getting chest pain while walking, which is getting worse. In the ED he endorsed central chest pain along with headache. He denied shortness of breath or palpitations. He also denied changes in urine output, frequency or dysuria. He notes a history of renal failure in his brother at 69yo requiring transplant. He denied recent traveling, sick exposure, NSAID use, exposure to Hep C, Hep B, HIV, new foods.       In the ED he was found to be afebrile, with a BP of 206/115, , RR 18 satting 99%RA. Labs were notable for WBC 7.58, Hb 8.2, BUN 94, Cr 17.09, troponin 221.     CTAP showed inadequately distended proximal colonic loop or query long segmental colitis. No bowel obstruction. 1L fluid and 10mg IV push of hydralazine was given in the ED.      The patient was admitted for renal failure on CKD stage 3 likely secondary to dehydration and hypertensive emergency, rule out ACS. The patient was continued on 100mg labetolol bid. And started on ceftriaxone and metronidazole in setting of suspected GI infection.     Later on the third the patient was found to meet sepsis criteria with a HR of 107, temp of 102 and RR 22. Gentle hydration with d5+ bicarb was given and work up was sent. Stool cultures were positive for ETEC, pt s/p ceftriaxone and metronidazole with good coverage. The patient was also found to be + for influenza A and symptoms were managed conservatively.     The patient was seen and evaluated by cardiology throughout their hospital stay and their recommendations were appreciated. The patient was continued on 200mg labetolol q8hrs for hypertension.      The patient was also seen and evaluated by nephrology for increasing creatinine. Shared decision making with the patient regarding management of worsening kidney function. The decision was made to obtain temporary access for HD and a shiley was placed on 1/6. The patient received HD on 1/6 and again on 1/8, tolerated well.      The patient was also found to be anemic on admission with hemoglobin levels dropping as low as 6.5. The patient signed consent forms for transfusion and received 1unit of packed red blood cells on 01/4/24 and another on 1/8/24 with dialysis with appropriate response.       The patients troponin was noted to be elevated on admission and continued to rise to 715.4 until peaking and coming back down. The patient was started on DAPT by cardiology even in the setting of anemia and atorvastatin.  Echo was without acute concerns other than pulmonary hypertension.     Suspect secondary to hypertensive emergency, however in setting of chest pain on admission and patient noting chest pain for the past 4 years on exertion, ischemic work up is warranted. Close communication between cardiology and nephrology was maintained. Nuclear stress test was performed on 1/9/24 revealing:      Now that the patient is renally stable, cardiology is recommending transfer to [facility] for ischemic work up with cath. The patient will also need to be evaluated for possible permanent HD access. Mr. Garcia is a 59yo M with PMHx of CKD 3, HTN, lost to follow up with PCP (per chart review used to take medications for HTN, but patient denies) who presented to the ED on 1/3/24. Per patient, he began having subjective fevers and vomiting 2-4x/ day for 3 weeks with occasional small amounts of blood with inability to hold down solids or liquids along with diarrhea for 2 days x8 episodes. Patient also noted that for the past 4 years he has been getting chest pain while walking, which is getting worse. In the ED he endorsed central chest pain along with headache. He denied shortness of breath or palpitations. He also denied changes in urine output, frequency or dysuria. He notes a history of renal failure in his brother at 69yo requiring transplant. He denied recent traveling, sick exposure, NSAID use, exposure to Hep C, Hep B, HIV, new foods.       In the ED he was found to be afebrile, with a BP of 206/115, , RR 18 satting 99%RA. Labs were notable for WBC 7.58, Hb 8.2, BUN 94, Cr 17.09, troponin 221.     CTAP showed inadequately distended proximal colonic loop or query long segmental colitis. No bowel obstruction. 1L fluid and 10mg IV push of hydralazine was given in the ED.      The patient was admitted for renal failure on CKD stage 3 likely secondary to dehydration and hypertensive emergency, rule out ACS. The patient was continued on 100mg labetolol bid. And started on ceftriaxone and metronidazole in setting of suspected GI infection.     Later on the third the patient was found to meet sepsis criteria with a HR of 107, temp of 102 and RR 22. Gentle hydration with d5+ bicarb was given and work up was sent. Stool cultures were positive for ETEC, pt s/p ceftriaxone and metronidazole with good coverage. The patient was also found to be + for influenza A and symptoms were managed conservatively.     The patient was seen and evaluated by cardiology throughout their hospital stay and their recommendations were appreciated. The patient was continued on 200mg labetolol q8hrs for hypertension.      The patient was also seen and evaluated by nephrology for increasing creatinine. Shared decision making with the patient regarding management of worsening kidney function. The decision was made to obtain temporary access for HD and a shiley was placed on 1/6. The patient received HD on 1/6 and again on 1/8, tolerated well.      The patient was also found to be anemic on admission with hemoglobin levels dropping as low as 6.5. The patient signed consent forms for transfusion and received 1unit of packed red blood cells on 01/4/24 and another on 1/8/24 with dialysis with appropriate response.       The patients troponin was noted to be elevated on admission and continued to rise to 715.4 until peaking and coming back down. The patient was started on DAPT by cardiology even in the setting of anemia and atorvastatin.  Echo was without acute concerns other than pulmonary hypertension.     Suspect secondary to hypertensive emergency, however in setting of chest pain on admission and patient noting chest pain for the past 4 years on exertion, ischemic work up is warranted. Close communication between cardiology and nephrology was maintained. Nuclear stress test was performed on 1/9/24 and found to be negative for ischemic disease.    Now that the patient is renally stable, cardiology is recommending transfer to [facility] for ischemic work up with cath. The patient will also need to be evaluated for possible permanent HD access. Mr. Garcia is a 59yo M with PMHx of CKD 3, HTN, lost to follow up with PCP (per chart review used to take medications for HTN, but patient denies) who presented to the ED on 1/3/24. Per patient, he began having subjective fevers and vomiting 2-4x/ day for 3 weeks with occasional small amounts of blood with inability to hold down solids or liquids along with diarrhea for 2 days x8 episodes. Patient also noted that for the past 4 years he has been getting chest pain while walking, which is getting worse. In the ED he endorsed central chest pain along with headache. He denied shortness of breath or palpitations. He also denied changes in urine output, frequency or dysuria. He notes a history of renal failure in his brother at 69yo requiring transplant. He denied recent traveling, sick exposure, NSAID use, exposure to Hep C, Hep B, HIV, new foods.       In the ED he was found to be afebrile, with a BP of 206/115, , RR 18 satting 99%RA. Labs were notable for WBC 7.58, Hb 8.2, BUN 94, Cr 17.09, troponin 221.     CTAP showed inadequately distended proximal colonic loop or query long segmental colitis. No bowel obstruction. 1L fluid and 10mg IV push of hydralazine was given in the ED.      The patient was admitted for renal failure on CKD stage 3 likely secondary to dehydration and hypertensive emergency, rule out ACS. The patient was continued on 100mg labetolol bid. And started on ceftriaxone and metronidazole in setting of suspected GI infection.     Later on the third the patient was found to meet sepsis criteria with a HR of 107, temp of 102 and RR 22. Gentle hydration with d5+ bicarb was given and work up was sent. Stool cultures were positive for ETEC, pt s/p ceftriaxone and metronidazole with good coverage. The patient was also found to be + for influenza A and symptoms were managed conservatively.     The patient was seen and evaluated by cardiology throughout their hospital stay and their recommendations were appreciated. The patient was continued on 200mg labetolol q8hrs for hypertension.      The patient was also seen and evaluated by nephrology for increasing creatinine. Shared decision making with the patient regarding management of worsening kidney function. The decision was made to obtain temporary access for HD and a shiley was placed on 1/6. The patient received HD on 1/6 and again on 1/8, tolerated well.      The patient was also found to be anemic on admission with hemoglobin levels dropping as low as 6.5. The patient signed consent forms for transfusion and received 1unit of packed red blood cells on 01/4/24 and another on 1/8/24 with dialysis with appropriate response.       The patients troponin was noted to be elevated on admission and continued to rise to 715.4 until peaking and coming back down. The patient was started on DAPT by cardiology even in the setting of anemia and atorvastatin.  Echo was without acute concerns other than pulmonary hypertension.     Suspect secondary to hypertensive emergency, however in setting of chest pain on admission and patient noting chest pain for the past 4 years on exertion, ischemic work up is warranted. Close communication between cardiology and nephrology was maintained. Nuclear stress test was performed on 1/9/24 and found to be negative for ischemic disease.    Now that the patient is renally stable, cardiology is recommending transfer to [facility] for ischemic work up with cath. The patient will also need to be evaluated for possible permanent HD access. Mr. Garcia is a 59yo M with PMHx of CKD 3, HTN, lost to follow up with PCP (per chart review used to take medications for HTN, but patient denies) who presented to the ED on 1/3/24. Per patient, he began having subjective fevers and vomiting 2-4x/ day for 3 weeks with occasional small amounts of blood with inability to hold down solids or liquids along with diarrhea for 2 days x8 episodes. Patient also noted that for the past 4 years he has been getting chest pain while walking, which is getting worse. In the ED he endorsed central chest pain along with headache. He denied shortness of breath or palpitations. He also denied changes in urine output, frequency or dysuria. He notes a history of renal failure in his brother at 71yo requiring transplant. He denied recent traveling, sick exposure, NSAID use, exposure to Hep C, Hep B, HIV, new foods.       In the ED he was found to be afebrile, with a BP of 206/115, , RR 18 satting 99%RA. Labs were notable for WBC 7.58, Hb 8.2, BUN 94, Cr 17.09, troponin 221.     CTAP showed inadequately distended proximal colonic loop or query long segmental colitis. No bowel obstruction. 1L fluid and 10mg IV push of hydralazine was given in the ED.      The patient was admitted for renal failure on CKD stage 3 likely secondary to dehydration and hypertensive emergency, rule out ACS. The patient was continued on 100mg labetolol bid. And started on ceftriaxone and metronidazole in setting of suspected GI infection.     Later on the third the patient was found to meet sepsis criteria with a HR of 107, temp of 102 and RR 22. Gentle hydration with d5+ bicarb was given and work up was sent. Stool cultures were positive for ETEC, pt s/p ceftriaxone and metronidazole with good coverage. The patient was also found to be + for influenza A and symptoms were managed conservatively.     The patient was seen and evaluated by cardiology throughout their hospital stay and their recommendations were appreciated. The patient was continued on 200mg labetolol q8hrs for hypertension.      The patient was also seen and evaluated by nephrology for increasing creatinine. Shared decision making with the patient regarding management of worsening kidney function. The decision was made to obtain temporary access for HD and a shiley was placed on 1/6. The patient received HD on 1/6 and again on 1/8, tolerated well.      The patient was also found to be anemic on admission with hemoglobin levels dropping as low as 6.5. The patient signed consent forms for transfusion and received 1unit of packed red blood cells on 01/4/24 and another on 1/8/24 with dialysis with appropriate response.       The patients troponin was noted to be elevated on admission and continued to rise to 715.4 until peaking and coming back down. The patient was started on DAPT by cardiology even in the setting of anemia and atorvastatin.  Echo was without acute concerns other than pulmonary hypertension.     Suspect secondary to hypertensive emergency, however in setting of chest pain on admission and patient noting chest pain for the past 4 years on exertion, ischemic work up is warranted. Close communication between cardiology and nephrology was maintained. Nuclear stress test was performed on 1/9/24 and found to be negative for ischemic disease.    In the setting of persistently elevated Cr, s/p two rounds Mr. Garcia is a 59yo M with PMHx of CKD 3, HTN, lost to follow up with PCP (per chart review used to take medications for HTN, but patient denies) who presented to the ED on 1/3/24. Per patient, he began having subjective fevers and vomiting 2-4x/ day for 3 weeks with occasional small amounts of blood with inability to hold down solids or liquids along with diarrhea for 2 days x8 episodes. Patient also noted that for the past 4 years he has been getting chest pain while walking, which is getting worse. In the ED he endorsed central chest pain along with headache. He denied shortness of breath or palpitations. He also denied changes in urine output, frequency or dysuria. He notes a history of renal failure in his brother at 69yo requiring transplant. He denied recent traveling, sick exposure, NSAID use, exposure to Hep C, Hep B, HIV, new foods.       In the ED he was found to be afebrile, with a BP of 206/115, , RR 18 satting 99%RA. Labs were notable for WBC 7.58, Hb 8.2, BUN 94, Cr 17.09, troponin 221.     CTAP showed inadequately distended proximal colonic loop or query long segmental colitis. No bowel obstruction. 1L fluid and 10mg IV push of hydralazine was given in the ED.      The patient was admitted for renal failure on CKD stage 3 likely secondary to dehydration and hypertensive emergency, rule out ACS. The patient was continued on 100mg labetolol bid. And started on ceftriaxone and metronidazole in setting of suspected GI infection.     Later on the third the patient was found to meet sepsis criteria with a HR of 107, temp of 102 and RR 22. Gentle hydration with d5+ bicarb was given and work up was sent. Stool cultures were positive for ETEC, pt s/p ceftriaxone and metronidazole with good coverage. The patient was also found to be + for influenza A and symptoms were managed conservatively.     The patient was seen and evaluated by cardiology throughout their hospital stay and their recommendations were appreciated. The patient was continued on 200mg labetolol q8hrs for hypertension.      The patient was also seen and evaluated by nephrology for increasing creatinine. Shared decision making with the patient regarding management of worsening kidney function. The decision was made to obtain temporary access for HD and a shiley was placed on 1/6. The patient received HD on 1/6 and again on 1/8, tolerated well.      The patient was also found to be anemic on admission with hemoglobin levels dropping as low as 6.5. The patient signed consent forms for transfusion and received 1unit of packed red blood cells on 01/4/24 and another on 1/8/24 with dialysis with appropriate response.       The patients troponin was noted to be elevated on admission and continued to rise to 715.4 until peaking and coming back down. The patient was started on DAPT by cardiology even in the setting of anemia and atorvastatin.  Echo was without acute concerns other than pulmonary hypertension.     Suspect secondary to hypertensive emergency, however in setting of chest pain on admission and patient noting chest pain for the past 4 years on exertion, ischemic work up is warranted. Close communication between cardiology and nephrology was maintained. Nuclear stress test was performed on 1/9/24 and found to be negative for ischemic disease.    In the setting of persistently elevated Cr, s/p two rounds of hemodialysis 1/6 and 1/8, the patient will need permanent HD access. Per nephrology, Dr. Laird is recommending transfer to [facility] for permacath placement and renal biopsy. The patient is accepted at [facility] with attending Dr. Chery.        PHYSICAL EXAM:  Vital Signs Last 24 Hrs  T(C): 36.6 (09 Jan 2024 05:41), Max: 37 (08 Jan 2024 21:13)  T(F): 97.8 (09 Jan 2024 05:41), Max: 98.6 (08 Jan 2024 21:13)  HR: 76 (09 Jan 2024 13:39) (74 - 80)  BP: 185/95 (09 Jan 2024 13:39) (157/85 - 185/95)  RR: 17 (09 Jan 2024 05:41) (16 - 18)  SpO2: 96% (09 Jan 2024 05:41) (96% - 98%)    Parameters below as of 09 Jan 2024 05:41  Patient On (Oxygen Delivery Method): room air    PHYSICAL EXAM:  GENERAL: NAD, lying in bed comfortably, shiley in place  HEAD:  Atraumatic, Normocephalic  NECK: Supple, No JVD  RESP: Clear to auscultation bilaterally, good air entry bilaterally; No wheezing, rales, or rhonchi. Unlabored respirations  CARDIAC: Regular rate and rhythm. S1 and S2. No murmurs, rubs, or gallops  GI:  Soft, Nontender, Nondistended. Bowel sounds present x4 quadrants; No hepatomegaly. No splenomegaly.  EXTREMITIES:  2+ Peripheral Pulses. Capillary refill <2 seconds. No clubbing, cyanosis, or edema  NERVOUS SYSTEM:  Alert & Oriented X3, speech clear. No deficits   MSK: FROM all 4 extremities, full and equal strength  SKIN: No rashes, bruises, or other lesions Mr. Garcia is a 59yo M with PMHx of CKD 3, HTN, lost to follow up with PCP (per chart review used to take medications for HTN, but patient denies) who presented to the ED on 1/3/24. Per patient, he began having subjective fevers and vomiting 2-4x/ day for 3 weeks with occasional small amounts of blood with inability to hold down solids or liquids along with diarrhea for 2 days x8 episodes. Patient also noted that for the past 4 years he has been getting chest pain while walking, which is getting worse. In the ED he endorsed central chest pain along with headache. He denied shortness of breath or palpitations. He also denied changes in urine output, frequency or dysuria. He notes a history of renal failure in his brother at 71yo requiring transplant. He denied recent traveling, sick exposure, NSAID use, exposure to Hep C, Hep B, HIV, new foods.       In the ED he was found to be afebrile, with a BP of 206/115, , RR 18 satting 99%RA. Labs were notable for WBC 7.58, Hb 8.2, BUN 94, Cr 17.09, troponin 221.     CTAP showed inadequately distended proximal colonic loop or query long segmental colitis. No bowel obstruction. 1L fluid and 10mg IV push of hydralazine was given in the ED.      The patient was admitted for renal failure on CKD stage 3 likely secondary to dehydration and hypertensive emergency, rule out ACS. The patient was continued on 100mg labetolol bid. And started on ceftriaxone and metronidazole in setting of suspected GI infection.     Later on the third the patient was found to meet sepsis criteria with a HR of 107, temp of 102 and RR 22. Gentle hydration with d5+ bicarb was given and work up was sent. Stool cultures were positive for ETEC, pt s/p ceftriaxone and metronidazole with good coverage. The patient was also found to be + for influenza A and symptoms were managed conservatively.     The patient was seen and evaluated by cardiology throughout their hospital stay and their recommendations were appreciated. The patient was continued on 200mg labetolol q8hrs for hypertension.      The patient was also seen and evaluated by nephrology for increasing creatinine. Shared decision making with the patient regarding management of worsening kidney function. The decision was made to obtain temporary access for HD and a shiley was placed on 1/6. The patient received HD on 1/6 and again on 1/8, tolerated well.      The patient was also found to be anemic on admission with hemoglobin levels dropping as low as 6.5. The patient signed consent forms for transfusion and received 1unit of packed red blood cells on 01/4/24 and another on 1/8/24 with dialysis with appropriate response.       The patients troponin was noted to be elevated on admission and continued to rise to 715.4 until peaking and coming back down. The patient was started on DAPT by cardiology even in the setting of anemia and atorvastatin.  Echo was without acute concerns other than pulmonary hypertension.     Suspect secondary to hypertensive emergency, however in setting of chest pain on admission and patient noting chest pain for the past 4 years on exertion, ischemic work up is warranted. Close communication between cardiology and nephrology was maintained. Nuclear stress test was performed on 1/9/24 and found to be negative for ischemic disease.    In the setting of persistently elevated Cr, s/p two rounds of hemodialysis 1/6 and 1/8, the patient will need permanent HD access. Per nephrology, Dr. Laird is recommending transfer to [facility] for permacath placement and renal biopsy. The patient is accepted at [facility] with attending Dr. Chery.        PHYSICAL EXAM:  Vital Signs Last 24 Hrs  T(C): 36.6 (09 Jan 2024 05:41), Max: 37 (08 Jan 2024 21:13)  T(F): 97.8 (09 Jan 2024 05:41), Max: 98.6 (08 Jan 2024 21:13)  HR: 76 (09 Jan 2024 13:39) (74 - 80)  BP: 185/95 (09 Jan 2024 13:39) (157/85 - 185/95)  RR: 17 (09 Jan 2024 05:41) (16 - 18)  SpO2: 96% (09 Jan 2024 05:41) (96% - 98%)    Parameters below as of 09 Jan 2024 05:41  Patient On (Oxygen Delivery Method): room air    PHYSICAL EXAM:  GENERAL: NAD, lying in bed comfortably, shiley in place  HEAD:  Atraumatic, Normocephalic  NECK: Supple, No JVD  RESP: Clear to auscultation bilaterally, good air entry bilaterally; No wheezing, rales, or rhonchi. Unlabored respirations  CARDIAC: Regular rate and rhythm. S1 and S2. No murmurs, rubs, or gallops  GI:  Soft, Nontender, Nondistended. Bowel sounds present x4 quadrants; No hepatomegaly. No splenomegaly.  EXTREMITIES:  2+ Peripheral Pulses. Capillary refill <2 seconds. No clubbing, cyanosis, or edema  NERVOUS SYSTEM:  Alert & Oriented X3, speech clear. No deficits   MSK: FROM all 4 extremities, full and equal strength  SKIN: No rashes, bruises, or other lesions Mr. Garcia is a 57yo M with PMHx of CKD 3, HTN, lost to follow up with PCP (per chart review used to take medications for HTN, but patient denies) who presented to the ED on 1/3/24. Per patient, he began having subjective fevers and vomiting 2-4x/ day for 3 weeks with occasional small amounts of blood with inability to hold down solids or liquids along with diarrhea for 2 days x8 episodes. Patient also noted that for the past 4 years he has been getting chest pain while walking, which is getting worse. In the ED he endorsed central chest pain along with headache. He denied shortness of breath or palpitations. He also denied changes in urine output, frequency or dysuria. He notes a history of renal failure in his brother at 71yo requiring transplant. He denied recent traveling, sick exposure, NSAID use, exposure to Hep C, Hep B, HIV, new foods.       In the ED he was found to be afebrile, with a BP of 206/115, , RR 18 satting 99%RA. Labs were notable for WBC 7.58, Hb 8.2, BUN 94, Cr 17.09, troponin 221.     CTAP showed inadequately distended proximal colonic loop or query long segmental colitis. No bowel obstruction. 1L fluid and 10mg IV push of hydralazine was given in the ED.      The patient was admitted for renal failure on CKD stage 3 likely secondary to dehydration and hypertensive emergency, rule out ACS. The patient was continued on 100mg labetolol bid. And started on ceftriaxone and metronidazole in setting of suspected GI infection.     Later on the third the patient was found to meet sepsis criteria with a HR of 107, temp of 102 and RR 22. Gentle hydration with d5+ bicarb was given and work up was sent. Stool cultures were positive for ETEC, pt s/p ceftriaxone and metronidazole with good coverage. The patient was also found to be + for influenza A and symptoms were managed conservatively.     The patient was seen and evaluated by cardiology throughout their hospital stay and their recommendations were appreciated. The patient was continued on 200mg labetolol q8hrs for hypertension.      The patient was also seen and evaluated by nephrology for increasing creatinine. Shared decision making with the patient regarding management of worsening kidney function. The decision was made to obtain temporary access for HD and a shiley was placed on 1/6. The patient received HD on 1/6 and again on 1/8, tolerated well.      The patient was also found to be anemic on admission with hemoglobin levels dropping as low as 6.5. The patient signed consent forms for transfusion and received 1unit of packed red blood cells on 01/4/24 and another on 1/8/24 with dialysis with appropriate response.       The patients troponin was noted to be elevated on admission and continued to rise to 715.4 until peaking and coming back down. The patient was started on DAPT by cardiology even in the setting of anemia and atorvastatin.  Echo was without acute concerns other than pulmonary hypertension.     Suspect secondary to hypertensive emergency, however in setting of chest pain on admission and patient noting chest pain for the past 4 years on exertion, ischemic work up is warranted. Close communication between cardiology and nephrology was maintained. Nuclear stress test was performed on 1/9/24 and found to be negative for ischemic disease.    In the setting of persistently elevated Cr, s/p two rounds of hemodialysis 1/6 and 1/8, the patient will need permanent HD access. Per nephrology, Dr. Laird is recommending transfer to [facility] for permacath placement and renal biopsy. The patient is accepted at [facility] with attending Dr. Chery.   Mr. Garcia is a 59yo M with PMHx of CKD 3, HTN, lost to follow up with PCP (per chart review used to take medications for HTN, but patient denies) who presented to the ED on 1/3/24. Per patient, he began having subjective fevers and vomiting 2-4x/ day for 3 weeks with occasional small amounts of blood with inability to hold down solids or liquids along with diarrhea for 2 days x8 episodes. Patient also noted that for the past 4 years he has been getting chest pain while walking, which is getting worse. In the ED he endorsed central chest pain along with headache. He denied shortness of breath or palpitations. He also denied changes in urine output, frequency or dysuria. He notes a history of renal failure in his brother at 69yo requiring transplant. He denied recent traveling, sick exposure, NSAID use, exposure to Hep C, Hep B, HIV, new foods.       In the ED he was found to be afebrile, with a BP of 206/115, , RR 18 satting 99%RA. Labs were notable for WBC 7.58, Hb 8.2, BUN 94, Cr 17.09, troponin 221.     CTAP showed inadequately distended proximal colonic loop or query long segmental colitis. No bowel obstruction. 1L fluid and 10mg IV push of hydralazine was given in the ED.      The patient was admitted for renal failure on CKD stage 3 likely secondary to dehydration and hypertensive emergency, rule out ACS. The patient was continued on 100mg labetolol bid. And started on ceftriaxone and metronidazole in setting of suspected GI infection.     Later on the third the patient was found to meet sepsis criteria with a HR of 107, temp of 102 and RR 22. Gentle hydration with d5+ bicarb was given and work up was sent. Stool cultures were positive for ETEC, pt s/p ceftriaxone and metronidazole with good coverage. The patient was also found to be + for influenza A and symptoms were managed conservatively.     The patient was seen and evaluated by cardiology throughout their hospital stay and their recommendations were appreciated. The patient was continued on 200mg labetolol q8hrs for hypertension.      The patient was also seen and evaluated by nephrology for increasing creatinine. Shared decision making with the patient regarding management of worsening kidney function. The decision was made to obtain temporary access for HD and a shiley was placed on 1/6. The patient received HD on 1/6 and again on 1/8, tolerated well.      The patient was also found to be anemic on admission with hemoglobin levels dropping as low as 6.5. The patient signed consent forms for transfusion and received 1unit of packed red blood cells on 01/4/24 and another on 1/8/24 with dialysis with appropriate response.       The patients troponin was noted to be elevated on admission and continued to rise to 715.4 until peaking and coming back down. The patient was started on DAPT by cardiology even in the setting of anemia and atorvastatin.  Echo was without acute concerns other than pulmonary hypertension.     Suspect secondary to hypertensive emergency, however in setting of chest pain on admission and patient noting chest pain for the past 4 years on exertion, ischemic work up is warranted. Close communication between cardiology and nephrology was maintained. Nuclear stress test was performed on 1/9/24 and found to be negative for ischemic disease.    In the setting of persistently elevated Cr, s/p two rounds of hemodialysis 1/6 and 1/8, the patient will need permanent HD access. Per nephrology, Dr. Laird is recommending transfer to [facility] for permacath placement and renal biopsy. The patient is accepted at [facility] with attending Dr. Chery.   Mr. Garcia is a 59yo M with PMHx of CKD 3, HTN, lost to follow up with PCP (per chart review used to take medications for HTN, but patient denies) who presented to the ED on 1/3/24. Per patient, he began having subjective fevers and vomiting 2-4x/ day for 3 weeks with occasional small amounts of blood with inability to hold down solids or liquids along with diarrhea for 2 days x8 episodes. Patient also noted that for the past 4 years he has been getting chest pain while walking, which is getting worse. In the ED he endorsed central chest pain along with headache. He denied shortness of breath or palpitations. He also denied changes in urine output, frequency or dysuria. He notes a history of renal failure in his brother at 69yo requiring transplant. He denied recent traveling, sick exposure, NSAID use, exposure to Hep C, Hep B, HIV, new foods.       In the ED he was found to be afebrile, with a BP of 206/115, , RR 18 satting 99%RA. Labs were notable for WBC 7.58, Hb 8.2, BUN 94, Cr 17.09, troponin 221.     CTAP showed inadequately distended proximal colonic loop or query long segmental colitis. No bowel obstruction. 1L fluid and 10mg IV push of hydralazine was given in the ED.      The patient was admitted for renal failure on CKD stage 3 likely secondary to dehydration and hypertensive emergency, rule out ACS. The patient was continued on 100mg labetolol bid. And started on ceftriaxone and metronidazole in setting of suspected GI infection.     Later on the third the patient was found to meet sepsis criteria with a HR of 107, temp of 102 and RR 22. Gentle hydration with d5+ bicarb was given and work up was sent. Stool cultures were positive for ETEC, pt s/p ceftriaxone and metronidazole with good coverage. The patient was also found to be + for influenza A and symptoms were managed conservatively.     The patient was seen and evaluated by cardiology throughout their hospital stay and their recommendations were appreciated. The patient was continued on 200mg labetolol q8hrs for hypertension.      The patient was also seen and evaluated by nephrology for increasing creatinine. Shared decision making with the patient regarding management of worsening kidney function. The decision was made to obtain temporary access for HD and a shiley was placed on 1/6. The patient received HD on 1/6 and again on 1/8, tolerated well.      The patient was also found to be anemic on admission with hemoglobin levels dropping as low as 6.5. The patient signed consent forms for transfusion and received 1unit of packed red blood cells on 01/4/24 and another on 1/8/24 with dialysis with appropriate response.       The patients troponin was noted to be elevated on admission and continued to rise to 715.4 until peaking and coming back down. The patient was started on DAPT by cardiology even in the setting of anemia and atorvastatin.  Echo was without acute concerns other than pulmonary hypertension.     Suspect secondary to hypertensive emergency, however in setting of chest pain on admission and patient noting chest pain for the past 4 years on exertion, ischemic work up is warranted. Close communication between cardiology and nephrology was maintained. Nuclear stress test was performed on 1/9/24 and found to be negative for ischemic disease.    In the setting of persistently elevated Cr, s/p multiple rounds of hemodialysis 1/6, 1/8, 1/10 and 1/12; the patient will need permanent HD access. Per nephrology, Dr. Laird is recommending transfer to [facility] for permacath placement and renal biopsy. The patient is accepted at [facility] with attending Dr. Chery.   Mr. Garcia is a 59yo M with PMHx of CKD 3, HTN, lost to follow up with PCP (per chart review used to take medications for HTN, but patient denies) who presented to the ED on 1/3/24. Per patient, he began having subjective fevers and vomiting 2-4x/ day for 3 weeks with occasional small amounts of blood with inability to hold down solids or liquids along with diarrhea for 2 days x8 episodes. Patient also noted that for the past 4 years he has been getting chest pain while walking, which is getting worse. In the ED he endorsed central chest pain along with headache. He denied shortness of breath or palpitations. He also denied changes in urine output, frequency or dysuria. He notes a history of renal failure in his brother at 71yo requiring transplant. He denied recent traveling, sick exposure, NSAID use, exposure to Hep C, Hep B, HIV, new foods.       In the ED he was found to be afebrile, with a BP of 206/115, , RR 18 satting 99%RA. Labs were notable for WBC 7.58, Hb 8.2, BUN 94, Cr 17.09, troponin 221.     CTAP showed inadequately distended proximal colonic loop or query long segmental colitis. No bowel obstruction. 1L fluid and 10mg IV push of hydralazine was given in the ED.      The patient was admitted for renal failure on CKD stage 3 likely secondary to dehydration and hypertensive emergency, rule out ACS. The patient was continued on 100mg labetolol bid. And started on ceftriaxone and metronidazole in setting of suspected GI infection.     Later on the third the patient was found to meet sepsis criteria with a HR of 107, temp of 102 and RR 22. Gentle hydration with d5+ bicarb was given and work up was sent. Stool cultures were positive for ETEC, pt s/p ceftriaxone and metronidazole with good coverage. The patient was also found to be + for influenza A and symptoms were managed conservatively.     The patient was seen and evaluated by cardiology throughout their hospital stay and their recommendations were appreciated. The patient was continued on 200mg labetolol q8hrs for hypertension.      The patient was also seen and evaluated by nephrology for increasing creatinine. Shared decision making with the patient regarding management of worsening kidney function. The decision was made to obtain temporary access for HD and a shiley was placed on 1/6. The patient received HD on 1/6 and again on 1/8, tolerated well.      The patient was also found to be anemic on admission with hemoglobin levels dropping as low as 6.5. The patient signed consent forms for transfusion and received 1unit of packed red blood cells on 01/4/24 and another on 1/8/24 with dialysis with appropriate response.       The patients troponin was noted to be elevated on admission and continued to rise to 715.4 until peaking and coming back down. The patient was started on DAPT by cardiology even in the setting of anemia and atorvastatin.  Echo was without acute concerns other than pulmonary hypertension.     Suspect secondary to hypertensive emergency, however in setting of chest pain on admission and patient noting chest pain for the past 4 years on exertion, ischemic work up is warranted. Close communication between cardiology and nephrology was maintained. Nuclear stress test was performed on 1/9/24 and found to be negative for ischemic disease.    In the setting of persistently elevated Cr, s/p multiple rounds of hemodialysis 1/6, 1/8, 1/10 and 1/12; the patient will need permanent HD access. Per nephrology, Dr. Laird is recommending transfer to [facility] for permacath placement and renal biopsy. The patient is accepted at [facility] with attending Dr. Chery.

## 2024-01-08 NOTE — PROGRESS NOTE ADULT - PROBLEM SELECTOR PLAN 2
-r/o other causes of other intrinsic renal disease ie nephritic/nephrotic syndrome, hepatitis  -s/p CTAP with IV contrast  -History of HTN, not on medications (poor medical follow up)  -BUN 94, Cr 17.09  -Cr 18.0  -urine with 300 protein, small blood, 100 glucose  -a1c and lipid panel wnl  -s/p 1L fluid in ED  -s/p 10mg hydralazine IV push  -c/w 100mg BID labetalol  - Autoimmune work up unrevealing   -Hep B and hep C neg  -F/up renal serologies  -Nephrology following, recommend gentle hydration with IVF and bicarb  -Pro remain in until 24 urine protein collection complete per nephrology, remove Sunday 1/7  -Shiley placed 1/6/24 by ICU PA, pt to get HD 1/6 and 1/8. Plan for possible xfer for angio vs shuttle for coronary CT pending Cardio recommendation. -r/o other causes of other intrinsic renal disease ie nephritic/nephrotic syndrome, hepatitis  -s/p CTAP with IV contrast  -History of HTN, not on medications (poor medical follow up)  - Autoimmune work up unrevealing   -Hep B and hep C neg  -urine with 300 protein, small blood, 100 glucose  -SPEP, renal serologies negative  -a1c and lipid panel wnl  -BUN 94, Cr 17.09 on admission, up to 18.14  -Cr 14.54 today  -c/w 200mg BID labetalol  -Nephrology following, recommend gentle hydration with IVF and bicarb  -S/p leblanc removal  -Shiley placed 1/6/24 by ICU PA, pt s/p HD 1/6, HD today 1/8 ordered with 1unit pRBCs  -Plan for nuclear stress test tomorrow, possible transfer after to [facility] for cath, NPO after midnight -r/o other causes of other intrinsic renal disease ie nephritic/nephrotic syndrome, hepatitis  -s/p CTAP with IV contrast  -History of HTN, not on medications (poor medical follow up)  - Autoimmune work up unrevealing   -Hep B and hep C neg  -urine with 300 protein, small blood, 100 glucose  -SPEP, renal serologies negative  -a1c and lipid panel wnl  -BUN 94, Cr 17.09 on admission, up to 18.14  -Cr 14.54 today  -c/w 200mg BID labetalol  -Nephrology following, recommend gentle hydration with IVF and bicarb  -S/p leblanc removal  -Shiley placed 1/6/24 by ICU PA, pt s/p HD 1/6, HD today 1/8 ordered with 1unit pRBCs  -Plan for nuclear stress test tomorrow, possible transfer after to [facility] for cath, NPO after midnight

## 2024-01-08 NOTE — PROGRESS NOTE ADULT - PROBLEM SELECTOR PLAN 1
-S/p CTAP with IV contrast  -History of HTN, not on medications (poor medical follow up)  -Urine with 300 protein, small blood, 100 glucose  -S/p 1L fluid in ED  -S/p 10mg hydralazine IV push  -C/w 200mg labetolol q8hrs  -Continue to monitor BP  -Appreciate nephrology recommendations  -Appreciate cardiology recommendations, will continue tele monitoring- plan for transfer to tertiary center for ischemic work up once renally stable -S/p CTAP with IV contrast  -History of HTN, not on medications (poor medical follow up)  -Urine with 300 protein, small blood, 100 glucose  -S/p 1L fluid in ED  -S/p 10mg hydralazine IV push  -C/w 200mg labetolol q8hrs  -Continue to monitor BP  -Appreciate nephrology recommendations  -Appreciate cardiology recommendations, will continue tele monitoring- plan for transfer to tertiary center for ischemic work up once renally stable, Nuclear stress test tomorrow, NPO after midnight

## 2024-01-08 NOTE — DISCHARGE NOTE PROVIDER - NSDCCPCAREPLAN_GEN_ALL_CORE_FT
PRINCIPAL DISCHARGE DIAGNOSIS  Diagnosis: ACS (acute coronary syndrome)  Assessment and Plan of Treatment:       SECONDARY DISCHARGE DIAGNOSES  Diagnosis: Acute renal failure superimposed on chronic kidney disease, unspecified acute renal failure type, unspecified CKD stage  Assessment and Plan of Treatment:      PRINCIPAL DISCHARGE DIAGNOSIS  Diagnosis: Acute renal failure superimposed on chronic kidney disease, unspecified acute renal failure type, unspecified CKD stage  Assessment and Plan of Treatment:       SECONDARY DISCHARGE DIAGNOSES  Diagnosis: Hypertensive emergency  Assessment and Plan of Treatment:     Diagnosis: Anemia  Assessment and Plan of Treatment:      PRINCIPAL DISCHARGE DIAGNOSIS  Diagnosis: Acute renal failure superimposed on chronic kidney disease, unspecified acute renal failure type, unspecified CKD stage  Assessment and Plan of Treatment: Patient noted to have ARF and seen by nephro, requiring HD. Likely with ARF due to uncontrolled BP- started on labetolol 200mg TID and amlodipine 5mg daily with control of BP. Patient now to be transferred to [facility] for permacath placement and for renal biospy      SECONDARY DISCHARGE DIAGNOSES  Diagnosis: Hypertensive emergency  Assessment and Plan of Treatment: Patient noted to have elevated trop, chest pain and HTN emergency. Seen by cardio, trops monitored and had nuclear stress test done which was negative. As per cardio, patient to continue aspirin 81mg daily which can be held for procedures, atorvastatin and beta blocker. Patient to followup with cardio outpatient    Diagnosis: Anemia  Assessment and Plan of Treatment: Patient noted to have anemia likely due to CKD- required blood transfusions and tolerated well     PRINCIPAL DISCHARGE DIAGNOSIS  Diagnosis: Acute renal failure superimposed on chronic kidney disease, unspecified acute renal failure type, unspecified CKD stage  Assessment and Plan of Treatment: Patient noted to have ARF and seen by nephro, requiring HD. Likely with ARF due to uncontrolled BP- started on labetolol 200mg TID and amlodipine 5mg daily with control of BP. Patient now to be transferred to [facility] for permacath placement and for renal biospy      SECONDARY DISCHARGE DIAGNOSES  Diagnosis: Hypertensive emergency  Assessment and Plan of Treatment: Patient noted to have elevated trop, chest pain and HTN emergency. Seen by cardio, trops monitored and had nuclear stress test done which was negative. As per cardio, patient to continue aspirin 81mg daily which can be held for procedures, atorvastatin and beta blocker. Patient to followup with cardio outpatient    Diagnosis: Anemia  Assessment and Plan of Treatment: Patient noted to have anemia likely due to CKD- required blood transfusions and tolerated well

## 2024-01-08 NOTE — PROGRESS NOTE ADULT - SUBJECTIVE AND OBJECTIVE BOX
Mr. Garcia is a 59yo M with PMHx of CKD 3, HTN, lost to follow up with PCP (per chart review used to take medications for HTN, but patient denies) presenting for vomiting times 3 weeks. Per patient, he began having subjective fevers and vomiting 2-4x/ day for 3 weeks with occasional small amounts of blood  with inability to hold down solids or liquids along with diarrhea for 2 days x8 episodes. Patient also notes that for the past 4 years he has been getting chest pain while walking, which is getting worse. Currently endorsing worsening central chest  pain in the ED along with a headache. Denies shortness of breath or palpitations. Denies changes in urine output , frequency or dysuria. Notes hx of renal failure in his brother at 71yo requiring transplant. Denied recent traveling, sick exposure, NSAID use, exposure to Hep C, Hep B, HIV, new foods. The patient was admitted for renal failure and r/o ACS.    1/7/24 AM: Patient retching this AM small amount of vomitus, given Zofran with improvement, otherwise feeling well.      REVIEW OF SYSTEMS:  CONSTITUTIONAL: (-) weakness, (-) fevers, (-) chills  RESPIRATORY:  (-) shortness of breath, (-) cough,  (-) wheezing,  (-) hemoptysis   CARDIOVASCULAR:  (+) chest pain, (-) palpitations  GASTROINTESTINAL:  (+) abdominal or epigastric pain, (-) nausea, (-) vomiting, (-) diarrhea, (-) constipation, (-) melena,  (-) hematemesis,  (-) hematochezia  GENITOURINARY: (-) dysuria, (-) frequency, (-) hematuria  NEUROLOGICAL: (-) numbness, (-) weakness  SKIN: (-) itching, (-) rashes, (-) lesions    PHYSICAL EXAM:  Vital Signs Last 24 Hrs  T(C): 37.1 (07 Jan 2024 05:35), Max: 37.1 (06 Jan 2024 20:30)  T(F): 98.7 (07 Jan 2024 05:35), Max: 98.7 (06 Jan 2024 20:30)  HR: 77 (07 Jan 2024 05:35) (70 - 82)  BP: 137/77 (07 Jan 2024 05:35) (116/72 - 162/84)  RR: 17 (07 Jan 2024 05:35) (17 - 19)  SpO2: 93% (07 Jan 2024 05:35) (93% - 95%)    Parameters below as of 07 Jan 2024 05:35  Patient On (Oxygen Delivery Method): room air        PHYSICAL EXAM:  GENERAL: NAD, lying in bed,  HEAD:  Atraumatic, Normocephalic  RESP: Clear to auscultation bilaterally, good air entry bilaterally; No wheezing, rales, or rhonchi. Unlabored respirations  CARDIAC: Regular rate and rhythm. S1 and S2. No murmurs, rubs, or gallops  GI:  Soft, Nontender, Nondistended. Bowel sounds present x4 quadrants; No hepatomegaly. No splenomegaly.  EXTREMITIES:  2+ Peripheral Pulses. Capillary refill <2 seconds. No clubbing, cyanosis, or edema  NERVOUS SYSTEM:  Alert & Oriented X3, speech clear. No deficits   MSK: FROM all 4 extremities, full and equal strength  SKIN: No rashes, bruises, or other lesions    MEDICATIONS  (STANDING):  amLODIPine   Tablet 10 milliGRAM(s) Oral at bedtime  aspirin  chewable 81 milliGRAM(s) Oral daily  atorvastatin 40 milliGRAM(s) Oral at bedtime  clopidogrel Tablet 75 milliGRAM(s) Oral daily  dextrose 5% 1000 milliLiter(s) (50 mL/Hr) IV Continuous <Continuous>  heparin   Injectable 5000 Unit(s) SubCutaneous every 12 hours  labetalol 200 milliGRAM(s) Oral every 8 hours    MEDICATIONS  (PRN):  acetaminophen     Tablet .. 650 milliGRAM(s) Oral every 6 hours PRN Temp greater or equal to 38C (100.4F), Mild Pain (1 - 3)  aluminum hydroxide/magnesium hydroxide/simethicone Suspension 30 milliLiter(s) Oral every 4 hours PRN Dyspepsia  benzonatate 100 milliGRAM(s) Oral three times a day PRN Cough  melatonin 3 milliGRAM(s) Oral at bedtime PRN Insomnia  ondansetron Injectable 4 milliGRAM(s) IV Push every 8 hours PRN Nausea and/or Vomiting        LABS                        7.7    3.91  )-----------( 239      ( 06 Jan 2024 06:45 )             20.8     06 Jan 2024 06:45    129    |  88     |  116    ----------------------------<  120    4.1     |  25     |  18.00    Ca    <5.0       06 Jan 2024 06:45  Phos  11.9      06 Jan 2024 06:45  Mg     1.9       06 Jan 2024 06:45    TPro  6.0    /  Alb  2.1    /  TBili  0.5    /  DBili  x      /  AST  18     /  ALT  14     /  AlkPhos  86     06 Jan 2024 06:45      CAPILLARY BLOOD GLUCOSE    LIVER FUNCTIONS - ( 06 Jan 2024 06:45 )  Alb: 2.1 g/dL / Pro: 6.0 g/dL / ALK PHOS: 86 U/L / ALT: 14 U/L / AST: 18 U/L / GGT: x           Urinalysis Basic - ( 06 Jan 2024 06:45 )    Color: x / Appearance: x / SG: x / pH: x  Gluc: 120 mg/dL / Ketone: x  / Bili: x / Urobili: x   Blood: x / Protein: x / Nitrite: x   Leuk Esterase: x / RBC: x / WBC x   Sq Epi: x / Non Sq Epi: x / Bacteria: x        CT ABDOMEN AND PELVIS  IMPRESSION:    Inadequately distended proximal colonic loop or query long segmental   colitis. No bowel obstruction. Nonemergent colonoscopy suggested.    --- End of Report ---    MARIANO COPELAND MD; Attending Radiologist  This document has been electronically signed. Froy  3 2024  2:58AM         Mr. Garcia is a 57yo M with PMHx of CKD 3, HTN, lost to follow up with PCP (per chart review used to take medications for HTN, but patient denies) presenting for vomiting times 3 weeks. Per patient, he began having subjective fevers and vomiting 2-4x/ day for 3 weeks with occasional small amounts of blood  with inability to hold down solids or liquids along with diarrhea for 2 days x8 episodes. Patient also notes that for the past 4 years he has been getting chest pain while walking, which is getting worse. Currently endorsing worsening central chest  pain in the ED along with a headache. Denies shortness of breath or palpitations. Denies changes in urine output , frequency or dysuria. Notes hx of renal failure in his brother at 71yo requiring transplant. Denied recent traveling, sick exposure, NSAID use, exposure to Hep C, Hep B, HIV, new foods. The patient was admitted for renal failure and r/o ACS.    1/7/24 AM: Patient retching this AM small amount of vomitus, given Zofran with improvement, otherwise feeling well.      REVIEW OF SYSTEMS:  CONSTITUTIONAL: (-) weakness, (-) fevers, (-) chills  RESPIRATORY:  (-) shortness of breath, (-) cough,  (-) wheezing,  (-) hemoptysis   CARDIOVASCULAR:  (+) chest pain, (-) palpitations  GASTROINTESTINAL:  (+) abdominal or epigastric pain, (-) nausea, (-) vomiting, (-) diarrhea, (-) constipation, (-) melena,  (-) hematemesis,  (-) hematochezia  GENITOURINARY: (-) dysuria, (-) frequency, (-) hematuria  NEUROLOGICAL: (-) numbness, (-) weakness  SKIN: (-) itching, (-) rashes, (-) lesions    PHYSICAL EXAM:  Vital Signs Last 24 Hrs  T(C): 37.1 (07 Jan 2024 05:35), Max: 37.1 (06 Jan 2024 20:30)  T(F): 98.7 (07 Jan 2024 05:35), Max: 98.7 (06 Jan 2024 20:30)  HR: 77 (07 Jan 2024 05:35) (70 - 82)  BP: 137/77 (07 Jan 2024 05:35) (116/72 - 162/84)  RR: 17 (07 Jan 2024 05:35) (17 - 19)  SpO2: 93% (07 Jan 2024 05:35) (93% - 95%)    Parameters below as of 07 Jan 2024 05:35  Patient On (Oxygen Delivery Method): room air        PHYSICAL EXAM:  GENERAL: NAD, lying in bed,  HEAD:  Atraumatic, Normocephalic  RESP: Clear to auscultation bilaterally, good air entry bilaterally; No wheezing, rales, or rhonchi. Unlabored respirations  CARDIAC: Regular rate and rhythm. S1 and S2. No murmurs, rubs, or gallops  GI:  Soft, Nontender, Nondistended. Bowel sounds present x4 quadrants; No hepatomegaly. No splenomegaly.  EXTREMITIES:  2+ Peripheral Pulses. Capillary refill <2 seconds. No clubbing, cyanosis, or edema  NERVOUS SYSTEM:  Alert & Oriented X3, speech clear. No deficits   MSK: FROM all 4 extremities, full and equal strength  SKIN: No rashes, bruises, or other lesions    MEDICATIONS  (STANDING):  amLODIPine   Tablet 10 milliGRAM(s) Oral at bedtime  aspirin  chewable 81 milliGRAM(s) Oral daily  atorvastatin 40 milliGRAM(s) Oral at bedtime  clopidogrel Tablet 75 milliGRAM(s) Oral daily  dextrose 5% 1000 milliLiter(s) (50 mL/Hr) IV Continuous <Continuous>  heparin   Injectable 5000 Unit(s) SubCutaneous every 12 hours  labetalol 200 milliGRAM(s) Oral every 8 hours    MEDICATIONS  (PRN):  acetaminophen     Tablet .. 650 milliGRAM(s) Oral every 6 hours PRN Temp greater or equal to 38C (100.4F), Mild Pain (1 - 3)  aluminum hydroxide/magnesium hydroxide/simethicone Suspension 30 milliLiter(s) Oral every 4 hours PRN Dyspepsia  benzonatate 100 milliGRAM(s) Oral three times a day PRN Cough  melatonin 3 milliGRAM(s) Oral at bedtime PRN Insomnia  ondansetron Injectable 4 milliGRAM(s) IV Push every 8 hours PRN Nausea and/or Vomiting        LABS                        7.7    3.91  )-----------( 239      ( 06 Jan 2024 06:45 )             20.8     06 Jan 2024 06:45    129    |  88     |  116    ----------------------------<  120    4.1     |  25     |  18.00    Ca    <5.0       06 Jan 2024 06:45  Phos  11.9      06 Jan 2024 06:45  Mg     1.9       06 Jan 2024 06:45    TPro  6.0    /  Alb  2.1    /  TBili  0.5    /  DBili  x      /  AST  18     /  ALT  14     /  AlkPhos  86     06 Jan 2024 06:45      CAPILLARY BLOOD GLUCOSE    LIVER FUNCTIONS - ( 06 Jan 2024 06:45 )  Alb: 2.1 g/dL / Pro: 6.0 g/dL / ALK PHOS: 86 U/L / ALT: 14 U/L / AST: 18 U/L / GGT: x           Urinalysis Basic - ( 06 Jan 2024 06:45 )    Color: x / Appearance: x / SG: x / pH: x  Gluc: 120 mg/dL / Ketone: x  / Bili: x / Urobili: x   Blood: x / Protein: x / Nitrite: x   Leuk Esterase: x / RBC: x / WBC x   Sq Epi: x / Non Sq Epi: x / Bacteria: x        CT ABDOMEN AND PELVIS  IMPRESSION:    Inadequately distended proximal colonic loop or query long segmental   colitis. No bowel obstruction. Nonemergent colonoscopy suggested.    --- End of Report ---    MARIANO COPELAND MD; Attending Radiologist  This document has been electronically signed. Froy  3 2024  2:58AM         Mr. Garcia is a 57yo M with PMHx of CKD 3, HTN, lost to follow up with PCP (per chart review used to take medications for HTN, but patient denies) presenting for vomiting times 3 weeks. Per patient, he began having subjective fevers and vomiting 2-4x/ day for 3 weeks with occasional small amounts of blood  with inability to hold down solids or liquids along with diarrhea for 2 days x8 episodes. Patient also notes that for the past 4 years he has been getting chest pain while walking, which is getting worse. Currently endorsing worsening central chest  pain in the ED along with a headache. Denies shortness of breath or palpitations. Denies changes in urine output , frequency or dysuria. Notes hx of renal failure in his brother at 69yo requiring transplant. Denied recent traveling, sick exposure, NSAID use, exposure to Hep C, Hep B, HIV, new foods. The patient was admitted for renal failure and r/o ACS.    1/8/24 AM: Patient seen and examined at bedside today, laying comfortably, no acute complaints.       REVIEW OF SYSTEMS:  CONSTITUTIONAL: (-) weakness, (-) fevers, (-) chills  RESPIRATORY:  (-) shortness of breath, (-) cough,  (-) wheezing,  (-) hemoptysis   CARDIOVASCULAR:  (+) chest pain, (-) palpitations  GASTROINTESTINAL:  (+) abdominal or epigastric pain, (-) nausea, (-) vomiting, (-) diarrhea, (-) constipation, (-) melena,  (-) hematemesis,  (-) hematochezia  GENITOURINARY: (-) dysuria, (-) frequency, (-) hematuria  NEUROLOGICAL: (-) numbness, (-) weakness  SKIN: (-) itching, (-) rashes, (-) lesions    Vital Signs Last 24 Hrs  T(C): 36.4 (08 Jan 2024 12:50), Max: 36.8 (07 Jan 2024 21:01)  T(F): 97.6 (08 Jan 2024 12:50), Max: 98.2 (07 Jan 2024 21:01)  HR: 69 (08 Jan 2024 12:50) (66 - 74)  BP: 158/88 (08 Jan 2024 12:50) (120/66 - 160/92)  RR: 16 (08 Jan 2024 12:50) (16 - 20)  SpO2: 98% (08 Jan 2024 12:50) (95% - 98%)    Parameters below as of 08 Jan 2024 12:50  Patient On (Oxygen Delivery Method): room air      PHYSICAL EXAM:  GENERAL: NAD, lying in bed, R shiley catheter in place  HEAD:  Atraumatic, Normocephalic  RESP: Clear to auscultation bilaterally, good air entry bilaterally; No wheezing, rales, or rhonchi. Unlabored respirations  CARDIAC: Regular rate and rhythm. S1 and S2. No murmurs, rubs, or gallops  GI:  Soft, Nontender, Nondistended. Bowel sounds present x4 quadrants; No hepatomegaly. No splenomegaly.  : Pro discontinued  EXTREMITIES:  2+ Peripheral Pulses. Capillary refill <2 seconds. No clubbing, cyanosis, or edema  NERVOUS SYSTEM:  Alert & Oriented X3, speech clear. No deficits   MSK: FROM all 4 extremities, full and equal strength  SKIN: No rashes, bruises, or other lesions      MEDICATIONS  (STANDING):  aspirin  chewable 81 milliGRAM(s) Oral daily  atorvastatin 40 milliGRAM(s) Oral at bedtime  calcium acetate 1334 milliGRAM(s) Oral three times a day with meals  chlorhexidine 2% Cloths 1 Application(s) Topical <User Schedule>  clopidogrel Tablet 75 milliGRAM(s) Oral daily  heparin   Injectable 5000 Unit(s) SubCutaneous every 12 hours  labetalol 200 milliGRAM(s) Oral every 8 hours  polyethylene glycol 3350 17 Gram(s) Oral daily  PPD  5 Tuberculin Unit(s) Injectable 5 Unit(s) IntraDermal once  regadenoson Injectable 0.4 milliGRAM(s) IV Push once  senna 2 Tablet(s) Oral at bedtime    MEDICATIONS  (PRN):  acetaminophen     Tablet .. 650 milliGRAM(s) Oral every 6 hours PRN Temp greater or equal to 38C (100.4F), Mild Pain (1 - 3)  aluminum hydroxide/magnesium hydroxide/simethicone Suspension 30 milliLiter(s) Oral every 4 hours PRN Dyspepsia  benzonatate 100 milliGRAM(s) Oral three times a day PRN Cough  melatonin 3 milliGRAM(s) Oral at bedtime PRN Insomnia  ondansetron Injectable 4 milliGRAM(s) IV Push every 8 hours PRN Nausea and/or Vomiting  sodium chloride 0.9% lock flush 10 milliLiter(s) IV Push every 1 hour PRN Pre/post blood products, medications, blood draw, and to maintain line patency        LABS                                   7.5    4.02  )-----------( 213      ( 08 Jan 2024 05:50 )             21.2     08 Jan 2024 05:50    134    |  96     |  83     ----------------------------<  90     4.2     |  25     |  14.54    Ca    5.5        08 Jan 2024 05:50  Phos  9.4       08 Jan 2024 05:50  Mg     2.0       07 Jan 2024 06:00    TPro  6.0    /  Alb  2.1    /  TBili  0.7    /  DBili  x      /  AST  60     /  ALT  38     /  AlkPhos  97     08 Jan 2024 05:50      CAPILLARY BLOOD GLUCOSE    LIVER FUNCTIONS - ( 08 Jan 2024 05:50 )  Alb: 2.1 g/dL / Pro: 6.0 g/dL / ALK PHOS: 97 U/L / ALT: 38 U/L / AST: 60 U/L / GGT: x           Urinalysis Basic - ( 08 Jan 2024 05:50 )    Color: x / Appearance: x / SG: x / pH: x  Gluc: 90 mg/dL / Ketone: x  / Bili: x / Urobili: x   Blood: x / Protein: x / Nitrite: x   Leuk Esterase: x / RBC: x / WBC x   Sq Epi: x / Non Sq Epi: x / Bacteria: x            CT ABDOMEN AND PELVIS  IMPRESSION:    Inadequately distended proximal colonic loop or query long segmental   colitis. No bowel obstruction. Nonemergent colonoscopy suggested.    --- End of Report ---    MARIANO COPELAND MD; Attending Radiologist  This document has been electronically signed. Froy  3 2024  2:58AM         Mr. Garcia is a 59yo M with PMHx of CKD 3, HTN, lost to follow up with PCP (per chart review used to take medications for HTN, but patient denies) presenting for vomiting times 3 weeks. Per patient, he began having subjective fevers and vomiting 2-4x/ day for 3 weeks with occasional small amounts of blood  with inability to hold down solids or liquids along with diarrhea for 2 days x8 episodes. Patient also notes that for the past 4 years he has been getting chest pain while walking, which is getting worse. Currently endorsing worsening central chest  pain in the ED along with a headache. Denies shortness of breath or palpitations. Denies changes in urine output , frequency or dysuria. Notes hx of renal failure in his brother at 71yo requiring transplant. Denied recent traveling, sick exposure, NSAID use, exposure to Hep C, Hep B, HIV, new foods. The patient was admitted for renal failure and r/o ACS.    1/8/24 AM: Patient seen and examined at bedside today, laying comfortably, no acute complaints.       REVIEW OF SYSTEMS:  CONSTITUTIONAL: (-) weakness, (-) fevers, (-) chills  RESPIRATORY:  (-) shortness of breath, (-) cough,  (-) wheezing,  (-) hemoptysis   CARDIOVASCULAR:  (+) chest pain, (-) palpitations  GASTROINTESTINAL:  (+) abdominal or epigastric pain, (-) nausea, (-) vomiting, (-) diarrhea, (-) constipation, (-) melena,  (-) hematemesis,  (-) hematochezia  GENITOURINARY: (-) dysuria, (-) frequency, (-) hematuria  NEUROLOGICAL: (-) numbness, (-) weakness  SKIN: (-) itching, (-) rashes, (-) lesions    Vital Signs Last 24 Hrs  T(C): 36.4 (08 Jan 2024 12:50), Max: 36.8 (07 Jan 2024 21:01)  T(F): 97.6 (08 Jan 2024 12:50), Max: 98.2 (07 Jan 2024 21:01)  HR: 69 (08 Jan 2024 12:50) (66 - 74)  BP: 158/88 (08 Jan 2024 12:50) (120/66 - 160/92)  RR: 16 (08 Jan 2024 12:50) (16 - 20)  SpO2: 98% (08 Jan 2024 12:50) (95% - 98%)    Parameters below as of 08 Jan 2024 12:50  Patient On (Oxygen Delivery Method): room air      PHYSICAL EXAM:  GENERAL: NAD, lying in bed, R shiley catheter in place  HEAD:  Atraumatic, Normocephalic  RESP: Clear to auscultation bilaterally, good air entry bilaterally; No wheezing, rales, or rhonchi. Unlabored respirations  CARDIAC: Regular rate and rhythm. S1 and S2. No murmurs, rubs, or gallops  GI:  Soft, Nontender, Nondistended. Bowel sounds present x4 quadrants; No hepatomegaly. No splenomegaly.  : Pro discontinued  EXTREMITIES:  2+ Peripheral Pulses. Capillary refill <2 seconds. No clubbing, cyanosis, or edema  NERVOUS SYSTEM:  Alert & Oriented X3, speech clear. No deficits   MSK: FROM all 4 extremities, full and equal strength  SKIN: No rashes, bruises, or other lesions      MEDICATIONS  (STANDING):  aspirin  chewable 81 milliGRAM(s) Oral daily  atorvastatin 40 milliGRAM(s) Oral at bedtime  calcium acetate 1334 milliGRAM(s) Oral three times a day with meals  chlorhexidine 2% Cloths 1 Application(s) Topical <User Schedule>  clopidogrel Tablet 75 milliGRAM(s) Oral daily  heparin   Injectable 5000 Unit(s) SubCutaneous every 12 hours  labetalol 200 milliGRAM(s) Oral every 8 hours  polyethylene glycol 3350 17 Gram(s) Oral daily  PPD  5 Tuberculin Unit(s) Injectable 5 Unit(s) IntraDermal once  regadenoson Injectable 0.4 milliGRAM(s) IV Push once  senna 2 Tablet(s) Oral at bedtime    MEDICATIONS  (PRN):  acetaminophen     Tablet .. 650 milliGRAM(s) Oral every 6 hours PRN Temp greater or equal to 38C (100.4F), Mild Pain (1 - 3)  aluminum hydroxide/magnesium hydroxide/simethicone Suspension 30 milliLiter(s) Oral every 4 hours PRN Dyspepsia  benzonatate 100 milliGRAM(s) Oral three times a day PRN Cough  melatonin 3 milliGRAM(s) Oral at bedtime PRN Insomnia  ondansetron Injectable 4 milliGRAM(s) IV Push every 8 hours PRN Nausea and/or Vomiting  sodium chloride 0.9% lock flush 10 milliLiter(s) IV Push every 1 hour PRN Pre/post blood products, medications, blood draw, and to maintain line patency        LABS                                   7.5    4.02  )-----------( 213      ( 08 Jan 2024 05:50 )             21.2     08 Jan 2024 05:50    134    |  96     |  83     ----------------------------<  90     4.2     |  25     |  14.54    Ca    5.5        08 Jan 2024 05:50  Phos  9.4       08 Jan 2024 05:50  Mg     2.0       07 Jan 2024 06:00    TPro  6.0    /  Alb  2.1    /  TBili  0.7    /  DBili  x      /  AST  60     /  ALT  38     /  AlkPhos  97     08 Jan 2024 05:50      CAPILLARY BLOOD GLUCOSE    LIVER FUNCTIONS - ( 08 Jan 2024 05:50 )  Alb: 2.1 g/dL / Pro: 6.0 g/dL / ALK PHOS: 97 U/L / ALT: 38 U/L / AST: 60 U/L / GGT: x           Urinalysis Basic - ( 08 Jan 2024 05:50 )    Color: x / Appearance: x / SG: x / pH: x  Gluc: 90 mg/dL / Ketone: x  / Bili: x / Urobili: x   Blood: x / Protein: x / Nitrite: x   Leuk Esterase: x / RBC: x / WBC x   Sq Epi: x / Non Sq Epi: x / Bacteria: x            CT ABDOMEN AND PELVIS  IMPRESSION:    Inadequately distended proximal colonic loop or query long segmental   colitis. No bowel obstruction. Nonemergent colonoscopy suggested.    --- End of Report ---    MARIANO COPELAND MD; Attending Radiologist  This document has been electronically signed. Froy  3 2024  2:58AM

## 2024-01-08 NOTE — PROGRESS NOTE ADULT - SUBJECTIVE AND OBJECTIVE BOX
MARLEY TRIMBLE  [id number]    Chief Complaint: HTN emergency  Interval events:  [id number] used. pt seen and examined, labs and chart reviewed. pt reports cp and ab pain today, no sob. Sinus 60-70s bpm on tele    ALLERGIES:  No Known Allergies      PAST MEDICAL & SURGICAL HISTORY:  H/O gastroesophageal reflux (GERD)      Hypertension      S/P Cholecystectomy            CURRENT MEDICATIONS:  MEDICATIONS  (STANDING):  cefTRIAXone   IVPB 1000 milliGRAM(s) IV Intermittent every 24 hours  heparin   Injectable 5000 Unit(s) SubCutaneous every 12 hours  labetalol 100 milliGRAM(s) Oral two times a day  metroNIDAZOLE  IVPB      metroNIDAZOLE  IVPB 500 milliGRAM(s) IV Intermittent every 8 hours    MEDICATIONS  (PRN):  acetaminophen     Tablet .. 650 milliGRAM(s) Oral every 6 hours PRN Temp greater or equal to 38C (100.4F), Mild Pain (1 - 3)  aluminum hydroxide/magnesium hydroxide/simethicone Suspension 30 milliLiter(s) Oral every 4 hours PRN Dyspepsia  melatonin 3 milliGRAM(s) Oral at bedtime PRN Insomnia  ondansetron Injectable 4 milliGRAM(s) IV Push every 8 hours PRN Nausea and/or Vomiting      SOCIAL HISTORY:  denies etoh and tobacco use    FAMILY HISTORY:      ROS:  All 10 systems reviewed and positives noted in HPI    OBJECTIVE:    VITAL SIGNS:  Vital Signs Last 24 Hrs  T(C): 39.4 (03 Jan 2024 10:28), Max: 39.4 (03 Jan 2024 10:28)  T(F): 102.9 (03 Jan 2024 10:28), Max: 102.9 (03 Jan 2024 10:28)  HR: 107 (03 Jan 2024 10:28) (100 - 107)  BP: 177/96 (03 Jan 2024 10:28) (177/96 - 218/113)  BP(mean): --  RR: 21 (03 Jan 2024 10:28) (18 - 21)  SpO2: 91% (03 Jan 2024 10:28) (91% - 99%)    Parameters below as of 03 Jan 2024 10:28  Patient On (Oxygen Delivery Method): room air        PHYSICAL EXAM:  General: well appearing, no distress  HEENT: sclera anicteric  Neck: supple, no carotid bruits b/l, right HD cath  CVS: JVP ~ 7 cm H20, RRR, s1, s2, no murmurs/rubs/gallops  Chest: unlabored respirations, clear to auscultation b/l  Abdomen: non-distended  Extremities: no lower extremity edema b/l  Neuro: awake, alert & oriented x 3  Psych: normal affect      LABS:                        8.2    7.58  )-----------( 244      ( 03 Jan 2024 01:30 )             22.3     01-03    132<L>  |  98  |  94<H>  ----------------------------<  116<H>  4.2   |  19<L>  |  17.09<H>    Ca    6.7<L>      03 Jan 2024 01:30    TPro  7.1  /  Alb  2.7<L>  /  TBili  0.4  /  DBili  x   /  AST  12  /  ALT  19  /  AlkPhos  108  01-03              ECG: Sinus tach       TTE: < from: TTE Echo Limited or F/U (01.03.24 @ 08:02) >   1. Biatrial enlargement   2. Left ventricular ejection fraction, by visual estimation, is 65 to   70%.   3. Normal global left ventricular systolic function.   4. Increased LV wall thickness.   5. Normal left ventricular internal cavity size.   6. Spectral Doppler shows impaired relaxation pattern of left   ventricular myocardial filling (Grade I diastolic dysfunction).   7. Trivial pericardial effusion.   8. Mild to moderate mitral valve regurgitation.   9. Mild-moderate tricuspid regurgitation.  10. Estimated pulmonary artery systolic pressure is 54.8 mmHg assuming a   right atrial pressure of 3 mmHg, which is consistent with moderate   pulmonary hypertension.       MARLEY TRIMBLE  [id number]    Chief Complaint: HTN emergency  Interval events:  [id number] used. pt seen and examined, labs and chart reviewed. pt reports cp and ab pain today, no sob. Sinus 60-70s bpm on tele    ALLERGIES:  No Known Allergies      PAST MEDICAL & SURGICAL HISTORY:  H/O gastroesophageal reflux (GERD)      Hypertension      S/P Cholecystectomy            CURRENT MEDICATIONS:  MEDICATIONS  (STANDING):  cefTRIAXone   IVPB 1000 milliGRAM(s) IV Intermittent every 24 hours  heparin   Injectable 5000 Unit(s) SubCutaneous every 12 hours  labetalol 100 milliGRAM(s) Oral two times a day  metroNIDAZOLE  IVPB      metroNIDAZOLE  IVPB 500 milliGRAM(s) IV Intermittent every 8 hours    MEDICATIONS  (PRN):  acetaminophen     Tablet .. 650 milliGRAM(s) Oral every 6 hours PRN Temp greater or equal to 38C (100.4F), Mild Pain (1 - 3)  aluminum hydroxide/magnesium hydroxide/simethicone Suspension 30 milliLiter(s) Oral every 4 hours PRN Dyspepsia  melatonin 3 milliGRAM(s) Oral at bedtime PRN Insomnia  ondansetron Injectable 4 milliGRAM(s) IV Push every 8 hours PRN Nausea and/or Vomiting      SOCIAL HISTORY:  denies etoh and tobacco use    FAMILY HISTORY:      ROS:  All 10 systems reviewed and positives noted in HPI    OBJECTIVE:    VITAL SIGNS:  Vital Signs Last 24 Hrs  T(C): 39.4 (03 Jan 2024 10:28), Max: 39.4 (03 Jan 2024 10:28)  T(F): 102.9 (03 Jan 2024 10:28), Max: 102.9 (03 Jan 2024 10:28)  HR: 107 (03 Jan 2024 10:28) (100 - 107)  BP: 177/96 (03 Jan 2024 10:28) (177/96 - 218/113)  BP(mean): --  RR: 21 (03 Jan 2024 10:28) (18 - 21)  SpO2: 91% (03 Jan 2024 10:28) (91% - 99%)    Parameters below as of 03 Jan 2024 10:28  Patient On (Oxygen Delivery Method): room air        PHYSICAL EXAM:  General: well appearing, no distress  HEENT: sclera anicteric  Neck: supple, no carotid bruits b/l, right HD cath  CVS: JVP ~ 7 cm H20, RRR, s1, s2, no murmurs/rubs/gallops  Chest: unlabored respirations, clear to auscultation b/l  Abdomen: non-distended  Extremities: no lower extremity edema b/l  Neuro: awake, alert & oriented x 3  Psych: normal affect      LABS:                        8.2    7.58  )-----------( 244      ( 03 Jan 2024 01:30 )             22.3     01-03    132<L>  |  98  |  94<H>  ----------------------------<  116<H>  4.2   |  19<L>  |  17.09<H>    Ca    6.7<L>      03 Jan 2024 01:30    TPro  7.1  /  Alb  2.7<L>  /  TBili  0.4  /  DBili  x   /  AST  12  /  ALT  19  /  AlkPhos  108  01-03              ECG: Sinus tach       TTE: < from: TTE Echo Limited or F/U (01.03.24 @ 08:02) >   1. Biatrial enlargement   2. Left ventricular ejection fraction, by visual estimation, is 65 to   70%.   3. Normal global left ventricular systolic function.   4. Increased LV wall thickness.   5. Normal left ventricular internal cavity size.   6. Spectral Doppler shows impaired relaxation pattern of left   ventricular myocardial filling (Grade I diastolic dysfunction).   7. Trivial pericardial effusion.   8. Mild to moderate mitral valve regurgitation.   9. Mild-moderate tricuspid regurgitation.  10. Estimated pulmonary artery systolic pressure is 54.8 mmHg assuming a   right atrial pressure of 3 mmHg, which is consistent with moderate   pulmonary hypertension.       MARLEY TRIMBLE  [id number]    Chief Complaint: HTN emergency    Interval events:  [id number] used. Patient seen and examined, labs and chart reviewed. Sinus 60-70s bpm on tele    ALLERGIES:  No Known Allergies      CURRENT MEDICATIONS:  MEDICATIONS  (STANDING):  cefTRIAXone   IVPB 1000 milliGRAM(s) IV Intermittent every 24 hours  heparin   Injectable 5000 Unit(s) SubCutaneous every 12 hours  labetalol 100 milliGRAM(s) Oral two times a day  metroNIDAZOLE  IVPB      metroNIDAZOLE  IVPB 500 milliGRAM(s) IV Intermittent every 8 hours    MEDICATIONS  (PRN):  acetaminophen     Tablet .. 650 milliGRAM(s) Oral every 6 hours PRN Temp greater or equal to 38C (100.4F), Mild Pain (1 - 3)  aluminum hydroxide/magnesium hydroxide/simethicone Suspension 30 milliLiter(s) Oral every 4 hours PRN Dyspepsia  melatonin 3 milliGRAM(s) Oral at bedtime PRN Insomnia  ondansetron Injectable 4 milliGRAM(s) IV Push every 8 hours PRN Nausea and/or Vomiting      ROS:  All 10 systems reviewed and positives noted in HPI    OBJECTIVE:    VITAL SIGNS:  Vital Signs Last 24 Hrs  T(C): 39.4 (03 Jan 2024 10:28), Max: 39.4 (03 Jan 2024 10:28)  T(F): 102.9 (03 Jan 2024 10:28), Max: 102.9 (03 Jan 2024 10:28)  HR: 107 (03 Jan 2024 10:28) (100 - 107)  BP: 177/96 (03 Jan 2024 10:28) (177/96 - 218/113)  BP(mean): --  RR: 21 (03 Jan 2024 10:28) (18 - 21)  SpO2: 91% (03 Jan 2024 10:28) (91% - 99%)    Parameters below as of 03 Jan 2024 10:28  Patient On (Oxygen Delivery Method): room air        PHYSICAL EXAM:  General: well appearing, no distress  HEENT: sclera anicteric  Neck: supple, no carotid bruits b/l, right HD cath  CVS: JVP ~ 7 cm H20, RRR, s1, s2, no murmurs/rubs/gallops  Chest: unlabored respirations, clear to auscultation b/l  Abdomen: non-distended  Extremities: no lower extremity edema b/l  Neuro: awake, alert & oriented x 3  Psych: normal affect      LABS:                        8.2    7.58  )-----------( 244      ( 03 Jan 2024 01:30 )             22.3     01-03    132<L>  |  98  |  94<H>  ----------------------------<  116<H>  4.2   |  19<L>  |  17.09<H>    Ca    6.7<L>      03 Jan 2024 01:30    TPro  7.1  /  Alb  2.7<L>  /  TBili  0.4  /  DBili  x   /  AST  12  /  ALT  19  /  AlkPhos  108  01-03        ECG: Sinus tach       TTE: < from: TTE Echo Limited or F/U (01.03.24 @ 08:02) >   1. Biatrial enlargement   2. Left ventricular ejection fraction, by visual estimation, is 65 to   70%.   3. Normal global left ventricular systolic function.   4. Increased LV wall thickness.   5. Normal left ventricular internal cavity size.   6. Spectral Doppler shows impaired relaxation pattern of left   ventricular myocardial filling (Grade I diastolic dysfunction).   7. Trivial pericardial effusion.   8. Mild to moderate mitral valve regurgitation.   9. Mild-moderate tricuspid regurgitation.  10. Estimated pulmonary artery systolic pressure is 54.8 mmHg assuming a   right atrial pressure of 3 mmHg, which is consistent with moderate   pulmonary hypertension.       MARLEY TRIMBLE  [id number]    Chief Complaint: HTN emergency    Interval events:  [id number] used. Patient seen and examined, labs and chart reviewed. Sinus 60-70s bpm on tele    ALLERGIES:  No Known Allergies      CURRENT MEDICATIONS:  MEDICATIONS  (STANDING):  cefTRIAXone   IVPB 1000 milliGRAM(s) IV Intermittent every 24 hours  heparin   Injectable 5000 Unit(s) SubCutaneous every 12 hours  labetalol 100 milliGRAM(s) Oral two times a day  metroNIDAZOLE  IVPB      metroNIDAZOLE  IVPB 500 milliGRAM(s) IV Intermittent every 8 hours    MEDICATIONS  (PRN):  acetaminophen     Tablet .. 650 milliGRAM(s) Oral every 6 hours PRN Temp greater or equal to 38C (100.4F), Mild Pain (1 - 3)  aluminum hydroxide/magnesium hydroxide/simethicone Suspension 30 milliLiter(s) Oral every 4 hours PRN Dyspepsia  melatonin 3 milliGRAM(s) Oral at bedtime PRN Insomnia  ondansetron Injectable 4 milliGRAM(s) IV Push every 8 hours PRN Nausea and/or Vomiting      ROS:  All 10 systems reviewed and positives noted in HPI    OBJECTIVE:    VITAL SIGNS:  Vital Signs Last 24 Hrs  T(C): 39.4 (03 Jan 2024 10:28), Max: 39.4 (03 Jan 2024 10:28)  T(F): 102.9 (03 Jan 2024 10:28), Max: 102.9 (03 Jan 2024 10:28)  HR: 107 (03 Jan 2024 10:28) (100 - 107)  BP: 177/96 (03 Jan 2024 10:28) (177/96 - 218/113)  BP(mean): --  RR: 21 (03 Jan 2024 10:28) (18 - 21)  SpO2: 91% (03 Jan 2024 10:28) (91% - 99%)    Parameters below as of 03 Jan 2024 10:28  Patient On (Oxygen Delivery Method): room air        PHYSICAL EXAM:  General: well appearing, no distress  HEENT: sclera anicteric  Neck: supple, no carotid bruits b/l, right HD cath  CVS: JVP ~ 7 cm H20, RRR, s1, s2, no murmurs/rubs/gallops  Chest: unlabored respirations, clear to auscultation b/l  Abdomen: non-distended  Extremities: no lower extremity edema b/l  Neuro: awake, alert & oriented x 3  Psych: normal affect      LABS:                        8.2    7.58  )-----------( 244      ( 03 Jan 2024 01:30 )             22.3     01-03    132<L>  |  98  |  94<H>  ----------------------------<  116<H>  4.2   |  19<L>  |  17.09<H>    Ca    6.7<L>      03 Jan 2024 01:30    TPro  7.1  /  Alb  2.7<L>  /  TBili  0.4  /  DBili  x   /  AST  12  /  ALT  19  /  AlkPhos  108  01-03        ECG: Sinus tach       TTE: < from: TTE Echo Limited or F/U (01.03.24 @ 08:02) >   1. Biatrial enlargement   2. Left ventricular ejection fraction, by visual estimation, is 65 to   70%.   3. Normal global left ventricular systolic function.   4. Increased LV wall thickness.   5. Normal left ventricular internal cavity size.   6. Spectral Doppler shows impaired relaxation pattern of left   ventricular myocardial filling (Grade I diastolic dysfunction).   7. Trivial pericardial effusion.   8. Mild to moderate mitral valve regurgitation.   9. Mild-moderate tricuspid regurgitation.  10. Estimated pulmonary artery systolic pressure is 54.8 mmHg assuming a   right atrial pressure of 3 mmHg, which is consistent with moderate   pulmonary hypertension.

## 2024-01-08 NOTE — PROVIDER CONTACT NOTE (CRITICAL VALUE NOTIFICATION) - SITUATION
Patient admitted with c/o of vomiting and fever, dx with acute renal failure.
Patient admitted for Acute renal failure, uremia, ACS, history of hypertension, GERD, new hemodialysis patient, presenting with calcium of 5.5

## 2024-01-08 NOTE — PROGRESS NOTE ADULT - ATTENDING COMMENTS
59yo M with PMHx of CKD 3, HTN, lost to follow up with PCP (per chart review used to take medications for HTN, but patient denies) presenting for vomiting times 3 weeks, admitted for renal failure and r/o ACS.     #HTN emergency  - not on meds at home  - continue labetalol 100mg BID and amlodipine 5mg daily  - cardio recs appreciated, discussed with Dr. Dolan- will order for nuc stress test tomorrow    #ARF  - likely chronic issue and 2/2 to uncontrolled BP  - s/p temp access placed by ICU PA on 1/6, s/p HD on 1/6 tolerated well  - now off IVF  - dc leblanc, monitor bladder scans  - follow up orthostatics  - follow up nephro studies  - nephro following- discussed at length with Dr. Laird, would likely need renal biopsy    #anemia  - likely anemia of chronic kidney disease  - s/p PRBC on 1/4 with appropriate response  - continue to monitor H/H  - occult negative  - ordered for transfusion with HD today  - follow up CBC in the AM, transfuse <8 of if symptomatic    #NSTEMI  - trop noted to be elevated, could be in setting of HTN emergency but will likely need further workup given chest pain on admission  - trop trending down, no need to further trend  - echo without acute concerns besides mod pHTN  - started DAPT even in setting of anemia, atorvastatin, continue labetalol as per cardio   - cardio recs appreciated, discussed with Dr. Dolan- will order for nuc stress test tomorrow    #sepsis  #influenza  #ETEC  - BCx with NGTD  - stool culture with ETEC   - given resolution of diarrhea, will hold off on abx treatment at this time  - continue to monitor off abx

## 2024-01-08 NOTE — DISCHARGE NOTE PROVIDER - NSDCMRMEDTOKEN_GEN_ALL_CORE_FT
acetaminophen 325 mg oral tablet: 2 tab(s) orally every 6 hours As needed Temp greater or equal to 38C (100.4F), Mild Pain (1 - 3)  aluminum hydroxide-magnesium hydroxide 200 mg-200 mg/5 mL oral suspension: 30 milliliter(s) orally every 4 hours As needed Dyspepsia  amLODIPine 5 mg oral tablet: 1 tab(s) orally once a day  aspirin 81 mg oral tablet, chewable: 1 tab(s) orally once a day  atorvastatin 40 mg oral tablet: 1 tab(s) orally once a day (at bedtime)  benzonatate 100 mg oral capsule: 1 cap(s) orally 3 times a day As needed Cough  calcium acetate 667 mg oral tablet: 1,334 gram(s) orally 3 times a day (with meals)  labetalol 200 mg oral tablet: 1 tab(s) orally every 8 hours  melatonin 3 mg oral tablet: 1 tab(s) orally once a day (at bedtime) As needed Insomnia  polyethylene glycol 3350 oral powder for reconstitution: 17 gram(s) orally once a day  senna leaf extract oral tablet: 2 tab(s) orally once a day (at bedtime)   acetaminophen 325 mg oral tablet: 2 tab(s) orally every 6 hours As needed Temp greater or equal to 38C (100.4F), Mild Pain (1 - 3)  aluminum hydroxide-magnesium hydroxide 200 mg-200 mg/5 mL oral suspension: 30 milliliter(s) orally every 4 hours As needed Dyspepsia  amLODIPine 5 mg oral tablet: 1 tab(s) orally once a day  aspirin 81 mg oral tablet, chewable: 1 tab(s) orally once a day  atorvastatin 40 mg oral tablet: 1 tab(s) orally once a day (at bedtime)  benzonatate 100 mg oral capsule: 1 cap(s) orally 3 times a day As needed Cough  Calphron 667 mg oral tablet: orally 3 times a day  labetalol 200 mg oral tablet: 1 tab(s) orally every 8 hours  melatonin 3 mg oral tablet: 1 tab(s) orally once a day (at bedtime) As needed Insomnia  polyethylene glycol 3350 oral powder for reconstitution: 17 gram(s) orally once a day  senna leaf extract oral tablet: 2 tab(s) orally once a day (at bedtime)

## 2024-01-08 NOTE — PROVIDER CONTACT NOTE (CRITICAL VALUE NOTIFICATION) - PERSON GIVING RESULT:
Satya THORNTON
Satya sellers
Luz Maria Lucero
Austin Marcos
Austin Marcos
Satya Patel
Bacilio
Haroldo
Haroldo
Mary N/ Lab
Satya THORNTON

## 2024-01-08 NOTE — DISCHARGE NOTE PROVIDER - ATTENDING DISCHARGE PHYSICAL EXAMINATION:
GENERAL: NAD, lying in bed, R shiley catheter in place  HEAD:  Atraumatic, Normocephalic  RESP: Clear to auscultation bilaterally, good air entry bilaterally; No wheezing, rales, or rhonchi. Unlabored respirations  CARDIAC: Regular rate and rhythm. S1 and S2. No murmurs, rubs, or gallops  GI:  Soft, Nontender, Nondistended. Bowel sounds present x4 quadrants; No hepatomegaly. No splenomegaly.  : Pro discontinued  EXTREMITIES:  2+ Peripheral Pulses. Capillary refill <2 seconds. No clubbing, cyanosis, or edema  NERVOUS SYSTEM:  Alert & Oriented X3, speech clear. No deficits   MSK: FROM all 4 extremities, full and equal strength  SKIN: No rashes, bruises, or other lesions

## 2024-01-08 NOTE — DISCHARGE NOTE PROVIDER - DETAILS OF MALNUTRITION DIAGNOSIS/DIAGNOSES
This patient has been assessed with a concern for Malnutrition and was treated during this hospitalization for the following Nutrition diagnosis/diagnoses:     -  01/04/2024: Moderate protein-calorie malnutrition

## 2024-01-08 NOTE — PROGRESS NOTE ADULT - ASSESSMENT
Assessment: 59yo M with pmhx CKD 3, HTN, lost to follow up with PCP (per chart review used to take medications for HTN, but patient denies) presenting for vomiting times 3 weeks, admitted for renal failure, htn urgency and r/o ACS.     Recommendations:  #elevated trops  - ekg non ischemic  - Cr markedly elevated, can also be a component of CKD +/- demand iso htn urgency  - cont bb, asa, plavix and statin  - TTE with normal EF 65-70 and mod pulm htn  - will consider nuclear stress test, pt not an ideal candidate for cardiac cath 2/2 DESIREE  - continue tele monitoring    Nohelia SEGURA-BC Assessment: 57yo M with pmhx CKD 3, HTN, lost to follow up with PCP (per chart review used to take medications for HTN, but patient denies) presenting for vomiting times 3 weeks, admitted for renal failure, htn urgency and r/o ACS.     Recommendations:  #elevated trops  - ekg non ischemic  - Cr markedly elevated, can also be a component of CKD +/- demand iso htn urgency  - cont bb, asa, plavix and statin  - TTE with normal EF 65-70 and mod pulm htn  - will consider nuclear stress test, pt not an ideal candidate for cardiac cath 2/2 DESIREE  - continue tele monitoring    Nohelia SEGURA-BC Assessment: 57 yo M with pmhx CKD 3, HTN, lost to follow up with PCP (per chart review used to take medications for HTN, but patient denies) presenting for vomiting times 3 weeks, admitted for renal failure, hypertensive urgency and r/o ACS.     Recommendations:  #elevated trops  - ekg non ischemic  - Cr markedly elevated, can also be a component of CKD +/- demand iso htn urgency  - cont bb, asa, plavix and statin  - TTE with normal EF 65-70 and mod pulm htn  - will consider nuclear stress test, patient not an ideal candidate for cardiac cath 2/2 DESIREE  - continue tele monitoring    Nohelia SEGURA-BC

## 2024-01-08 NOTE — PROVIDER CONTACT NOTE (CRITICAL VALUE NOTIFICATION) - TEST AND RESULT REPORTED:
Hematocrit 20.2
Calcium less than 5.
Calcium 5.5
hematocrit 20.8
Hemoglobin 6.6   Hematocrit 18.8
Calcium 5.1    Troponin 715.4
Calcium 6.3
low hematocrit 20.8
Calcium 5.4  Troponin 475.8
[id number]
Hematocrit 21.0

## 2024-01-08 NOTE — PROGRESS NOTE ADULT - SUBJECTIVE AND OBJECTIVE BOX
S/p 2nd HD today    Vital Signs Last 24 Hrs  T(C): 36.8 (01-07-24 @ 21:01), Max: 37.1 (01-07-24 @ 05:35)  T(F): 98.2 (01-07-24 @ 21:01), Max: 98.7 (01-07-24 @ 05:35)  HR: 71 (01-07-24 @ 21:01) (71 - 77)  BP: 120/66 (01-07-24 @ 21:01) (120/66 - 137/77)  RR: 20 (01-07-24 @ 21:01) (17 - 20)  SpO2: 96% (01-07-24 @ 21:01) (93% - 96%)    I&O's Detail    07 Jan 2024 07:01  -  08 Jan 2024 07:00  --------------------------------------------------------  OUT:    Indwelling Catheter - Urethral (mL): 800 mL  Total OUT: 800 mL    08 Jan 2024 07:01  -  08 Jan 2024 23:17  --------------------------------------------------------  IN:    Oral Fluid: 1300 mL  Total IN: 1300 mL    OUT:    Indwelling Catheter - Urethral (mL): 825 mL    Other (mL): 1000 mL  Total OUT: 1825 mL    s1s2  b/l air entry  soft, ND  no edema                                                          7.5    4.02  )-----------( 213      ( 08 Jan 2024 05:50 )             21.2     08 Jan 2024 05:50    134    |  96     |  83     ----------------------------<  90     4.2     |  25     |  14.54    Ca    5.5        08 Jan 2024 05:50  Phos  9.4       08 Jan 2024 05:50  Mg     2.0       07 Jan 2024 06:00    TPro  6.0    /  Alb  2.1    /  TBili  0.7    /  DBili  x      /  AST  60     /  ALT  38     /  AlkPhos  97     08 Jan 2024 05:50    LIVER FUNCTIONS - ( 08 Jan 2024 05:50 )  Alb: 2.1 g/dL / Pro: 6.0 g/dL / ALK PHOS: 97 U/L / ALT: 38 U/L / AST: 60 U/L / GGT: x           acetaminophen     Tablet .. 650 milliGRAM(s) Oral every 6 hours PRN  aluminum hydroxide/magnesium hydroxide/simethicone Suspension 30 milliLiter(s) Oral every 4 hours PRN  aspirin  chewable 81 milliGRAM(s) Oral daily  atorvastatin 40 milliGRAM(s) Oral at bedtime  benzonatate 100 milliGRAM(s) Oral three times a day PRN  calcium acetate 1334 milliGRAM(s) Oral three times a day with meals  chlorhexidine 2% Cloths 1 Application(s) Topical <User Schedule>  clopidogrel Tablet 75 milliGRAM(s) Oral daily  heparin   Injectable 5000 Unit(s) SubCutaneous every 12 hours  labetalol 200 milliGRAM(s) Oral every 8 hours  melatonin 3 milliGRAM(s) Oral at bedtime PRN  ondansetron Injectable 4 milliGRAM(s) IV Push every 8 hours PRN  polyethylene glycol 3350 17 Gram(s) Oral daily  regadenoson Injectable 0.4 milliGRAM(s) IV Push once  senna 2 Tablet(s) Oral at bedtime  sodium chloride 0.9% lock flush 10 milliLiter(s) IV Push every 1 hour PRN    A/P:    Pt w/no known PMH, not following w/doctors, possibly hx of HTN, brother w/hx ESRD (pt does not know the etiology)   Adm w/N/V, fever, anemia, markedly abnormal renal fx (baseline renal fx is unknown)  Dx w/Flu A  S/p CT w/IV dye 1/3/23, dye injury can also be contributing to rising Cr   24hr urine collection positive for nephrotic range proteinuria   SPEP, renal serologies are negative   CT A/P w/o hydro   NSTEMI, no objection to cardiac cath from renal pov   Plan for stress test per cardiology   Started on HD 1/6 via temp HD cath  2nd HD today  F/u CBC, BMP, UO   Next HD tomorrow or Wednesday pending timing of stress test  Kidney biopsy is being considered, but at this point suspect advanced disease  Expect pt will need chronic HD     [phone]       S/p 2nd HD today    Vital Signs Last 24 Hrs  T(C): 36.8 (01-07-24 @ 21:01), Max: 37.1 (01-07-24 @ 05:35)  T(F): 98.2 (01-07-24 @ 21:01), Max: 98.7 (01-07-24 @ 05:35)  HR: 71 (01-07-24 @ 21:01) (71 - 77)  BP: 120/66 (01-07-24 @ 21:01) (120/66 - 137/77)  RR: 20 (01-07-24 @ 21:01) (17 - 20)  SpO2: 96% (01-07-24 @ 21:01) (93% - 96%)    I&O's Detail    07 Jan 2024 07:01  -  08 Jan 2024 07:00  --------------------------------------------------------  OUT:    Indwelling Catheter - Urethral (mL): 800 mL  Total OUT: 800 mL    08 Jan 2024 07:01  -  08 Jan 2024 23:17  --------------------------------------------------------  IN:    Oral Fluid: 1300 mL  Total IN: 1300 mL    OUT:    Indwelling Catheter - Urethral (mL): 825 mL    Other (mL): 1000 mL  Total OUT: 1825 mL    s1s2  b/l air entry  soft, ND  no edema                                                          7.5    4.02  )-----------( 213      ( 08 Jan 2024 05:50 )             21.2     08 Jan 2024 05:50    134    |  96     |  83     ----------------------------<  90     4.2     |  25     |  14.54    Ca    5.5        08 Jan 2024 05:50  Phos  9.4       08 Jan 2024 05:50  Mg     2.0       07 Jan 2024 06:00    TPro  6.0    /  Alb  2.1    /  TBili  0.7    /  DBili  x      /  AST  60     /  ALT  38     /  AlkPhos  97     08 Jan 2024 05:50    LIVER FUNCTIONS - ( 08 Jan 2024 05:50 )  Alb: 2.1 g/dL / Pro: 6.0 g/dL / ALK PHOS: 97 U/L / ALT: 38 U/L / AST: 60 U/L / GGT: x           acetaminophen     Tablet .. 650 milliGRAM(s) Oral every 6 hours PRN  aluminum hydroxide/magnesium hydroxide/simethicone Suspension 30 milliLiter(s) Oral every 4 hours PRN  aspirin  chewable 81 milliGRAM(s) Oral daily  atorvastatin 40 milliGRAM(s) Oral at bedtime  benzonatate 100 milliGRAM(s) Oral three times a day PRN  calcium acetate 1334 milliGRAM(s) Oral three times a day with meals  chlorhexidine 2% Cloths 1 Application(s) Topical <User Schedule>  clopidogrel Tablet 75 milliGRAM(s) Oral daily  heparin   Injectable 5000 Unit(s) SubCutaneous every 12 hours  labetalol 200 milliGRAM(s) Oral every 8 hours  melatonin 3 milliGRAM(s) Oral at bedtime PRN  ondansetron Injectable 4 milliGRAM(s) IV Push every 8 hours PRN  polyethylene glycol 3350 17 Gram(s) Oral daily  regadenoson Injectable 0.4 milliGRAM(s) IV Push once  senna 2 Tablet(s) Oral at bedtime  sodium chloride 0.9% lock flush 10 milliLiter(s) IV Push every 1 hour PRN    A/P:    Pt w/no known PMH, not following w/doctors, possibly hx of HTN, brother w/hx ESRD (pt does not know the etiology)   Adm w/N/V, fever, anemia, markedly abnormal renal fx (baseline renal fx is unknown)  Dx w/Flu A  S/p CT w/IV dye 1/3/23, dye injury can also be contributing to rising Cr   24hr urine collection positive for nephrotic range proteinuria   SPEP, renal serologies are negative   CT A/P w/o hydro   NSTEMI, no objection to cardiac cath from renal pov   Plan for stress test per cardiology   Started on HD 1/6 via temp HD cath  2nd HD today  F/u CBC, BMP, UO   Next HD tomorrow or Wednesday pending timing of stress test  Kidney biopsy is being considered, but at this point suspect advanced disease  Expect pt will need chronic HD     [phone]

## 2024-01-09 LAB
ANION GAP SERPL CALC-SCNC: 12 MMOL/L — SIGNIFICANT CHANGE UP (ref 5–17)
ANION GAP SERPL CALC-SCNC: 12 MMOL/L — SIGNIFICANT CHANGE UP (ref 5–17)
APTT BLD: 29.5 SEC — SIGNIFICANT CHANGE UP (ref 24.5–35.6)
APTT BLD: 29.5 SEC — SIGNIFICANT CHANGE UP (ref 24.5–35.6)
BUN SERPL-MCNC: 56 MG/DL — HIGH (ref 7–23)
BUN SERPL-MCNC: 56 MG/DL — HIGH (ref 7–23)
CALCIUM SERPL-MCNC: 7 MG/DL — LOW (ref 8.4–10.5)
CALCIUM SERPL-MCNC: 7 MG/DL — LOW (ref 8.4–10.5)
CHLORIDE SERPL-SCNC: 99 MMOL/L — SIGNIFICANT CHANGE UP (ref 96–108)
CHLORIDE SERPL-SCNC: 99 MMOL/L — SIGNIFICANT CHANGE UP (ref 96–108)
CO2 SERPL-SCNC: 25 MMOL/L — SIGNIFICANT CHANGE UP (ref 22–31)
CO2 SERPL-SCNC: 25 MMOL/L — SIGNIFICANT CHANGE UP (ref 22–31)
CREAT SERPL-MCNC: 11.22 MG/DL — HIGH (ref 0.5–1.3)
CREAT SERPL-MCNC: 11.22 MG/DL — HIGH (ref 0.5–1.3)
EGFR: 5 ML/MIN/1.73M2 — LOW
EGFR: 5 ML/MIN/1.73M2 — LOW
GLUCOSE SERPL-MCNC: 96 MG/DL — SIGNIFICANT CHANGE UP (ref 70–99)
GLUCOSE SERPL-MCNC: 96 MG/DL — SIGNIFICANT CHANGE UP (ref 70–99)
HCT VFR BLD CALC: 27.9 % — LOW (ref 39–50)
HCT VFR BLD CALC: 27.9 % — LOW (ref 39–50)
HGB BLD-MCNC: 9.7 G/DL — LOW (ref 13–17)
HGB BLD-MCNC: 9.7 G/DL — LOW (ref 13–17)
INR BLD: 0.96 RATIO — SIGNIFICANT CHANGE UP (ref 0.85–1.18)
INR BLD: 0.96 RATIO — SIGNIFICANT CHANGE UP (ref 0.85–1.18)
MAGNESIUM SERPL-MCNC: 2.2 MG/DL — SIGNIFICANT CHANGE UP (ref 1.6–2.6)
MAGNESIUM SERPL-MCNC: 2.2 MG/DL — SIGNIFICANT CHANGE UP (ref 1.6–2.6)
MCHC RBC-ENTMCNC: 27.3 PG — SIGNIFICANT CHANGE UP (ref 27–34)
MCHC RBC-ENTMCNC: 27.3 PG — SIGNIFICANT CHANGE UP (ref 27–34)
MCHC RBC-ENTMCNC: 34.8 GM/DL — SIGNIFICANT CHANGE UP (ref 32–36)
MCHC RBC-ENTMCNC: 34.8 GM/DL — SIGNIFICANT CHANGE UP (ref 32–36)
MCV RBC AUTO: 78.6 FL — LOW (ref 80–100)
MCV RBC AUTO: 78.6 FL — LOW (ref 80–100)
NRBC # BLD: 0 /100 WBCS — SIGNIFICANT CHANGE UP (ref 0–0)
NRBC # BLD: 0 /100 WBCS — SIGNIFICANT CHANGE UP (ref 0–0)
PHOSPHATE SERPL-MCNC: 6 MG/DL — HIGH (ref 2.5–4.5)
PHOSPHATE SERPL-MCNC: 6 MG/DL — HIGH (ref 2.5–4.5)
PLATELET # BLD AUTO: 233 K/UL — SIGNIFICANT CHANGE UP (ref 150–400)
PLATELET # BLD AUTO: 233 K/UL — SIGNIFICANT CHANGE UP (ref 150–400)
POTASSIUM SERPL-MCNC: 4.2 MMOL/L — SIGNIFICANT CHANGE UP (ref 3.5–5.3)
POTASSIUM SERPL-MCNC: 4.2 MMOL/L — SIGNIFICANT CHANGE UP (ref 3.5–5.3)
POTASSIUM SERPL-SCNC: 4.2 MMOL/L — SIGNIFICANT CHANGE UP (ref 3.5–5.3)
POTASSIUM SERPL-SCNC: 4.2 MMOL/L — SIGNIFICANT CHANGE UP (ref 3.5–5.3)
PROTHROM AB SERPL-ACNC: 11.2 SEC — SIGNIFICANT CHANGE UP (ref 9.5–13)
PROTHROM AB SERPL-ACNC: 11.2 SEC — SIGNIFICANT CHANGE UP (ref 9.5–13)
RBC # BLD: 3.55 M/UL — LOW (ref 4.2–5.8)
RBC # BLD: 3.55 M/UL — LOW (ref 4.2–5.8)
RBC # FLD: 14.1 % — SIGNIFICANT CHANGE UP (ref 10.3–14.5)
RBC # FLD: 14.1 % — SIGNIFICANT CHANGE UP (ref 10.3–14.5)
SODIUM SERPL-SCNC: 136 MMOL/L — SIGNIFICANT CHANGE UP (ref 135–145)
SODIUM SERPL-SCNC: 136 MMOL/L — SIGNIFICANT CHANGE UP (ref 135–145)
WBC # BLD: 5.3 K/UL — SIGNIFICANT CHANGE UP (ref 3.8–10.5)
WBC # BLD: 5.3 K/UL — SIGNIFICANT CHANGE UP (ref 3.8–10.5)
WBC # FLD AUTO: 5.3 K/UL — SIGNIFICANT CHANGE UP (ref 3.8–10.5)
WBC # FLD AUTO: 5.3 K/UL — SIGNIFICANT CHANGE UP (ref 3.8–10.5)

## 2024-01-09 PROCEDURE — 93016 CV STRESS TEST SUPVJ ONLY: CPT

## 2024-01-09 PROCEDURE — 99232 SBSQ HOSP IP/OBS MODERATE 35: CPT | Mod: 25

## 2024-01-09 PROCEDURE — 78452 HT MUSCLE IMAGE SPECT MULT: CPT | Mod: 26

## 2024-01-09 PROCEDURE — 93018 CV STRESS TEST I&R ONLY: CPT

## 2024-01-09 PROCEDURE — 99239 HOSP IP/OBS DSCHRG MGMT >30: CPT | Mod: GC

## 2024-01-09 RX ORDER — HEPARIN SODIUM 5000 [USP'U]/ML
5000 INJECTION INTRAVENOUS; SUBCUTANEOUS EVERY 12 HOURS
Refills: 0 | Status: DISCONTINUED | OUTPATIENT
Start: 2024-01-09 | End: 2024-01-12

## 2024-01-09 RX ORDER — CLOPIDOGREL BISULFATE 75 MG/1
75 TABLET, FILM COATED ORAL DAILY
Refills: 0 | Status: DISCONTINUED | OUTPATIENT
Start: 2024-01-09 | End: 2024-01-09

## 2024-01-09 RX ORDER — LANOLIN ALCOHOL/MO/W.PET/CERES
1 CREAM (GRAM) TOPICAL
Qty: 0 | Refills: 0 | DISCHARGE
Start: 2024-01-09

## 2024-01-09 RX ORDER — ASPIRIN/CALCIUM CARB/MAGNESIUM 324 MG
1 TABLET ORAL
Qty: 0 | Refills: 0 | DISCHARGE
Start: 2024-01-09

## 2024-01-09 RX ORDER — AMLODIPINE BESYLATE 2.5 MG/1
5 TABLET ORAL DAILY
Refills: 0 | Status: DISCONTINUED | OUTPATIENT
Start: 2024-01-09 | End: 2024-01-12

## 2024-01-09 RX ORDER — ATORVASTATIN CALCIUM 80 MG/1
1 TABLET, FILM COATED ORAL
Qty: 0 | Refills: 0 | DISCHARGE
Start: 2024-01-09

## 2024-01-09 RX ORDER — POLYETHYLENE GLYCOL 3350 17 G/17G
17 POWDER, FOR SOLUTION ORAL
Qty: 0 | Refills: 0 | DISCHARGE
Start: 2024-01-09

## 2024-01-09 RX ORDER — CALCIUM ACETATE 667 MG
1334 TABLET ORAL
Qty: 0 | Refills: 0 | DISCHARGE
Start: 2024-01-09

## 2024-01-09 RX ORDER — LABETALOL HCL 100 MG
1 TABLET ORAL
Qty: 0 | Refills: 0 | DISCHARGE
Start: 2024-01-09

## 2024-01-09 RX ORDER — AMLODIPINE BESYLATE 2.5 MG/1
1 TABLET ORAL
Qty: 0 | Refills: 0 | DISCHARGE
Start: 2024-01-09

## 2024-01-09 RX ORDER — ACETAMINOPHEN 500 MG
2 TABLET ORAL
Qty: 0 | Refills: 0 | DISCHARGE
Start: 2024-01-09

## 2024-01-09 RX ORDER — ASPIRIN/CALCIUM CARB/MAGNESIUM 324 MG
81 TABLET ORAL DAILY
Refills: 0 | Status: DISCONTINUED | OUTPATIENT
Start: 2024-01-09 | End: 2024-01-10

## 2024-01-09 RX ORDER — SENNA PLUS 8.6 MG/1
2 TABLET ORAL
Qty: 0 | Refills: 0 | DISCHARGE
Start: 2024-01-09

## 2024-01-09 RX ADMIN — CHLORHEXIDINE GLUCONATE 1 APPLICATION(S): 213 SOLUTION TOPICAL at 07:04

## 2024-01-09 RX ADMIN — ATORVASTATIN CALCIUM 40 MILLIGRAM(S): 80 TABLET, FILM COATED ORAL at 21:35

## 2024-01-09 RX ADMIN — Medication 1334 MILLIGRAM(S): at 12:20

## 2024-01-09 RX ADMIN — Medication 1334 MILLIGRAM(S): at 18:03

## 2024-01-09 RX ADMIN — SENNA PLUS 2 TABLET(S): 8.6 TABLET ORAL at 21:35

## 2024-01-09 RX ADMIN — HEPARIN SODIUM 5000 UNIT(S): 5000 INJECTION INTRAVENOUS; SUBCUTANEOUS at 18:03

## 2024-01-09 RX ADMIN — POLYETHYLENE GLYCOL 3350 17 GRAM(S): 17 POWDER, FOR SOLUTION ORAL at 12:21

## 2024-01-09 RX ADMIN — AMLODIPINE BESYLATE 5 MILLIGRAM(S): 2.5 TABLET ORAL at 12:20

## 2024-01-09 RX ADMIN — Medication 200 MILLIGRAM(S): at 21:35

## 2024-01-09 RX ADMIN — Medication 81 MILLIGRAM(S): at 12:21

## 2024-01-09 RX ADMIN — CLOPIDOGREL BISULFATE 75 MILLIGRAM(S): 75 TABLET, FILM COATED ORAL at 12:21

## 2024-01-09 RX ADMIN — Medication 200 MILLIGRAM(S): at 13:35

## 2024-01-09 NOTE — PROGRESS NOTE ADULT - ATTENDING COMMENTS
57yo M with PMHx of CKD 3, HTN, lost to follow up with PCP (per chart review used to take medications for HTN, but patient denies) presenting for vomiting times 3 weeks, admitted for renal failure and r/o ACS.     #HTN emergency  - not on meds at home  - continue labetalol 100mg BID and amlodipine 5mg daily    #ARF  - likely chronic issue and 2/2 to uncontrolled BP  - s/p temp access placed by ICU PA on 1/6, s/p HD on 1/6 tolerated well  - now off IVF  - dc leblanc, monitor bladder scans  - follow up orthostatics  - follow up nephro studies  - nephro following- discussed at length with Dr. Laird, would likely need renal biopsy and chronic HD    #anemia  - likely anemia of chronic kidney disease  - s/p PRBC 2U total with appropriate response   - continue to monitor H/H, now stable  - occult negative  - transfuse <8 of if symptomatic    #NSTEMI  - trop noted to be elevated, could be in setting of HTN emergency but will likely need further workup given chest pain on admission  - trop trending down, no need to further trend  - echo without acute concerns besides mod pHTN  - started DAPT even in setting of anemia, atorvastatin, continue labetalol as per cardio   - cardio recs appreciated, discussed with NP Nohelia- follow up stress test today    #sepsis  #influenza  #ETEC  - BCx with NGTD  - stool culture with ETEC   - given resolution of diarrhea, will hold off on abx treatment at this time  - continue to monitor off abx

## 2024-01-09 NOTE — DISCHARGE NOTE NURSING/CASE MANAGEMENT/SOCIAL WORK - PATIENT PORTAL LINK FT
You can access the FollowMyHealth Patient Portal offered by Health system by registering at the following website: [email or web address]. By joining [employer]’s FollowMyHealth portal, you will also be able to view your health information using other applications (apps) compatible with our system. You can access the FollowMyHealth Patient Portal offered by [facility] by registering at the following website: [email or web address]. By joining [employer]’s FollowMyHealth portal, you will also be able to view your health information using other applications (apps) compatible with our system.

## 2024-01-09 NOTE — PROGRESS NOTE ADULT - PROBLEM SELECTOR PLAN 5
-NSTEMI  -Chest pain likely 2/2 hypertension  -Trops trending up 221>246>401>>466, 529.1, 715.14> now trending down 475.8  -Per cardio trops likely 2/2 demand ischemia in setting of renal function  - TTE with normal EF 65-70 and mod pulm htn  -Cw labetolol for BP control  -Cw heparin, plavix, aspirin, per cardio   -Appreciate cardiology recommendations, will continue tele monitoring- plan for nuclear stress test tomorrow, plan for transfer to tertiary center for cath pending results -NSTEMI  -Chest pain likely 2/2 hypertension  -Trops trending up 221>246>401>>466, 529.1, 715.14> now trending down 475.8  -Per cardio trops likely 2/2 demand ischemia in setting of renal function  - TTE with normal EF 65-70 and mod pulm htn  -Cw labetolol for BP control  -Cw heparin, plavix, aspirin, per cardio   -Appreciate cardiology recommendations, will continue tele monitoring  -F/up results of nuclear stress test

## 2024-01-09 NOTE — PROGRESS NOTE ADULT - PROBLEM SELECTOR PLAN 4
-Likely 2/2 kidney failure  -iron panel with ferritin 478, likely anemia of chronic disease  -H&H trending down  -Transfusion consent forms signed and in chart  -Hgb 6.8 1/4/24, s/p PRBC  -Keep type and screen current  -Transfuse Hgb <7.0  -Mild downtrend over last 2-3 days today 7.6, will receive 1unit pRBCs with HD today 1/8 -Likely 2/2 kidney failure  -iron panel with ferritin 478, likely anemia of chronic disease  -H&H trending down  -Transfusion consent forms signed and in chart  -Hgb 6.8 1/4/24, s/p PRBC  -Keep type and screen current  -Transfuse Hgb <7.0  -Mild downtrend over last 2-3 days today 7.6, received 1unit pRBCs with HD 1/8

## 2024-01-09 NOTE — PROGRESS NOTE ADULT - PROBLEM SELECTOR PLAN 2
-r/o other causes of other intrinsic renal disease ie nephritic/nephrotic syndrome, hepatitis  -s/p CTAP with IV contrast  -History of HTN, not on medications (poor medical follow up)  - Autoimmune work up unrevealing   -Hep B and hep C neg  -urine with 300 protein, small blood, 100 glucose  -SPEP, renal serologies negative  -a1c and lipid panel wnl  -BUN 94, Cr 17.09 on admission, up to 18.14  -Cr 14.54 today  -c/w 200mg BID labetalol  -Nephrology following, recommend gentle hydration with IVF and bicarb  -S/p leblanc removal  -Shiley placed 1/6/24 by ICU PA, pt s/p HD 1/6, HD today 1/8 ordered with 1unit pRBCs  -Plan for nuclear stress test tomorrow, possible transfer after to [facility] for cath, NPO after midnight -r/o other causes of other intrinsic renal disease ie nephritic/nephrotic syndrome, hepatitis  -s/p CTAP with IV contrast  -History of HTN, not on medications (poor medical follow up)  - Autoimmune work up unrevealing   -Hep B and hep C neg  -urine with 300 protein, small blood, 100 glucose  -SPEP, renal serologies negative  -a1c and lipid panel wnl  -BUN 94, Cr 17.09 on admission, up to 18.14  -Cr 14.54 today  -c/w 200mg BID labetalol  -Nephrology following, recommend gentle hydration with IVF and bicarb  -S/p leblanc removal  -Shiley placed 1/6/24 by ICU PA, pt s/p HD 1/6, HD today 1/8 ordered with 1unit pRBCs  -Plan for nuclear stress test tomorrow, possible transfer after to [facility] for cath, NPO after midnight -r/o other causes of other intrinsic renal disease ie nephritic/nephrotic syndrome, hepatitis  -s/p CTAP with IV contrast  -History of HTN, not on medications (poor medical follow up)  - Autoimmune work up unrevealing   -Hep B and hep C neg  -urine with 300 protein, small blood, 100 glucose  -SPEP, renal serologies negative  -a1c and lipid panel wnl  -BUN 94, Cr 17.09 on admission, up to 18.14  -Cr 14.54 today  -c/w 200mg BID labetalol  -Nephrology following, recommend gentle hydration with IVF and bicarb  -S/p leblanc removal  -Shiley placed 1/6/24 by ICU PA, pt s/p HD 1/6, HD 1/8 ordered with 1unit pRBCs  -Plan for possible transfer to tertiary center for permanent HD access and biopsy

## 2024-01-09 NOTE — PROGRESS NOTE ADULT - NS ATTEND AMEND GEN_ALL_CORE FT
I have seen and examined the patient. I have discussed case with PETER Walton.    Neri Garcia is a 59 year old man with past medical history of Hypertension and Chronic kidney disease who presented with nausea, vomiting as well as chronic episodes of exertional chest discomfort, found to have acute renal failure. ECG on admission consistent with sinus tachycardia, no acute ischemic ST abnormalities. Troponins elevated and have peaked which may be due to renal failure, however due to symptoms of angina, cannot rule out underlying ischemic heart disease. Chest xray consistent with CHF. Echo report consistent with normal LVEF 65-70%, LVH and moderate pulmonary hypertension. Overall, patient admitted with acute renal failure now receiving hemodialysis. Would recommend non-invasive ischemic evaluation such as pharmacologic nuclear stress test when patient is euvolemic as patient would be a poor candidate for coronary angiogram due to high risk for permanent contrast induced nephropathy in setting of this new acute renal failure, follow up stress test today. Continue CV meds; aspirin, statin and beta blocker.    We will continue to follow along.    Preston Dolan MD  Cardiology I have seen and examined the patient. I have discussed case with PETER Walton.    Neri Garcia is a 59 year old man with past medical history of Hypertension and Chronic kidney disease who presented with nausea, vomiting as well as chronic episodes of exertional chest discomfort, found to have acute renal failure. ECG on admission consistent with sinus tachycardia, no acute ischemic ST abnormalities. Troponins elevated and have peaked which may be due to renal failure, however due to symptoms of angina, cannot rule out underlying ischemic heart disease. Chest xray consistent with CHF. Echo report consistent with normal LVEF 65-70%, LVH and moderate pulmonary hypertension. Overall, patient admitted with acute renal failure now receiving hemodialysis. Would recommend non-invasive ischemic evaluation such as pharmacologic nuclear stress test when patient is euvolemic as patient would be a poor candidate for coronary angiogram due to high risk for permanent contrast induced nephropathy in setting of this new acute renal failure, follow up stress test today. Continue CV meds; aspirin, statin and beta blocker.    Addendum:    Nuclear stress test results:  IMPRESSION:  SPECT Myocardial Perfusion Imaging post rest and post   vasodilator revealed normal left ventricular perfusion. There is mild   diaphragmatic attenuation artifact visualized which reduces sensitivity   of the study findings.  No scan evidence of reversible or fixed perfusion defects.  Normal left ventricular wall motion with ejection fraction of 66 %   (normal: 50% or greater).  No regional wall motion abnormalities.      Nuclear stress test consistent with normal myocardial perfusion, normal post stress LVEF. The patient is CV stable at this time and continue medical management; aspirin, statin and beta blocker. The patient should follow up in cardiology office when discharged, we will sign off, please re-consult if needed.    Preston Dolan MD  Cardiology

## 2024-01-09 NOTE — PROGRESS NOTE ADULT - SUBJECTIVE AND OBJECTIVE BOX
No distress    Vital Signs Last 24 Hrs  T(C): 36.9 (01-09-24 @ 20:55), Max: 36.9 (01-09-24 @ 20:55)  T(F): 98.5 (01-09-24 @ 20:55), Max: 98.5 (01-09-24 @ 20:55)  HR: 73 (01-09-24 @ 20:55) (73 - 76)  BP: 151/81 (01-09-24 @ 20:55) (142/71 - 185/95)  RR: 18 (01-09-24 @ 20:55) (17 - 18)  SpO2: 96% (01-09-24 @ 20:55) (95% - 96%)    I&O's Detail    08 Jan 2024 07:01  -  09 Jan 2024 07:00  --------------------------------------------------------  IN:    Oral Fluid: 1300 mL  Total IN: 1300 mL    OUT:    Indwelling Catheter - Urethral (mL): 825 mL    Other (mL): 1000 mL    Voided (mL): 500 mL  Total OUT: 2325 mL    s1s2  b/l air entry  soft, ND  no edema                                                                 9.7    5.30  )-----------( 233      ( 09 Jan 2024 07:56 )             27.9     09 Jan 2024 07:56    136    |  99     |  56     ----------------------------<  96     4.2     |  25     |  11.22    Ca    7.0        09 Jan 2024 07:56  Phos  6.0       09 Jan 2024 07:56  Mg     2.2       09 Jan 2024 07:56    TPro  6.0    /  Alb  2.1    /  TBili  0.7    /  DBili  x      /  AST  60     /  ALT  38     /  AlkPhos  97     08 Jan 2024 05:50    LIVER FUNCTIONS - ( 08 Jan 2024 05:50 )  Alb: 2.1 g/dL / Pro: 6.0 g/dL / ALK PHOS: 97 U/L / ALT: 38 U/L / AST: 60 U/L / GGT: x           PT/INR - ( 09 Jan 2024 12:25 )   PT: 11.2 sec;   INR: 0.96 ratio      acetaminophen     Tablet .. 650 milliGRAM(s) Oral every 6 hours PRN  aluminum hydroxide/magnesium hydroxide/simethicone Suspension 30 milliLiter(s) Oral every 4 hours PRN  amLODIPine   Tablet 5 milliGRAM(s) Oral daily  aspirin  chewable 81 milliGRAM(s) Oral daily  atorvastatin 40 milliGRAM(s) Oral at bedtime  benzonatate 100 milliGRAM(s) Oral three times a day PRN  calcium acetate 1334 milliGRAM(s) Oral three times a day with meals  chlorhexidine 2% Cloths 1 Application(s) Topical <User Schedule>  heparin   Injectable 5000 Unit(s) SubCutaneous every 12 hours  labetalol 200 milliGRAM(s) Oral every 8 hours  melatonin 3 milliGRAM(s) Oral at bedtime PRN  ondansetron Injectable 4 milliGRAM(s) IV Push every 8 hours PRN  polyethylene glycol 3350 17 Gram(s) Oral daily  regadenoson Injectable 0.4 milliGRAM(s) IV Push once  senna 2 Tablet(s) Oral at bedtime  sodium chloride 0.9% lock flush 10 milliLiter(s) IV Push every 1 hour PRN    A/P:    Pt w/no known PMH, not following w/doctors, possibly hx of HTN, brother w/hx ESRD, s/p transplant   Adm w/N/V, fever, anemia, markedly abnormal renal fx (baseline renal fx is unknown)  Dx w/Flu A  S/p CT w/IV dye 1/3/23, dye injury can also be contributing to rising Cr   24hr urine collection positive for nephrotic range proteinuria   SPEP, renal serologies are negative   CT A/P w/o hydro   Started on HD 1/6 via temp HD cath  3rd HD tomorrow   F/u CBC, BMP, UO   Plan to transfer to [facility] for kidney biopsy, perm HD access  D/w medicine     [phone]       No distress    Vital Signs Last 24 Hrs  T(C): 36.9 (01-09-24 @ 20:55), Max: 36.9 (01-09-24 @ 20:55)  T(F): 98.5 (01-09-24 @ 20:55), Max: 98.5 (01-09-24 @ 20:55)  HR: 73 (01-09-24 @ 20:55) (73 - 76)  BP: 151/81 (01-09-24 @ 20:55) (142/71 - 185/95)  RR: 18 (01-09-24 @ 20:55) (17 - 18)  SpO2: 96% (01-09-24 @ 20:55) (95% - 96%)    I&O's Detail    08 Jan 2024 07:01  -  09 Jan 2024 07:00  --------------------------------------------------------  IN:    Oral Fluid: 1300 mL  Total IN: 1300 mL    OUT:    Indwelling Catheter - Urethral (mL): 825 mL    Other (mL): 1000 mL    Voided (mL): 500 mL  Total OUT: 2325 mL    s1s2  b/l air entry  soft, ND  no edema                                                                 9.7    5.30  )-----------( 233      ( 09 Jan 2024 07:56 )             27.9     09 Jan 2024 07:56    136    |  99     |  56     ----------------------------<  96     4.2     |  25     |  11.22    Ca    7.0        09 Jan 2024 07:56  Phos  6.0       09 Jan 2024 07:56  Mg     2.2       09 Jan 2024 07:56    TPro  6.0    /  Alb  2.1    /  TBili  0.7    /  DBili  x      /  AST  60     /  ALT  38     /  AlkPhos  97     08 Jan 2024 05:50    LIVER FUNCTIONS - ( 08 Jan 2024 05:50 )  Alb: 2.1 g/dL / Pro: 6.0 g/dL / ALK PHOS: 97 U/L / ALT: 38 U/L / AST: 60 U/L / GGT: x           PT/INR - ( 09 Jan 2024 12:25 )   PT: 11.2 sec;   INR: 0.96 ratio      acetaminophen     Tablet .. 650 milliGRAM(s) Oral every 6 hours PRN  aluminum hydroxide/magnesium hydroxide/simethicone Suspension 30 milliLiter(s) Oral every 4 hours PRN  amLODIPine   Tablet 5 milliGRAM(s) Oral daily  aspirin  chewable 81 milliGRAM(s) Oral daily  atorvastatin 40 milliGRAM(s) Oral at bedtime  benzonatate 100 milliGRAM(s) Oral three times a day PRN  calcium acetate 1334 milliGRAM(s) Oral three times a day with meals  chlorhexidine 2% Cloths 1 Application(s) Topical <User Schedule>  heparin   Injectable 5000 Unit(s) SubCutaneous every 12 hours  labetalol 200 milliGRAM(s) Oral every 8 hours  melatonin 3 milliGRAM(s) Oral at bedtime PRN  ondansetron Injectable 4 milliGRAM(s) IV Push every 8 hours PRN  polyethylene glycol 3350 17 Gram(s) Oral daily  regadenoson Injectable 0.4 milliGRAM(s) IV Push once  senna 2 Tablet(s) Oral at bedtime  sodium chloride 0.9% lock flush 10 milliLiter(s) IV Push every 1 hour PRN    A/P:    Pt w/no known PMH, not following w/doctors, possibly hx of HTN, brother w/hx ESRD, s/p transplant   Adm w/N/V, fever, anemia, markedly abnormal renal fx (baseline renal fx is unknown)  Dx w/Flu A  S/p CT w/IV dye 1/3/23, dye injury can also be contributing to rising Cr   24hr urine collection positive for nephrotic range proteinuria   SPEP, renal serologies are negative   CT A/P w/o hydro   Started on HD 1/6 via temp HD cath  3rd HD tomorrow   F/u CBC, BMP, UO   Plan to transfer to [facility] for kidney biopsy, perm HD access  D/w medicine     [phone]

## 2024-01-09 NOTE — PROGRESS NOTE ADULT - SUBJECTIVE AND OBJECTIVE BOX
MARLEY ATILIO  [id number]    Chief Complaint: HTN emergency    Interval events: Patient seen and examined, labs and chart reviewed. no c/o cp. Sinus 60-70s bpm on tele    ALLERGIES:  No Known Allergies      CURRENT MEDICATIONS:  MEDICATIONS  (STANDING):  cefTRIAXone   IVPB 1000 milliGRAM(s) IV Intermittent every 24 hours  heparin   Injectable 5000 Unit(s) SubCutaneous every 12 hours  labetalol 100 milliGRAM(s) Oral two times a day  metroNIDAZOLE  IVPB      metroNIDAZOLE  IVPB 500 milliGRAM(s) IV Intermittent every 8 hours    MEDICATIONS  (PRN):  acetaminophen     Tablet .. 650 milliGRAM(s) Oral every 6 hours PRN Temp greater or equal to 38C (100.4F), Mild Pain (1 - 3)  aluminum hydroxide/magnesium hydroxide/simethicone Suspension 30 milliLiter(s) Oral every 4 hours PRN Dyspepsia  melatonin 3 milliGRAM(s) Oral at bedtime PRN Insomnia  ondansetron Injectable 4 milliGRAM(s) IV Push every 8 hours PRN Nausea and/or Vomiting      ROS:  All 10 systems reviewed and positives noted in HPI    OBJECTIVE:    VITAL SIGNS:  Vital Signs Last 24 Hrs  T(C): 39.4 (03 Jan 2024 10:28), Max: 39.4 (03 Jan 2024 10:28)  T(F): 102.9 (03 Jan 2024 10:28), Max: 102.9 (03 Jan 2024 10:28)  HR: 107 (03 Jan 2024 10:28) (100 - 107)  BP: 177/96 (03 Jan 2024 10:28) (177/96 - 218/113)  BP(mean): --  RR: 21 (03 Jan 2024 10:28) (18 - 21)  SpO2: 91% (03 Jan 2024 10:28) (91% - 99%)    Parameters below as of 03 Jan 2024 10:28  Patient On (Oxygen Delivery Method): room air        PHYSICAL EXAM:  General: well appearing, no distress  HEENT: sclera anicteric  Neck: supple, no carotid bruits b/l, right HD cath  CVS: JVP ~ 7 cm H20, RRR, s1, s2, no murmurs/rubs/gallops  Chest: unlabored respirations, clear to auscultation b/l  Abdomen: non-distended  Extremities: no lower extremity edema b/l  Neuro: awake, alert & oriented x 3  Psych: normal affect      LABS:                        8.2    7.58  )-----------( 244      ( 03 Jan 2024 01:30 )             22.3     01-03    132<L>  |  98  |  94<H>  ----------------------------<  116<H>  4.2   |  19<L>  |  17.09<H>    Ca    6.7<L>      03 Jan 2024 01:30    TPro  7.1  /  Alb  2.7<L>  /  TBili  0.4  /  DBili  x   /  AST  12  /  ALT  19  /  AlkPhos  108  01-03        ECG: Sinus tach       TTE: < from: TTE Echo Limited or F/U (01.03.24 @ 08:02) >   1. Biatrial enlargement   2. Left ventricular ejection fraction, by visual estimation, is 65 to   70%.   3. Normal global left ventricular systolic function.   4. Increased LV wall thickness.   5. Normal left ventricular internal cavity size.   6. Spectral Doppler shows impaired relaxation pattern of left   ventricular myocardial filling (Grade I diastolic dysfunction).   7. Trivial pericardial effusion.   8. Mild to moderate mitral valve regurgitation.   9. Mild-moderate tricuspid regurgitation.  10. Estimated pulmonary artery systolic pressure is 54.8 mmHg assuming a   right atrial pressure of 3 mmHg, which is consistent with moderate   pulmonary hypertension.       MARLEY ATILIO  [id number]    Chief Complaint: HTN emergency    Interval events: Patient seen and examined, labs and chart reviewed. no c/o cp. Sinus 60-70s bpm on tele    ALLERGIES:  No Known Allergies      CURRENT MEDICATIONS:  MEDICATIONS  (STANDING):  cefTRIAXone   IVPB 1000 milliGRAM(s) IV Intermittent every 24 hours  heparin   Injectable 5000 Unit(s) SubCutaneous every 12 hours  labetalol 100 milliGRAM(s) Oral two times a day  metroNIDAZOLE  IVPB      metroNIDAZOLE  IVPB 500 milliGRAM(s) IV Intermittent every 8 hours    MEDICATIONS  (PRN):  acetaminophen     Tablet .. 650 milliGRAM(s) Oral every 6 hours PRN Temp greater or equal to 38C (100.4F), Mild Pain (1 - 3)  aluminum hydroxide/magnesium hydroxide/simethicone Suspension 30 milliLiter(s) Oral every 4 hours PRN Dyspepsia  melatonin 3 milliGRAM(s) Oral at bedtime PRN Insomnia  ondansetron Injectable 4 milliGRAM(s) IV Push every 8 hours PRN Nausea and/or Vomiting      ROS:  All 10 systems reviewed and positives noted in HPI    OBJECTIVE:    VITAL SIGNS:  Vital Signs Last 24 Hrs  T(C): 39.4 (03 Jan 2024 10:28), Max: 39.4 (03 Jan 2024 10:28)  T(F): 102.9 (03 Jan 2024 10:28), Max: 102.9 (03 Jan 2024 10:28)  HR: 107 (03 Jan 2024 10:28) (100 - 107)  BP: 177/96 (03 Jan 2024 10:28) (177/96 - 218/113)  BP(mean): --  RR: 21 (03 Jan 2024 10:28) (18 - 21)  SpO2: 91% (03 Jan 2024 10:28) (91% - 99%)    Parameters below as of 03 Jan 2024 10:28  Patient On (Oxygen Delivery Method): room air        PHYSICAL EXAM:  General: well appearing, no distress  HEENT: sclera anicteric  Neck: supple, no carotid bruits b/l, right HD cath  CVS: JVP ~ 7 cm H20, RRR, s1, s2, no murmurs/rubs/gallops  Chest: unlabored respirations, clear to auscultation b/l  Abdomen: non-distended  Extremities: no lower extremity edema b/l  Neuro: awake, alert & oriented x 3  Psych: normal affect      LABS:                        8.2    7.58  )-----------( 244      ( 03 Jan 2024 01:30 )             22.3     01-03    132<L>  |  98  |  94<H>  ----------------------------<  116<H>  4.2   |  19<L>  |  17.09<H>    Ca    6.7<L>      03 Jan 2024 01:30    TPro  7.1  /  Alb  2.7<L>  /  TBili  0.4  /  DBili  x   /  AST  12  /  ALT  19  /  AlkPhos  108  01-03        ECG: Sinus tach       TTE: < from: TTE Echo Limited or F/U (01.03.24 @ 08:02) >   1. Biatrial enlargement   2. Left ventricular ejection fraction, by visual estimation, is 65 to   70%.   3. Normal global left ventricular systolic function.   4. Increased LV wall thickness.   5. Normal left ventricular internal cavity size.   6. Spectral Doppler shows impaired relaxation pattern of left   ventricular myocardial filling (Grade I diastolic dysfunction).   7. Trivial pericardial effusion.   8. Mild to moderate mitral valve regurgitation.   9. Mild-moderate tricuspid regurgitation.  10. Estimated pulmonary artery systolic pressure is 54.8 mmHg assuming a   right atrial pressure of 3 mmHg, which is consistent with moderate   pulmonary hypertension.       MARLEY TRIMBLE  [id number]    Chief Complaint: HTN emergency    Interval events: Patient seen and examined, labs and chart reviewed. No chest pain. Sinus 60-70s bpm on tele    ALLERGIES:  No Known Allergies      CURRENT MEDICATIONS:  MEDICATIONS  (STANDING):  cefTRIAXone   IVPB 1000 milliGRAM(s) IV Intermittent every 24 hours  heparin   Injectable 5000 Unit(s) SubCutaneous every 12 hours  labetalol 100 milliGRAM(s) Oral two times a day  metroNIDAZOLE  IVPB      metroNIDAZOLE  IVPB 500 milliGRAM(s) IV Intermittent every 8 hours    MEDICATIONS  (PRN):  acetaminophen     Tablet .. 650 milliGRAM(s) Oral every 6 hours PRN Temp greater or equal to 38C (100.4F), Mild Pain (1 - 3)  aluminum hydroxide/magnesium hydroxide/simethicone Suspension 30 milliLiter(s) Oral every 4 hours PRN Dyspepsia  melatonin 3 milliGRAM(s) Oral at bedtime PRN Insomnia  ondansetron Injectable 4 milliGRAM(s) IV Push every 8 hours PRN Nausea and/or Vomiting      ROS:  All 10 systems reviewed and positives noted in HPI    OBJECTIVE:    VITAL SIGNS:  Vital Signs Last 24 Hrs  T(C): 39.4 (03 Jan 2024 10:28), Max: 39.4 (03 Jan 2024 10:28)  T(F): 102.9 (03 Jan 2024 10:28), Max: 102.9 (03 Jan 2024 10:28)  HR: 107 (03 Jan 2024 10:28) (100 - 107)  BP: 177/96 (03 Jan 2024 10:28) (177/96 - 218/113)  BP(mean): --  RR: 21 (03 Jan 2024 10:28) (18 - 21)  SpO2: 91% (03 Jan 2024 10:28) (91% - 99%)    Parameters below as of 03 Jan 2024 10:28  Patient On (Oxygen Delivery Method): room air        PHYSICAL EXAM:  General: well appearing, no distress  HEENT: sclera anicteric  Neck: supple, no carotid bruits b/l, right HD cath  CVS: JVP ~ 7 cm H20, RRR, s1, s2, no murmurs/rubs/gallops  Chest: unlabored respirations, clear to auscultation b/l  Abdomen: non-distended  Extremities: no lower extremity edema b/l  Neuro: awake, alert & oriented x 3  Psych: normal affect      LABS:                        8.2    7.58  )-----------( 244      ( 03 Jan 2024 01:30 )             22.3     01-03    132<L>  |  98  |  94<H>  ----------------------------<  116<H>  4.2   |  19<L>  |  17.09<H>    Ca    6.7<L>      03 Jan 2024 01:30    TPro  7.1  /  Alb  2.7<L>  /  TBili  0.4  /  DBili  x   /  AST  12  /  ALT  19  /  AlkPhos  108  01-03        ECG: Sinus tach       TTE: < from: TTE Echo Limited or F/U (01.03.24 @ 08:02) >   1. Biatrial enlargement   2. Left ventricular ejection fraction, by visual estimation, is 65 to   70%.   3. Normal global left ventricular systolic function.   4. Increased LV wall thickness.   5. Normal left ventricular internal cavity size.   6. Spectral Doppler shows impaired relaxation pattern of left   ventricular myocardial filling (Grade I diastolic dysfunction).   7. Trivial pericardial effusion.   8. Mild to moderate mitral valve regurgitation.   9. Mild-moderate tricuspid regurgitation.  10. Estimated pulmonary artery systolic pressure is 54.8 mmHg assuming a   right atrial pressure of 3 mmHg, which is consistent with moderate   pulmonary hypertension.       MARLEY TRIMBLE  [id number]    Chief Complaint: HTN emergency    Interval events: Patient seen and examined, labs and chart reviewed. No chest pain. Sinus 60-70s bpm on tele    ALLERGIES:  No Known Allergies      CURRENT MEDICATIONS:  MEDICATIONS  (STANDING):  cefTRIAXone   IVPB 1000 milliGRAM(s) IV Intermittent every 24 hours  heparin   Injectable 5000 Unit(s) SubCutaneous every 12 hours  labetalol 100 milliGRAM(s) Oral two times a day  metroNIDAZOLE  IVPB      metroNIDAZOLE  IVPB 500 milliGRAM(s) IV Intermittent every 8 hours    MEDICATIONS  (PRN):  acetaminophen     Tablet .. 650 milliGRAM(s) Oral every 6 hours PRN Temp greater or equal to 38C (100.4F), Mild Pain (1 - 3)  aluminum hydroxide/magnesium hydroxide/simethicone Suspension 30 milliLiter(s) Oral every 4 hours PRN Dyspepsia  melatonin 3 milliGRAM(s) Oral at bedtime PRN Insomnia  ondansetron Injectable 4 milliGRAM(s) IV Push every 8 hours PRN Nausea and/or Vomiting      ROS:  All 10 systems reviewed and positives noted in HPI    OBJECTIVE:    VITAL SIGNS:  Vital Signs Last 24 Hrs  T(C): 39.4 (03 Jan 2024 10:28), Max: 39.4 (03 Jan 2024 10:28)  T(F): 102.9 (03 Jan 2024 10:28), Max: 102.9 (03 Jan 2024 10:28)  HR: 107 (03 Jan 2024 10:28) (100 - 107)  BP: 177/96 (03 Jan 2024 10:28) (177/96 - 218/113)  BP(mean): --  RR: 21 (03 Jan 2024 10:28) (18 - 21)  SpO2: 91% (03 Jan 2024 10:28) (91% - 99%)    Parameters below as of 03 Jan 2024 10:28  Patient On (Oxygen Delivery Method): room air        PHYSICAL EXAM:  General: well appearing, no distress  HEENT: sclera anicteric  Neck: supple, no carotid bruits b/l, right HD cath  CVS: JVP ~ 7 cm H20, RRR, s1, s2, no murmurs/rubs/gallops  Chest: unlabored respirations, clear to auscultation b/l  Abdomen: non-distended  Extremities: no lower extremity edema b/l  Neuro: awake, alert & oriented x 3  Psych: normal affect      LABS:                        8.2    7.58  )-----------( 244      ( 03 Jan 2024 01:30 )             22.3     01-03    132<L>  |  98  |  94<H>  ----------------------------<  116<H>  4.2   |  19<L>  |  17.09<H>    Ca    6.7<L>      03 Jan 2024 01:30    TPro  7.1  /  Alb  2.7<L>  /  TBili  0.4  /  DBili  x   /  AST  12  /  ALT  19  /  AlkPhos  108  01-03        ECG: Sinus tach       TTE: < from: TTE Echo Limited or F/U (01.03.24 @ 08:02) >   1. Biatrial enlargement   2. Left ventricular ejection fraction, by visual estimation, is 65 to   70%.   3. Normal global left ventricular systolic function.   4. Increased LV wall thickness.   5. Normal left ventricular internal cavity size.   6. Spectral Doppler shows impaired relaxation pattern of left   ventricular myocardial filling (Grade I diastolic dysfunction).   7. Trivial pericardial effusion.   8. Mild to moderate mitral valve regurgitation.   9. Mild-moderate tricuspid regurgitation.  10. Estimated pulmonary artery systolic pressure is 54.8 mmHg assuming a   right atrial pressure of 3 mmHg, which is consistent with moderate   pulmonary hypertension.

## 2024-01-09 NOTE — PROGRESS NOTE ADULT - PROBLEM SELECTOR PLAN 1
-S/p CTAP with IV contrast  -History of HTN, not on medications (poor medical follow up)  -Urine with 300 protein, small blood, 100 glucose  -S/p 1L fluid in ED  -S/p 10mg hydralazine IV push  -C/w 200mg labetolol q8hrs  -Continue to monitor BP  -Appreciate nephrology recommendations  -Appreciate cardiology recommendations, will continue tele monitoring- plan for transfer to tertiary center for ischemic work up once renally stable, Nuclear stress test tomorrow, NPO after midnight -S/p CTAP with IV contrast  -History of HTN, not on medications (poor medical follow up)  -Urine with 300 protein, small blood, 100 glucose  -S/p 1L fluid in ED  -S/p 10mg hydralazine IV push  -C/w 200mg labetolol q8hrs  -restart amlodipine 5mg  -Continue to monitor BP  -Appreciate nephrology recommendations  -Appreciate cardiology recommendations, will continue tele monitoring- plan for possible transfer to tertiary center for permanent HD access and biopsy  -Follow up results of nuclear stress test

## 2024-01-09 NOTE — PROGRESS NOTE ADULT - SUBJECTIVE AND OBJECTIVE BOX
Mr. Garcia is a 57yo M with PMHx of CKD 3, HTN, lost to follow up with PCP (per chart review used to take medications for HTN, but patient denies) presenting for vomiting times 3 weeks. Per patient, he began having subjective fevers and vomiting 2-4x/ day for 3 weeks with occasional small amounts of blood  with inability to hold down solids or liquids along with diarrhea for 2 days x8 episodes. Patient also notes that for the past 4 years he has been getting chest pain while walking, which is getting worse. Currently endorsing worsening central chest  pain in the ED along with a headache. Denies shortness of breath or palpitations. Denies changes in urine output , frequency or dysuria. Notes hx of renal failure in his brother at 71yo requiring transplant. Denied recent traveling, sick exposure, NSAID use, exposure to Hep C, Hep B, HIV, new foods. The patient was admitted for renal failure and r/o ACS.    1/8/24 AM: Patient seen and examined at bedside today, laying comfortably, no acute complaints.       REVIEW OF SYSTEMS:  CONSTITUTIONAL: (-) weakness, (-) fevers, (-) chills  RESPIRATORY:  (-) shortness of breath, (-) cough,  (-) wheezing,  (-) hemoptysis   CARDIOVASCULAR:  (+) chest pain, (-) palpitations  GASTROINTESTINAL:  (+) abdominal or epigastric pain, (-) nausea, (-) vomiting, (-) diarrhea, (-) constipation, (-) melena,  (-) hematemesis,  (-) hematochezia  GENITOURINARY: (-) dysuria, (-) frequency, (-) hematuria  NEUROLOGICAL: (-) numbness, (-) weakness  SKIN: (-) itching, (-) rashes, (-) lesions    Vital Signs Last 24 Hrs  T(C): 36.4 (08 Jan 2024 12:50), Max: 36.8 (07 Jan 2024 21:01)  T(F): 97.6 (08 Jan 2024 12:50), Max: 98.2 (07 Jan 2024 21:01)  HR: 69 (08 Jan 2024 12:50) (66 - 74)  BP: 158/88 (08 Jan 2024 12:50) (120/66 - 160/92)  RR: 16 (08 Jan 2024 12:50) (16 - 20)  SpO2: 98% (08 Jan 2024 12:50) (95% - 98%)    Parameters below as of 08 Jan 2024 12:50  Patient On (Oxygen Delivery Method): room air      PHYSICAL EXAM:  GENERAL: NAD, lying in bed, R shiley catheter in place  HEAD:  Atraumatic, Normocephalic  RESP: Clear to auscultation bilaterally, good air entry bilaterally; No wheezing, rales, or rhonchi. Unlabored respirations  CARDIAC: Regular rate and rhythm. S1 and S2. No murmurs, rubs, or gallops  GI:  Soft, Nontender, Nondistended. Bowel sounds present x4 quadrants; No hepatomegaly. No splenomegaly.  : Pro discontinued  EXTREMITIES:  2+ Peripheral Pulses. Capillary refill <2 seconds. No clubbing, cyanosis, or edema  NERVOUS SYSTEM:  Alert & Oriented X3, speech clear. No deficits   MSK: FROM all 4 extremities, full and equal strength  SKIN: No rashes, bruises, or other lesions      MEDICATIONS  (STANDING):  aspirin  chewable 81 milliGRAM(s) Oral daily  atorvastatin 40 milliGRAM(s) Oral at bedtime  calcium acetate 1334 milliGRAM(s) Oral three times a day with meals  chlorhexidine 2% Cloths 1 Application(s) Topical <User Schedule>  clopidogrel Tablet 75 milliGRAM(s) Oral daily  heparin   Injectable 5000 Unit(s) SubCutaneous every 12 hours  labetalol 200 milliGRAM(s) Oral every 8 hours  polyethylene glycol 3350 17 Gram(s) Oral daily  PPD  5 Tuberculin Unit(s) Injectable 5 Unit(s) IntraDermal once  regadenoson Injectable 0.4 milliGRAM(s) IV Push once  senna 2 Tablet(s) Oral at bedtime    MEDICATIONS  (PRN):  acetaminophen     Tablet .. 650 milliGRAM(s) Oral every 6 hours PRN Temp greater or equal to 38C (100.4F), Mild Pain (1 - 3)  aluminum hydroxide/magnesium hydroxide/simethicone Suspension 30 milliLiter(s) Oral every 4 hours PRN Dyspepsia  benzonatate 100 milliGRAM(s) Oral three times a day PRN Cough  melatonin 3 milliGRAM(s) Oral at bedtime PRN Insomnia  ondansetron Injectable 4 milliGRAM(s) IV Push every 8 hours PRN Nausea and/or Vomiting  sodium chloride 0.9% lock flush 10 milliLiter(s) IV Push every 1 hour PRN Pre/post blood products, medications, blood draw, and to maintain line patency        LABS                                   7.5    4.02  )-----------( 213      ( 08 Jan 2024 05:50 )             21.2     08 Jan 2024 05:50    134    |  96     |  83     ----------------------------<  90     4.2     |  25     |  14.54    Ca    5.5        08 Jan 2024 05:50  Phos  9.4       08 Jan 2024 05:50  Mg     2.0       07 Jan 2024 06:00    TPro  6.0    /  Alb  2.1    /  TBili  0.7    /  DBili  x      /  AST  60     /  ALT  38     /  AlkPhos  97     08 Jan 2024 05:50      CAPILLARY BLOOD GLUCOSE    LIVER FUNCTIONS - ( 08 Jan 2024 05:50 )  Alb: 2.1 g/dL / Pro: 6.0 g/dL / ALK PHOS: 97 U/L / ALT: 38 U/L / AST: 60 U/L / GGT: x           Urinalysis Basic - ( 08 Jan 2024 05:50 )    Color: x / Appearance: x / SG: x / pH: x  Gluc: 90 mg/dL / Ketone: x  / Bili: x / Urobili: x   Blood: x / Protein: x / Nitrite: x   Leuk Esterase: x / RBC: x / WBC x   Sq Epi: x / Non Sq Epi: x / Bacteria: x            CT ABDOMEN AND PELVIS  IMPRESSION:    Inadequately distended proximal colonic loop or query long segmental   colitis. No bowel obstruction. Nonemergent colonoscopy suggested.    --- End of Report ---    MARIANO COPELAND MD; Attending Radiologist  This document has been electronically signed. Froy  3 2024  2:58AM         Mr. Garcia is a 57yo M with PMHx of CKD 3, HTN, lost to follow up with PCP (per chart review used to take medications for HTN, but patient denies) presenting for vomiting times 3 weeks. Per patient, he began having subjective fevers and vomiting 2-4x/ day for 3 weeks with occasional small amounts of blood  with inability to hold down solids or liquids along with diarrhea for 2 days x8 episodes. Patient also notes that for the past 4 years he has been getting chest pain while walking, which is getting worse. Currently endorsing worsening central chest  pain in the ED along with a headache. Denies shortness of breath or palpitations. Denies changes in urine output , frequency or dysuria. Notes hx of renal failure in his brother at 69yo requiring transplant. Denied recent traveling, sick exposure, NSAID use, exposure to Hep C, Hep B, HIV, new foods. The patient was admitted for renal failure and r/o ACS.    1/8/24 AM: Patient seen and examined at bedside today, laying comfortably, no acute complaints.       REVIEW OF SYSTEMS:  CONSTITUTIONAL: (-) weakness, (-) fevers, (-) chills  RESPIRATORY:  (-) shortness of breath, (-) cough,  (-) wheezing,  (-) hemoptysis   CARDIOVASCULAR:  (+) chest pain, (-) palpitations  GASTROINTESTINAL:  (+) abdominal or epigastric pain, (-) nausea, (-) vomiting, (-) diarrhea, (-) constipation, (-) melena,  (-) hematemesis,  (-) hematochezia  GENITOURINARY: (-) dysuria, (-) frequency, (-) hematuria  NEUROLOGICAL: (-) numbness, (-) weakness  SKIN: (-) itching, (-) rashes, (-) lesions    Vital Signs Last 24 Hrs  T(C): 36.4 (08 Jan 2024 12:50), Max: 36.8 (07 Jan 2024 21:01)  T(F): 97.6 (08 Jan 2024 12:50), Max: 98.2 (07 Jan 2024 21:01)  HR: 69 (08 Jan 2024 12:50) (66 - 74)  BP: 158/88 (08 Jan 2024 12:50) (120/66 - 160/92)  RR: 16 (08 Jan 2024 12:50) (16 - 20)  SpO2: 98% (08 Jan 2024 12:50) (95% - 98%)    Parameters below as of 08 Jan 2024 12:50  Patient On (Oxygen Delivery Method): room air      PHYSICAL EXAM:  GENERAL: NAD, lying in bed, R shiley catheter in place  HEAD:  Atraumatic, Normocephalic  RESP: Clear to auscultation bilaterally, good air entry bilaterally; No wheezing, rales, or rhonchi. Unlabored respirations  CARDIAC: Regular rate and rhythm. S1 and S2. No murmurs, rubs, or gallops  GI:  Soft, Nontender, Nondistended. Bowel sounds present x4 quadrants; No hepatomegaly. No splenomegaly.  : Pro discontinued  EXTREMITIES:  2+ Peripheral Pulses. Capillary refill <2 seconds. No clubbing, cyanosis, or edema  NERVOUS SYSTEM:  Alert & Oriented X3, speech clear. No deficits   MSK: FROM all 4 extremities, full and equal strength  SKIN: No rashes, bruises, or other lesions      MEDICATIONS  (STANDING):  aspirin  chewable 81 milliGRAM(s) Oral daily  atorvastatin 40 milliGRAM(s) Oral at bedtime  calcium acetate 1334 milliGRAM(s) Oral three times a day with meals  chlorhexidine 2% Cloths 1 Application(s) Topical <User Schedule>  clopidogrel Tablet 75 milliGRAM(s) Oral daily  heparin   Injectable 5000 Unit(s) SubCutaneous every 12 hours  labetalol 200 milliGRAM(s) Oral every 8 hours  polyethylene glycol 3350 17 Gram(s) Oral daily  PPD  5 Tuberculin Unit(s) Injectable 5 Unit(s) IntraDermal once  regadenoson Injectable 0.4 milliGRAM(s) IV Push once  senna 2 Tablet(s) Oral at bedtime    MEDICATIONS  (PRN):  acetaminophen     Tablet .. 650 milliGRAM(s) Oral every 6 hours PRN Temp greater or equal to 38C (100.4F), Mild Pain (1 - 3)  aluminum hydroxide/magnesium hydroxide/simethicone Suspension 30 milliLiter(s) Oral every 4 hours PRN Dyspepsia  benzonatate 100 milliGRAM(s) Oral three times a day PRN Cough  melatonin 3 milliGRAM(s) Oral at bedtime PRN Insomnia  ondansetron Injectable 4 milliGRAM(s) IV Push every 8 hours PRN Nausea and/or Vomiting  sodium chloride 0.9% lock flush 10 milliLiter(s) IV Push every 1 hour PRN Pre/post blood products, medications, blood draw, and to maintain line patency        LABS                                   7.5    4.02  )-----------( 213      ( 08 Jan 2024 05:50 )             21.2     08 Jan 2024 05:50    134    |  96     |  83     ----------------------------<  90     4.2     |  25     |  14.54    Ca    5.5        08 Jan 2024 05:50  Phos  9.4       08 Jan 2024 05:50  Mg     2.0       07 Jan 2024 06:00    TPro  6.0    /  Alb  2.1    /  TBili  0.7    /  DBili  x      /  AST  60     /  ALT  38     /  AlkPhos  97     08 Jan 2024 05:50      CAPILLARY BLOOD GLUCOSE    LIVER FUNCTIONS - ( 08 Jan 2024 05:50 )  Alb: 2.1 g/dL / Pro: 6.0 g/dL / ALK PHOS: 97 U/L / ALT: 38 U/L / AST: 60 U/L / GGT: x           Urinalysis Basic - ( 08 Jan 2024 05:50 )    Color: x / Appearance: x / SG: x / pH: x  Gluc: 90 mg/dL / Ketone: x  / Bili: x / Urobili: x   Blood: x / Protein: x / Nitrite: x   Leuk Esterase: x / RBC: x / WBC x   Sq Epi: x / Non Sq Epi: x / Bacteria: x            CT ABDOMEN AND PELVIS  IMPRESSION:    Inadequately distended proximal colonic loop or query long segmental   colitis. No bowel obstruction. Nonemergent colonoscopy suggested.    --- End of Report ---    MARIANO COPELAND MD; Attending Radiologist  This document has been electronically signed. Froy  3 2024  2:58AM         Mr. Garcia is a 57yo M with PMHx of CKD 3, HTN, lost to follow up with PCP (per chart review used to take medications for HTN, but patient denies) presenting for vomiting times 3 weeks. Per patient, he began having subjective fevers and vomiting 2-4x/ day for 3 weeks with occasional small amounts of blood  with inability to hold down solids or liquids along with diarrhea for 2 days x8 episodes. Patient also notes that for the past 4 years he has been getting chest pain while walking, which is getting worse. Currently endorsing worsening central chest  pain in the ED along with a headache. Denies shortness of breath or palpitations. Denies changes in urine output , frequency or dysuria. Notes hx of renal failure in his brother at 71yo requiring transplant. Denied recent traveling, sick exposure, NSAID use, exposure to Hep C, Hep B, HIV, new foods. The patient was admitted for renal failure and r/o ACS.    1/9/24 AM: Patient seen and examined at bedside today, laying comfortably, no acute complaints. Overnight pt had bleeding at heparin injection sites, heparin and plavix were held for the morning, now resolved.     REVIEW OF SYSTEMS:  CONSTITUTIONAL: (-) weakness, (-) fevers, (-) chills  RESPIRATORY:  (-) shortness of breath, (-) cough,  (-) wheezing,  (-) hemoptysis   CARDIOVASCULAR:  (+) chest pain, (-) palpitations  GASTROINTESTINAL:  (+) abdominal or epigastric pain, (-) nausea, (-) vomiting, (-) diarrhea, (-) constipation, (-) melena,  (-) hematemesis,  (-) hematochezia  GENITOURINARY: (-) dysuria, (-) frequency, (-) hematuria  NEUROLOGICAL: (-) numbness, (-) weakness  SKIN: (-) itching, (-) rashes, (-) lesions      Vital Signs Last 24 Hrs  T(C): 36.6 (09 Jan 2024 05:41), Max: 37 (08 Jan 2024 21:13)  T(F): 97.8 (09 Jan 2024 05:41), Max: 98.6 (08 Jan 2024 21:13)  HR: 76 (09 Jan 2024 13:39) (74 - 80)  BP: 185/95 (09 Jan 2024 13:39) (157/85 - 185/95)  RR: 17 (09 Jan 2024 05:41) (16 - 18)  SpO2: 96% (09 Jan 2024 05:41) (96% - 98%)    Parameters below as of 09 Jan 2024 05:41  Patient On (Oxygen Delivery Method): room air        PHYSICAL EXAM:  GENERAL: NAD, lying in bed, R shiley catheter in place  HEAD:  Atraumatic, Normocephalic  RESP: Clear to auscultation bilaterally, good air entry bilaterally; No wheezing, rales, or rhonchi. Unlabored respirations  CARDIAC: Regular rate and rhythm. S1 and S2. No murmurs, rubs, or gallops  GI:  Soft, Nontender, Nondistended. Bowel sounds present x4 quadrants; No hepatomegaly. No splenomegaly.  : Pro discontinued  EXTREMITIES:  2+ Peripheral Pulses. Capillary refill <2 seconds. No clubbing, cyanosis, or edema  NERVOUS SYSTEM:  Alert & Oriented X3, speech clear. No deficits   MSK: FROM all 4 extremities, full and equal strength  SKIN: No rashes, bruises, or other lesions    MEDICATIONS  (STANDING):  amLODIPine   Tablet 5 milliGRAM(s) Oral daily  aspirin  chewable 81 milliGRAM(s) Oral daily  atorvastatin 40 milliGRAM(s) Oral at bedtime  calcium acetate 1334 milliGRAM(s) Oral three times a day with meals  chlorhexidine 2% Cloths 1 Application(s) Topical <User Schedule>  clopidogrel Tablet 75 milliGRAM(s) Oral daily  heparin   Injectable 5000 Unit(s) SubCutaneous every 12 hours  labetalol 200 milliGRAM(s) Oral every 8 hours  polyethylene glycol 3350 17 Gram(s) Oral daily  regadenoson Injectable 0.4 milliGRAM(s) IV Push once  senna 2 Tablet(s) Oral at bedtime    MEDICATIONS  (PRN):  acetaminophen     Tablet .. 650 milliGRAM(s) Oral every 6 hours PRN Temp greater or equal to 38C (100.4F), Mild Pain (1 - 3)  aluminum hydroxide/magnesium hydroxide/simethicone Suspension 30 milliLiter(s) Oral every 4 hours PRN Dyspepsia  benzonatate 100 milliGRAM(s) Oral three times a day PRN Cough  melatonin 3 milliGRAM(s) Oral at bedtime PRN Insomnia  ondansetron Injectable 4 milliGRAM(s) IV Push every 8 hours PRN Nausea and/or Vomiting  sodium chloride 0.9% lock flush 10 milliLiter(s) IV Push every 1 hour PRN Pre/post blood products, medications, blood draw, and to maintain line patency        LABS                           9.7    5.30  )-----------( 233      ( 09 Jan 2024 07:56 )             27.9     09 Jan 2024 07:56    136    |  99     |  56     ----------------------------<  96     4.2     |  25     |  11.22    Ca    7.0        09 Jan 2024 07:56  Phos  6.0       09 Jan 2024 07:56  Mg     2.2       09 Jan 2024 07:56    TPro  6.0    /  Alb  2.1    /  TBili  0.7    /  DBili  x      /  AST  60     /  ALT  38     /  AlkPhos  97     08 Jan 2024 05:50    PT/INR - ( 09 Jan 2024 12:25 )   PT: 11.2 sec;   INR: 0.96 ratio         PTT - ( 09 Jan 2024 12:25 )  PTT:29.5 sec  CAPILLARY BLOOD GLUCOSE    LIVER FUNCTIONS - ( 08 Jan 2024 05:50 )  Alb: 2.1 g/dL / Pro: 6.0 g/dL / ALK PHOS: 97 U/L / ALT: 38 U/L / AST: 60 U/L / GGT: x           Urinalysis Basic - ( 09 Jan 2024 07:56 )    Color: x / Appearance: x / SG: x / pH: x  Gluc: 96 mg/dL / Ketone: x  / Bili: x / Urobili: x   Blood: x / Protein: x / Nitrite: x   Leuk Esterase: x / RBC: x / WBC x   Sq Epi: x / Non Sq Epi: x / Bacteria: x        CT ABDOMEN AND PELVIS  IMPRESSION:    Inadequately distended proximal colonic loop or query long segmental   colitis. No bowel obstruction. Nonemergent colonoscopy suggested.    --- End of Report ---    MARIANO COPELAND MD; Attending Radiologist  This document has been electronically signed. Froy  3 2024  2:58AM         Mr. Garcia is a 59yo M with PMHx of CKD 3, HTN, lost to follow up with PCP (per chart review used to take medications for HTN, but patient denies) presenting for vomiting times 3 weeks. Per patient, he began having subjective fevers and vomiting 2-4x/ day for 3 weeks with occasional small amounts of blood  with inability to hold down solids or liquids along with diarrhea for 2 days x8 episodes. Patient also notes that for the past 4 years he has been getting chest pain while walking, which is getting worse. Currently endorsing worsening central chest  pain in the ED along with a headache. Denies shortness of breath or palpitations. Denies changes in urine output , frequency or dysuria. Notes hx of renal failure in his brother at 71yo requiring transplant. Denied recent traveling, sick exposure, NSAID use, exposure to Hep C, Hep B, HIV, new foods. The patient was admitted for renal failure and r/o ACS.    1/9/24 AM: Patient seen and examined at bedside today, laying comfortably, no acute complaints. Overnight pt had bleeding at heparin injection sites, heparin and plavix were held for the morning, now resolved.     REVIEW OF SYSTEMS:  CONSTITUTIONAL: (-) weakness, (-) fevers, (-) chills  RESPIRATORY:  (-) shortness of breath, (-) cough,  (-) wheezing,  (-) hemoptysis   CARDIOVASCULAR:  (+) chest pain, (-) palpitations  GASTROINTESTINAL:  (+) abdominal or epigastric pain, (-) nausea, (-) vomiting, (-) diarrhea, (-) constipation, (-) melena,  (-) hematemesis,  (-) hematochezia  GENITOURINARY: (-) dysuria, (-) frequency, (-) hematuria  NEUROLOGICAL: (-) numbness, (-) weakness  SKIN: (-) itching, (-) rashes, (-) lesions      Vital Signs Last 24 Hrs  T(C): 36.6 (09 Jan 2024 05:41), Max: 37 (08 Jan 2024 21:13)  T(F): 97.8 (09 Jan 2024 05:41), Max: 98.6 (08 Jan 2024 21:13)  HR: 76 (09 Jan 2024 13:39) (74 - 80)  BP: 185/95 (09 Jan 2024 13:39) (157/85 - 185/95)  RR: 17 (09 Jan 2024 05:41) (16 - 18)  SpO2: 96% (09 Jan 2024 05:41) (96% - 98%)    Parameters below as of 09 Jan 2024 05:41  Patient On (Oxygen Delivery Method): room air        PHYSICAL EXAM:  GENERAL: NAD, lying in bed, R shiley catheter in place  HEAD:  Atraumatic, Normocephalic  RESP: Clear to auscultation bilaterally, good air entry bilaterally; No wheezing, rales, or rhonchi. Unlabored respirations  CARDIAC: Regular rate and rhythm. S1 and S2. No murmurs, rubs, or gallops  GI:  Soft, Nontender, Nondistended. Bowel sounds present x4 quadrants; No hepatomegaly. No splenomegaly.  : Pro discontinued  EXTREMITIES:  2+ Peripheral Pulses. Capillary refill <2 seconds. No clubbing, cyanosis, or edema  NERVOUS SYSTEM:  Alert & Oriented X3, speech clear. No deficits   MSK: FROM all 4 extremities, full and equal strength  SKIN: No rashes, bruises, or other lesions    MEDICATIONS  (STANDING):  amLODIPine   Tablet 5 milliGRAM(s) Oral daily  aspirin  chewable 81 milliGRAM(s) Oral daily  atorvastatin 40 milliGRAM(s) Oral at bedtime  calcium acetate 1334 milliGRAM(s) Oral three times a day with meals  chlorhexidine 2% Cloths 1 Application(s) Topical <User Schedule>  clopidogrel Tablet 75 milliGRAM(s) Oral daily  heparin   Injectable 5000 Unit(s) SubCutaneous every 12 hours  labetalol 200 milliGRAM(s) Oral every 8 hours  polyethylene glycol 3350 17 Gram(s) Oral daily  regadenoson Injectable 0.4 milliGRAM(s) IV Push once  senna 2 Tablet(s) Oral at bedtime    MEDICATIONS  (PRN):  acetaminophen     Tablet .. 650 milliGRAM(s) Oral every 6 hours PRN Temp greater or equal to 38C (100.4F), Mild Pain (1 - 3)  aluminum hydroxide/magnesium hydroxide/simethicone Suspension 30 milliLiter(s) Oral every 4 hours PRN Dyspepsia  benzonatate 100 milliGRAM(s) Oral three times a day PRN Cough  melatonin 3 milliGRAM(s) Oral at bedtime PRN Insomnia  ondansetron Injectable 4 milliGRAM(s) IV Push every 8 hours PRN Nausea and/or Vomiting  sodium chloride 0.9% lock flush 10 milliLiter(s) IV Push every 1 hour PRN Pre/post blood products, medications, blood draw, and to maintain line patency        LABS                           9.7    5.30  )-----------( 233      ( 09 Jan 2024 07:56 )             27.9     09 Jan 2024 07:56    136    |  99     |  56     ----------------------------<  96     4.2     |  25     |  11.22    Ca    7.0        09 Jan 2024 07:56  Phos  6.0       09 Jan 2024 07:56  Mg     2.2       09 Jan 2024 07:56    TPro  6.0    /  Alb  2.1    /  TBili  0.7    /  DBili  x      /  AST  60     /  ALT  38     /  AlkPhos  97     08 Jan 2024 05:50    PT/INR - ( 09 Jan 2024 12:25 )   PT: 11.2 sec;   INR: 0.96 ratio         PTT - ( 09 Jan 2024 12:25 )  PTT:29.5 sec  CAPILLARY BLOOD GLUCOSE    LIVER FUNCTIONS - ( 08 Jan 2024 05:50 )  Alb: 2.1 g/dL / Pro: 6.0 g/dL / ALK PHOS: 97 U/L / ALT: 38 U/L / AST: 60 U/L / GGT: x           Urinalysis Basic - ( 09 Jan 2024 07:56 )    Color: x / Appearance: x / SG: x / pH: x  Gluc: 96 mg/dL / Ketone: x  / Bili: x / Urobili: x   Blood: x / Protein: x / Nitrite: x   Leuk Esterase: x / RBC: x / WBC x   Sq Epi: x / Non Sq Epi: x / Bacteria: x        CT ABDOMEN AND PELVIS  IMPRESSION:    Inadequately distended proximal colonic loop or query long segmental   colitis. No bowel obstruction. Nonemergent colonoscopy suggested.    --- End of Report ---    MARIANO COPELAND MD; Attending Radiologist  This document has been electronically signed. Froy  3 2024  2:58AM

## 2024-01-09 NOTE — PROGRESS NOTE ADULT - ASSESSMENT
Assessment: 57 yo M with pmhx CKD 3, HTN, lost to follow up with PCP (per chart review used to take medications for HTN, but patient denies) presenting for vomiting times 3 weeks, admitted for renal failure, hypertensive urgency and r/o ACS.     Recommendations:  #elevated trops  - ekg non ischemic  - Cr markedly elevated, can also be a component of CKD +/- demand iso htn urgency  - cont bb, asa, plavix and statin  - TTE with normal EF 65-70 and mod pulm htn  - patient not an ideal candidate for cardiac cath 2/2 DESIREE, pt pending results of nuclear pharm stress test  - continue tele monitoring    Nohelia KEANE-BC Assessment: 59 yo M with pmhx CKD 3, HTN, lost to follow up with PCP (per chart review used to take medications for HTN, but patient denies) presenting for vomiting times 3 weeks, admitted for renal failure, hypertensive urgency and r/o ACS.     Recommendations:  #elevated trops  - ekg non ischemic  - Cr markedly elevated, can also be a component of CKD +/- demand iso htn urgency  - cont bb, asa, plavix and statin  - TTE with normal EF 65-70 and mod pulm htn  - patient not an ideal candidate for cardiac cath 2/2 DESIREE, pt pending results of nuclear pharm stress test  - continue tele monitoring    Nohelia KEANE-BC Assessment: 59 yo M with pmhx CKD 3, HTN, lost to follow up with PCP (per chart review used to take medications for HTN, but patient denies) presenting for vomiting times 3 weeks, admitted for renal failure, hypertensive urgency and r/o ACS.     Recommendations:  #elevated trops  - ekg non ischemic  - Cr markedly elevated, can also be a component of CKD +/- demand iso htn urgency  - cont bb, asa, plavix and statin  - TTE with normal EF 65-70 and mod pulm htn  - patient not an ideal candidate for cardiac cath 2/2 DESIREE, patient pending results of nuclear pharm stress test  - continue tele monitoring    Nohelia KEANE-BC Assessment: 57 yo M with pmhx CKD 3, HTN, lost to follow up with PCP (per chart review used to take medications for HTN, but patient denies) presenting for vomiting times 3 weeks, admitted for renal failure, hypertensive urgency and r/o ACS.     Recommendations:  #elevated trops  - ekg non ischemic  - Cr markedly elevated, can also be a component of CKD +/- demand iso htn urgency  - cont bb, asa, plavix and statin  - TTE with normal EF 65-70 and mod pulm htn  - patient not an ideal candidate for cardiac cath 2/2 DESIREE, patient pending results of nuclear pharm stress test  - continue tele monitoring    Nohelia [facility]-BC    Addendum: pt nuclear stress test normal. no plan for cardiac cath, continue with med management asa, statin and BB. can hold asa if needed for procedure Assessment: 57 yo M with pmhx CKD 3, HTN, lost to follow up with PCP (per chart review used to take medications for HTN, but patient denies) presenting for vomiting times 3 weeks, admitted for renal failure, hypertensive urgency and r/o ACS.     Recommendations:  #elevated trops  - ekg non ischemic  - Cr markedly elevated, can also be a component of CKD +/- demand iso htn urgency  - cont bb, asa, plavix and statin  - TTE with normal EF 65-70 and mod pulm htn  - patient not an ideal candidate for cardiac cath 2/2 DESIREE, patient pending results of nuclear pharm stress test  - continue tele monitoring    Nohelia Walton [facility]-BC    Addendum: pt nuclear stress test normal. no plan for cardiac cath, continue with med management asa, statin and BB. can hold asa if needed for procedure

## 2024-01-09 NOTE — PROGRESS NOTE ADULT - ASSESSMENT
Mr. Garcia is a 59yo M with PMHx of CKD 3, HTN, lost to follow up with PCP (per chart review used to take medications for HTN, but patient denies) presenting for vomiting times 3 weeks, admitted for renal failure and r/o ACS.        Mr. Garcia is a 59yo M with PMHx of CKD 3, HTN, lost to follow up with PCP (per chart review used to take medications for HTN, but patient denies) presenting for vomiting times 3 weeks, admitted for renal failure and r/o ACS.       Case dw Dr. Jimenez.     Close communication between cardiology and nephrology regarding possible ischemic work up and need for permanent HD access. F/up results of nuclear stress test, assess possible transfer from there. Mr. Garcia is a 57yo M with PMHx of CKD 3, HTN, lost to follow up with PCP (per chart review used to take medications for HTN, but patient denies) presenting for vomiting times 3 weeks, admitted for renal failure and r/o ACS.       Case dw Dr. Jimenez.     Close communication between cardiology and nephrology regarding possible ischemic work up and need for permanent HD access. F/up results of nuclear stress test, assess possible transfer from there.

## 2024-01-09 NOTE — PROVIDER CONTACT NOTE (OTHER) - BACKGROUND
Patient admitted with acute renal failure, HD pt, uremia  Recent history of blood transfusion on 1/8/2024

## 2024-01-09 NOTE — DISCHARGE NOTE NURSING/CASE MANAGEMENT/SOCIAL WORK - NSDCPEFALRISK_GEN_ALL_CORE
For information on Fall & Injury Prevention, visit: [email or web address] OR  [email or web address] OR  https://www.cdc.gov/steadi/patient.html For information on Fall & Injury Prevention, visit: [email or web address] OR  [email or web address] OR  https://www.cdc.gov/steadi/patient.html

## 2024-01-09 NOTE — CHART NOTE - NSCHARTNOTEFT_GEN_A_CORE
MD called for large amount of blood from heparin injection site. MD at bedside. Gauze and gown soaked in bright red blood. VSS. BP HR. Patient denies acute complaints such as dizziness, SOB, chest pain. Nurse to apply pressure dressing. Aspirin and Clopidogrel held for AM team to evaluate. Coags added to AM labs. MD called for large amount of blood from heparin injection site. MD at bedside. Gauze and gown soaked in bright red blood. VSS. /90 HR 75. Patient denies acute complaints such as dizziness, SOB, chest pain. Nurse to apply pressure dressing. Aspirin and Clopidogrel held for AM team to evaluate. Coags added to AM labs.

## 2024-01-09 NOTE — CHART NOTE - NSCHARTNOTEFT_GEN_A_CORE
NUTRITION FOLLOW UP    SOURCE: Patient [X)   Family [ ]    Medical Record (X), PCA    Diet, DASH/TLC:   Sodium & Cholesterol Restricted  For patients receiving Renal Replacement - No Protein Restr, No Conc K, No Conc Phos, Low Sodium (01-03-24 @ 19:08) [Active]      Pt observed with N/V at time of visit- states that he had a reaction to testing that was done today. PO intake has otherwise been variable: 0-100%. Miguel placed 1/6/24 by ICU PA, pt s/p HD 1/6, HD 1/8 ordered with 1unit PRBCs. Plan for possible transfer to tertiary center for permanent HD access and biopsy. Recommend adding Nepro qd to optimize intake. Last BM 1/9.                CURRENT WEIGHT:  140.4lbs (1/8) - post HD  143.3lbs (1/6)    PERTINENT MEDS:   Pertinent Medications: MEDICATIONS  (STANDING):  amLODIPine   Tablet 5 milliGRAM(s) Oral daily  aspirin  chewable 81 milliGRAM(s) Oral daily  atorvastatin 40 milliGRAM(s) Oral at bedtime  calcium acetate 1334 milliGRAM(s) Oral three times a day with meals  chlorhexidine 2% Cloths 1 Application(s) Topical <User Schedule>  clopidogrel Tablet 75 milliGRAM(s) Oral daily  heparin   Injectable 5000 Unit(s) SubCutaneous every 12 hours  labetalol 200 milliGRAM(s) Oral every 8 hours  polyethylene glycol 3350 17 Gram(s) Oral daily  regadenoson Injectable 0.4 milliGRAM(s) IV Push once  senna 2 Tablet(s) Oral at bedtime    MEDICATIONS  (PRN):  acetaminophen     Tablet .. 650 milliGRAM(s) Oral every 6 hours PRN Temp greater or equal to 38C (100.4F), Mild Pain (1 - 3)  aluminum hydroxide/magnesium hydroxide/simethicone Suspension 30 milliLiter(s) Oral every 4 hours PRN Dyspepsia  benzonatate 100 milliGRAM(s) Oral three times a day PRN Cough  melatonin 3 milliGRAM(s) Oral at bedtime PRN Insomnia  ondansetron Injectable 4 milliGRAM(s) IV Push every 8 hours PRN Nausea and/or Vomiting  sodium chloride 0.9% lock flush 10 milliLiter(s) IV Push every 1 hour PRN Pre/post blood products, medications, blood draw, and to maintain line patency      PERTINENT LABS:  01-09 Na136 mmol/L Glu 96 mg/dL K+ 4.2 mmol/L Cr  11.22 mg/dL<H> BUN 56 mg/dL<H> 01-09 Phos 6.0 mg/dL<H> 01-08 Alb 2.1 g/dL<L> 01-03 Chol 103 mg/dL LDL --    HDL 60 mg/dL Trig 57 mg/dL        SKIN:  no pressure ulcers  EDEMA: none  LAST BM: 1/8     ESTIMATED NEEDS:   [X] no change since previous assessment  [ ] recalculated:     PREVIOUS NUTRITION DIAGNOSIS:    1. Moderate malnutrition- add Nepro qd    NUTRITION DIAGNOSIS is :  (X)  Ongoing      NEW NUTRITION DIAGNOSIS: N/A    NUTRITION RECOMMENDATIONS:   1. Add Nepro qd  2. Nausea management per medical team     MONITORING AND EVALUATION:   1. Tolerance to diet prescription   2. PO intake  3. Weights  4. Labs  5. Follow Up per protocol     RD to remain available   Yessenia Jolly RDN    or TEAMS

## 2024-01-09 NOTE — DISCHARGE NOTE NURSING/CASE MANAGEMENT/SOCIAL WORK - NSDCFUADDAPPT_GEN_ALL_CORE_FT
Transfer to [facility] for permanent HD access and renal biopsy, accepting attending is Dr. Chery. Transfer to [facility] for permanent HD access and renal biopsy, accepting attending is Dr. Chery.

## 2024-01-09 NOTE — PROGRESS NOTE ADULT - NS ATTEND BILL GEN_ALL_CORE
ED Physician Addendum Note    Case endorsed to Dr. Henderson by Dr. Gilbert at change of shift, pending CT abdomen, pelvis with contrast results.      Re-evaluation     6:43 AM---The patient is resting comfortably.    8:10 AM--- Reviewed CT abdomen and pelvis showing possible esophagitis. Reviewed diagnostic results with the patient, as well as plans for disposition. Discussed discharge with medications for the esophagitis and  states she was just provided omeprazole.  Discussed continuing to take that.  Will also discharge with antibiotics for UTI.  Discussed follow up with GI.  Patient is going to [facility] next week for 3 months and discussed following up with GI in [facility].  Discussed return precautions in detail.  All questions answered. Patient agrees to discharge home and understands need for follow-up on an outpatient basis.        Results for orders placed or performed during the hospital encounter of 01/02/24   Comprehensive Metabolic Panel   Result Value Ref Range    Fasting Status      Sodium 137 135 - 145 mmol/L    Potassium 3.6 3.4 - 5.1 mmol/L    Chloride 99 97 - 110 mmol/L    Carbon Dioxide 27 21 - 32 mmol/L    Anion Gap 15 7 - 19 mmol/L    Glucose 188 (H) 70 - 99 mg/dL    BUN 15 6 - 20 mg/dL    Creatinine 0.76 0.51 - 0.95 mg/dL    Glomerular Filtration Rate 83 >=60    BUN/Cr 20 7 - 25    Calcium 9.3 8.4 - 10.2 mg/dL    Bilirubin, Total 0.3 0.2 - 1.0 mg/dL    GOT/AST 24 <=37 Units/L    GPT/ALT 31 <64 Units/L    Alkaline Phosphatase 72 45 - 117 Units/L    Albumin 4.2 3.6 - 5.1 g/dL    Protein, Total 8.3 (H) 6.4 - 8.2 g/dL    Globulin 4.1 (H) 2.0 - 4.0 g/dL    A/G Ratio 1.0 1.0 - 2.4   Lipase   Result Value Ref Range    Lipase 104 (H) 15 - 77 Units/L   Urinalysis & Reflex Microscopy With Culture If Indicated   Result Value Ref Range    COLOR, URINALYSIS Straw     APPEARANCE, URINALYSIS Clear     GLUCOSE, URINALYSIS Negative Negative mg/dL    BILIRUBIN, URINALYSIS Negative Negative    KETONES, URINALYSIS 
Negative Negative mg/dL    SPECIFIC GRAVITY, URINALYSIS 1.014 1.005 - 1.030    OCCULT BLOOD, URINALYSIS Negative Negative    PH, URINALYSIS 5.0 5.0 - 7.0    PROTEIN, URINALYSIS Negative Negative mg/dL    UROBILINOGEN, URINALYSIS 0.2 0.2, 1.0 mg/dL    NITRITE, URINALYSIS Negative Negative    LEUKOCYTE ESTERASE, URINALYSIS Large (A) Negative    SQUAMOUS EPITHELIAL, URINALYSIS 1 to 5 None Seen, 1 to 5 /hpf    ERYTHROCYTES, URINALYSIS 1 to 2 None Seen, 1 to 2 /hpf    LEUKOCYTES, URINALYSIS 26 to 100 (A) None Seen, 1 to 5 /hpf    BACTERIA, URINALYSIS Few (A) None Seen /hpf    HYALINE CASTS, URINALYSIS 1 to 5 None Seen, 1 to 5 /lpf    MUCUS Present    CBC with Automated Differential (performable only)   Result Value Ref Range    WBC 9.6 4.2 - 11.0 K/mcL    RBC 4.08 4.00 - 5.20 mil/mcL    HGB 11.9 (L) 12.0 - 15.5 g/dL    HCT 37.3 36.0 - 46.5 %    MCV 91.4 78.0 - 100.0 fl    MCH 29.2 26.0 - 34.0 pg    MCHC 31.9 (L) 32.0 - 36.5 g/dL    RDW-CV 12.6 11.0 - 15.0 %    RDW-SD 41.8 39.0 - 50.0 fL     140 - 450 K/mcL    NRBC 0 <=0 /100 WBC    Neutrophil, Percent 56 %    Lymphocytes, Percent 35 %    Mono, Percent 6 %    Eosinophils, Percent 2 %    Basophils, Percent 1 %    Immature Granulocytes 0 %    Absolute Neutrophils 5.4 1.8 - 7.7 K/mcL    Absolute Lymphocytes 3.4 1.0 - 4.0 K/mcL    Absolute Monocytes 0.6 0.3 - 0.9 K/mcL    Absolute Eosinophils  0.2 0.0 - 0.5 K/mcL    Absolute Basophils 0.1 0.0 - 0.3 K/mcL    Absolute Immature Granulocytes 0.0 0.0 - 0.2 K/mcL   TROPONIN I, HIGH SENSITIVITY   Result Value Ref Range    Troponin I, High Sensitivity 28 <52 ng/L       CT ABDOMEN PELVIS W CONTRAST   Final Result   1. No acute findings in the abdomen or pelvis. Of note, imaging findings   may be normal in early or mild pancreatitis.   2. Cholelithiasis without CT evidence of acute cholecystitis.   3. Hepatic steatosis.   4. Distal esophageal wall thickening, may reflect esophagitis.         Electronically Signed by: FRANCIS 
MD REGINALDO    Signed on: 1/2/2024 7:30 AM    Workstation ID: [id number]          ED Diagnosis   1. Epigastric pain        2. Bacteriuria        3. Esophagitis            Disposition        Discharge 1/2/2024  8:19 AM  Marleny Kate discharge to home/self care.            On 1/2/2024, I, Madyson Basilio, accurately documented the service(s) personally performed and the decisions made by Dr. Henderson.    I have reviewed the scribe's charting and agree that the final signed note accurately reflects my personal performance of the history, physical exam, hospital course, and assessment and plan.       Kathie Henderson MD  01/02/24 0913    
Attending to bill

## 2024-01-10 LAB
ANION GAP SERPL CALC-SCNC: 14 MMOL/L — SIGNIFICANT CHANGE UP (ref 5–17)
ANION GAP SERPL CALC-SCNC: 14 MMOL/L — SIGNIFICANT CHANGE UP (ref 5–17)
BUN SERPL-MCNC: 61 MG/DL — HIGH (ref 7–23)
BUN SERPL-MCNC: 61 MG/DL — HIGH (ref 7–23)
CALCIUM SERPL-MCNC: 6.6 MG/DL — LOW (ref 8.4–10.5)
CALCIUM SERPL-MCNC: 6.6 MG/DL — LOW (ref 8.4–10.5)
CHLORIDE SERPL-SCNC: 98 MMOL/L — SIGNIFICANT CHANGE UP (ref 96–108)
CHLORIDE SERPL-SCNC: 98 MMOL/L — SIGNIFICANT CHANGE UP (ref 96–108)
CO2 SERPL-SCNC: 22 MMOL/L — SIGNIFICANT CHANGE UP (ref 22–31)
CO2 SERPL-SCNC: 22 MMOL/L — SIGNIFICANT CHANGE UP (ref 22–31)
CREAT SERPL-MCNC: 12.33 MG/DL — HIGH (ref 0.5–1.3)
CREAT SERPL-MCNC: 12.33 MG/DL — HIGH (ref 0.5–1.3)
EGFR: 4 ML/MIN/1.73M2 — LOW
EGFR: 4 ML/MIN/1.73M2 — LOW
GLUCOSE BLDC GLUCOMTR-MCNC: 101 MG/DL — HIGH (ref 70–99)
GLUCOSE BLDC GLUCOMTR-MCNC: 101 MG/DL — HIGH (ref 70–99)
GLUCOSE SERPL-MCNC: 90 MG/DL — SIGNIFICANT CHANGE UP (ref 70–99)
GLUCOSE SERPL-MCNC: 90 MG/DL — SIGNIFICANT CHANGE UP (ref 70–99)
HCT VFR BLD CALC: 26.3 % — LOW (ref 39–50)
HCT VFR BLD CALC: 26.3 % — LOW (ref 39–50)
HGB BLD-MCNC: 9.1 G/DL — LOW (ref 13–17)
HGB BLD-MCNC: 9.1 G/DL — LOW (ref 13–17)
MCHC RBC-ENTMCNC: 27.4 PG — SIGNIFICANT CHANGE UP (ref 27–34)
MCHC RBC-ENTMCNC: 27.4 PG — SIGNIFICANT CHANGE UP (ref 27–34)
MCHC RBC-ENTMCNC: 34.6 GM/DL — SIGNIFICANT CHANGE UP (ref 32–36)
MCHC RBC-ENTMCNC: 34.6 GM/DL — SIGNIFICANT CHANGE UP (ref 32–36)
MCV RBC AUTO: 79.2 FL — LOW (ref 80–100)
MCV RBC AUTO: 79.2 FL — LOW (ref 80–100)
NRBC # BLD: 0 /100 WBCS — SIGNIFICANT CHANGE UP (ref 0–0)
NRBC # BLD: 0 /100 WBCS — SIGNIFICANT CHANGE UP (ref 0–0)
PHOSPHATE SERPL-MCNC: 6.4 MG/DL — HIGH (ref 2.5–4.5)
PHOSPHATE SERPL-MCNC: 6.4 MG/DL — HIGH (ref 2.5–4.5)
PLATELET # BLD AUTO: 257 K/UL — SIGNIFICANT CHANGE UP (ref 150–400)
PLATELET # BLD AUTO: 257 K/UL — SIGNIFICANT CHANGE UP (ref 150–400)
POTASSIUM SERPL-MCNC: 4.2 MMOL/L — SIGNIFICANT CHANGE UP (ref 3.5–5.3)
POTASSIUM SERPL-MCNC: 4.2 MMOL/L — SIGNIFICANT CHANGE UP (ref 3.5–5.3)
POTASSIUM SERPL-SCNC: 4.2 MMOL/L — SIGNIFICANT CHANGE UP (ref 3.5–5.3)
POTASSIUM SERPL-SCNC: 4.2 MMOL/L — SIGNIFICANT CHANGE UP (ref 3.5–5.3)
RBC # BLD: 3.32 M/UL — LOW (ref 4.2–5.8)
RBC # BLD: 3.32 M/UL — LOW (ref 4.2–5.8)
RBC # FLD: 13.9 % — SIGNIFICANT CHANGE UP (ref 10.3–14.5)
RBC # FLD: 13.9 % — SIGNIFICANT CHANGE UP (ref 10.3–14.5)
SODIUM SERPL-SCNC: 134 MMOL/L — LOW (ref 135–145)
SODIUM SERPL-SCNC: 134 MMOL/L — LOW (ref 135–145)
WBC # BLD: 5.84 K/UL — SIGNIFICANT CHANGE UP (ref 3.8–10.5)
WBC # BLD: 5.84 K/UL — SIGNIFICANT CHANGE UP (ref 3.8–10.5)
WBC # FLD AUTO: 5.84 K/UL — SIGNIFICANT CHANGE UP (ref 3.8–10.5)
WBC # FLD AUTO: 5.84 K/UL — SIGNIFICANT CHANGE UP (ref 3.8–10.5)

## 2024-01-10 PROCEDURE — 99233 SBSQ HOSP IP/OBS HIGH 50: CPT | Mod: GC

## 2024-01-10 RX ORDER — CALCIUM ACETATE 667 MG
2001 TABLET ORAL
Refills: 0 | Status: DISCONTINUED | OUTPATIENT
Start: 2024-01-10 | End: 2024-01-12

## 2024-01-10 RX ADMIN — HEPARIN SODIUM 5000 UNIT(S): 5000 INJECTION INTRAVENOUS; SUBCUTANEOUS at 06:13

## 2024-01-10 RX ADMIN — Medication 200 MILLIGRAM(S): at 13:55

## 2024-01-10 RX ADMIN — ATORVASTATIN CALCIUM 40 MILLIGRAM(S): 80 TABLET, FILM COATED ORAL at 22:11

## 2024-01-10 RX ADMIN — HEPARIN SODIUM 5000 UNIT(S): 5000 INJECTION INTRAVENOUS; SUBCUTANEOUS at 18:12

## 2024-01-10 RX ADMIN — CHLORHEXIDINE GLUCONATE 1 APPLICATION(S): 213 SOLUTION TOPICAL at 06:13

## 2024-01-10 RX ADMIN — Medication 1334 MILLIGRAM(S): at 09:03

## 2024-01-10 RX ADMIN — SENNA PLUS 2 TABLET(S): 8.6 TABLET ORAL at 22:11

## 2024-01-10 RX ADMIN — Medication 200 MILLIGRAM(S): at 22:11

## 2024-01-10 RX ADMIN — AMLODIPINE BESYLATE 5 MILLIGRAM(S): 2.5 TABLET ORAL at 07:53

## 2024-01-10 RX ADMIN — POLYETHYLENE GLYCOL 3350 17 GRAM(S): 17 POWDER, FOR SOLUTION ORAL at 13:56

## 2024-01-10 RX ADMIN — Medication 2001 MILLIGRAM(S): at 18:11

## 2024-01-10 RX ADMIN — Medication 200 MILLIGRAM(S): at 06:13

## 2024-01-10 NOTE — PROGRESS NOTE ADULT - PROBLEM SELECTOR PLAN 2
-r/o other causes of other intrinsic renal disease ie nephritic/nephrotic syndrome, hepatitis  -s/p CTAP with IV contrast  -History of HTN, not on medications (poor medical follow up)  - Autoimmune work up unrevealing   -Hep B and hep C neg  -urine with 300 protein, small blood, 100 glucose  -SPEP, renal serologies negative  -a1c and lipid panel wnl  -BUN 94, Cr 17.09 on admission, up to 18.14  -Cr 14.54 today  -c/w 200mg BID labetalol  -Nephrology following, recommend gentle hydration with IVF and bicarb  -S/p leblanc removal  -Shiley placed 1/6/24 by ICU PA, pt s/p HD 1/6, HD 1/8 ordered with 1unit pRBCs  -Plan for possible transfer to tertiary center for permanent HD access and biopsy -r/o other causes of other intrinsic renal disease ie nephritic/nephrotic syndrome, hepatitis  -s/p CTAP with IV contrast  -History of HTN, not on medications (poor medical follow up)  - Autoimmune work up unrevealing   -Hep B and hep C neg  -urine with 300 protein, small blood, 100 glucose  -SPEP, renal serologies negative  -a1c and lipid panel wnl  -BUN 94, Cr 17.09 on admission, up to 18.14  -Cr 12.33 today  -c/w 200mg BID labetalol  -Nephrology following, recommend gentle hydration with IVF and bicarb  -S/p leblanc removal  -Shiley placed 1/6/24 by ICU PA, pt s/p HD 1/6, HD 1/8 ordered with 1unit pRBCs  -Plan for transfer to [facility] for permanent HD access and renal biopsy -r/o other causes of other intrinsic renal disease ie nephritic/nephrotic syndrome, hepatitis  -s/p CTAP with IV contrast  -History of HTN, not on medications (poor medical follow up)  - Autoimmune work up unrevealing   -Hep B and hep C neg  -urine with 300 protein, small blood, 100 glucose  -SPEP, renal serologies negative  -a1c and lipid panel wnl  -BUN 94, Cr 17.09 on admission, up to 18.14  -Cr 12.33 today  -c/w 200mg BID labetalol  -Nephrology following, recommend gentle hydration with IVF and bicarb  -S/p leblanc removal  -Shiley placed 1/6/24 by ICU PA, pt s/p HD 1/6, HD 1/8 ordered with 1unit pRBCs  -Plan for transfer to [facility] for permanent HD access and renal biopsy

## 2024-01-10 NOTE — PROGRESS NOTE ADULT - ATTENDING COMMENTS
59yo M with PMHx of CKD 3, HTN, lost to follow up with PCP (per chart review used to take medications for HTN, but patient denies) presenting for vomiting times 3 weeks, admitted for renal failure and r/o ACS.     #HTN emergency  - not on meds at home  - continue labetalol 200mg TID and amlodipine 5mg daily    #ARF  - likely chronic issue and 2/2 to uncontrolled BP  - s/p temp access placed by ICU PA on 1/6, s/p HD on 1/6 tolerated well  - now off IVF  - dc leblanc, monitor bladder scans  - follow up nephro studies  - nephro following- discussed at length with Dr. Laird, would likely need renal biopsy and chronic HD  - patient accepted for transfer to [facility], accepting Dr. Keo Chery, transfer for permacath placement and renal biospy     #anemia  - likely anemia of chronic kidney disease  - s/p PRBC 2U total with appropriate response   - continue to monitor H/H, now stable  - occult negative  - transfuse <8 of if symptomatic    #NSTEMI  - trop noted to be elevated, could be in setting of HTN emergency but will likely need further workup given chest pain on admission  - trop trending down, no need to further trend  - echo without acute concerns besides mod pHTN  - stress test 1/9 negative, as per cardio no plan for further intervention  - can stop plavix, continue asa, statin and BB. Asa can be held for procedures  - cardio recs appreciated, signed off    #sepsis  #influenza  #ETEC  - BCx with NGTD  - stool culture with ETEC   - given resolution of diarrhea, will hold off on abx treatment at this time  - continue to monitor off abx    pending transfer to [facility] 59yo M with PMHx of CKD 3, HTN, lost to follow up with PCP (per chart review used to take medications for HTN, but patient denies) presenting for vomiting times 3 weeks, admitted for renal failure and r/o ACS.     #HTN emergency  - not on meds at home  - continue labetalol 200mg TID and amlodipine 5mg daily    #ARF  - likely chronic issue and 2/2 to uncontrolled BP  - s/p temp access placed by ICU PA on 1/6, s/p HD on 1/6 tolerated well  - now off IVF  - dc leblanc, monitor bladder scans  - follow up nephro studies  - nephro following- discussed at length with Dr. Laird, would likely need renal biopsy and chronic HD  - patient accepted for transfer to [facility], accepting Dr. Keo Chery, transfer for permacath placement and renal biospy     #anemia  - likely anemia of chronic kidney disease  - s/p PRBC 2U total with appropriate response   - continue to monitor H/H, now stable  - occult negative  - transfuse <8 of if symptomatic    #NSTEMI  - trop noted to be elevated, could be in setting of HTN emergency but will likely need further workup given chest pain on admission  - trop trending down, no need to further trend  - echo without acute concerns besides mod pHTN  - stress test 1/9 negative, as per cardio no plan for further intervention  - can stop plavix, continue asa, statin and BB. Asa can be held for procedures  - cardio recs appreciated, signed off    #sepsis  #influenza  #ETEC  - BCx with NGTD  - stool culture with ETEC   - given resolution of diarrhea, will hold off on abx treatment at this time  - continue to monitor off abx    pending transfer to [facility]

## 2024-01-10 NOTE — PROGRESS NOTE ADULT - PROBLEM SELECTOR PLAN 3
-Pt not meeting sepsis criteria on admission, found to be meeting sepsis criteria 1/3/24 with , temp 102.9, RR 22, and suspected GI infection  -RVP + for Influenza A, Stool culture +ETEC   -Blood/urine cx negative  -Off abx at this time (was on flagyl +CTX on admission)  -Tylenol PRN for fever  -Symptomatic support for Influenza and ETEC   -Will continue to monitor labs and vitals -Pt not meeting sepsis criteria on admission, found to be meeting sepsis criteria 1/3/24 with , temp 102.9, RR 22, and suspected GI infection  -RVP + for Influenza A, Stool culture +ETEC   -Blood/urine cx negative  -Off abx at this time (was on flagyl +CTX on admission)  -Afebrile  -Tylenol PRN for fever  -Symptomatic support for Influenza and ETEC   -Will continue to monitor labs and vitals

## 2024-01-10 NOTE — PROGRESS NOTE ADULT - SUBJECTIVE AND OBJECTIVE BOX
Mr. Garcia is a 59yo M with PMHx of CKD 3, HTN, lost to follow up with PCP (per chart review used to take medications for HTN, but patient denies) presenting for vomiting times 3 weeks. Per patient, he began having subjective fevers and vomiting 2-4x/ day for 3 weeks with occasional small amounts of blood  with inability to hold down solids or liquids along with diarrhea for 2 days x8 episodes. Patient also notes that for the past 4 years he has been getting chest pain while walking, which is getting worse. Currently endorsing worsening central chest  pain in the ED along with a headache. Denies shortness of breath or palpitations. Denies changes in urine output , frequency or dysuria. Notes hx of renal failure in his brother at 71yo requiring transplant. Denied recent traveling, sick exposure, NSAID use, exposure to Hep C, Hep B, HIV, new foods. The patient was admitted for renal failure and r/o ACS.    1/10/24 AM: Patient seen and examined at bedside today, laying comfortably, no acute complaints. Overnight pt had bleeding at heparin injection sites, heparin and plavix were held for the morning, now resolved.     REVIEW OF SYSTEMS:  CONSTITUTIONAL: (-) weakness, (-) fevers, (-) chills  RESPIRATORY:  (-) shortness of breath, (-) cough,  (-) wheezing,  (-) hemoptysis   CARDIOVASCULAR:  (+) chest pain, (-) palpitations  GASTROINTESTINAL:  (+) abdominal or epigastric pain, (-) nausea, (-) vomiting, (-) diarrhea, (-) constipation, (-) melena,  (-) hematemesis,  (-) hematochezia  GENITOURINARY: (-) dysuria, (-) frequency, (-) hematuria  NEUROLOGICAL: (-) numbness, (-) weakness  SKIN: (-) itching, (-) rashes, (-) lesions    Vital Signs Last 24 Hrs  T(C): 36.6 (10 Froy 2024 04:54), Max: 36.9 (09 Jan 2024 20:55)  T(F): 97.8 (10 Froy 2024 04:54), Max: 98.5 (09 Jan 2024 20:55)  HR: 74 (10 Froy 2024 04:54) (73 - 76)  BP: 170/74 (10 Froy 2024 04:54) (142/71 - 185/95)  RR: 18 (10 Froy 2024 04:54) (17 - 18)  SpO2: 97% (10 Froy 2024 04:54) (95% - 97%)    Parameters below as of 10 Froy 2024 04:54  Patient On (Oxygen Delivery Method): room air    PHYSICAL EXAM:  GENERAL: NAD, lying in bed, R shiley catheter in place  HEAD:  Atraumatic, Normocephalic  RESP: Clear to auscultation bilaterally, good air entry bilaterally; No wheezing, rales, or rhonchi. Unlabored respirations  CARDIAC: Regular rate and rhythm. S1 and S2. No murmurs, rubs, or gallops  GI:  Soft, Nontender, Nondistended. Bowel sounds present x4 quadrants; No hepatomegaly. No splenomegaly.  : Pro discontinued  EXTREMITIES:  2+ Peripheral Pulses. Capillary refill <2 seconds. No clubbing, cyanosis, or edema  NERVOUS SYSTEM:  Alert & Oriented X3, speech clear. No deficits   MSK: FROM all 4 extremities, full and equal strength  SKIN: No rashes, bruises, or other lesions    MEDICATIONS  (STANDING):  amLODIPine   Tablet 5 milliGRAM(s) Oral daily  aspirin  chewable 81 milliGRAM(s) Oral daily  atorvastatin 40 milliGRAM(s) Oral at bedtime  calcium acetate 1334 milliGRAM(s) Oral three times a day with meals  chlorhexidine 2% Cloths 1 Application(s) Topical <User Schedule>  clopidogrel Tablet 75 milliGRAM(s) Oral daily  heparin   Injectable 5000 Unit(s) SubCutaneous every 12 hours  labetalol 200 milliGRAM(s) Oral every 8 hours  polyethylene glycol 3350 17 Gram(s) Oral daily  regadenoson Injectable 0.4 milliGRAM(s) IV Push once  senna 2 Tablet(s) Oral at bedtime    MEDICATIONS  (PRN):  acetaminophen     Tablet .. 650 milliGRAM(s) Oral every 6 hours PRN Temp greater or equal to 38C (100.4F), Mild Pain (1 - 3)  aluminum hydroxide/magnesium hydroxide/simethicone Suspension 30 milliLiter(s) Oral every 4 hours PRN Dyspepsia  benzonatate 100 milliGRAM(s) Oral three times a day PRN Cough  melatonin 3 milliGRAM(s) Oral at bedtime PRN Insomnia  ondansetron Injectable 4 milliGRAM(s) IV Push every 8 hours PRN Nausea and/or Vomiting  sodium chloride 0.9% lock flush 10 milliLiter(s) IV Push every 1 hour PRN Pre/post blood products, medications, blood draw, and to maintain line patency        LABS                           9.7    5.30  )-----------( 233      ( 09 Jan 2024 07:56 )             27.9     09 Jan 2024 07:56    136    |  99     |  56     ----------------------------<  96     4.2     |  25     |  11.22    Ca    7.0        09 Jan 2024 07:56  Phos  6.0       09 Jan 2024 07:56  Mg     2.2       09 Jan 2024 07:56    TPro  6.0    /  Alb  2.1    /  TBili  0.7    /  DBili  x      /  AST  60     /  ALT  38     /  AlkPhos  97     08 Jan 2024 05:50    PT/INR - ( 09 Jan 2024 12:25 )   PT: 11.2 sec;   INR: 0.96 ratio         PTT - ( 09 Jan 2024 12:25 )  PTT:29.5 sec  CAPILLARY BLOOD GLUCOSE    LIVER FUNCTIONS - ( 08 Jan 2024 05:50 )  Alb: 2.1 g/dL / Pro: 6.0 g/dL / ALK PHOS: 97 U/L / ALT: 38 U/L / AST: 60 U/L / GGT: x           Urinalysis Basic - ( 09 Jan 2024 07:56 )    Color: x / Appearance: x / SG: x / pH: x  Gluc: 96 mg/dL / Ketone: x  / Bili: x / Urobili: x   Blood: x / Protein: x / Nitrite: x   Leuk Esterase: x / RBC: x / WBC x   Sq Epi: x / Non Sq Epi: x / Bacteria: x        CT ABDOMEN AND PELVIS  IMPRESSION:    Inadequately distended proximal colonic loop or query long segmental   colitis. No bowel obstruction. Nonemergent colonoscopy suggested.    --- End of Report ---    MARIANO COPELAND MD; Attending Radiologist  This document has been electronically signed. Froy  3 2024  2:58AM         Mr. Garcia is a 57yo M with PMHx of CKD 3, HTN, lost to follow up with PCP (per chart review used to take medications for HTN, but patient denies) presenting for vomiting times 3 weeks. Per patient, he began having subjective fevers and vomiting 2-4x/ day for 3 weeks with occasional small amounts of blood  with inability to hold down solids or liquids along with diarrhea for 2 days x8 episodes. Patient also notes that for the past 4 years he has been getting chest pain while walking, which is getting worse. Currently endorsing worsening central chest  pain in the ED along with a headache. Denies shortness of breath or palpitations. Denies changes in urine output , frequency or dysuria. Notes hx of renal failure in his brother at 69yo requiring transplant. Denied recent traveling, sick exposure, NSAID use, exposure to Hep C, Hep B, HIV, new foods. The patient was admitted for renal failure and r/o ACS.    1/10/24 AM: Patient seen and examined at bedside today, laying comfortably, no acute complaints. Overnight pt had bleeding at heparin injection sites, heparin and plavix were held for the morning, now resolved.     REVIEW OF SYSTEMS:  CONSTITUTIONAL: (-) weakness, (-) fevers, (-) chills  RESPIRATORY:  (-) shortness of breath, (-) cough,  (-) wheezing,  (-) hemoptysis   CARDIOVASCULAR:  (+) chest pain, (-) palpitations  GASTROINTESTINAL:  (+) abdominal or epigastric pain, (-) nausea, (-) vomiting, (-) diarrhea, (-) constipation, (-) melena,  (-) hematemesis,  (-) hematochezia  GENITOURINARY: (-) dysuria, (-) frequency, (-) hematuria  NEUROLOGICAL: (-) numbness, (-) weakness  SKIN: (-) itching, (-) rashes, (-) lesions    Vital Signs Last 24 Hrs  T(C): 36.6 (10 Froy 2024 04:54), Max: 36.9 (09 Jan 2024 20:55)  T(F): 97.8 (10 Froy 2024 04:54), Max: 98.5 (09 Jan 2024 20:55)  HR: 74 (10 Froy 2024 04:54) (73 - 76)  BP: 170/74 (10 Froy 2024 04:54) (142/71 - 185/95)  RR: 18 (10 Froy 2024 04:54) (17 - 18)  SpO2: 97% (10 Froy 2024 04:54) (95% - 97%)    Parameters below as of 10 Froy 2024 04:54  Patient On (Oxygen Delivery Method): room air    PHYSICAL EXAM:  GENERAL: NAD, lying in bed, R shiley catheter in place  HEAD:  Atraumatic, Normocephalic  RESP: Clear to auscultation bilaterally, good air entry bilaterally; No wheezing, rales, or rhonchi. Unlabored respirations  CARDIAC: Regular rate and rhythm. S1 and S2. No murmurs, rubs, or gallops  GI:  Soft, Nontender, Nondistended. Bowel sounds present x4 quadrants; No hepatomegaly. No splenomegaly.  : Pro discontinued  EXTREMITIES:  2+ Peripheral Pulses. Capillary refill <2 seconds. No clubbing, cyanosis, or edema  NERVOUS SYSTEM:  Alert & Oriented X3, speech clear. No deficits   MSK: FROM all 4 extremities, full and equal strength  SKIN: No rashes, bruises, or other lesions    MEDICATIONS  (STANDING):  amLODIPine   Tablet 5 milliGRAM(s) Oral daily  aspirin  chewable 81 milliGRAM(s) Oral daily  atorvastatin 40 milliGRAM(s) Oral at bedtime  calcium acetate 1334 milliGRAM(s) Oral three times a day with meals  chlorhexidine 2% Cloths 1 Application(s) Topical <User Schedule>  clopidogrel Tablet 75 milliGRAM(s) Oral daily  heparin   Injectable 5000 Unit(s) SubCutaneous every 12 hours  labetalol 200 milliGRAM(s) Oral every 8 hours  polyethylene glycol 3350 17 Gram(s) Oral daily  regadenoson Injectable 0.4 milliGRAM(s) IV Push once  senna 2 Tablet(s) Oral at bedtime    MEDICATIONS  (PRN):  acetaminophen     Tablet .. 650 milliGRAM(s) Oral every 6 hours PRN Temp greater or equal to 38C (100.4F), Mild Pain (1 - 3)  aluminum hydroxide/magnesium hydroxide/simethicone Suspension 30 milliLiter(s) Oral every 4 hours PRN Dyspepsia  benzonatate 100 milliGRAM(s) Oral three times a day PRN Cough  melatonin 3 milliGRAM(s) Oral at bedtime PRN Insomnia  ondansetron Injectable 4 milliGRAM(s) IV Push every 8 hours PRN Nausea and/or Vomiting  sodium chloride 0.9% lock flush 10 milliLiter(s) IV Push every 1 hour PRN Pre/post blood products, medications, blood draw, and to maintain line patency        LABS                           9.7    5.30  )-----------( 233      ( 09 Jan 2024 07:56 )             27.9     09 Jan 2024 07:56    136    |  99     |  56     ----------------------------<  96     4.2     |  25     |  11.22    Ca    7.0        09 Jan 2024 07:56  Phos  6.0       09 Jan 2024 07:56  Mg     2.2       09 Jan 2024 07:56    TPro  6.0    /  Alb  2.1    /  TBili  0.7    /  DBili  x      /  AST  60     /  ALT  38     /  AlkPhos  97     08 Jan 2024 05:50    PT/INR - ( 09 Jan 2024 12:25 )   PT: 11.2 sec;   INR: 0.96 ratio         PTT - ( 09 Jan 2024 12:25 )  PTT:29.5 sec  CAPILLARY BLOOD GLUCOSE    LIVER FUNCTIONS - ( 08 Jan 2024 05:50 )  Alb: 2.1 g/dL / Pro: 6.0 g/dL / ALK PHOS: 97 U/L / ALT: 38 U/L / AST: 60 U/L / GGT: x           Urinalysis Basic - ( 09 Jan 2024 07:56 )    Color: x / Appearance: x / SG: x / pH: x  Gluc: 96 mg/dL / Ketone: x  / Bili: x / Urobili: x   Blood: x / Protein: x / Nitrite: x   Leuk Esterase: x / RBC: x / WBC x   Sq Epi: x / Non Sq Epi: x / Bacteria: x        CT ABDOMEN AND PELVIS  IMPRESSION:    Inadequately distended proximal colonic loop or query long segmental   colitis. No bowel obstruction. Nonemergent colonoscopy suggested.    --- End of Report ---    MARIANO COPELAND MD; Attending Radiologist  This document has been electronically signed. Froy  3 2024  2:58AM         Mr. Garcia is a 57yo M with PMHx of CKD 3, HTN, lost to follow up with PCP (per chart review used to take medications for HTN, but patient denies) presenting for vomiting times 3 weeks. Per patient, he began having subjective fevers and vomiting 2-4x/ day for 3 weeks with occasional small amounts of blood  with inability to hold down solids or liquids along with diarrhea for 2 days x8 episodes. Patient also notes that for the past 4 years he has been getting chest pain while walking, which is getting worse. Currently endorsing worsening central chest  pain in the ED along with a headache. Denies shortness of breath or palpitations. Denies changes in urine output , frequency or dysuria. Notes hx of renal failure in his brother at 69yo requiring transplant. Denied recent traveling, sick exposure, NSAID use, exposure to Hep C, Hep B, HIV, new foods. The patient was admitted for renal failure and r/o ACS.    1/10/24 AM: Patient seen and examined at bedside today, laying comfortably, no acute complaints.      REVIEW OF SYSTEMS:  CONSTITUTIONAL: (-) weakness, (-) fevers, (-) chills  RESPIRATORY:  (-) shortness of breath, (-) cough,  (-) wheezing,  (-) hemoptysis   CARDIOVASCULAR:  (-) chest pain, (-) palpitations  GASTROINTESTINAL:  (-) abdominal or epigastric pain, (-) nausea, (-) vomiting, (-) diarrhea, (-) constipation, (-) melena,  (-) hematemesis,  (-) hematochezia  GENITOURINARY: (-) dysuria, (-) frequency, (-) hematuria  NEUROLOGICAL: (-) numbness, (-) weakness  SKIN: (-) itching, (-) rashes, (-) lesions    Vital Signs Last 24 Hrs  T(C): 36.6 (10 Froy 2024 04:54), Max: 36.9 (09 Jan 2024 20:55)  T(F): 97.8 (10 Froy 2024 04:54), Max: 98.5 (09 Jan 2024 20:55)  HR: 74 (10 Froy 2024 04:54) (73 - 76)  BP: 170/74 (10 Froy 2024 04:54) (142/71 - 185/95)  RR: 18 (10 Froy 2024 04:54) (17 - 18)  SpO2: 97% (10 Froy 2024 04:54) (95% - 97%)    Parameters below as of 10 Froy 2024 04:54  Patient On (Oxygen Delivery Method): room air    PHYSICAL EXAM:  GENERAL: NAD, lying in bed, R shiley catheter in place  HEAD:  Atraumatic, Normocephalic  RESP: Clear to auscultation bilaterally, good air entry bilaterally; No wheezing, rales, or rhonchi. Unlabored respirations  CARDIAC: Regular rate and rhythm. S1 and S2. No murmurs, rubs, or gallops  GI:  Soft, Nontender, Nondistended. Bowel sounds present x4 quadrants; No hepatomegaly. No splenomegaly.  : Pro discontinued  EXTREMITIES:  2+ Peripheral Pulses. Capillary refill <2 seconds. No clubbing, cyanosis, or edema  NERVOUS SYSTEM:  Alert & Oriented X3, speech clear. No deficits   MSK: FROM all 4 extremities, full and equal strength  SKIN: No rashes, bruises, or other lesions    MEDICATIONS  (STANDING):  amLODIPine   Tablet 5 milliGRAM(s) Oral daily  aspirin  chewable 81 milliGRAM(s) Oral daily  atorvastatin 40 milliGRAM(s) Oral at bedtime  calcium acetate 1334 milliGRAM(s) Oral three times a day with meals  chlorhexidine 2% Cloths 1 Application(s) Topical <User Schedule>  heparin   Injectable 5000 Unit(s) SubCutaneous every 12 hours  labetalol 200 milliGRAM(s) Oral every 8 hours  polyethylene glycol 3350 17 Gram(s) Oral daily  regadenoson Injectable 0.4 milliGRAM(s) IV Push once  senna 2 Tablet(s) Oral at bedtime    MEDICATIONS  (PRN):  acetaminophen     Tablet .. 650 milliGRAM(s) Oral every 6 hours PRN Temp greater or equal to 38C (100.4F), Mild Pain (1 - 3)  aluminum hydroxide/magnesium hydroxide/simethicone Suspension 30 milliLiter(s) Oral every 4 hours PRN Dyspepsia  benzonatate 100 milliGRAM(s) Oral three times a day PRN Cough  melatonin 3 milliGRAM(s) Oral at bedtime PRN Insomnia  ondansetron Injectable 4 milliGRAM(s) IV Push every 8 hours PRN Nausea and/or Vomiting  sodium chloride 0.9% lock flush 10 milliLiter(s) IV Push every 1 hour PRN Pre/post blood products, medications, blood draw, and to maintain line patency          LABS                      9.1    5.84  )-----------( 257      ( 10 Froy 2024 07:56 )             26.3     10 Froy 2024 07:56    134    |  98     |  61     ----------------------------<  90     4.2     |  22     |  12.33    Ca    6.6        10 Froy 2024 07:56  Phos  6.4       10 Froy 2024 07:56  Mg     2.2       09 Jan 2024 07:56      PT/INR - ( 09 Jan 2024 12:25 )   PT: 11.2 sec;   INR: 0.96 ratio        PTT - ( 09 Jan 2024 12:25 )  PTT:29.5 sec  CAPILLARY BLOOD GLUCOSE      POCT Blood Glucose.: 101 mg/dL (10 Froy 2024 07:50)      Urinalysis Basic - ( 10 Froy 2024 07:56 )    Color: x / Appearance: x / SG: x / pH: x  Gluc: 90 mg/dL / Ketone: x  / Bili: x / Urobili: x   Blood: x / Protein: x / Nitrite: x   Leuk Esterase: x / RBC: x / WBC x   Sq Epi: x / Non Sq Epi: x / Bacteria: x          CT ABDOMEN AND PELVIS  IMPRESSION:    Inadequately distended proximal colonic loop or query long segmental   colitis. No bowel obstruction. Nonemergent colonoscopy suggested.    --- End of Report ---    MARIANO COPELAND MD; Attending Radiologist  This document has been electronically signed. Froy  3 2024  2:58AM         Mr. Garcia is a 59yo M with PMHx of CKD 3, HTN, lost to follow up with PCP (per chart review used to take medications for HTN, but patient denies) presenting for vomiting times 3 weeks. Per patient, he began having subjective fevers and vomiting 2-4x/ day for 3 weeks with occasional small amounts of blood  with inability to hold down solids or liquids along with diarrhea for 2 days x8 episodes. Patient also notes that for the past 4 years he has been getting chest pain while walking, which is getting worse. Currently endorsing worsening central chest  pain in the ED along with a headache. Denies shortness of breath or palpitations. Denies changes in urine output , frequency or dysuria. Notes hx of renal failure in his brother at 71yo requiring transplant. Denied recent traveling, sick exposure, NSAID use, exposure to Hep C, Hep B, HIV, new foods. The patient was admitted for renal failure and r/o ACS.    1/10/24 AM: Patient seen and examined at bedside today, laying comfortably, no acute complaints.      REVIEW OF SYSTEMS:  CONSTITUTIONAL: (-) weakness, (-) fevers, (-) chills  RESPIRATORY:  (-) shortness of breath, (-) cough,  (-) wheezing,  (-) hemoptysis   CARDIOVASCULAR:  (-) chest pain, (-) palpitations  GASTROINTESTINAL:  (-) abdominal or epigastric pain, (-) nausea, (-) vomiting, (-) diarrhea, (-) constipation, (-) melena,  (-) hematemesis,  (-) hematochezia  GENITOURINARY: (-) dysuria, (-) frequency, (-) hematuria  NEUROLOGICAL: (-) numbness, (-) weakness  SKIN: (-) itching, (-) rashes, (-) lesions    Vital Signs Last 24 Hrs  T(C): 36.6 (10 Froy 2024 04:54), Max: 36.9 (09 Jan 2024 20:55)  T(F): 97.8 (10 Froy 2024 04:54), Max: 98.5 (09 Jan 2024 20:55)  HR: 74 (10 Froy 2024 04:54) (73 - 76)  BP: 170/74 (10 Froy 2024 04:54) (142/71 - 185/95)  RR: 18 (10 Froy 2024 04:54) (17 - 18)  SpO2: 97% (10 Froy 2024 04:54) (95% - 97%)    Parameters below as of 10 Froy 2024 04:54  Patient On (Oxygen Delivery Method): room air    PHYSICAL EXAM:  GENERAL: NAD, lying in bed, R shiley catheter in place  HEAD:  Atraumatic, Normocephalic  RESP: Clear to auscultation bilaterally, good air entry bilaterally; No wheezing, rales, or rhonchi. Unlabored respirations  CARDIAC: Regular rate and rhythm. S1 and S2. No murmurs, rubs, or gallops  GI:  Soft, Nontender, Nondistended. Bowel sounds present x4 quadrants; No hepatomegaly. No splenomegaly.  : Pro discontinued  EXTREMITIES:  2+ Peripheral Pulses. Capillary refill <2 seconds. No clubbing, cyanosis, or edema  NERVOUS SYSTEM:  Alert & Oriented X3, speech clear. No deficits   MSK: FROM all 4 extremities, full and equal strength  SKIN: No rashes, bruises, or other lesions    MEDICATIONS  (STANDING):  amLODIPine   Tablet 5 milliGRAM(s) Oral daily  aspirin  chewable 81 milliGRAM(s) Oral daily  atorvastatin 40 milliGRAM(s) Oral at bedtime  calcium acetate 1334 milliGRAM(s) Oral three times a day with meals  chlorhexidine 2% Cloths 1 Application(s) Topical <User Schedule>  heparin   Injectable 5000 Unit(s) SubCutaneous every 12 hours  labetalol 200 milliGRAM(s) Oral every 8 hours  polyethylene glycol 3350 17 Gram(s) Oral daily  regadenoson Injectable 0.4 milliGRAM(s) IV Push once  senna 2 Tablet(s) Oral at bedtime    MEDICATIONS  (PRN):  acetaminophen     Tablet .. 650 milliGRAM(s) Oral every 6 hours PRN Temp greater or equal to 38C (100.4F), Mild Pain (1 - 3)  aluminum hydroxide/magnesium hydroxide/simethicone Suspension 30 milliLiter(s) Oral every 4 hours PRN Dyspepsia  benzonatate 100 milliGRAM(s) Oral three times a day PRN Cough  melatonin 3 milliGRAM(s) Oral at bedtime PRN Insomnia  ondansetron Injectable 4 milliGRAM(s) IV Push every 8 hours PRN Nausea and/or Vomiting  sodium chloride 0.9% lock flush 10 milliLiter(s) IV Push every 1 hour PRN Pre/post blood products, medications, blood draw, and to maintain line patency          LABS                      9.1    5.84  )-----------( 257      ( 10 Froy 2024 07:56 )             26.3     10 Froy 2024 07:56    134    |  98     |  61     ----------------------------<  90     4.2     |  22     |  12.33    Ca    6.6        10 Froy 2024 07:56  Phos  6.4       10 Froy 2024 07:56  Mg     2.2       09 Jan 2024 07:56      PT/INR - ( 09 Jan 2024 12:25 )   PT: 11.2 sec;   INR: 0.96 ratio        PTT - ( 09 Jan 2024 12:25 )  PTT:29.5 sec  CAPILLARY BLOOD GLUCOSE      POCT Blood Glucose.: 101 mg/dL (10 Froy 2024 07:50)      Urinalysis Basic - ( 10 Froy 2024 07:56 )    Color: x / Appearance: x / SG: x / pH: x  Gluc: 90 mg/dL / Ketone: x  / Bili: x / Urobili: x   Blood: x / Protein: x / Nitrite: x   Leuk Esterase: x / RBC: x / WBC x   Sq Epi: x / Non Sq Epi: x / Bacteria: x          CT ABDOMEN AND PELVIS  IMPRESSION:    Inadequately distended proximal colonic loop or query long segmental   colitis. No bowel obstruction. Nonemergent colonoscopy suggested.    --- End of Report ---    MARIANO COPELAND MD; Attending Radiologist  This document has been electronically signed. Froy  3 2024  2:58AM

## 2024-01-10 NOTE — PROGRESS NOTE ADULT - PROBLEM SELECTOR PLAN 4
-Likely 2/2 kidney failure  -iron panel with ferritin 478, likely anemia of chronic disease  -H&H trending down  -Transfusion consent forms signed and in chart  -Hgb 6.8 1/4/24, s/p PRBC  -Keep type and screen current  -Transfuse Hgb <7.0  -Mild downtrend over last 2-3 days today 7.6, received 1unit pRBCs with HD 1/8 -Likely 2/2 kidney failure  -iron panel with ferritin 478, likely anemia of chronic disease  -H&H trending down  -Transfusion consent forms signed and in chart  -Hgb 6.8 1/4/24, s/p PRBC, received 1unit pRBCs with HD 1/8  -Keep type and screen current  -Transfuse Hgb <7.0  -Hgb today 9.1, continue to monitor and transfuse as needed

## 2024-01-10 NOTE — PROGRESS NOTE ADULT - PROBLEM SELECTOR PLAN 5
-NSTEMI  -Chest pain likely 2/2 hypertension  -Trops trending up 221>246>401>>466, 529.1, 715.14> now trending down 475.8  -Per cardio trops likely 2/2 demand ischemia in setting of renal function  - TTE with normal EF 65-70 and mod pulm htn  -Cw labetolol for BP control  -Cw heparin, plavix, aspirin, per cardio   -Appreciate cardiology recommendations, will continue tele monitoring  -F/up results of nuclear stress test -NSTEMI  -Chest pain likely 2/2 hypertension  -Trops trending up 221>246>401>>466, 529.1, 715.14> now trending down 475.8  -Per cardio trops likely 2/2 demand ischemia in setting of renal function  -TTE with normal EF 65-70 and mod pulm htn  -Nuclear stress test results wnl  -Cw labetolol for BP control  -Cw heparin, aspirin, per cardio   -Dc plavix per cardio  -Appreciate cardiology recommendations, will continue tele monitoring

## 2024-01-10 NOTE — PROGRESS NOTE ADULT - ASSESSMENT
Mr. Garcia is a 59yo M with PMHx of CKD 3, HTN, lost to follow up with PCP (per chart review used to take medications for HTN, but patient denies) presenting for vomiting times 3 weeks, admitted for renal failure and r/o ACS.       Case dw Dr. Jimenez.     Pt to be transferred to [facility] for permanent HD placement and renal biopsy. Accepting physician: Dr. Chery. Transfer paperwork in chart. Mr. Garcia is a 59yo M with PMHx of CKD 3, HTN, lost to follow up with PCP (per chart review used to take medications for HTN, but patient denies) presenting for vomiting times 3 weeks, admitted for renal failure and r/o ACS.       Case dw Dr. Jimenez.     Pt to be transferred to [facility] for permanent HD placement and renal biopsy. Accepting physician: Dr. Chery. Transfer paperwork in chart.

## 2024-01-10 NOTE — PROGRESS NOTE ADULT - PROBLEM SELECTOR PLAN 1
-S/p CTAP with IV contrast  -History of HTN, not on medications (poor medical follow up)  -Urine with 300 protein, small blood, 100 glucose  -S/p 1L fluid in ED  -S/p 10mg hydralazine IV push  -C/w 200mg labetolol q8hrs  -restart amlodipine 5mg  -Continue to monitor BP  -Appreciate nephrology recommendations  -Appreciate cardiology recommendations, will continue tele monitoring- plan for possible transfer to tertiary center for permanent HD access and biopsy  -Follow up results of nuclear stress test -S/p CTAP with IV contrast  -History of HTN, not on medications (poor medical follow up)  -Urine with 300 protein, small blood, 100 glucose  -S/p 1L fluid in ED  -S/p 10mg hydralazine IV push  -C/w 200mg labetolol q8hrs  -restart amlodipine 5mg  -Continue to monitor BP  -Nuclear stress test results wnl  -Appreciate cardiology recommendations  -Appreciate nephrology recommendations, will continue tele monitoring- plan for possible transfer to tertiary center for permanent HD access and biopsy

## 2024-01-10 NOTE — PROGRESS NOTE ADULT - SUBJECTIVE AND OBJECTIVE BOX
S/p stable HD today     Vital Signs Last 24 Hrs  T(C): 36.4 (01-10-24 @ 12:34), Max: 36.9 (01-09-24 @ 20:55)  T(F): 97.6 (01-10-24 @ 12:34), Max: 98.5 (01-09-24 @ 20:55)  HR: 71 (01-10-24 @ 12:34) (71 - 74)  BP: 155/92 (01-10-24 @ 12:34) (134/79 - 170/74)  RR: 17 (01-10-24 @ 12:34) (17 - 18)  SpO2: 97% (01-10-24 @ 12:34) (96% - 97%)    I&O's Detail    09 Jan 2024 07:01  -  10 Froy 2024 07:00  --------------------------------------------------------  IN:    Oral Fluid: 480 mL  Total IN: 480 mL    OUT:    Voided (mL): 400 mL  Total OUT: 400 mL    s1s2  b/l air entry  soft, ND  no edema                                                                         9.1    5.84  )-----------( 257      ( 10 Froy 2024 07:56 )             26.3     10 Froy 2024 07:56    134    |  98     |  61     ----------------------------<  90     4.2     |  22     |  12.33    Ca    6.6        10 Froy 2024 07:56  Phos  6.4       10 Froy 2024 07:56  Mg     2.2       09 Jan 2024 07:56    acetaminophen     Tablet .. 650 milliGRAM(s) Oral every 6 hours PRN  aluminum hydroxide/magnesium hydroxide/simethicone Suspension 30 milliLiter(s) Oral every 4 hours PRN  amLODIPine   Tablet 5 milliGRAM(s) Oral daily  atorvastatin 40 milliGRAM(s) Oral at bedtime  benzonatate 100 milliGRAM(s) Oral three times a day PRN  calcium acetate 2001 milliGRAM(s) Oral three times a day with meals  chlorhexidine 2% Cloths 1 Application(s) Topical <User Schedule>  heparin   Injectable 5000 Unit(s) SubCutaneous every 12 hours  labetalol 200 milliGRAM(s) Oral every 8 hours  melatonin 3 milliGRAM(s) Oral at bedtime PRN  ondansetron Injectable 4 milliGRAM(s) IV Push every 8 hours PRN  polyethylene glycol 3350 17 Gram(s) Oral daily  regadenoson Injectable 0.4 milliGRAM(s) IV Push once  senna 2 Tablet(s) Oral at bedtime  sodium chloride 0.9% lock flush 10 milliLiter(s) IV Push every 1 hour PRN    A/P:    Pt w/no known PMH, not following w/doctors, possibly hx of HTN, brother w/hx ESRD, s/p transplant   Adm w/N/V, fever, anemia, markedly abnormal renal fx (baseline renal fx is unknown)  Dx w/Flu A  S/p CT w/IV dye 1/3/23, dye injury can also be contributing to rising Cr   24hr urine collection positive for nephrotic range proteinuria   SPEP, renal serologies are negative   CT A/P w/o hydro   Started on HD 1/6 via temp HD cath  3rd HD today  F/u CBC, BMP, UO   Plan to transfer to [facility] for kidney biopsy, perm HD access  D/w medicine     [phone]       S/p stable HD today     Vital Signs Last 24 Hrs  T(C): 36.4 (01-10-24 @ 12:34), Max: 36.9 (01-09-24 @ 20:55)  T(F): 97.6 (01-10-24 @ 12:34), Max: 98.5 (01-09-24 @ 20:55)  HR: 71 (01-10-24 @ 12:34) (71 - 74)  BP: 155/92 (01-10-24 @ 12:34) (134/79 - 170/74)  RR: 17 (01-10-24 @ 12:34) (17 - 18)  SpO2: 97% (01-10-24 @ 12:34) (96% - 97%)    I&O's Detail    09 Jan 2024 07:01  -  10 Froy 2024 07:00  --------------------------------------------------------  IN:    Oral Fluid: 480 mL  Total IN: 480 mL    OUT:    Voided (mL): 400 mL  Total OUT: 400 mL    s1s2  b/l air entry  soft, ND  no edema                                                                         9.1    5.84  )-----------( 257      ( 10 Froy 2024 07:56 )             26.3     10 Froy 2024 07:56    134    |  98     |  61     ----------------------------<  90     4.2     |  22     |  12.33    Ca    6.6        10 Froy 2024 07:56  Phos  6.4       10 Froy 2024 07:56  Mg     2.2       09 Jan 2024 07:56    acetaminophen     Tablet .. 650 milliGRAM(s) Oral every 6 hours PRN  aluminum hydroxide/magnesium hydroxide/simethicone Suspension 30 milliLiter(s) Oral every 4 hours PRN  amLODIPine   Tablet 5 milliGRAM(s) Oral daily  atorvastatin 40 milliGRAM(s) Oral at bedtime  benzonatate 100 milliGRAM(s) Oral three times a day PRN  calcium acetate 2001 milliGRAM(s) Oral three times a day with meals  chlorhexidine 2% Cloths 1 Application(s) Topical <User Schedule>  heparin   Injectable 5000 Unit(s) SubCutaneous every 12 hours  labetalol 200 milliGRAM(s) Oral every 8 hours  melatonin 3 milliGRAM(s) Oral at bedtime PRN  ondansetron Injectable 4 milliGRAM(s) IV Push every 8 hours PRN  polyethylene glycol 3350 17 Gram(s) Oral daily  regadenoson Injectable 0.4 milliGRAM(s) IV Push once  senna 2 Tablet(s) Oral at bedtime  sodium chloride 0.9% lock flush 10 milliLiter(s) IV Push every 1 hour PRN    A/P:    Pt w/no known PMH, not following w/doctors, possibly hx of HTN, brother w/hx ESRD, s/p transplant   Adm w/N/V, fever, anemia, markedly abnormal renal fx (baseline renal fx is unknown)  Dx w/Flu A  S/p CT w/IV dye 1/3/23, dye injury can also be contributing to rising Cr   24hr urine collection positive for nephrotic range proteinuria   SPEP, renal serologies are negative   CT A/P w/o hydro   Started on HD 1/6 via temp HD cath  3rd HD today  F/u CBC, BMP, UO   Plan to transfer to [facility] for kidney biopsy, perm HD access  D/w medicine     [phone]

## 2024-01-11 LAB
ALBUMIN SERPL ELPH-MCNC: 2.4 G/DL — LOW (ref 3.3–5)
ALBUMIN SERPL ELPH-MCNC: 2.4 G/DL — LOW (ref 3.3–5)
ALP SERPL-CCNC: 110 U/L — SIGNIFICANT CHANGE UP (ref 40–120)
ALP SERPL-CCNC: 110 U/L — SIGNIFICANT CHANGE UP (ref 40–120)
ALT FLD-CCNC: 61 U/L — HIGH (ref 10–45)
ALT FLD-CCNC: 61 U/L — HIGH (ref 10–45)
ANION GAP SERPL CALC-SCNC: 9 MMOL/L — SIGNIFICANT CHANGE UP (ref 5–17)
ANION GAP SERPL CALC-SCNC: 9 MMOL/L — SIGNIFICANT CHANGE UP (ref 5–17)
AST SERPL-CCNC: 51 U/L — HIGH (ref 10–40)
AST SERPL-CCNC: 51 U/L — HIGH (ref 10–40)
BILIRUB SERPL-MCNC: 0.8 MG/DL — SIGNIFICANT CHANGE UP (ref 0.2–1.2)
BILIRUB SERPL-MCNC: 0.8 MG/DL — SIGNIFICANT CHANGE UP (ref 0.2–1.2)
BUN SERPL-MCNC: 34 MG/DL — HIGH (ref 7–23)
BUN SERPL-MCNC: 34 MG/DL — HIGH (ref 7–23)
CALCIUM SERPL-MCNC: 7.1 MG/DL — LOW (ref 8.4–10.5)
CALCIUM SERPL-MCNC: 7.1 MG/DL — LOW (ref 8.4–10.5)
CHLORIDE SERPL-SCNC: 99 MMOL/L — SIGNIFICANT CHANGE UP (ref 96–108)
CHLORIDE SERPL-SCNC: 99 MMOL/L — SIGNIFICANT CHANGE UP (ref 96–108)
CO2 SERPL-SCNC: 26 MMOL/L — SIGNIFICANT CHANGE UP (ref 22–31)
CO2 SERPL-SCNC: 26 MMOL/L — SIGNIFICANT CHANGE UP (ref 22–31)
CREAT SERPL-MCNC: 8.44 MG/DL — HIGH (ref 0.5–1.3)
CREAT SERPL-MCNC: 8.44 MG/DL — HIGH (ref 0.5–1.3)
EGFR: 7 ML/MIN/1.73M2 — LOW
EGFR: 7 ML/MIN/1.73M2 — LOW
GLUCOSE SERPL-MCNC: 108 MG/DL — HIGH (ref 70–99)
GLUCOSE SERPL-MCNC: 108 MG/DL — HIGH (ref 70–99)
HCT VFR BLD CALC: 27.6 % — LOW (ref 39–50)
HCT VFR BLD CALC: 27.6 % — LOW (ref 39–50)
HGB BLD-MCNC: 9.5 G/DL — LOW (ref 13–17)
HGB BLD-MCNC: 9.5 G/DL — LOW (ref 13–17)
MAGNESIUM SERPL-MCNC: 2.1 MG/DL — SIGNIFICANT CHANGE UP (ref 1.6–2.6)
MAGNESIUM SERPL-MCNC: 2.1 MG/DL — SIGNIFICANT CHANGE UP (ref 1.6–2.6)
MCHC RBC-ENTMCNC: 27 PG — SIGNIFICANT CHANGE UP (ref 27–34)
MCHC RBC-ENTMCNC: 27 PG — SIGNIFICANT CHANGE UP (ref 27–34)
MCHC RBC-ENTMCNC: 34.4 GM/DL — SIGNIFICANT CHANGE UP (ref 32–36)
MCHC RBC-ENTMCNC: 34.4 GM/DL — SIGNIFICANT CHANGE UP (ref 32–36)
MCV RBC AUTO: 78.4 FL — LOW (ref 80–100)
MCV RBC AUTO: 78.4 FL — LOW (ref 80–100)
NRBC # BLD: 0 /100 WBCS — SIGNIFICANT CHANGE UP (ref 0–0)
NRBC # BLD: 0 /100 WBCS — SIGNIFICANT CHANGE UP (ref 0–0)
PHOSPHATE SERPL-MCNC: 4.8 MG/DL — HIGH (ref 2.5–4.5)
PHOSPHATE SERPL-MCNC: 4.8 MG/DL — HIGH (ref 2.5–4.5)
PLATELET # BLD AUTO: 299 K/UL — SIGNIFICANT CHANGE UP (ref 150–400)
PLATELET # BLD AUTO: 299 K/UL — SIGNIFICANT CHANGE UP (ref 150–400)
POTASSIUM SERPL-MCNC: 4.1 MMOL/L — SIGNIFICANT CHANGE UP (ref 3.5–5.3)
POTASSIUM SERPL-MCNC: 4.1 MMOL/L — SIGNIFICANT CHANGE UP (ref 3.5–5.3)
POTASSIUM SERPL-SCNC: 4.1 MMOL/L — SIGNIFICANT CHANGE UP (ref 3.5–5.3)
POTASSIUM SERPL-SCNC: 4.1 MMOL/L — SIGNIFICANT CHANGE UP (ref 3.5–5.3)
PROT SERPL-MCNC: 6.6 G/DL — SIGNIFICANT CHANGE UP (ref 6–8.3)
PROT SERPL-MCNC: 6.6 G/DL — SIGNIFICANT CHANGE UP (ref 6–8.3)
RBC # BLD: 3.52 M/UL — LOW (ref 4.2–5.8)
RBC # BLD: 3.52 M/UL — LOW (ref 4.2–5.8)
RBC # FLD: 13.7 % — SIGNIFICANT CHANGE UP (ref 10.3–14.5)
RBC # FLD: 13.7 % — SIGNIFICANT CHANGE UP (ref 10.3–14.5)
SODIUM SERPL-SCNC: 134 MMOL/L — LOW (ref 135–145)
SODIUM SERPL-SCNC: 134 MMOL/L — LOW (ref 135–145)
WBC # BLD: 6.39 K/UL — SIGNIFICANT CHANGE UP (ref 3.8–10.5)
WBC # BLD: 6.39 K/UL — SIGNIFICANT CHANGE UP (ref 3.8–10.5)
WBC # FLD AUTO: 6.39 K/UL — SIGNIFICANT CHANGE UP (ref 3.8–10.5)
WBC # FLD AUTO: 6.39 K/UL — SIGNIFICANT CHANGE UP (ref 3.8–10.5)

## 2024-01-11 PROCEDURE — 99233 SBSQ HOSP IP/OBS HIGH 50: CPT

## 2024-01-11 RX ADMIN — ATORVASTATIN CALCIUM 40 MILLIGRAM(S): 80 TABLET, FILM COATED ORAL at 22:10

## 2024-01-11 RX ADMIN — HEPARIN SODIUM 5000 UNIT(S): 5000 INJECTION INTRAVENOUS; SUBCUTANEOUS at 06:21

## 2024-01-11 RX ADMIN — AMLODIPINE BESYLATE 5 MILLIGRAM(S): 2.5 TABLET ORAL at 06:21

## 2024-01-11 RX ADMIN — Medication 2001 MILLIGRAM(S): at 08:49

## 2024-01-11 RX ADMIN — Medication 2001 MILLIGRAM(S): at 12:45

## 2024-01-11 RX ADMIN — Medication 200 MILLIGRAM(S): at 22:10

## 2024-01-11 RX ADMIN — HEPARIN SODIUM 5000 UNIT(S): 5000 INJECTION INTRAVENOUS; SUBCUTANEOUS at 17:45

## 2024-01-11 RX ADMIN — CHLORHEXIDINE GLUCONATE 1 APPLICATION(S): 213 SOLUTION TOPICAL at 06:21

## 2024-01-11 RX ADMIN — POLYETHYLENE GLYCOL 3350 17 GRAM(S): 17 POWDER, FOR SOLUTION ORAL at 12:44

## 2024-01-11 RX ADMIN — Medication 200 MILLIGRAM(S): at 14:30

## 2024-01-11 RX ADMIN — SENNA PLUS 2 TABLET(S): 8.6 TABLET ORAL at 22:10

## 2024-01-11 RX ADMIN — Medication 200 MILLIGRAM(S): at 06:20

## 2024-01-11 RX ADMIN — Medication 2001 MILLIGRAM(S): at 17:45

## 2024-01-11 NOTE — PROGRESS NOTE ADULT - SUBJECTIVE AND OBJECTIVE BOX
No distress    Vital Signs Last 24 Hrs  T(C): 37.1 (01-11-24 @ 21:07), Max: 37.1 (01-11-24 @ 21:07)  T(F): 98.7 (01-11-24 @ 21:07), Max: 98.7 (01-11-24 @ 21:07)  HR: 71 (01-11-24 @ 21:07) (70 - 93)  BP: 155/80 (01-11-24 @ 21:07) (133/69 - 155/80)  RR: 19 (01-11-24 @ 21:07) (15 - 19)  SpO2: 98% (01-11-24 @ 21:07) (95% - 98%)    I&O's Detail    10 Froy 2024 07:01  -  11 Jan 2024 07:00  --------------------------------------------------------  IN:    Oral Fluid: 760 mL  Total IN: 760 mL    OUT:    Voided (mL): 650 mL  Total OUT: 650 mL    11 Jan 2024 07:01  -  11 Jan 2024 21:25  --------------------------------------------------------  IN:    Oral Fluid: 840 mL  Total IN: 840 mL    OUT:  Total OUT: 0 mL    s1s2  b/l air entry  soft, ND  no edema                                                                                 9.5    6.39  )-----------( 299      ( 11 Jan 2024 06:32 )             27.6     11 Jan 2024 06:32    134    |  99     |  34     ----------------------------<  108    4.1     |  26     |  8.44     Ca    7.1        11 Jan 2024 06:32  Phos  4.8       11 Jan 2024 06:32  Mg     2.1       11 Jan 2024 06:32    TPro  6.6    /  Alb  2.4    /  TBili  0.8    /  DBili  x      /  AST  51     /  ALT  61     /  AlkPhos  110    11 Jan 2024 06:32    LIVER FUNCTIONS - ( 11 Jan 2024 06:32 )  Alb: 2.4 g/dL / Pro: 6.6 g/dL / ALK PHOS: 110 U/L / ALT: 61 U/L / AST: 51 U/L / GGT: x           acetaminophen     Tablet .. 650 milliGRAM(s) Oral every 6 hours PRN  aluminum hydroxide/magnesium hydroxide/simethicone Suspension 30 milliLiter(s) Oral every 4 hours PRN  amLODIPine   Tablet 5 milliGRAM(s) Oral daily  atorvastatin 40 milliGRAM(s) Oral at bedtime  benzonatate 100 milliGRAM(s) Oral three times a day PRN  calcium acetate 2001 milliGRAM(s) Oral three times a day with meals  chlorhexidine 2% Cloths 1 Application(s) Topical <User Schedule>  heparin   Injectable 5000 Unit(s) SubCutaneous every 12 hours  labetalol 200 milliGRAM(s) Oral every 8 hours  melatonin 3 milliGRAM(s) Oral at bedtime PRN  ondansetron Injectable 4 milliGRAM(s) IV Push every 8 hours PRN  polyethylene glycol 3350 17 Gram(s) Oral daily  regadenoson Injectable 0.4 milliGRAM(s) IV Push once  senna 2 Tablet(s) Oral at bedtime  sodium chloride 0.9% lock flush 10 milliLiter(s) IV Push every 1 hour PRN    A/P:    Pt w/no known PMH, not following w/doctors, possibly hx of HTN, brother w/hx ESRD, s/p transplant   Adm w/N/V, fever, anemia, markedly abnormal renal fx (baseline renal fx is unknown)  Dx w/Flu A  S/p CT w/IV dye 1/3/23  24hr urine collection positive for nephrotic range proteinuria   SPEP, renal serologies are negative   CT A/P w/o hydro   Started on HD 1/6 via temp HD cath  HD tomorrow as ordered   F/u CBC, BMP, UO   Plan to transfer to [facility] for kidney biopsy, perm HD access    [phone]       No distress    Vital Signs Last 24 Hrs  T(C): 37.1 (01-11-24 @ 21:07), Max: 37.1 (01-11-24 @ 21:07)  T(F): 98.7 (01-11-24 @ 21:07), Max: 98.7 (01-11-24 @ 21:07)  HR: 71 (01-11-24 @ 21:07) (70 - 93)  BP: 155/80 (01-11-24 @ 21:07) (133/69 - 155/80)  RR: 19 (01-11-24 @ 21:07) (15 - 19)  SpO2: 98% (01-11-24 @ 21:07) (95% - 98%)    I&O's Detail    10 Froy 2024 07:01  -  11 Jan 2024 07:00  --------------------------------------------------------  IN:    Oral Fluid: 760 mL  Total IN: 760 mL    OUT:    Voided (mL): 650 mL  Total OUT: 650 mL    11 Jan 2024 07:01  -  11 Jan 2024 21:25  --------------------------------------------------------  IN:    Oral Fluid: 840 mL  Total IN: 840 mL    OUT:  Total OUT: 0 mL    s1s2  b/l air entry  soft, ND  no edema                                                                                 9.5    6.39  )-----------( 299      ( 11 Jan 2024 06:32 )             27.6     11 Jan 2024 06:32    134    |  99     |  34     ----------------------------<  108    4.1     |  26     |  8.44     Ca    7.1        11 Jan 2024 06:32  Phos  4.8       11 Jan 2024 06:32  Mg     2.1       11 Jan 2024 06:32    TPro  6.6    /  Alb  2.4    /  TBili  0.8    /  DBili  x      /  AST  51     /  ALT  61     /  AlkPhos  110    11 Jan 2024 06:32    LIVER FUNCTIONS - ( 11 Jan 2024 06:32 )  Alb: 2.4 g/dL / Pro: 6.6 g/dL / ALK PHOS: 110 U/L / ALT: 61 U/L / AST: 51 U/L / GGT: x           acetaminophen     Tablet .. 650 milliGRAM(s) Oral every 6 hours PRN  aluminum hydroxide/magnesium hydroxide/simethicone Suspension 30 milliLiter(s) Oral every 4 hours PRN  amLODIPine   Tablet 5 milliGRAM(s) Oral daily  atorvastatin 40 milliGRAM(s) Oral at bedtime  benzonatate 100 milliGRAM(s) Oral three times a day PRN  calcium acetate 2001 milliGRAM(s) Oral three times a day with meals  chlorhexidine 2% Cloths 1 Application(s) Topical <User Schedule>  heparin   Injectable 5000 Unit(s) SubCutaneous every 12 hours  labetalol 200 milliGRAM(s) Oral every 8 hours  melatonin 3 milliGRAM(s) Oral at bedtime PRN  ondansetron Injectable 4 milliGRAM(s) IV Push every 8 hours PRN  polyethylene glycol 3350 17 Gram(s) Oral daily  regadenoson Injectable 0.4 milliGRAM(s) IV Push once  senna 2 Tablet(s) Oral at bedtime  sodium chloride 0.9% lock flush 10 milliLiter(s) IV Push every 1 hour PRN    A/P:    Pt w/no known PMH, not following w/doctors, possibly hx of HTN, brother w/hx ESRD, s/p transplant   Adm w/N/V, fever, anemia, markedly abnormal renal fx (baseline renal fx is unknown)  Dx w/Flu A  S/p CT w/IV dye 1/3/23  24hr urine collection positive for nephrotic range proteinuria   SPEP, renal serologies are negative   CT A/P w/o hydro   Started on HD 1/6 via temp HD cath  HD tomorrow as ordered   F/u CBC, BMP, UO   Plan to transfer to [facility] for kidney biopsy, perm HD access    [phone]

## 2024-01-11 NOTE — PROGRESS NOTE ADULT - ASSESSMENT
Mr. Garcia is a 59yo M with PMHx of CKD 3, HTN, lost to follow up with PCP (per chart review used to take medications for HTN, but patient denies) presenting for vomiting times 3 weeks, admitted for renal failure and r/o ACS.       Case dw Dr. Jimenez.     Pt to be transferred to [facility] for permanent HD placement and renal biopsy. Accepting physician: Dr. Chery. Transfer paperwork in chart. Mr. Garcia is a 59yo M with PMHx of CKD 3, HTN, lost to follow up with PCP (per chart review used to take medications for HTN, but patient denies) presenting for vomiting times 3 weeks, admitted for renal failure and r/o ACS.       Case dw Dr. Jimenez.     Pt to be transferred to [facility] for permanent HD placement and renal biopsy. Accepting physician: Dr. Chery. Transfer paperwork in chart. Mr. Garcia is a 59yo M with PMHx of CKD 3, HTN, lost to follow up with PCP (per chart review used to take medications for HTN, but patient denies) presenting for vomiting times 3 weeks, admitted for renal failure and r/o ACS.       Case dw Dr. Land.     Pt to be transferred to [facility] for permanent HD placement and renal biopsy. Accepting physician: Dr. Chery. Transfer paperwork in chart. Mr. Garcia is a 59yo M with PMHx of CKD 3, HTN, lost to follow up with PCP (per chart review used to take medications for HTN, but patient denies) presenting for vomiting times 3 weeks, admitted for renal failure and r/o ACS.       Case dw Dr. Land.     Pt to be transferred to [facility] for permanent HD placement and renal biopsy. Accepting physician: Dr. Chery. Transfer paperwork in chart.

## 2024-01-11 NOTE — PROGRESS NOTE ADULT - ATTENDING COMMENTS
Agree with above  new ESRD requiring HD  case accepted for trx to [facility] for renal biopsy and permacath placement  pending bed availability  s/p HD yesterday  nephro following  daily labs  continue to monitor h/h on labwork Agree with above  new ESRD requiring HD  case accepted for trx to [facility] for renal biopsy and permacath placement  pending bed availability  s/p HD yesterday  nephro following  daily labs  continue to monitor h/h on labwork

## 2024-01-11 NOTE — PROGRESS NOTE ADULT - PROBLEM SELECTOR PLAN 2
-r/o other causes of other intrinsic renal disease ie nephritic/nephrotic syndrome, hepatitis  -s/p CTAP with IV contrast  -History of HTN, not on medications (poor medical follow up)  - Autoimmune work up unrevealing   -Hep B and hep C neg  -urine with 300 protein, small blood, 100 glucose  -SPEP, renal serologies negative  -a1c and lipid panel wnl  -BUN 94, Cr 17.09 on admission, up to 18.14  -Cr 12.33 today  -c/w 200mg BID labetalol  -Nephrology following, recommend gentle hydration with IVF and bicarb  -S/p leblanc removal  -Shiley placed 1/6/24 by ICU PA, pt s/p HD 1/6, HD 1/8 ordered with 1unit pRBCs  -Plan for transfer to [facility] for permanent HD access and renal biopsy -r/o other causes of other intrinsic renal disease ie nephritic/nephrotic syndrome, hepatitis  -s/p CTAP with IV contrast  -History of HTN, not on medications (poor medical follow up)  - Autoimmune work up unrevealing   -Hep B and hep C neg  -urine with 300 protein, small blood, 100 glucose  -SPEP, renal serologies negative  -a1c and lipid panel wnl  -BUN 94, Cr 17.09 on admission, up to 18.14  -Cr 12.33 today  -c/w 200mg BID labetalol  -Nephrology following, recommend gentle hydration with IVF and bicarb  -S/p leblanc removal  -Shiley placed 1/6/24 by ICU PA, pt s/p HD 1/6, HD 1/8 ordered with 1unit pRBCs  -Plan for transfer to [facility] for permanent HD access and renal biopsy -r/o other causes of other intrinsic renal disease ie nephritic/nephrotic syndrome, hepatitis  -s/p CTAP with IV contrast  -History of HTN, not on medications (poor medical follow up)  - Autoimmune work up unrevealing   -Hep B and hep C neg  -urine with 300 protein, small blood, 100 glucose  -SPEP, renal serologies negative  -a1c and lipid panel wnl  -BUN 94, Cr 17.09 on admission, up to 18.14  -Cr 8.4 today  -c/w 200mg BID labetalol  -Nephrology following, recommend gentle hydration with IVF and bicarb  -S/p leblanc removal  -Shiley placed 1/6/24 by ICU PA, pt s/p HD 1/6, HD 1/8 ordered with 1unit pRBCs  -Plan for transfer to [facility] for permanent HD access and renal biopsy-case accepted, pending bed assignment -r/o other causes of other intrinsic renal disease ie nephritic/nephrotic syndrome, hepatitis  -s/p CTAP with IV contrast  -History of HTN, not on medications (poor medical follow up)  - Autoimmune work up unrevealing   -Hep B and hep C neg  -urine with 300 protein, small blood, 100 glucose  -SPEP, renal serologies negative  -a1c and lipid panel wnl  -BUN 94, Cr 17.09 on admission, up to 18.14  -Cr 8.4 today  -c/w 200mg BID labetalol  -Nephrology following, recommend gentle hydration with IVF and bicarb  -S/p leblanc removal  -Shiley placed 1/6/24 by ICU PA, pt s/p HD 1/6, HD 1/8 ordered with 1unit pRBCs  -Plan for transfer to [facility] for permanent HD access and renal biopsy-case accepted, pending bed assignment

## 2024-01-11 NOTE — PROGRESS NOTE ADULT - PROBLEM SELECTOR PLAN 5
-NSTEMI  -Chest pain likely 2/2 hypertension  -Trops trending up 221>246>401>>466, 529.1, 715.14> now trending down 475.8  -Per cardio trops likely 2/2 demand ischemia in setting of renal function  -TTE with normal EF 65-70 and mod pulm htn  -Nuclear stress test results wnl  -Cw labetolol for BP control  -Cw heparin, aspirin, per cardio   -Dc plavix per cardio  -Appreciate cardiology recommendations, will continue tele monitoring -NSTEMI  -Chest pain likely 2/2 hypertension  -Trops trending up 221>246>401>>466, 529.1, 715.14> now trending down 475.8  -Per cardio trops likely 2/2 demand ischemia in setting of renal function  -TTE with normal EF 65-70 and mod pulm htn  -Nuclear stress test results wnl  -Cw labetolol for BP control  -Cw heparin per cardio   -Plavix discontinued per cardio, ASA discontinued per nephro  -Appreciate cardiology recommendations, will continue tele monitoring

## 2024-01-11 NOTE — PROGRESS NOTE ADULT - PROBLEM SELECTOR PLAN 3
-Pt not meeting sepsis criteria on admission, found to be meeting sepsis criteria 1/3/24 with , temp 102.9, RR 22, and suspected GI infection  -RVP + for Influenza A, Stool culture +ETEC   -Blood/urine cx negative  -Off abx at this time (was on flagyl +CTX on admission)  -Afebrile  -Tylenol PRN for fever  -Symptomatic support for Influenza and ETEC   -Will continue to monitor labs and vitals

## 2024-01-11 NOTE — PROGRESS NOTE ADULT - SUBJECTIVE AND OBJECTIVE BOX
Mr. Garcia is a 59yo M with PMHx of CKD 3, HTN, lost to follow up with PCP (per chart review used to take medications for HTN, but patient denies) presenting for vomiting times 3 weeks. Per patient, he began having subjective fevers and vomiting 2-4x/ day for 3 weeks with occasional small amounts of blood  with inability to hold down solids or liquids along with diarrhea for 2 days x8 episodes. Patient also notes that for the past 4 years he has been getting chest pain while walking, which is getting worse. Currently endorsing worsening central chest  pain in the ED along with a headache. Denies shortness of breath or palpitations. Denies changes in urine output , frequency or dysuria. Notes hx of renal failure in his brother at 71yo requiring transplant. Denied recent traveling, sick exposure, NSAID use, exposure to Hep C, Hep B, HIV, new foods. The patient was admitted for renal failure and r/o ACS.    1/10/24 AM: Patient seen and examined at bedside today, laying comfortably, no acute complaints.      REVIEW OF SYSTEMS:  CONSTITUTIONAL: (-) weakness, (-) fevers, (-) chills  RESPIRATORY:  (-) shortness of breath, (-) cough,  (-) wheezing,  (-) hemoptysis   CARDIOVASCULAR:  (-) chest pain, (-) palpitations  GASTROINTESTINAL:  (-) abdominal or epigastric pain, (-) nausea, (-) vomiting, (-) diarrhea, (-) constipation, (-) melena,  (-) hematemesis,  (-) hematochezia  GENITOURINARY: (-) dysuria, (-) frequency, (-) hematuria  NEUROLOGICAL: (-) numbness, (-) weakness  SKIN: (-) itching, (-) rashes, (-) lesions    Vital Signs Last 24 Hrs  T(C): 36.6 (10 Froy 2024 04:54), Max: 36.9 (09 Jan 2024 20:55)  T(F): 97.8 (10 Froy 2024 04:54), Max: 98.5 (09 Jan 2024 20:55)  HR: 74 (10 Froy 2024 04:54) (73 - 76)  BP: 170/74 (10 Froy 2024 04:54) (142/71 - 185/95)  RR: 18 (10 Froy 2024 04:54) (17 - 18)  SpO2: 97% (10 Froy 2024 04:54) (95% - 97%)    Parameters below as of 10 Froy 2024 04:54  Patient On (Oxygen Delivery Method): room air    PHYSICAL EXAM:  GENERAL: NAD, lying in bed, R shiley catheter in place  HEAD:  Atraumatic, Normocephalic  RESP: Clear to auscultation bilaterally, good air entry bilaterally; No wheezing, rales, or rhonchi. Unlabored respirations  CARDIAC: Regular rate and rhythm. S1 and S2. No murmurs, rubs, or gallops  GI:  Soft, Nontender, Nondistended. Bowel sounds present x4 quadrants; No hepatomegaly. No splenomegaly.  : Pro discontinued  EXTREMITIES:  2+ Peripheral Pulses. Capillary refill <2 seconds. No clubbing, cyanosis, or edema  NERVOUS SYSTEM:  Alert & Oriented X3, speech clear. No deficits   MSK: FROM all 4 extremities, full and equal strength  SKIN: No rashes, bruises, or other lesions    MEDICATIONS  (STANDING):  amLODIPine   Tablet 5 milliGRAM(s) Oral daily  aspirin  chewable 81 milliGRAM(s) Oral daily  atorvastatin 40 milliGRAM(s) Oral at bedtime  calcium acetate 1334 milliGRAM(s) Oral three times a day with meals  chlorhexidine 2% Cloths 1 Application(s) Topical <User Schedule>  heparin   Injectable 5000 Unit(s) SubCutaneous every 12 hours  labetalol 200 milliGRAM(s) Oral every 8 hours  polyethylene glycol 3350 17 Gram(s) Oral daily  regadenoson Injectable 0.4 milliGRAM(s) IV Push once  senna 2 Tablet(s) Oral at bedtime    MEDICATIONS  (PRN):  acetaminophen     Tablet .. 650 milliGRAM(s) Oral every 6 hours PRN Temp greater or equal to 38C (100.4F), Mild Pain (1 - 3)  aluminum hydroxide/magnesium hydroxide/simethicone Suspension 30 milliLiter(s) Oral every 4 hours PRN Dyspepsia  benzonatate 100 milliGRAM(s) Oral three times a day PRN Cough  melatonin 3 milliGRAM(s) Oral at bedtime PRN Insomnia  ondansetron Injectable 4 milliGRAM(s) IV Push every 8 hours PRN Nausea and/or Vomiting  sodium chloride 0.9% lock flush 10 milliLiter(s) IV Push every 1 hour PRN Pre/post blood products, medications, blood draw, and to maintain line patency          LABS                      9.1    5.84  )-----------( 257      ( 10 Froy 2024 07:56 )             26.3     10 Froy 2024 07:56    134    |  98     |  61     ----------------------------<  90     4.2     |  22     |  12.33    Ca    6.6        10 Froy 2024 07:56  Phos  6.4       10 Froy 2024 07:56  Mg     2.2       09 Jan 2024 07:56      PT/INR - ( 09 Jan 2024 12:25 )   PT: 11.2 sec;   INR: 0.96 ratio        PTT - ( 09 Jan 2024 12:25 )  PTT:29.5 sec  CAPILLARY BLOOD GLUCOSE      POCT Blood Glucose.: 101 mg/dL (10 Froy 2024 07:50)      Urinalysis Basic - ( 10 Froy 2024 07:56 )    Color: x / Appearance: x / SG: x / pH: x  Gluc: 90 mg/dL / Ketone: x  / Bili: x / Urobili: x   Blood: x / Protein: x / Nitrite: x   Leuk Esterase: x / RBC: x / WBC x   Sq Epi: x / Non Sq Epi: x / Bacteria: x          CT ABDOMEN AND PELVIS  IMPRESSION:    Inadequately distended proximal colonic loop or query long segmental   colitis. No bowel obstruction. Nonemergent colonoscopy suggested.    --- End of Report ---    MARIANO COPELAND MD; Attending Radiologist  This document has been electronically signed. Froy  3 2024  2:58AM         Mr. Garcia is a 57yo M with PMHx of CKD 3, HTN, lost to follow up with PCP (per chart review used to take medications for HTN, but patient denies) presenting for vomiting times 3 weeks. Per patient, he began having subjective fevers and vomiting 2-4x/ day for 3 weeks with occasional small amounts of blood  with inability to hold down solids or liquids along with diarrhea for 2 days x8 episodes. Patient also notes that for the past 4 years he has been getting chest pain while walking, which is getting worse. Currently endorsing worsening central chest  pain in the ED along with a headache. Denies shortness of breath or palpitations. Denies changes in urine output , frequency or dysuria. Notes hx of renal failure in his brother at 69yo requiring transplant. Denied recent traveling, sick exposure, NSAID use, exposure to Hep C, Hep B, HIV, new foods. The patient was admitted for renal failure and r/o ACS.    1/10/24 AM: Patient seen and examined at bedside today, laying comfortably, no acute complaints.      REVIEW OF SYSTEMS:  CONSTITUTIONAL: (-) weakness, (-) fevers, (-) chills  RESPIRATORY:  (-) shortness of breath, (-) cough,  (-) wheezing,  (-) hemoptysis   CARDIOVASCULAR:  (-) chest pain, (-) palpitations  GASTROINTESTINAL:  (-) abdominal or epigastric pain, (-) nausea, (-) vomiting, (-) diarrhea, (-) constipation, (-) melena,  (-) hematemesis,  (-) hematochezia  GENITOURINARY: (-) dysuria, (-) frequency, (-) hematuria  NEUROLOGICAL: (-) numbness, (-) weakness  SKIN: (-) itching, (-) rashes, (-) lesions    Vital Signs Last 24 Hrs  T(C): 36.6 (10 Froy 2024 04:54), Max: 36.9 (09 Jan 2024 20:55)  T(F): 97.8 (10 Froy 2024 04:54), Max: 98.5 (09 Jan 2024 20:55)  HR: 74 (10 Froy 2024 04:54) (73 - 76)  BP: 170/74 (10 Froy 2024 04:54) (142/71 - 185/95)  RR: 18 (10 Froy 2024 04:54) (17 - 18)  SpO2: 97% (10 Froy 2024 04:54) (95% - 97%)    Parameters below as of 10 Froy 2024 04:54  Patient On (Oxygen Delivery Method): room air    PHYSICAL EXAM:  GENERAL: NAD, lying in bed, R shiley catheter in place  HEAD:  Atraumatic, Normocephalic  RESP: Clear to auscultation bilaterally, good air entry bilaterally; No wheezing, rales, or rhonchi. Unlabored respirations  CARDIAC: Regular rate and rhythm. S1 and S2. No murmurs, rubs, or gallops  GI:  Soft, Nontender, Nondistended. Bowel sounds present x4 quadrants; No hepatomegaly. No splenomegaly.  : Pro discontinued  EXTREMITIES:  2+ Peripheral Pulses. Capillary refill <2 seconds. No clubbing, cyanosis, or edema  NERVOUS SYSTEM:  Alert & Oriented X3, speech clear. No deficits   MSK: FROM all 4 extremities, full and equal strength  SKIN: No rashes, bruises, or other lesions    MEDICATIONS  (STANDING):  amLODIPine   Tablet 5 milliGRAM(s) Oral daily  aspirin  chewable 81 milliGRAM(s) Oral daily  atorvastatin 40 milliGRAM(s) Oral at bedtime  calcium acetate 1334 milliGRAM(s) Oral three times a day with meals  chlorhexidine 2% Cloths 1 Application(s) Topical <User Schedule>  heparin   Injectable 5000 Unit(s) SubCutaneous every 12 hours  labetalol 200 milliGRAM(s) Oral every 8 hours  polyethylene glycol 3350 17 Gram(s) Oral daily  regadenoson Injectable 0.4 milliGRAM(s) IV Push once  senna 2 Tablet(s) Oral at bedtime    MEDICATIONS  (PRN):  acetaminophen     Tablet .. 650 milliGRAM(s) Oral every 6 hours PRN Temp greater or equal to 38C (100.4F), Mild Pain (1 - 3)  aluminum hydroxide/magnesium hydroxide/simethicone Suspension 30 milliLiter(s) Oral every 4 hours PRN Dyspepsia  benzonatate 100 milliGRAM(s) Oral three times a day PRN Cough  melatonin 3 milliGRAM(s) Oral at bedtime PRN Insomnia  ondansetron Injectable 4 milliGRAM(s) IV Push every 8 hours PRN Nausea and/or Vomiting  sodium chloride 0.9% lock flush 10 milliLiter(s) IV Push every 1 hour PRN Pre/post blood products, medications, blood draw, and to maintain line patency          LABS                      9.1    5.84  )-----------( 257      ( 10 Froy 2024 07:56 )             26.3     10 Froy 2024 07:56    134    |  98     |  61     ----------------------------<  90     4.2     |  22     |  12.33    Ca    6.6        10 Froy 2024 07:56  Phos  6.4       10 Rfoy 2024 07:56  Mg     2.2       09 Jan 2024 07:56      PT/INR - ( 09 Jan 2024 12:25 )   PT: 11.2 sec;   INR: 0.96 ratio        PTT - ( 09 Jan 2024 12:25 )  PTT:29.5 sec  CAPILLARY BLOOD GLUCOSE      POCT Blood Glucose.: 101 mg/dL (10 Froy 2024 07:50)      Urinalysis Basic - ( 10 Froy 2024 07:56 )    Color: x / Appearance: x / SG: x / pH: x  Gluc: 90 mg/dL / Ketone: x  / Bili: x / Urobili: x   Blood: x / Protein: x / Nitrite: x   Leuk Esterase: x / RBC: x / WBC x   Sq Epi: x / Non Sq Epi: x / Bacteria: x          CT ABDOMEN AND PELVIS  IMPRESSION:    Inadequately distended proximal colonic loop or query long segmental   colitis. No bowel obstruction. Nonemergent colonoscopy suggested.    --- End of Report ---    MARIANO COPELAND MD; Attending Radiologist  This document has been electronically signed. Froy  3 2024  2:58AM         Mr. Garcia is a 59yo M with PMHx of CKD 3, HTN, lost to follow up with PCP (per chart review used to take medications for HTN, but patient denies) presenting for vomiting times 3 weeks. Per patient, he began having subjective fevers and vomiting 2-4x/ day for 3 weeks with occasional small amounts of blood  with inability to hold down solids or liquids along with diarrhea for 2 days x8 episodes. Patient also notes that for the past 4 years he has been getting chest pain while walking, which is getting worse. Currently endorsing worsening central chest  pain in the ED along with a headache. Denies shortness of breath or palpitations. Denies changes in urine output , frequency or dysuria. Notes hx of renal failure in his brother at 71yo requiring transplant. Denied recent traveling, sick exposure, NSAID use, exposure to Hep C, Hep B, HIV, new foods. The patient was admitted for renal failure and r/o ACS.    1/11/24 AM: Patient seen and examined at bedside today, laying comfortably, no acute complaints.      REVIEW OF SYSTEMS:  CONSTITUTIONAL: (-) weakness, (-) fevers, (-) chills  RESPIRATORY:  (-) shortness of breath, (-) cough,  (-) wheezing,  (-) hemoptysis   CARDIOVASCULAR:  (-) chest pain, (-) palpitations  GASTROINTESTINAL:  (-) abdominal or epigastric pain, (-) nausea, (-) vomiting, (-) diarrhea, (-) constipation, (-) melena,  (-) hematemesis,  (-) hematochezia  GENITOURINARY: (-) dysuria, (-) frequency, (-) hematuria  NEUROLOGICAL: (-) numbness, (-) weakness  SKIN: (-) itching, (-) rashes, (-) lesions    Vital Signs Last 24 Hrs  T(C): 36.7 (11 Jan 2024 06:07), Max: 36.7 (11 Jan 2024 06:07)  T(F): 98 (11 Jan 2024 06:07), Max: 98 (11 Jan 2024 06:07)  HR: 93 (11 Jan 2024 06:07) (74 - 93)  BP: 138/79 (11 Jan 2024 06:07) (138/79 - 146/80)  RR: 15 (11 Jan 2024 06:07) (15 - 18)  SpO2: 98% (11 Jan 2024 06:07) (95% - 98%)    Parameters below as of 11 Jan 2024 06:07  Patient On (Oxygen Delivery Method): room air        PHYSICAL EXAM:  GENERAL: NAD, lying in bed, R shiley catheter in place  HEAD:  Atraumatic, Normocephalic  RESP: Clear to auscultation bilaterally, good air entry bilaterally; No wheezing, rales, or rhonchi. Unlabored respirations  CARDIAC: Regular rate and rhythm. S1 and S2. No murmurs, rubs, or gallops  GI:  Soft, Nontender, Nondistended. Bowel sounds present x4 quadrants; No hepatomegaly. No splenomegaly.  : Pro discontinued  EXTREMITIES:  2+ Peripheral Pulses. Capillary refill <2 seconds. No clubbing, cyanosis, or edema  NERVOUS SYSTEM:  Alert & Oriented X3, speech clear. No deficits   MSK: FROM all 4 extremities, full and equal strength  SKIN: No rashes, bruises, or other lesions      MEDICATIONS  (STANDING):  amLODIPine   Tablet 5 milliGRAM(s) Oral daily  atorvastatin 40 milliGRAM(s) Oral at bedtime  calcium acetate 2001 milliGRAM(s) Oral three times a day with meals  chlorhexidine 2% Cloths 1 Application(s) Topical <User Schedule>  heparin   Injectable 5000 Unit(s) SubCutaneous every 12 hours  labetalol 200 milliGRAM(s) Oral every 8 hours  polyethylene glycol 3350 17 Gram(s) Oral daily  regadenoson Injectable 0.4 milliGRAM(s) IV Push once  senna 2 Tablet(s) Oral at bedtime    MEDICATIONS  (PRN):  acetaminophen     Tablet .. 650 milliGRAM(s) Oral every 6 hours PRN Temp greater or equal to 38C (100.4F), Mild Pain (1 - 3)  aluminum hydroxide/magnesium hydroxide/simethicone Suspension 30 milliLiter(s) Oral every 4 hours PRN Dyspepsia  benzonatate 100 milliGRAM(s) Oral three times a day PRN Cough  melatonin 3 milliGRAM(s) Oral at bedtime PRN Insomnia  ondansetron Injectable 4 milliGRAM(s) IV Push every 8 hours PRN Nausea and/or Vomiting  sodium chloride 0.9% lock flush 10 milliLiter(s) IV Push every 1 hour PRN Pre/post blood products, medications, blood draw, and to maintain line patency        LABS                              9.5    6.39  )-----------( 299      ( 11 Jan 2024 06:32 )             27.6     11 Jan 2024 06:32    134    |  99     |  34     ----------------------------<  108    4.1     |  26     |  8.44     Ca    7.1        11 Jan 2024 06:32  Phos  4.8       11 Jan 2024 06:32  Mg     2.1       11 Jan 2024 06:32    TPro  6.6    /  Alb  2.4    /  TBili  0.8    /  DBili  x      /  AST  51     /  ALT  61     /  AlkPhos  110    11 Jan 2024 06:32      CAPILLARY BLOOD GLUCOSE    LIVER FUNCTIONS - ( 11 Jan 2024 06:32 )  Alb: 2.4 g/dL / Pro: 6.6 g/dL / ALK PHOS: 110 U/L / ALT: 61 U/L / AST: 51 U/L / GGT: x           Urinalysis Basic - ( 11 Jan 2024 06:32 )    Color: x / Appearance: x / SG: x / pH: x  Gluc: 108 mg/dL / Ketone: x  / Bili: x / Urobili: x   Blood: x / Protein: x / Nitrite: x   Leuk Esterase: x / RBC: x / WBC x   Sq Epi: x / Non Sq Epi: x / Bacteria: x          CT ABDOMEN AND PELVIS  IMPRESSION:    Inadequately distended proximal colonic loop or query long segmental   colitis. No bowel obstruction. Nonemergent colonoscopy suggested.    --- End of Report ---    MARIANO COPELAND MD; Attending Radiologist  This document has been electronically signed. Froy  3 2024  2:58AM         Mr. Garcia is a 57yo M with PMHx of CKD 3, HTN, lost to follow up with PCP (per chart review used to take medications for HTN, but patient denies) presenting for vomiting times 3 weeks. Per patient, he began having subjective fevers and vomiting 2-4x/ day for 3 weeks with occasional small amounts of blood  with inability to hold down solids or liquids along with diarrhea for 2 days x8 episodes. Patient also notes that for the past 4 years he has been getting chest pain while walking, which is getting worse. Currently endorsing worsening central chest  pain in the ED along with a headache. Denies shortness of breath or palpitations. Denies changes in urine output , frequency or dysuria. Notes hx of renal failure in his brother at 69yo requiring transplant. Denied recent traveling, sick exposure, NSAID use, exposure to Hep C, Hep B, HIV, new foods. The patient was admitted for renal failure and r/o ACS.    1/11/24 AM: Patient seen and examined at bedside today, laying comfortably, no acute complaints.      REVIEW OF SYSTEMS:  CONSTITUTIONAL: (-) weakness, (-) fevers, (-) chills  RESPIRATORY:  (-) shortness of breath, (-) cough,  (-) wheezing,  (-) hemoptysis   CARDIOVASCULAR:  (-) chest pain, (-) palpitations  GASTROINTESTINAL:  (-) abdominal or epigastric pain, (-) nausea, (-) vomiting, (-) diarrhea, (-) constipation, (-) melena,  (-) hematemesis,  (-) hematochezia  GENITOURINARY: (-) dysuria, (-) frequency, (-) hematuria  NEUROLOGICAL: (-) numbness, (-) weakness  SKIN: (-) itching, (-) rashes, (-) lesions    Vital Signs Last 24 Hrs  T(C): 36.7 (11 Jan 2024 06:07), Max: 36.7 (11 Jan 2024 06:07)  T(F): 98 (11 Jan 2024 06:07), Max: 98 (11 Jan 2024 06:07)  HR: 93 (11 Jan 2024 06:07) (74 - 93)  BP: 138/79 (11 Jan 2024 06:07) (138/79 - 146/80)  RR: 15 (11 Jan 2024 06:07) (15 - 18)  SpO2: 98% (11 Jan 2024 06:07) (95% - 98%)    Parameters below as of 11 Jan 2024 06:07  Patient On (Oxygen Delivery Method): room air        PHYSICAL EXAM:  GENERAL: NAD, lying in bed, R shiley catheter in place  HEAD:  Atraumatic, Normocephalic  RESP: Clear to auscultation bilaterally, good air entry bilaterally; No wheezing, rales, or rhonchi. Unlabored respirations  CARDIAC: Regular rate and rhythm. S1 and S2. No murmurs, rubs, or gallops  GI:  Soft, Nontender, Nondistended. Bowel sounds present x4 quadrants; No hepatomegaly. No splenomegaly.  : Pro discontinued  EXTREMITIES:  2+ Peripheral Pulses. Capillary refill <2 seconds. No clubbing, cyanosis, or edema  NERVOUS SYSTEM:  Alert & Oriented X3, speech clear. No deficits   MSK: FROM all 4 extremities, full and equal strength  SKIN: No rashes, bruises, or other lesions      MEDICATIONS  (STANDING):  amLODIPine   Tablet 5 milliGRAM(s) Oral daily  atorvastatin 40 milliGRAM(s) Oral at bedtime  calcium acetate 2001 milliGRAM(s) Oral three times a day with meals  chlorhexidine 2% Cloths 1 Application(s) Topical <User Schedule>  heparin   Injectable 5000 Unit(s) SubCutaneous every 12 hours  labetalol 200 milliGRAM(s) Oral every 8 hours  polyethylene glycol 3350 17 Gram(s) Oral daily  regadenoson Injectable 0.4 milliGRAM(s) IV Push once  senna 2 Tablet(s) Oral at bedtime    MEDICATIONS  (PRN):  acetaminophen     Tablet .. 650 milliGRAM(s) Oral every 6 hours PRN Temp greater or equal to 38C (100.4F), Mild Pain (1 - 3)  aluminum hydroxide/magnesium hydroxide/simethicone Suspension 30 milliLiter(s) Oral every 4 hours PRN Dyspepsia  benzonatate 100 milliGRAM(s) Oral three times a day PRN Cough  melatonin 3 milliGRAM(s) Oral at bedtime PRN Insomnia  ondansetron Injectable 4 milliGRAM(s) IV Push every 8 hours PRN Nausea and/or Vomiting  sodium chloride 0.9% lock flush 10 milliLiter(s) IV Push every 1 hour PRN Pre/post blood products, medications, blood draw, and to maintain line patency        LABS                              9.5    6.39  )-----------( 299      ( 11 Jan 2024 06:32 )             27.6     11 Jan 2024 06:32    134    |  99     |  34     ----------------------------<  108    4.1     |  26     |  8.44     Ca    7.1        11 Jan 2024 06:32  Phos  4.8       11 Jan 2024 06:32  Mg     2.1       11 Jan 2024 06:32    TPro  6.6    /  Alb  2.4    /  TBili  0.8    /  DBili  x      /  AST  51     /  ALT  61     /  AlkPhos  110    11 Jan 2024 06:32      CAPILLARY BLOOD GLUCOSE    LIVER FUNCTIONS - ( 11 Jan 2024 06:32 )  Alb: 2.4 g/dL / Pro: 6.6 g/dL / ALK PHOS: 110 U/L / ALT: 61 U/L / AST: 51 U/L / GGT: x           Urinalysis Basic - ( 11 Jan 2024 06:32 )    Color: x / Appearance: x / SG: x / pH: x  Gluc: 108 mg/dL / Ketone: x  / Bili: x / Urobili: x   Blood: x / Protein: x / Nitrite: x   Leuk Esterase: x / RBC: x / WBC x   Sq Epi: x / Non Sq Epi: x / Bacteria: x          CT ABDOMEN AND PELVIS  IMPRESSION:    Inadequately distended proximal colonic loop or query long segmental   colitis. No bowel obstruction. Nonemergent colonoscopy suggested.    --- End of Report ---    MARIANO COPELAND MD; Attending Radiologist  This document has been electronically signed. Froy  3 2024  2:58AM

## 2024-01-11 NOTE — PROGRESS NOTE ADULT - PROBLEM SELECTOR PLAN 1
-S/p CTAP with IV contrast  -History of HTN, not on medications (poor medical follow up)  -Urine with 300 protein, small blood, 100 glucose  -S/p 1L fluid in ED  -S/p 10mg hydralazine IV push  -C/w 200mg labetolol q8hrs  -restart amlodipine 5mg  -Continue to monitor BP  -Nuclear stress test results wnl  -Appreciate cardiology recommendations  -Appreciate nephrology recommendations, will continue tele monitoring- plan for possible transfer to tertiary center for permanent HD access and biopsy -S/p CTAP with IV contrast  -History of HTN, not on medications (poor medical follow up)  -Urine with 300 protein, small blood, 100 glucose  -S/p 1L fluid in ED  -S/p 10mg hydralazine IV push  -C/w 200mg labetolol q8hrs  -restart amlodipine 5mg  -Continue to monitor BP  -Nuclear stress test results wnl  -Appreciate cardiology recommendations  -Appreciate nephrology recommendations, will continue tele monitoring- plan for transfer to tertiary center for permanent HD access and biopsy

## 2024-01-11 NOTE — PROGRESS NOTE ADULT - PROBLEM SELECTOR PLAN 4
-Likely 2/2 kidney failure  -iron panel with ferritin 478, likely anemia of chronic disease  -H&H trending down  -Transfusion consent forms signed and in chart  -Hgb 6.8 1/4/24, s/p PRBC, received 1unit pRBCs with HD 1/8  -Keep type and screen current  -Transfuse Hgb <7.0  -Hgb today 9.1, continue to monitor and transfuse as needed

## 2024-01-12 ENCOUNTER — INPATIENT (INPATIENT)
Facility: HOSPITAL | Age: 59
LOS: 12 days | Discharge: HOME | End: 2024-01-25
Attending: STUDENT IN AN ORGANIZED HEALTH CARE EDUCATION/TRAINING PROGRAM | Admitting: STUDENT IN AN ORGANIZED HEALTH CARE EDUCATION/TRAINING PROGRAM
Payer: MEDICAID

## 2024-01-12 VITALS
HEART RATE: 73 BPM | RESPIRATION RATE: 16 BRPM | TEMPERATURE: 99 F | OXYGEN SATURATION: 99 % | SYSTOLIC BLOOD PRESSURE: 150 MMHG | DIASTOLIC BLOOD PRESSURE: 83 MMHG

## 2024-01-12 VITALS
HEART RATE: 77 BPM | RESPIRATION RATE: 18 BRPM | SYSTOLIC BLOOD PRESSURE: 161 MMHG | TEMPERATURE: 98 F | OXYGEN SATURATION: 99 % | HEIGHT: 65 IN | WEIGHT: 119.93 LBS | DIASTOLIC BLOOD PRESSURE: 78 MMHG

## 2024-01-12 DIAGNOSIS — J10.1 INFLUENZA DUE TO OTHER IDENTIFIED INFLUENZA VIRUS WITH OTHER RESPIRATORY MANIFESTATIONS: ICD-10-CM

## 2024-01-12 DIAGNOSIS — N18.9 CHRONIC KIDNEY DISEASE, UNSPECIFIED: ICD-10-CM

## 2024-01-12 DIAGNOSIS — R80.9 PROTEINURIA, UNSPECIFIED: ICD-10-CM

## 2024-01-12 DIAGNOSIS — D63.8 ANEMIA IN OTHER CHRONIC DISEASES CLASSIFIED ELSEWHERE: ICD-10-CM

## 2024-01-12 DIAGNOSIS — I21.4 NON-ST ELEVATION (NSTEMI) MYOCARDIAL INFARCTION: ICD-10-CM

## 2024-01-12 DIAGNOSIS — Z29.9 ENCOUNTER FOR PROPHYLACTIC MEASURES, UNSPECIFIED: ICD-10-CM

## 2024-01-12 DIAGNOSIS — N17.9 ACUTE KIDNEY FAILURE, UNSPECIFIED: ICD-10-CM

## 2024-01-12 DIAGNOSIS — A41.51 SEPSIS DUE TO ESCHERICHIA COLI [E. COLI]: ICD-10-CM

## 2024-01-12 DIAGNOSIS — N18.6 END STAGE RENAL DISEASE: ICD-10-CM

## 2024-01-12 DIAGNOSIS — R79.89 OTHER SPECIFIED ABNORMAL FINDINGS OF BLOOD CHEMISTRY: ICD-10-CM

## 2024-01-12 DIAGNOSIS — I10 ESSENTIAL (PRIMARY) HYPERTENSION: ICD-10-CM

## 2024-01-12 LAB
ANION GAP SERPL CALC-SCNC: 12 MMOL/L — SIGNIFICANT CHANGE UP (ref 5–17)
ANION GAP SERPL CALC-SCNC: 12 MMOL/L — SIGNIFICANT CHANGE UP (ref 5–17)
BUN SERPL-MCNC: 46 MG/DL — HIGH (ref 7–23)
BUN SERPL-MCNC: 46 MG/DL — HIGH (ref 7–23)
CALCIUM SERPL-MCNC: 7.5 MG/DL — LOW (ref 8.4–10.5)
CALCIUM SERPL-MCNC: 7.5 MG/DL — LOW (ref 8.4–10.5)
CHLORIDE SERPL-SCNC: 97 MMOL/L — SIGNIFICANT CHANGE UP (ref 96–108)
CHLORIDE SERPL-SCNC: 97 MMOL/L — SIGNIFICANT CHANGE UP (ref 96–108)
CO2 SERPL-SCNC: 25 MMOL/L — SIGNIFICANT CHANGE UP (ref 22–31)
CO2 SERPL-SCNC: 25 MMOL/L — SIGNIFICANT CHANGE UP (ref 22–31)
CREAT SERPL-MCNC: 10.15 MG/DL — HIGH (ref 0.5–1.3)
CREAT SERPL-MCNC: 10.15 MG/DL — HIGH (ref 0.5–1.3)
EGFR: 5 ML/MIN/1.73M2 — LOW
EGFR: 5 ML/MIN/1.73M2 — LOW
GLUCOSE SERPL-MCNC: 98 MG/DL — SIGNIFICANT CHANGE UP (ref 70–99)
GLUCOSE SERPL-MCNC: 98 MG/DL — SIGNIFICANT CHANGE UP (ref 70–99)
HCT VFR BLD CALC: 28.8 % — LOW (ref 39–50)
HCT VFR BLD CALC: 28.8 % — LOW (ref 39–50)
HGB BLD-MCNC: 10.1 G/DL — LOW (ref 13–17)
HGB BLD-MCNC: 10.1 G/DL — LOW (ref 13–17)
MAGNESIUM SERPL-MCNC: 2 MG/DL — SIGNIFICANT CHANGE UP (ref 1.6–2.6)
MAGNESIUM SERPL-MCNC: 2 MG/DL — SIGNIFICANT CHANGE UP (ref 1.6–2.6)
MCHC RBC-ENTMCNC: 27.8 PG — SIGNIFICANT CHANGE UP (ref 27–34)
MCHC RBC-ENTMCNC: 27.8 PG — SIGNIFICANT CHANGE UP (ref 27–34)
MCHC RBC-ENTMCNC: 35.1 GM/DL — SIGNIFICANT CHANGE UP (ref 32–36)
MCHC RBC-ENTMCNC: 35.1 GM/DL — SIGNIFICANT CHANGE UP (ref 32–36)
MCV RBC AUTO: 79.3 FL — LOW (ref 80–100)
MCV RBC AUTO: 79.3 FL — LOW (ref 80–100)
NRBC # BLD: 0 /100 WBCS — SIGNIFICANT CHANGE UP (ref 0–0)
NRBC # BLD: 0 /100 WBCS — SIGNIFICANT CHANGE UP (ref 0–0)
PHOSPHATE SERPL-MCNC: 5 MG/DL — HIGH (ref 2.5–4.5)
PHOSPHATE SERPL-MCNC: 5 MG/DL — HIGH (ref 2.5–4.5)
PLATELET # BLD AUTO: 332 K/UL — SIGNIFICANT CHANGE UP (ref 150–400)
PLATELET # BLD AUTO: 332 K/UL — SIGNIFICANT CHANGE UP (ref 150–400)
POTASSIUM SERPL-MCNC: 4.4 MMOL/L — SIGNIFICANT CHANGE UP (ref 3.5–5.3)
POTASSIUM SERPL-MCNC: 4.4 MMOL/L — SIGNIFICANT CHANGE UP (ref 3.5–5.3)
POTASSIUM SERPL-SCNC: 4.4 MMOL/L — SIGNIFICANT CHANGE UP (ref 3.5–5.3)
POTASSIUM SERPL-SCNC: 4.4 MMOL/L — SIGNIFICANT CHANGE UP (ref 3.5–5.3)
RBC # BLD: 3.63 M/UL — LOW (ref 4.2–5.8)
RBC # BLD: 3.63 M/UL — LOW (ref 4.2–5.8)
RBC # FLD: 13.8 % — SIGNIFICANT CHANGE UP (ref 10.3–14.5)
RBC # FLD: 13.8 % — SIGNIFICANT CHANGE UP (ref 10.3–14.5)
SODIUM SERPL-SCNC: 134 MMOL/L — LOW (ref 135–145)
SODIUM SERPL-SCNC: 134 MMOL/L — LOW (ref 135–145)
WBC # BLD: 6.94 K/UL — SIGNIFICANT CHANGE UP (ref 3.8–10.5)
WBC # BLD: 6.94 K/UL — SIGNIFICANT CHANGE UP (ref 3.8–10.5)
WBC # FLD AUTO: 6.94 K/UL — SIGNIFICANT CHANGE UP (ref 3.8–10.5)
WBC # FLD AUTO: 6.94 K/UL — SIGNIFICANT CHANGE UP (ref 3.8–10.5)

## 2024-01-12 PROCEDURE — 83735 ASSAY OF MAGNESIUM: CPT

## 2024-01-12 PROCEDURE — 85730 THROMBOPLASTIN TIME PARTIAL: CPT

## 2024-01-12 PROCEDURE — A9500: CPT

## 2024-01-12 PROCEDURE — 96361 HYDRATE IV INFUSION ADD-ON: CPT

## 2024-01-12 PROCEDURE — P9016: CPT

## 2024-01-12 PROCEDURE — 85025 COMPLETE CBC W/AUTO DIFF WBC: CPT

## 2024-01-12 PROCEDURE — 85018 HEMOGLOBIN: CPT

## 2024-01-12 PROCEDURE — 81001 URINALYSIS AUTO W/SCOPE: CPT

## 2024-01-12 PROCEDURE — 86923 COMPATIBILITY TEST ELECTRIC: CPT

## 2024-01-12 PROCEDURE — 84165 PROTEIN E-PHORESIS SERUM: CPT

## 2024-01-12 PROCEDURE — 86803 HEPATITIS C AB TEST: CPT

## 2024-01-12 PROCEDURE — 78452 HT MUSCLE IMAGE SPECT MULT: CPT

## 2024-01-12 PROCEDURE — 84484 ASSAY OF TROPONIN QUANT: CPT

## 2024-01-12 PROCEDURE — 86850 RBC ANTIBODY SCREEN: CPT

## 2024-01-12 PROCEDURE — 83516 IMMUNOASSAY NONANTIBODY: CPT

## 2024-01-12 PROCEDURE — 87040 BLOOD CULTURE FOR BACTERIA: CPT

## 2024-01-12 PROCEDURE — 87046 STOOL CULTR AEROBIC BACT EA: CPT

## 2024-01-12 PROCEDURE — 86705 HEP B CORE ANTIBODY IGM: CPT

## 2024-01-12 PROCEDURE — 86901 BLOOD TYPING SEROLOGIC RH(D): CPT

## 2024-01-12 PROCEDURE — 83036 HEMOGLOBIN GLYCOSYLATED A1C: CPT

## 2024-01-12 PROCEDURE — 82728 ASSAY OF FERRITIN: CPT

## 2024-01-12 PROCEDURE — 99285 EMERGENCY DEPT VISIT HI MDM: CPT

## 2024-01-12 PROCEDURE — 96374 THER/PROPH/DIAG INJ IV PUSH: CPT

## 2024-01-12 PROCEDURE — 83690 ASSAY OF LIPASE: CPT

## 2024-01-12 PROCEDURE — 80048 BASIC METABOLIC PNL TOTAL CA: CPT

## 2024-01-12 PROCEDURE — G1004: CPT

## 2024-01-12 PROCEDURE — 84156 ASSAY OF PROTEIN URINE: CPT

## 2024-01-12 PROCEDURE — 99418 PROLNG IP/OBS E/M EA 15 MIN: CPT

## 2024-01-12 PROCEDURE — 99239 HOSP IP/OBS DSCHRG MGMT >30: CPT

## 2024-01-12 PROCEDURE — 87177 OVA AND PARASITES SMEARS: CPT

## 2024-01-12 PROCEDURE — 86160 COMPLEMENT ANTIGEN: CPT

## 2024-01-12 PROCEDURE — 86038 ANTINUCLEAR ANTIBODIES: CPT

## 2024-01-12 PROCEDURE — 83540 ASSAY OF IRON: CPT

## 2024-01-12 PROCEDURE — 80061 LIPID PANEL: CPT

## 2024-01-12 PROCEDURE — 82272 OCCULT BLD FECES 1-3 TESTS: CPT

## 2024-01-12 PROCEDURE — 86334 IMMUNOFIX E-PHORESIS SERUM: CPT

## 2024-01-12 PROCEDURE — 84100 ASSAY OF PHOSPHORUS: CPT

## 2024-01-12 PROCEDURE — 80074 ACUTE HEPATITIS PANEL: CPT

## 2024-01-12 PROCEDURE — 86225 DNA ANTIBODY NATIVE: CPT

## 2024-01-12 PROCEDURE — 0225U NFCT DS DNA&RNA 21 SARSCOV2: CPT

## 2024-01-12 PROCEDURE — 71045 X-RAY EXAM CHEST 1 VIEW: CPT

## 2024-01-12 PROCEDURE — 85610 PROTHROMBIN TIME: CPT

## 2024-01-12 PROCEDURE — 99223 1ST HOSP IP/OBS HIGH 75: CPT

## 2024-01-12 PROCEDURE — 85014 HEMATOCRIT: CPT

## 2024-01-12 PROCEDURE — 87507 IADNA-DNA/RNA PROBE TQ 12-25: CPT

## 2024-01-12 PROCEDURE — 36415 COLL VENOUS BLD VENIPUNCTURE: CPT

## 2024-01-12 PROCEDURE — 99261: CPT

## 2024-01-12 PROCEDURE — 87340 HEPATITIS B SURFACE AG IA: CPT

## 2024-01-12 PROCEDURE — 86900 BLOOD TYPING SEROLOGIC ABO: CPT

## 2024-01-12 PROCEDURE — 93005 ELECTROCARDIOGRAM TRACING: CPT

## 2024-01-12 PROCEDURE — 83010 ASSAY OF HAPTOGLOBIN QUANT: CPT

## 2024-01-12 PROCEDURE — 74177 CT ABD & PELVIS W/CONTRAST: CPT | Mod: ME

## 2024-01-12 PROCEDURE — 99497 ADVNCD CARE PLAN 30 MIN: CPT | Mod: 25

## 2024-01-12 PROCEDURE — 82962 GLUCOSE BLOOD TEST: CPT

## 2024-01-12 PROCEDURE — 85027 COMPLETE CBC AUTOMATED: CPT

## 2024-01-12 PROCEDURE — 36430 TRANSFUSION BLD/BLD COMPNT: CPT

## 2024-01-12 PROCEDURE — 84155 ASSAY OF PROTEIN SERUM: CPT

## 2024-01-12 PROCEDURE — 84466 ASSAY OF TRANSFERRIN: CPT

## 2024-01-12 PROCEDURE — 87077 CULTURE AEROBIC IDENTIFY: CPT

## 2024-01-12 PROCEDURE — 93017 CV STRESS TEST TRACING ONLY: CPT

## 2024-01-12 PROCEDURE — 80053 COMPREHEN METABOLIC PANEL: CPT

## 2024-01-12 PROCEDURE — 83615 LACTATE (LD) (LDH) ENZYME: CPT

## 2024-01-12 PROCEDURE — 96375 TX/PRO/DX INJ NEW DRUG ADDON: CPT

## 2024-01-12 PROCEDURE — 83605 ASSAY OF LACTIC ACID: CPT

## 2024-01-12 PROCEDURE — 93308 TTE F-UP OR LMTD: CPT

## 2024-01-12 PROCEDURE — 87045 FECES CULTURE AEROBIC BACT: CPT

## 2024-01-12 PROCEDURE — 86706 HEP B SURFACE ANTIBODY: CPT

## 2024-01-12 PROCEDURE — 86036 ANCA SCREEN EACH ANTIBODY: CPT

## 2024-01-12 RX ORDER — LABETALOL HCL 100 MG
100 TABLET ORAL EVERY 8 HOURS
Refills: 0 | Status: DISCONTINUED | OUTPATIENT
Start: 2024-01-12 | End: 2024-01-16

## 2024-01-12 RX ORDER — LANOLIN ALCOHOL/MO/W.PET/CERES
3 CREAM (GRAM) TOPICAL AT BEDTIME
Refills: 0 | Status: DISCONTINUED | OUTPATIENT
Start: 2024-01-12 | End: 2024-01-25

## 2024-01-12 RX ORDER — ACETAMINOPHEN 500 MG
650 TABLET ORAL EVERY 6 HOURS
Refills: 0 | Status: DISCONTINUED | OUTPATIENT
Start: 2024-01-12 | End: 2024-01-25

## 2024-01-12 RX ORDER — CALCIUM ACETATE 667 MG
2001 TABLET ORAL
Refills: 0 | Status: DISCONTINUED | OUTPATIENT
Start: 2024-01-13 | End: 2024-01-25

## 2024-01-12 RX ORDER — ATORVASTATIN CALCIUM 80 MG/1
40 TABLET, FILM COATED ORAL AT BEDTIME
Refills: 0 | Status: DISCONTINUED | OUTPATIENT
Start: 2024-01-12 | End: 2024-01-25

## 2024-01-12 RX ORDER — ERYTHROPOIETIN 10000 [IU]/ML
10000 INJECTION, SOLUTION INTRAVENOUS; SUBCUTANEOUS ONCE
Refills: 0 | Status: COMPLETED | OUTPATIENT
Start: 2024-01-12 | End: 2024-01-12

## 2024-01-12 RX ORDER — CALCIUM ACETATE 667 MG
0 TABLET ORAL
Qty: 0 | Refills: 0 | DISCHARGE
Start: 2024-01-12

## 2024-01-12 RX ADMIN — AMLODIPINE BESYLATE 5 MILLIGRAM(S): 2.5 TABLET ORAL at 12:38

## 2024-01-12 RX ADMIN — ERYTHROPOIETIN 10000 UNIT(S): 10000 INJECTION, SOLUTION INTRAVENOUS; SUBCUTANEOUS at 10:44

## 2024-01-12 RX ADMIN — Medication 2001 MILLIGRAM(S): at 12:38

## 2024-01-12 RX ADMIN — Medication 200 MILLIGRAM(S): at 12:39

## 2024-01-12 RX ADMIN — CHLORHEXIDINE GLUCONATE 1 APPLICATION(S): 213 SOLUTION TOPICAL at 06:55

## 2024-01-12 RX ADMIN — Medication 100 MILLIGRAM(S): at 23:45

## 2024-01-12 RX ADMIN — POLYETHYLENE GLYCOL 3350 17 GRAM(S): 17 POWDER, FOR SOLUTION ORAL at 12:38

## 2024-01-12 RX ADMIN — HEPARIN SODIUM 5000 UNIT(S): 5000 INJECTION INTRAVENOUS; SUBCUTANEOUS at 06:56

## 2024-01-12 RX ADMIN — HEPARIN SODIUM 5000 UNIT(S): 5000 INJECTION INTRAVENOUS; SUBCUTANEOUS at 17:25

## 2024-01-12 RX ADMIN — ATORVASTATIN CALCIUM 40 MILLIGRAM(S): 80 TABLET, FILM COATED ORAL at 23:47

## 2024-01-12 RX ADMIN — Medication 2001 MILLIGRAM(S): at 17:25

## 2024-01-12 NOTE — H&P ADULT - ATTENDING COMMENTS
Mr. Garcia is a 59 year old M with history of CKD stage 3, HTN (non-compliant) with recent admission at [facility], sepsis secondary to EHEC complicated by with central chest pain with nuclear stress negative (troponinemia peal at 715) and new onset renal failure secondary to HTN emergency who was transferred to [facility] for renal biopsy and placement of permanent HD catheter. Of note patient had renal failure at 68 years old requiring transplant. CTA abdomen and pelvis at [facility] showed segmental colitis without obstruction and the patient was treated with ceftriaxone and flagyll. He was also found to be influenza positive and was monitored conservatively. TTE there showed LVEF 65-70% normal LV systolic function, mild to moderate MR, mild to moderate TR, pulmonary artery is 54.8 with moderate pulmonary HTN. Labs were sig for Hb 10.1, Na 134, BUN/Cr 46/10.1, Ca 5.0. Patient currently states he is urinating 1x a day. HD sessions at [place] was as follows: 01/06, 01/08, 01/10, and 01/12. He also reports some hesitancy with pursuing HD long-term. VIR consult placed for renal biopsy and long-term permanent catheter placement. Renal consult to be done in the AM. Mr. Garcia is a 59 year old M with history of CKD stage 3, HTN (non-compliant) with recent admission at [facility], sepsis secondary to EHEC complicated by with central chest pain with nuclear stress negative (troponinemia peal at 715) and new onset renal failure secondary to HTN emergency who was transferred to [facility] for renal biopsy and placement of permanent HD catheter. Of note patient had renal failure at 68 years old requiring transplant. CTA abdomen and pelvis at [facility] showed segmental colitis without obstruction and the patient was treated with ceftriaxone and flagyll. He was also found to be influenza positive and was monitored conservatively. TTE there showed LVEF 65-70% normal LV systolic function, mild to moderate MR, mild to moderate TR, pulmonary artery is 54.8 with moderate pulmonary HTN. Labs were sig for Hb 10.1, Na 134, BUN/Cr 46/10.1, Ca 5.0. Patient currently states he is urinating 1x a day. HD sessions at [place] was as follows: 01/06, 01/08, 01/10, and 01/12. He also reports some hesitancy with pursuing HD long-term. VIR consult placed for renal biopsy and long-term permanent catheter placement. Renal consult to be done in the AM. I agree with the note above with modifications and addendum below:     Mr. Garcia is a 59 year old M with history of CKD stage 3, HTN (non-compliant) with recent admission at [facility], sepsis secondary to EHEC complicated by with central chest pain with nuclear stress negative (troponinemia peal at 715) and new onset renal failure secondary to HTN emergency who was transferred to [facility] for renal biopsy and placement of permanent HD catheter. Of note patient had renal failure at 68 years old requiring transplant. CTA abdomen and pelvis at [facility] showed segmental colitis without obstruction and the patient was treated with ceftriaxone and flagyll. He was also found to be influenza positive and was monitored conservatively. TTE there showed LVEF 65-70% normal LV systolic function, mild to moderate MR, mild to moderate TR, pulmonary artery is 54.8 with moderate pulmonary HTN. Labs were sig for Hb 10.1, Na 134, BUN/Cr 46/10.1, Ca 5.0. Patient currently states he is urinating 1x a day. HD sessions at [place] was as follows: 01/06, 01/08, 01/10, and 01/12.     Assessment: 59 year old M with initially CKD now ESRD and HTN  complicated by recent admission for renal failure, sepsis secondary to EHEC, and NSTEMI secondary to demand ischemia secondary to HTN emergency who presents as a transfer from [facility] for permanent HD catheter and renal biopsy.     Plan for VIR and renal consults in the AM for permanent HD catheter/renal biopsy for renal failure. He also reports some hesitancy with pursuing HD long-term. VIR consult placed for renal biopsy and long-term permanent catheter placement. Renal consult to be done in the AM. I agree with the note above with modifications and addendum below:     Mr. Garcia is a 59 year old M with history of CKD stage 3, HTN (non-compliant) with recent admission at [facility], sepsis secondary to EHEC complicated by with central chest pain with nuclear stress negative (troponinemia peal at 715) and new onset renal failure secondary to HTN emergency who was transferred to [facility] for renal biopsy and placement of permanent HD catheter. Of note patient had renal failure at 68 years old requiring transplant. CTA abdomen and pelvis at [facility] showed segmental colitis without obstruction and the patient was treated with ceftriaxone and flagyll. He was also found to be influenza positive and was monitored conservatively. TTE there showed LVEF 65-70% normal LV systolic function, mild to moderate MR, mild to moderate TR, pulmonary artery is 54.8 with moderate pulmonary HTN. Labs were sig for Hb 10.1, Na 134, BUN/Cr 46/10.1, Ca 5.0. Patient currently states he is urinating 1x a day. HD sessions at [place] was as follows: 01/06, 01/08, 01/10, and 01/12.     Assessment: 59 year old M with initially CKD now ESRD and HTN  complicated by recent admission for renal failure, sepsis secondary to EHEC, and NSTEMI secondary to demand ischemia secondary to HTN emergency who presents as a transfer from [facility] for permanent HD catheter and renal biopsy.     Plan for VIR and renal consults in the AM for permanent HD catheter/renal biopsy for renal failure. He also reports some hesitancy with pursuing HD long-term. VIR consult placed for renal biopsy and long-term permanent catheter placement. Renal consult to be done in the AM.

## 2024-01-12 NOTE — PROGRESS NOTE ADULT - PROBLEM SELECTOR PLAN 4
-Likely 2/2 kidney failure  -iron panel with ferritin 478, likely anemia of chronic disease  -H&H trending down  -Transfusion consent forms signed and in chart  -Hgb 6.8 1/4/24, s/p PRBC, received 1unit pRBCs with HD 1/8  -Keep type and screen current  -Transfuse Hgb <7.0  -Hgb today 10.1, continue to monitor and transfuse as needed

## 2024-01-12 NOTE — H&P ADULT - NSHPPHYSICALEXAM_GEN_ALL_CORE
PHYSICAL EXAM:  GENERAL: NAD, Resting in bed  HEENT:  Head atraumatic, EOMI, PERRLA, conjunctiva and sclera clear; Moist mucous membranes, normal oropharynx  NECK: Supple, No JVD, no lymphadenopathy, no thyroid nodules or enlargement  CHEST/LUNG: Clear to auscultation bilaterally; No rales, rhonchi, wheezing, or rubs. Unlabored respirations on room air  HEART: Regular rate and rhythm; No murmurs, rubs, or gallops  ABDOMEN: Bowel sounds present; Soft, Nontender, Nondistended. No hepatomegally  EXTREMITIES:  2+ Peripheral Pulses, brisk capillary refill. No clubbing, cyanosis, or edema  NERVOUS SYSTEM:  Alert & Oriented X3, non-focal and spontaneous movements of all extremities  SKIN: No rashes or lesions PHYSICAL EXAM:  GENERAL: NAD, Resting in bed  HEENT:  Head atraumatic, EOMI, PERRLA, conjunctiva and sclera clear; Moist mucous membranes, normal oropharynx  NECK: Supple, No JVD, +R supraclav cath in place (1/6)  CHEST/LUNG: Clear to auscultation bilaterally; No rales, rhonchi, wheezing, or rubs. Unlabored respirations on room air  HEART: Regular rate and rhythm; No murmurs, rubs, or gallops  ABDOMEN: Bowel sounds present; Soft, Nontender, Nondistended  EXTREMITIES:  2+ Peripheral Pulses, brisk capillary refill. No clubbing, cyanosis, or edema  NERVOUS SYSTEM:  Alert & Oriented X3, non-focal and spontaneous movements of all extremities  SKIN: No rashes or lesions

## 2024-01-12 NOTE — H&P ADULT - PROBLEM SELECTOR PLAN 6
- presented at  with N/V/D and 4 years of chest pain withe exertion, progressively worsening and no prior PCP or physician evaluation; on admission at , elevated troponin with peak to 715,   - EKG nonischemic, Cr elevated +/- demand i/s/o HTN urgency; echo with mod pulm htn, nuclear stress test unremarkable, most likely i/s/o demand ishcemia   - Echo: consistent with normal LVEF 65-70%, LVH and moderate pulm HTN  - Pt pursued with non-invasive ischemic eval iso contrast induced nephropathy and new renal failure: Stress test on 1/9/23: LVEF 66%  -- Given normal myocardial perfusion, CV stable  - C/W ASA, statin, and BB and outpt cardiology follow-up with [facility]   - Last ASA/plavix dose _____ RESOLVED  - presented at  with N/V/D and 4 years of chest pain withe exertion, progressively worsening and no prior PCP or physician evaluation; on admission at , elevated troponin with peak to 715,   - EKG nonischemic, Cr elevated +/- demand i/s/o HTN urgency; echo with mod pulm htn, nuclear stress test unremarkable, most likely i/s/o demand ishcemia   - Echo: consistent with normal LVEF 65-70%, LVH and moderate pulm HTN  - Pt pursued with non-invasive ischemic eval iso contrast induced nephropathy and new renal failure: Stress test on 1/9/23: LVEF 66%  -- Given normal myocardial perfusion, CV stable  - C/W statin, and BB [facility]   - held hep for 1/13 AM given possibility for bx/HD placement RESOLVED  - presented at  with N/V/D and 4 years of chest pain withe exertion, progressively worsening and no prior PCP or physician evaluation; on admission at , elevated troponin with peak to 715,   - EKG nonischemic, Cr elevated +/- demand i/s/o HTN urgency; echo with mod pulm htn, nuclear stress test unremarkable, most likely i/s/o demand ishcemia   - Echo: consistent with normal LVEF 65-70%, LVH and moderate pulm HTN  - Pt pursued with non-invasive ischemic eval iso contrast induced nephropathy and new renal failure: Stress test on 1/9/23: LVEF 66%  -- Given normal myocardial perfusion, CV stable  - C/W statin, and BB [facility]   - held hep for 1/13 AM given possibility for bx/HD placement RESOLVED-NSTEMI in the setting of demand ischemia in the setting of HTN emergency  - presented at  with N/V/D and 4 years of chest pain withe exertion, progressively worsening and no prior PCP or physician evaluation; on admission at , elevated troponin with peak to 715.  - EKG nonischemic, Cr elevated +/- demand i/s/o HTN urgency; echo with mod pulm htn, nuclear stress test unremarkable, most likely i/s/o demand ischemia   - Echo: consistent with normal LVEF 65-70%, LVH and moderate pulm HTN  -Cards recommended non-invasive ischemic eval given contrast induced nephropathy and new renal failure: Stress test on 1/9/23: LVEF 66%  -- Given normal myocardial perfusion, CV stable  - C/W statin, and BB and outpt cardiology follow-up with [facility]   - held hep for 1/13 AM given possibility for bx/HD placement RESOLVED-NSTEMI in the setting of demand ischemia in the setting of HTN emergency  - presented at  with N/V/D and 4 years of chest pain withe exertion, progressively worsening and no prior PCP or physician evaluation; on admission at , elevated troponin with peak to 715.  - EKG nonischemic, Cr elevated +/- demand i/s/o HTN urgency; echo with mod pulm htn, nuclear stress test unremarkable, most likely i/s/o demand ischemia   - Echo: consistent with normal LVEF 65-70%, LVH and moderate pulm HTN  -Cards recommended non-invasive ischemic eval given contrast induced nephropathy and new renal failure: Stress test on 1/9/23: LVEF 66%  -- Given normal myocardial perfusion, CV stable  - C/W statin, and BB and outpt cardiology follow-up with [facility]   - held hep for 1/13 AM given possibility for bx/HD placement

## 2024-01-12 NOTE — PROGRESS NOTE ADULT - ATTENDING COMMENTS
agree with above  no new complaints  awaiting bed at [facility]  in communication with transfer team agree with above  no new complaints  awaiting bed at [facility]  in communication with transfer team

## 2024-01-12 NOTE — PROGRESS NOTE ADULT - PROBLEM SELECTOR PROBLEM 1
Hypertensive emergency
Diarrhea
Hypertensive emergency
Diarrhea
Hypertensive emergency

## 2024-01-12 NOTE — PROGRESS NOTE ADULT - PROBLEM SELECTOR PROBLEM 2
Acute renal failure superimposed on chronic kidney disease, unspecified acute renal failure type, unspecified CKD stage
Acute renal failure superimposed on chronic kidney disease, unspecified acute renal failure type, unspecified CKD stage
Anemia
Anemia
Acute renal failure superimposed on chronic kidney disease, unspecified acute renal failure type, unspecified CKD stage

## 2024-01-12 NOTE — H&P ADULT - NSICDXFAMILYHX_GEN_ALL_CORE_FT
FAMILY HISTORY:  Sibling  Still living? Unknown  FH: renal failure, Age at diagnosis: Age Unknown

## 2024-01-12 NOTE — PROGRESS NOTE ADULT - PROBLEM SELECTOR PLAN 1
-S/p CTAP with IV contrast  -History of HTN, not on medications (poor medical follow up)  -Urine with 300 protein, small blood, 100 glucose  -S/p 1L fluid in ED  -S/p 10mg hydralazine IV push  -C/w 200mg labetolol q8hrs  -restart amlodipine 5mg  -Continue to monitor BP  -Nuclear stress test results wnl  -Appreciate cardiology recommendations  -Appreciate nephrology recommendations, will continue tele monitoring- plan for transfer to tertiary center for permanent HD access and renal biopsy

## 2024-01-12 NOTE — PROGRESS NOTE ADULT - SUBJECTIVE AND OBJECTIVE BOX
S/p stable  HD today     Vital Signs Last 24 Hrs  T(C): 37 (01-12-24 @ 17:00), Max: 37.1 (01-11-24 @ 21:07)  T(F): 98.6 (01-12-24 @ 17:00), Max: 98.7 (01-11-24 @ 21:07)  HR: 73 (01-12-24 @ 17:00) (70 - 77)  BP: 150/83 (01-12-24 @ 17:00) (130/77 - 165/85)  RR: 16 (01-12-24 @ 17:00) (16 - 19)  SpO2: 99% (01-12-24 @ 17:00) (97% - 99%)    I&O's Detail    12 Jan 2024 07:01  -  12 Jan 2024 19:16  --------------------------------------------------------  OUT:    Other (mL): 1500 mL  Total OUT: 1500 mL    s1s2  b/l air entry  soft, ND  no edema                                                                                10.1   6.94  )-----------( 332      ( 12 Jan 2024 05:56 )             28.8     12 Jan 2024 05:56    134    |  97     |  46     ----------------------------<  98     4.4     |  25     |  10.15    Ca    7.5        12 Jan 2024 05:56  Phos  5.0       12 Jan 2024 05:56  Mg     2.0       12 Jan 2024 05:56    TPro  6.6    /  Alb  2.4    /  TBili  0.8    /  DBili  x      /  AST  51     /  ALT  61     /  AlkPhos  110    11 Jan 2024 06:32    LIVER FUNCTIONS - ( 11 Jan 2024 06:32 )  Alb: 2.4 g/dL / Pro: 6.6 g/dL / ALK PHOS: 110 U/L / ALT: 61 U/L / AST: 51 U/L / GGT: x           acetaminophen     Tablet .. 650 milliGRAM(s) Oral every 6 hours PRN  aluminum hydroxide/magnesium hydroxide/simethicone Suspension 30 milliLiter(s) Oral every 4 hours PRN  amLODIPine   Tablet 5 milliGRAM(s) Oral daily  atorvastatin 40 milliGRAM(s) Oral at bedtime  benzonatate 100 milliGRAM(s) Oral three times a day PRN  calcium acetate 2001 milliGRAM(s) Oral three times a day with meals  chlorhexidine 2% Cloths 1 Application(s) Topical <User Schedule>  heparin   Injectable 5000 Unit(s) SubCutaneous every 12 hours  labetalol 200 milliGRAM(s) Oral every 8 hours  melatonin 3 milliGRAM(s) Oral at bedtime PRN  ondansetron Injectable 4 milliGRAM(s) IV Push every 8 hours PRN  polyethylene glycol 3350 17 Gram(s) Oral daily  regadenoson Injectable 0.4 milliGRAM(s) IV Push once  senna 2 Tablet(s) Oral at bedtime  sodium chloride 0.9% lock flush 10 milliLiter(s) IV Push every 1 hour PRN    A/P:    Pt w/no known PMH, not following w/doctors, possibly hx of HTN, brother w/hx ESRD, s/p transplant   Adm w/N/V, fever, anemia, markedly abnormal renal fx (baseline renal fx is unknown)  Dx w/Flu A  S/p CT w/IV dye 1/3/23  24hr urine collection w/3.6gm protein in 24hrs   SPEP, renal serologies are negative   CT A/P w/o hydro   Started on HD 1/6/24 via temp HD cath  S/p stable HD today   F/u CBC, BMP, UO   Plan for transfer to [facility] for eval for kidney biopsy, perm HD access    [phone]       S/p stable  HD today     Vital Signs Last 24 Hrs  T(C): 37 (01-12-24 @ 17:00), Max: 37.1 (01-11-24 @ 21:07)  T(F): 98.6 (01-12-24 @ 17:00), Max: 98.7 (01-11-24 @ 21:07)  HR: 73 (01-12-24 @ 17:00) (70 - 77)  BP: 150/83 (01-12-24 @ 17:00) (130/77 - 165/85)  RR: 16 (01-12-24 @ 17:00) (16 - 19)  SpO2: 99% (01-12-24 @ 17:00) (97% - 99%)    I&O's Detail    12 Jan 2024 07:01  -  12 Jan 2024 19:16  --------------------------------------------------------  OUT:    Other (mL): 1500 mL  Total OUT: 1500 mL    s1s2  b/l air entry  soft, ND  no edema                                                                                10.1   6.94  )-----------( 332      ( 12 Jan 2024 05:56 )             28.8     12 Jan 2024 05:56    134    |  97     |  46     ----------------------------<  98     4.4     |  25     |  10.15    Ca    7.5        12 Jan 2024 05:56  Phos  5.0       12 Jan 2024 05:56  Mg     2.0       12 Jan 2024 05:56    TPro  6.6    /  Alb  2.4    /  TBili  0.8    /  DBili  x      /  AST  51     /  ALT  61     /  AlkPhos  110    11 Jan 2024 06:32    LIVER FUNCTIONS - ( 11 Jan 2024 06:32 )  Alb: 2.4 g/dL / Pro: 6.6 g/dL / ALK PHOS: 110 U/L / ALT: 61 U/L / AST: 51 U/L / GGT: x           acetaminophen     Tablet .. 650 milliGRAM(s) Oral every 6 hours PRN  aluminum hydroxide/magnesium hydroxide/simethicone Suspension 30 milliLiter(s) Oral every 4 hours PRN  amLODIPine   Tablet 5 milliGRAM(s) Oral daily  atorvastatin 40 milliGRAM(s) Oral at bedtime  benzonatate 100 milliGRAM(s) Oral three times a day PRN  calcium acetate 2001 milliGRAM(s) Oral three times a day with meals  chlorhexidine 2% Cloths 1 Application(s) Topical <User Schedule>  heparin   Injectable 5000 Unit(s) SubCutaneous every 12 hours  labetalol 200 milliGRAM(s) Oral every 8 hours  melatonin 3 milliGRAM(s) Oral at bedtime PRN  ondansetron Injectable 4 milliGRAM(s) IV Push every 8 hours PRN  polyethylene glycol 3350 17 Gram(s) Oral daily  regadenoson Injectable 0.4 milliGRAM(s) IV Push once  senna 2 Tablet(s) Oral at bedtime  sodium chloride 0.9% lock flush 10 milliLiter(s) IV Push every 1 hour PRN    A/P:    Pt w/no known PMH, not following w/doctors, possibly hx of HTN, brother w/hx ESRD, s/p transplant   Adm w/N/V, fever, anemia, markedly abnormal renal fx (baseline renal fx is unknown)  Dx w/Flu A  S/p CT w/IV dye 1/3/23  24hr urine collection w/3.6gm protein in 24hrs   SPEP, renal serologies are negative   CT A/P w/o hydro   Started on HD 1/6/24 via temp HD cath  S/p stable HD today   F/u CBC, BMP, UO   Plan for transfer to [facility] for eval for kidney biopsy, perm HD access    [phone]

## 2024-01-12 NOTE — H&P ADULT - PROBLEM SELECTOR PLAN 8
DVT PPx: Improve Score: Heparin 5000 subq Q8h/Lovenox 40mg QD   Diet: Renal  Code: Full/DNR/DNI   Dispo: PT/OT Pending Hospital Course  Pharmacy:  PCP:   Communication: DVT PPx:SCDs  Diet: Renal  Code: Full  Dispo: PT/OT Pending Hospital Course  Pharmacy:  PCP:   Communication: DVT PPx:SCDs for now, to be re-evaluated for chemical DVT prophylaxis once VIR plan determined.   Diet: Renal  Code: Full  Dispo: PT/OT Pending Hospital Course

## 2024-01-12 NOTE — H&P ADULT - LOCATION OF DISCUSSION
X Size Of Lesion In Cm (Optional): 0
Anatomic Location From Referring Provider: central chest
Detail Level: Simple
Face to face

## 2024-01-12 NOTE — PROGRESS NOTE ADULT - PROBLEM SELECTOR PROBLEM 6
Nausea & vomiting

## 2024-01-12 NOTE — PROGRESS NOTE ADULT - SUBJECTIVE AND OBJECTIVE BOX
Mr. Garcia is a 57yo M with PMHx of CKD 3, HTN, lost to follow up with PCP (per chart review used to take medications for HTN, but patient denies) presenting for vomiting times 3 weeks. Per patient, he began having subjective fevers and vomiting 2-4x/ day for 3 weeks with occasional small amounts of blood  with inability to hold down solids or liquids along with diarrhea for 2 days x8 episodes. Patient also notes that for the past 4 years he has been getting chest pain while walking, which is getting worse. Currently endorsing worsening central chest  pain in the ED along with a headache. Denies shortness of breath or palpitations. Denies changes in urine output , frequency or dysuria. Notes hx of renal failure in his brother at 71yo requiring transplant. Denied recent traveling, sick exposure, NSAID use, exposure to Hep C, Hep B, HIV, new foods. The patient was admitted for renal failure and r/o ACS.    1/12/24 AM: Patient seen and examined at bedside today, laying comfortably, no acute complaints.      REVIEW OF SYSTEMS:  CONSTITUTIONAL: (-) weakness, (-) fevers, (-) chills  RESPIRATORY:  (-) shortness of breath, (-) cough,  (-) wheezing,  (-) hemoptysis   CARDIOVASCULAR:  (-) chest pain, (-) palpitations  GASTROINTESTINAL:  (-) abdominal or epigastric pain, (-) nausea, (-) vomiting, (-) diarrhea, (-) constipation, (-) melena,  (-) hematemesis,  (-) hematochezia  GENITOURINARY: (-) dysuria, (-) frequency, (-) hematuria  NEUROLOGICAL: (-) numbness, (-) weakness  SKIN: (-) itching, (-) rashes, (-) lesions      Vital Signs Last 24 Hrs  T(C): 36.5 (12 Jan 2024 04:59), Max: 37.1 (11 Jan 2024 21:07)  T(F): 97.7 (12 Jan 2024 04:59), Max: 98.7 (11 Jan 2024 21:07)  HR: 70 (12 Jan 2024 04:59) (70 - 71)  BP: 165/85 (12 Jan 2024 04:59) (133/69 - 165/85)  RR: 16 (12 Jan 2024 04:59) (16 - 19)  SpO2: 97% (12 Jan 2024 04:59) (97% - 98%)    Parameters below as of 12 Jan 2024 04:59  Patient On (Oxygen Delivery Method): room air      PHYSICAL EXAM:  GENERAL: NAD, lying in bed, R shiley catheter in place  HEAD:  Atraumatic, Normocephalic  RESP: Clear to auscultation bilaterally, good air entry bilaterally; No wheezing, rales, or rhonchi. Unlabored respirations  CARDIAC: Regular rate and rhythm. S1 and S2. No murmurs, rubs, or gallops  GI:  Soft, Nontender, Nondistended. Bowel sounds present x4 quadrants; No hepatomegaly. No splenomegaly.  : Pro discontinued  EXTREMITIES:  2+ Peripheral Pulses. Capillary refill <2 seconds. No clubbing, cyanosis, or edema  NERVOUS SYSTEM:  Alert & Oriented X3, speech clear. No deficits   MSK: FROM all 4 extremities, full and equal strength  SKIN: No rashes, bruises, or other lesions      MEDICATIONS  (STANDING):  amLODIPine   Tablet 5 milliGRAM(s) Oral daily  atorvastatin 40 milliGRAM(s) Oral at bedtime  calcium acetate 2001 milliGRAM(s) Oral three times a day with meals  chlorhexidine 2% Cloths 1 Application(s) Topical <User Schedule>  heparin   Injectable 5000 Unit(s) SubCutaneous every 12 hours  labetalol 200 milliGRAM(s) Oral every 8 hours  polyethylene glycol 3350 17 Gram(s) Oral daily  regadenoson Injectable 0.4 milliGRAM(s) IV Push once  senna 2 Tablet(s) Oral at bedtime    MEDICATIONS  (PRN):  acetaminophen     Tablet .. 650 milliGRAM(s) Oral every 6 hours PRN Temp greater or equal to 38C (100.4F), Mild Pain (1 - 3)  aluminum hydroxide/magnesium hydroxide/simethicone Suspension 30 milliLiter(s) Oral every 4 hours PRN Dyspepsia  benzonatate 100 milliGRAM(s) Oral three times a day PRN Cough  melatonin 3 milliGRAM(s) Oral at bedtime PRN Insomnia  ondansetron Injectable 4 milliGRAM(s) IV Push every 8 hours PRN Nausea and/or Vomiting  sodium chloride 0.9% lock flush 10 milliLiter(s) IV Push every 1 hour PRN Pre/post blood products, medications, blood draw, and to maintain line patency          LABS                        10.1   6.94  )-----------( 332      ( 12 Jan 2024 05:56 )             28.8     12 Jan 2024 05:56    134    |  97     |  46     ----------------------------<  98     4.4     |  25     |  10.15    Ca    7.5        12 Jan 2024 05:56  Phos  5.0       12 Jan 2024 05:56  Mg     2.0       12 Jan 2024 05:56    TPro  6.6    /  Alb  2.4    /  TBili  0.8    /  DBili  x      /  AST  51     /  ALT  61     /  AlkPhos  110    11 Jan 2024 06:32      CAPILLARY BLOOD GLUCOSE      LIVER FUNCTIONS - ( 11 Jan 2024 06:32 )  Alb: 2.4 g/dL / Pro: 6.6 g/dL / ALK PHOS: 110 U/L / ALT: 61 U/L / AST: 51 U/L / GGT: x           Urinalysis Basic - ( 12 Jan 2024 05:56 )    Color: x / Appearance: x / SG: x / pH: x  Gluc: 98 mg/dL / Ketone: x  / Bili: x / Urobili: x   Blood: x / Protein: x / Nitrite: x   Leuk Esterase: x / RBC: x / WBC x   Sq Epi: x / Non Sq Epi: x / Bacteria: x          CT ABDOMEN AND PELVIS  IMPRESSION:    Inadequately distended proximal colonic loop or query long segmental   colitis. No bowel obstruction. Nonemergent colonoscopy suggested.    --- End of Report ---    MARIANO COPELAND MD; Attending Radiologist  This document has been electronically signed. Froy  3 2024  2:58AM         Mr. Garcia is a 59yo M with PMHx of CKD 3, HTN, lost to follow up with PCP (per chart review used to take medications for HTN, but patient denies) presenting for vomiting times 3 weeks. Per patient, he began having subjective fevers and vomiting 2-4x/ day for 3 weeks with occasional small amounts of blood  with inability to hold down solids or liquids along with diarrhea for 2 days x8 episodes. Patient also notes that for the past 4 years he has been getting chest pain while walking, which is getting worse. Currently endorsing worsening central chest  pain in the ED along with a headache. Denies shortness of breath or palpitations. Denies changes in urine output , frequency or dysuria. Notes hx of renal failure in his brother at 69yo requiring transplant. Denied recent traveling, sick exposure, NSAID use, exposure to Hep C, Hep B, HIV, new foods. The patient was admitted for renal failure and r/o ACS.     1/12/24 AM: Patient seen and examined at bedside today, laying comfortably, no acute complaints.      REVIEW OF SYSTEMS:  CONSTITUTIONAL: (-) weakness, (-) fevers, (-) chills  RESPIRATORY:  (-) shortness of breath, (-) cough,  (-) wheezing,  (-) hemoptysis   CARDIOVASCULAR:  (-) chest pain, (-) palpitations  GASTROINTESTINAL:  (-) abdominal or epigastric pain, (-) nausea, (-) vomiting, (-) diarrhea, (-) constipation, (-) melena,  (-) hematemesis,  (-) hematochezia  GENITOURINARY: (-) dysuria, (-) frequency, (-) hematuria  NEUROLOGICAL: (-) numbness, (-) weakness  SKIN: (-) itching, (-) rashes, (-) lesions      Vital Signs Last 24 Hrs  T(C): 36.5 (12 Jan 2024 04:59), Max: 37.1 (11 Jan 2024 21:07)  T(F): 97.7 (12 Jan 2024 04:59), Max: 98.7 (11 Jan 2024 21:07)  HR: 70 (12 Jan 2024 04:59) (70 - 71)  BP: 165/85 (12 Jan 2024 04:59) (133/69 - 165/85)  RR: 16 (12 Jan 2024 04:59) (16 - 19)  SpO2: 97% (12 Jan 2024 04:59) (97% - 98%)    Parameters below as of 12 Jan 2024 04:59  Patient On (Oxygen Delivery Method): room air      PHYSICAL EXAM:  GENERAL: NAD, lying in bed, R shiley catheter in place  HEAD:  Atraumatic, Normocephalic  RESP: Clear to auscultation bilaterally, good air entry bilaterally; No wheezing, rales, or rhonchi. Unlabored respirations  CARDIAC: Regular rate and rhythm. S1 and S2. No murmurs, rubs, or gallops  GI:  Soft, Nontender, Nondistended. Bowel sounds present x4 quadrants; No hepatomegaly. No splenomegaly.  : Pro discontinued  EXTREMITIES:  2+ Peripheral Pulses. Capillary refill <2 seconds. No clubbing, cyanosis, or edema  NERVOUS SYSTEM:  Alert & Oriented X3, speech clear. No deficits   MSK: FROM all 4 extremities, full and equal strength  SKIN: No rashes, bruises, or other lesions      MEDICATIONS  (STANDING):  amLODIPine   Tablet 5 milliGRAM(s) Oral daily  atorvastatin 40 milliGRAM(s) Oral at bedtime  calcium acetate 2001 milliGRAM(s) Oral three times a day with meals  chlorhexidine 2% Cloths 1 Application(s) Topical <User Schedule>  heparin   Injectable 5000 Unit(s) SubCutaneous every 12 hours  labetalol 200 milliGRAM(s) Oral every 8 hours  polyethylene glycol 3350 17 Gram(s) Oral daily  regadenoson Injectable 0.4 milliGRAM(s) IV Push once  senna 2 Tablet(s) Oral at bedtime    MEDICATIONS  (PRN):  acetaminophen     Tablet .. 650 milliGRAM(s) Oral every 6 hours PRN Temp greater or equal to 38C (100.4F), Mild Pain (1 - 3)  aluminum hydroxide/magnesium hydroxide/simethicone Suspension 30 milliLiter(s) Oral every 4 hours PRN Dyspepsia  benzonatate 100 milliGRAM(s) Oral three times a day PRN Cough  melatonin 3 milliGRAM(s) Oral at bedtime PRN Insomnia  ondansetron Injectable 4 milliGRAM(s) IV Push every 8 hours PRN Nausea and/or Vomiting  sodium chloride 0.9% lock flush 10 milliLiter(s) IV Push every 1 hour PRN Pre/post blood products, medications, blood draw, and to maintain line patency          LABS                        10.1   6.94  )-----------( 332      ( 12 Jan 2024 05:56 )             28.8     12 Jan 2024 05:56    134    |  97     |  46     ----------------------------<  98     4.4     |  25     |  10.15    Ca    7.5        12 Jan 2024 05:56  Phos  5.0       12 Jan 2024 05:56  Mg     2.0       12 Jan 2024 05:56    TPro  6.6    /  Alb  2.4    /  TBili  0.8    /  DBili  x      /  AST  51     /  ALT  61     /  AlkPhos  110    11 Jan 2024 06:32      CAPILLARY BLOOD GLUCOSE      LIVER FUNCTIONS - ( 11 Jan 2024 06:32 )  Alb: 2.4 g/dL / Pro: 6.6 g/dL / ALK PHOS: 110 U/L / ALT: 61 U/L / AST: 51 U/L / GGT: x           Urinalysis Basic - ( 12 Jan 2024 05:56 )    Color: x / Appearance: x / SG: x / pH: x  Gluc: 98 mg/dL / Ketone: x  / Bili: x / Urobili: x   Blood: x / Protein: x / Nitrite: x   Leuk Esterase: x / RBC: x / WBC x   Sq Epi: x / Non Sq Epi: x / Bacteria: x          CT ABDOMEN AND PELVIS  IMPRESSION:    Inadequately distended proximal colonic loop or query long segmental   colitis. No bowel obstruction. Nonemergent colonoscopy suggested.    --- End of Report ---    MARIANO COPELAND MD; Attending Radiologist  This document has been electronically signed. Froy  3 2024  2:58AM         Mr. Garcia is a 57yo M with PMHx of CKD 3, HTN, lost to follow up with PCP (per chart review used to take medications for HTN, but patient denies) presenting for vomiting times 3 weeks. Per patient, he began having subjective fevers and vomiting 2-4x/ day for 3 weeks with occasional small amounts of blood  with inability to hold down solids or liquids along with diarrhea for 2 days x8 episodes. Patient also notes that for the past 4 years he has been getting chest pain while walking, which is getting worse. Currently endorsing worsening central chest  pain in the ED along with a headache. Denies shortness of breath or palpitations. Denies changes in urine output , frequency or dysuria. Notes hx of renal failure in his brother at 67yo requiring transplant. Denied recent traveling, sick exposure, NSAID use, exposure to Hep C, Hep B, HIV, new foods. The patient was admitted for renal failure and r/o ACS.     1/12/24 AM: Patient seen and examined at bedside today, laying comfortably, no acute complaints.      REVIEW OF SYSTEMS:  CONSTITUTIONAL: (-) weakness, (-) fevers, (-) chills  RESPIRATORY:  (-) shortness of breath, (-) cough,  (-) wheezing,  (-) hemoptysis   CARDIOVASCULAR:  (-) chest pain, (-) palpitations  GASTROINTESTINAL:  (-) abdominal or epigastric pain, (-) nausea, (-) vomiting, (-) diarrhea, (-) constipation, (-) melena,  (-) hematemesis,  (-) hematochezia  GENITOURINARY: (-) dysuria, (-) frequency, (-) hematuria  NEUROLOGICAL: (-) numbness, (-) weakness  SKIN: (-) itching, (-) rashes, (-) lesions      Vital Signs Last 24 Hrs  T(C): 36.5 (12 Jan 2024 04:59), Max: 37.1 (11 Jan 2024 21:07)  T(F): 97.7 (12 Jan 2024 04:59), Max: 98.7 (11 Jan 2024 21:07)  HR: 70 (12 Jan 2024 04:59) (70 - 71)  BP: 165/85 (12 Jan 2024 04:59) (133/69 - 165/85)  RR: 16 (12 Jan 2024 04:59) (16 - 19)  SpO2: 97% (12 Jan 2024 04:59) (97% - 98%)    Parameters below as of 12 Jan 2024 04:59  Patient On (Oxygen Delivery Method): room air      PHYSICAL EXAM:  GENERAL: NAD, lying in bed, R shiley catheter in place  HEAD:  Atraumatic, Normocephalic  RESP: Clear to auscultation bilaterally, good air entry bilaterally; No wheezing, rales, or rhonchi. Unlabored respirations  CARDIAC: Regular rate and rhythm. S1 and S2. No murmurs, rubs, or gallops  GI:  Soft, Nontender, Nondistended. Bowel sounds present x4 quadrants; No hepatomegaly. No splenomegaly.  : Pro discontinued  EXTREMITIES:  2+ Peripheral Pulses. Capillary refill <2 seconds. No clubbing, cyanosis, or edema  NERVOUS SYSTEM:  Alert & Oriented X3, speech clear. No deficits   MSK: FROM all 4 extremities, full and equal strength  SKIN: No rashes, bruises, or other lesions      MEDICATIONS  (STANDING):  amLODIPine   Tablet 5 milliGRAM(s) Oral daily  atorvastatin 40 milliGRAM(s) Oral at bedtime  calcium acetate 2001 milliGRAM(s) Oral three times a day with meals  chlorhexidine 2% Cloths 1 Application(s) Topical <User Schedule>  heparin   Injectable 5000 Unit(s) SubCutaneous every 12 hours  labetalol 200 milliGRAM(s) Oral every 8 hours  polyethylene glycol 3350 17 Gram(s) Oral daily  regadenoson Injectable 0.4 milliGRAM(s) IV Push once  senna 2 Tablet(s) Oral at bedtime    MEDICATIONS  (PRN):  acetaminophen     Tablet .. 650 milliGRAM(s) Oral every 6 hours PRN Temp greater or equal to 38C (100.4F), Mild Pain (1 - 3)  aluminum hydroxide/magnesium hydroxide/simethicone Suspension 30 milliLiter(s) Oral every 4 hours PRN Dyspepsia  benzonatate 100 milliGRAM(s) Oral three times a day PRN Cough  melatonin 3 milliGRAM(s) Oral at bedtime PRN Insomnia  ondansetron Injectable 4 milliGRAM(s) IV Push every 8 hours PRN Nausea and/or Vomiting  sodium chloride 0.9% lock flush 10 milliLiter(s) IV Push every 1 hour PRN Pre/post blood products, medications, blood draw, and to maintain line patency          LABS                        10.1   6.94  )-----------( 332      ( 12 Jan 2024 05:56 )             28.8     12 Jan 2024 05:56    134    |  97     |  46     ----------------------------<  98     4.4     |  25     |  10.15    Ca    7.5        12 Jan 2024 05:56  Phos  5.0       12 Jan 2024 05:56  Mg     2.0       12 Jan 2024 05:56    TPro  6.6    /  Alb  2.4    /  TBili  0.8    /  DBili  x      /  AST  51     /  ALT  61     /  AlkPhos  110    11 Jan 2024 06:32      CAPILLARY BLOOD GLUCOSE      LIVER FUNCTIONS - ( 11 Jan 2024 06:32 )  Alb: 2.4 g/dL / Pro: 6.6 g/dL / ALK PHOS: 110 U/L / ALT: 61 U/L / AST: 51 U/L / GGT: x           Urinalysis Basic - ( 12 Jan 2024 05:56 )    Color: x / Appearance: x / SG: x / pH: x  Gluc: 98 mg/dL / Ketone: x  / Bili: x / Urobili: x   Blood: x / Protein: x / Nitrite: x   Leuk Esterase: x / RBC: x / WBC x   Sq Epi: x / Non Sq Epi: x / Bacteria: x          CT ABDOMEN AND PELVIS  IMPRESSION:    Inadequately distended proximal colonic loop or query long segmental   colitis. No bowel obstruction. Nonemergent colonoscopy suggested.    --- End of Report ---    MARIANO COPELAND MD; Attending Radiologist  This document has been electronically signed. Froy  3 2024  2:58AM

## 2024-01-12 NOTE — H&P ADULT - PROBLEM SELECTOR PLAN 3
- [facility]: pt with anemia, hgb 6.5, s/p 1u pRBC i/s/o renal failure  - hx of blood transfusion at  during admission: 1/4/24 and 1/8/24   - - [facility]: pt with anemia, hgb 6.5, s/p 1u pRBC i/s/o renal failure  - hx of blood transfusion at  during admission: 1/4/24 and 1/8/24   - hgb stable at 10, pending type and screen, f/u CBC - At [place] pt with anemia, hgb 6.5, s/p 1u pRBC i/s/o renal failure  - hx of blood transfusion at  during admission: 1/4/24 and 1/8/24   - hgb stable at 10, pending type and screen, f/u CBC

## 2024-01-12 NOTE — PROGRESS NOTE ADULT - PROBLEM SELECTOR PLAN 2
-r/o other causes of other intrinsic renal disease ie nephritic/nephrotic syndrome, hepatitis  -s/p CTAP with IV contrast  -History of HTN, not on medications (poor medical follow up)  - Autoimmune work up unrevealing   -Hep B and hep C neg  -urine with 300 protein, small blood, 100 glucose  -SPEP, renal serologies negative  -24hr urine collection positive for nephrotic range proteinuria  -a1c and lipid panel wnl  -BUN 94, Cr 17.09 on admission, up to 18.14  -Cr 10.15 today  -c/w 200mg BID labetalol  -Nephrology following, recommend gentle hydration with IVF and bicarb  -S/p leblanc removal  -Shiley placed 1/6/24 by ICU PA, pt s/p HD 1/6, HD 1/8 ordered with 1unit pRBCs  -Plan for transfer to [facility] for permanent HD access and renal biopsy-case accepted, pending bed assignment -r/o other causes of other intrinsic renal disease ie nephritic/nephrotic syndrome, hepatitis  -s/p CTAP with IV contrast  -History of HTN, not on medications (poor medical follow up)  - Autoimmune work up unrevealing   -Hep B and hep C neg  -urine with 300 protein, small blood, 100 glucose  -SPEP, renal serologies negative  -24hr urine collection positive for nephrotic range proteinuria  -a1c and lipid panel wnl  -BUN 94, Cr 17.09 on admission, up to 18.14  -Cr 10.15 today  -c/w 200mg BID labetalol  -Nephrology following, recommend gentle hydration with IVF and bicarb  -S/p leblanc removal  -Shiley placed 1/6/24 by ICU PA, pt s/p HD 1/6, HD 1/8 ordered with 1unit pRBCs  -Plan for transfer to [facility] for permanent HD access and renal biopsy-case accepted, pending bed assignment

## 2024-01-12 NOTE — H&P ADULT - HISTORY OF PRESENT ILLNESS
Pt is a 59M with CKD3 and presumed chronic HTN (no PCP/questionable previous anti-HTN use) who presented to [place] on 1/3/24 with abdominal pain with assoc n/v/d and chest pain with exertion over 4 years with no physician follow-up. The patient was found to have Cr 17+, with no known baseline cr and HTN urgency with SBP +200 requiring hydralazine and admitted for renal failure and r/o ACS. At [place], the pt had CT with IV dye on 1/3/24 with concerns for contrast induced nephropathy with subsequent significant proteinuria, ACD requiring 1u pRBC, and placement of temp HD cath to initiate dialysis w/EPO on 1/6/24. Hospital course c/b ETEC sepsis treated with abx/IVF, influenza A managed conservatively, and elevated troponin with peak to 715. Cardiology recommended pursuing non-invasive cardiac workup iso contrast induced nephropathy and had an echo c/f moderate pulm HTN and subsequent nuclear stress test with LVEF 65% and no c/f ischemic injury to myocardial tissue.    The patient's cardiology workup was completed and cleared at  and nephrology recommended renal biopsy and perm HD catheter placement with transfer to [facility] for higher level of care today, 1/12.    On arrival, the patient is doing well, he is awake, alert, and responsive with no new complaints at this time. BP stable, afebrile, and hgb 10, with last HD session on 1/12. Pt's last hep subq 1/12 at ~1700, Resumed ASA 81 mg and BB (labetalol 200 mg BID) per cardio recommendations. Pt's phoslo increased from 667 TID to 2001 TID per previous order. He states he is making urine without any difficulty, no headache, no abdominal or chest pain, no worsening lower extremity edema, and no subjective fevers.  Pt is a 59M with CKD3 and presumed chronic HTN (no PCP/questionable previous anti-HTN use) who presented to [place] on 1/3/24 with abdominal pain with assoc n/v/d and chest pain with exertion over 4 years with no physician follow-up. The patient was found to have Cr 17+, with no known baseline cr and HTN urgency with SBP +200 requiring hydralazine and admitted for renal failure and r/o ACS. At [place], the pt had CT with IV dye on 1/3/24 with concerns for contrast induced nephropathy with subsequent significant proteinuria, ACD requiring 1u pRBC, and placement of temp HD cath to initiate dialysis w/EPO on 1/6/24. Hospital course c/b ETEC sepsis treated with abx/IVF, influenza A managed conservatively, and elevated troponin with peak to 715. Cardiology recommended pursuing non-invasive cardiac workup iso contrast induced nephropathy and had an echo c/f moderate pulm HTN and subsequent nuclear stress test with LVEF 65% and no c/f ischemic injury to myocardial tissue.    The patient's cardiology workup was completed and cleared at  and nephrology recommended renal biopsy and perm HD catheter placement with transfer to [facility] for higher level of care today, 1/12.    On arrival, the patient is doing well, he is awake, alert, and responsive with no new complaints at this time. BP stable, afebrile, and hgb 10, with last HD session on 1/12. Pt's last hep subq 1/12 at ~1700, Resumed ASA 81 mg and BB (labetalol 200 mg BID) per cardio recommendations. Pt's phoslo increased from 667 TID to 2001 TID per previous order. He states he is making urine without any difficulty, no headache, no abdominal or chest pain, no worsening lower extremity edema, and no subjective fevers.

## 2024-01-12 NOTE — CHART NOTE - NSCHARTNOTEFT_GEN_A_CORE
NUTRITION Follow Up Note    SOURCE: Patient [X]  Medical Record [X]  Nursing Staff [X]  Family Member []    DIET: Diet, Renal Restrictions:   For patients receiving Renal Replacement - No Protein Restr, No Conc K, No Conc Phos, Low Sodium  Supplement Feeding Modality:  Oral  Nepro Cans or Servings Per Day:  1       Frequency:  Daily (24 @ 14:21) [Active]    Patient noted with variable PO intakes, consuming % of meals per nursing flowsheets. Continues on renal diet + Nepro 8oz Daily (Provides 425kcal & 19grams of Protein). Hyperphosphatemia: Phos 5.0 mg/dL<H>. Calcium acetate ordered. Next HD session noted for today 24.     EDEMA: none noted    LAST BM: 2024    SKIN: intact    WEIGHT TRENDS:  Weight in k.7 (2024 12:50)  Weight in k.7 (2024 10:20)  Weight in k (2024 15:15)  Weight in k (2024 13:15)      PERTINENT MEDICATIONS: MEDICATIONS  (STANDING):  amLODIPine   Tablet 5 milliGRAM(s) Oral daily  atorvastatin 40 milliGRAM(s) Oral at bedtime  calcium acetate 2001 milliGRAM(s) Oral three times a day with meals  chlorhexidine 2% Cloths 1 Application(s) Topical <User Schedule>  heparin   Injectable 5000 Unit(s) SubCutaneous every 12 hours  labetalol 200 milliGRAM(s) Oral every 8 hours  polyethylene glycol 3350 17 Gram(s) Oral daily  regadenoson Injectable 0.4 milliGRAM(s) IV Push once  senna 2 Tablet(s) Oral at bedtime    MEDICATIONS  (PRN):  acetaminophen     Tablet .. 650 milliGRAM(s) Oral every 6 hours PRN Temp greater or equal to 38C (100.4F), Mild Pain (1 - 3)  aluminum hydroxide/magnesium hydroxide/simethicone Suspension 30 milliLiter(s) Oral every 4 hours PRN Dyspepsia  benzonatate 100 milliGRAM(s) Oral three times a day PRN Cough  melatonin 3 milliGRAM(s) Oral at bedtime PRN Insomnia  ondansetron Injectable 4 milliGRAM(s) IV Push every 8 hours PRN Nausea and/or Vomiting  sodium chloride 0.9% lock flush 10 milliLiter(s) IV Push every 1 hour PRN Pre/post blood products, medications, blood draw, and to maintain line patency      PERTINENT LABS:   Na134 mmol/L<L> Glu 98 mg/dL K+ 4.4 mmol/L Cr  10.15 mg/dL<H> BUN 46 mg/dL<H>  Phos 5.0 mg/dL<H>  Alb 2.4 g/dL<L>  Chol 103 mg/dL LDL --    HDL 60 mg/dL Trig 57 mg/dL        ESTIMATED NEEDS:   [X] No Change Since Previous Assessment    PREVIOUS NUTRITION DIAGNOSIS:  1. Moderate Protein Calorie Malnutrition    NUTRITION DIAGNOSIS IS [X] Ongoing -     NEW NUTRITION DIAGNOSIS: [X] Not Applicable    INTERVENTIONS:   1. Recommend continue current nutrition plan of care as tolerated   2. Honor patients daily food preferences as feasible with prescribed diet   3. Monitor daily PO intakes, GI tolerance, labs, weights, skin integrity, & BM regularity     MONITORING & EVALUATION:   1. Weights   2. PO intakes   3. Skin integrity   4. Tolerance to diet prescription   5. Labs & POCT  6. Follow up (per protocol)    Registered Dietitian/Nutritionist Remains Available.  Hiram Armendariz RD, CDN    Contact: [phone] or via MS TEAMS NUTRITION Follow Up Note    SOURCE: Patient [X]  Medical Record [X]  Nursing Staff [X]  Family Member []    DIET: Diet, Renal Restrictions:   For patients receiving Renal Replacement - No Protein Restr, No Conc K, No Conc Phos, Low Sodium  Supplement Feeding Modality:  Oral  Nepro Cans or Servings Per Day:  1       Frequency:  Daily (24 @ 14:21) [Active]    Patient noted with variable PO intakes, consuming % of meals per nursing flowsheets. Continues on renal diet + Nepro 8oz Daily (Provides 425kcal & 19grams of Protein). Hyperphosphatemia: Phos 5.0 mg/dL<H>. Calcium acetate ordered. Next HD session noted for today 24.     EDEMA: none noted    LAST BM: 2024    SKIN: intact    WEIGHT TRENDS:  Weight in k.7 (2024 12:50)  Weight in k.7 (2024 10:20)  Weight in k (2024 15:15)  Weight in k (2024 13:15)      PERTINENT MEDICATIONS: MEDICATIONS  (STANDING):  amLODIPine   Tablet 5 milliGRAM(s) Oral daily  atorvastatin 40 milliGRAM(s) Oral at bedtime  calcium acetate 2001 milliGRAM(s) Oral three times a day with meals  chlorhexidine 2% Cloths 1 Application(s) Topical <User Schedule>  heparin   Injectable 5000 Unit(s) SubCutaneous every 12 hours  labetalol 200 milliGRAM(s) Oral every 8 hours  polyethylene glycol 3350 17 Gram(s) Oral daily  regadenoson Injectable 0.4 milliGRAM(s) IV Push once  senna 2 Tablet(s) Oral at bedtime    MEDICATIONS  (PRN):  acetaminophen     Tablet .. 650 milliGRAM(s) Oral every 6 hours PRN Temp greater or equal to 38C (100.4F), Mild Pain (1 - 3)  aluminum hydroxide/magnesium hydroxide/simethicone Suspension 30 milliLiter(s) Oral every 4 hours PRN Dyspepsia  benzonatate 100 milliGRAM(s) Oral three times a day PRN Cough  melatonin 3 milliGRAM(s) Oral at bedtime PRN Insomnia  ondansetron Injectable 4 milliGRAM(s) IV Push every 8 hours PRN Nausea and/or Vomiting  sodium chloride 0.9% lock flush 10 milliLiter(s) IV Push every 1 hour PRN Pre/post blood products, medications, blood draw, and to maintain line patency      PERTINENT LABS:   Na134 mmol/L<L> Glu 98 mg/dL K+ 4.4 mmol/L Cr  10.15 mg/dL<H> BUN 46 mg/dL<H>  Phos 5.0 mg/dL<H>  Alb 2.4 g/dL<L>  Chol 103 mg/dL LDL --    HDL 60 mg/dL Trig 57 mg/dL        ESTIMATED NEEDS:   [X] No Change Since Previous Assessment    PREVIOUS NUTRITION DIAGNOSIS:  1. Moderate Protein Calorie Malnutrition    NUTRITION DIAGNOSIS IS [X] Ongoing -     NEW NUTRITION DIAGNOSIS: [X] Not Applicable    INTERVENTIONS:   1. Recommend continue current nutrition plan of care as tolerated   2. Honor patients daily food preferences as feasible with prescribed diet   3. Monitor daily PO intakes, GI tolerance, labs, weights, skin integrity, & BM regularity     MONITORING & EVALUATION:   1. Weights   2. PO intakes   3. Skin integrity   4. Tolerance to diet prescription   5. Labs & POCT  6. Follow up (per protocol)    Registered Dietitian/Nutritionist Remains Available.  Hiram Armendariz RD, CDN    Contact: [phone] or via [place] TEAMS

## 2024-01-12 NOTE — H&P ADULT - NSHPSOCIALHISTORY_GEN_ALL_CORE
The patient lives at home, does not report a h/o of tobacco, alcohol, or illicit drug use. No recent travel.

## 2024-01-12 NOTE — PROGRESS NOTE ADULT - PROBLEM SELECTOR PLAN 5
-NSTEMI  -Chest pain likely 2/2 hypertension  -Trops trending up 221>246>401>>466, 529.1, 715.14> now trending down 475.8  -Per cardio trops likely 2/2 demand ischemia in setting of renal function  -TTE with normal EF 65-70 and mod pulm htn  -Nuclear stress test results wnl  -Cw labetolol for BP control  -Cw heparin per cardio   -Plavix discontinued per cardio, ASA discontinued per nephro  -Appreciate cardiology recommendations, will continue tele monitoring

## 2024-01-12 NOTE — H&P ADULT - NSHPLABSRESULTS_GEN_ALL_CORE
.  LABS:                         10.1   6.94  )-----------( 332      ( 12 Jan 2024 05:56 )             28.8     01-12    134<L>  |  97  |  46<H>  ----------------------------<  98  4.4   |  25  |  10.15<H>    Ca    7.5<L>      12 Jan 2024 05:56  Phos  5.0     01-12  Mg     2.0     01-12    TPro  6.6  /  Alb  2.4<L>  /  TBili  0.8  /  DBili  x   /  AST  51<H>  /  ALT  61<H>  /  AlkPhos  110  01-11      Urinalysis Basic - ( 12 Jan 2024 05:56 )    Color: x / Appearance: x / SG: x / pH: x  Gluc: 98 mg/dL / Ketone: x  / Bili: x / Urobili: x   Blood: x / Protein: x / Nitrite: x   Leuk Esterase: x / RBC: x / WBC x   Sq Epi: x / Non Sq Epi: x / Bacteria: x      RADIOLOGY, EKG & ADDITIONAL TESTS: Reviewed.     NM stress 1.9.24:   EF 66%;  normal left ventricular perfusion. There is mild   diaphragmatic attenuation artifact visualized which reduces sensitivity   of the study findings.       No scan evidence of reversible or fixed perfusion defects.       Normal left ventricular wall motion with ejection fraction of 66 %   (normal: 50% or greater).       Noregional wall motion abnormalities.    ECHO:   Summary:   1. Biatrial enlargement   2. Left ventricular ejection fraction, by visual estimation, is 65 to   70%.   3. Normal global left ventricular systolic function.   4. Increased LV wall thickness.   5. Normal left ventricular internal cavity size.   6. Spectral Doppler shows impaired relaxation pattern of left   ventricular myocardial filling (Grade I diastolic dysfunction).   7. Trivial pericardial effusion.   8. Mild to moderate mitral valve regurgitation.   9. Mild-moderate tricuspid regurgitation.  10. Estimated pulmonary artery systolic pressure is 54.8 mmHg assuming a   right atrial pressure of 3 mmHg, which is consistent with moderate   pulmonary hypertension.

## 2024-01-12 NOTE — PROGRESS NOTE ADULT - PROBLEM SELECTOR PROBLEM 3
Nausea & vomiting
Sepsis
Nausea & vomiting
Sepsis

## 2024-01-12 NOTE — H&P ADULT - PROBLEM SELECTOR PLAN 7
- pt denies any previous hx of HTN, however, per chart review - pt has hx of taking hypertensive med  - initially admitted to  for hypertensive emergency and renal failure, s/p hydralazine and cardio eval   -- @  pt continued on 200 mg Labetalol q8 hrs for hypertension   - followed by cardiology for NSTEMI workup as well, see above - pt denies any previous hx of HTN, however, per chart review - pt has hx of taking hypertensive med  - initially admitted to  for hypertensive emergency and renal failure, s/p hydralazine and cardio eval   -- @  pt continued on 200 mg Labetalol q8 hrs for hypertension   - 1/12 SBP 160s, resumed Labetalol 100 mg BID, up titrate as needed

## 2024-01-12 NOTE — PROGRESS NOTE ADULT - NUTRITIONAL ASSESSMENT
This patient has been assessed with a concern for Malnutrition and has been determined to have a diagnosis/diagnoses of Moderate protein-calorie malnutrition.    This patient is being managed with:   Diet NPO after Midnight-     NPO Start Date: 08-Jan-2024   NPO Start Time: 23:59  Entered: Jan 8 2024  2:20PM    Diet DASH/TLC-  Sodium & Cholesterol Restricted  For patients receiving Renal Replacement - No Protein Restr No Conc K No Conc Phos Low Sodium  Entered: Froy  3 2024  7:08PM  
This patient has been assessed with a concern for Malnutrition and has been determined to have a diagnosis/diagnoses of Moderate protein-calorie malnutrition.    This patient is being managed with:   Diet Renal Restrictions-  For patients receiving Renal Replacement - No Protein Restr No Conc K No Conc Phos Low Sodium  Supplement Feeding Modality:  Oral  Nepro Cans or Servings Per Day:  1       Frequency:  Daily  Entered: Jan 9 2024  2:21PM  
This patient has been assessed with a concern for Malnutrition and has been determined to have a diagnosis/diagnoses of Moderate protein-calorie malnutrition.    This patient is being managed with:   Diet DASH/TLC-  Sodium & Cholesterol Restricted  For patients receiving Renal Replacement - No Protein Restr No Conc K No Conc Phos Low Sodium  Entered: Froy  3 2024  7:08PM  
This patient has been assessed with a concern for Malnutrition and has been determined to have a diagnosis/diagnoses of Moderate protein-calorie malnutrition.    This patient is being managed with:   Diet NPO after Midnight-     NPO Start Date: 08-Jan-2024   NPO Start Time: 23:59  Entered: Jan 8 2024  2:20PM    Diet DASH/TLC-  Sodium & Cholesterol Restricted  For patients receiving Renal Replacement - No Protein Restr No Conc K No Conc Phos Low Sodium  Entered: Froy  3 2024  7:08PM  
This patient has been assessed with a concern for Malnutrition and has been determined to have a diagnosis/diagnoses of Moderate protein-calorie malnutrition.    This patient is being managed with:   Diet Renal Restrictions-  For patients receiving Renal Replacement - No Protein Restr No Conc K No Conc Phos Low Sodium  Supplement Feeding Modality:  Oral  Nepro Cans or Servings Per Day:  1       Frequency:  Daily  Entered: Jan 9 2024  2:21PM  
This patient has been assessed with a concern for Malnutrition and has been determined to have a diagnosis/diagnoses of Moderate protein-calorie malnutrition.    This patient is being managed with:   Diet DASH/TLC-  Sodium & Cholesterol Restricted  For patients receiving Renal Replacement - No Protein Restr No Conc K No Conc Phos Low Sodium  Entered: Froy  3 2024  7:08PM  
This patient has been assessed with a concern for Malnutrition and has been determined to have a diagnosis/diagnoses of Moderate protein-calorie malnutrition.    This patient is being managed with:   Diet Renal Restrictions-  For patients receiving Renal Replacement - No Protein Restr No Conc K No Conc Phos Low Sodium  Supplement Feeding Modality:  Oral  Nepro Cans or Servings Per Day:  1       Frequency:  Daily  Entered: Jan 9 2024  2:21PM

## 2024-01-12 NOTE — PROGRESS NOTE ADULT - PROBLEM SELECTOR PROBLEM 5
ACS (acute coronary syndrome)

## 2024-01-12 NOTE — H&P ADULT - PROBLEM SELECTOR PLAN 9
Patient would like full code status for now after explaining options. He is also considering whether he wants to pursue permanent HD.   Patient to think about feeding tube.

## 2024-01-12 NOTE — PROGRESS NOTE ADULT - PROBLEM SELECTOR PROBLEM 8
Encounter for deep vein thrombosis (DVT) prophylaxis

## 2024-01-12 NOTE — H&P ADULT - PROBLEM/PLAN-3
I have personally seen and examined this patient.  I have reviewed all pertinent clinical information and reviewed all relevant imaging and diagnostic studies personally.   If possible, I counseled the patient about diagnostic testing and treatment plan.   I discussed my recommendations with the primary team.
DISPLAY PLAN FREE TEXT

## 2024-01-12 NOTE — PROGRESS NOTE ADULT - ASSESSMENT
Mr. Garcia is a 57yo M with PMHx of CKD 3, HTN, lost to follow up with PCP (per chart review used to take medications for HTN, but patient denies) presenting for vomiting times 3 weeks, admitted for renal failure and r/o ACS.     Going for HD today.    Case dw Dr. Land.     Pt to be transferred to [facility] for permanent HD placement and renal biopsy. Accepting physician: Dr. Chery. Transfer paperwork in chart. Mr. Garcia is a 59yo M with PMHx of CKD 3, HTN, lost to follow up with PCP (per chart review used to take medications for HTN, but patient denies) presenting for vomiting times 3 weeks, admitted for renal failure and r/o ACS.     Going for HD today.    Case dw Dr. Land.     Pt to be transferred to [facility] for permanent HD placement and renal biopsy. Accepting physician: Dr. Chery. Transfer paperwork in chart.

## 2024-01-12 NOTE — H&P ADULT - NSICDXPASTMEDICALHX_GEN_ALL_CORE_FT
PAST MEDICAL HISTORY:  H/O gastroesophageal reflux (GERD)     Hypertension      PAST MEDICAL HISTORY:  ESRD on dialysis     H/O gastroesophageal reflux (GERD)     Hypertension

## 2024-01-12 NOTE — PROGRESS NOTE ADULT - REASON FOR ADMISSION
Kidney failure

## 2024-01-12 NOTE — H&P ADULT - PROBLEM SELECTOR PLAN 1
- admitted to  for renal failure on CKD stage 3 likely 2/2 dehydration and hypertensive emergency from 1/3/24,   - s/p CT w/IV dye 1/3/23 possibly contributing to rise in Cr per  nephrology notes  - 24 [place] urine collectio positive for nephrotic range proteinuria; SPEP, renal serologies negative   - CT A/P w/o hydro; Started on HD 1/6 via temp HD cath  - Pt requires chronic HD, transition from temp to perm HD cath and renal bx   - Transfer[facility] 1/12 for HD perm and bx, IR consult order place, nephrology consulted as well to inform of transfer   - CTM electrolytes, creatinine, strict I/Os, and cath site   - f/u AM with IR/Nephro - admitted to  for renal failure on CKD stage 3 likely 2/2 dehydration and hypertensive emergency from 1/3/24,   - s/p CT w/IV dye 1/3/23 possibly contributing to rise in Cr per  nephrology notes  - 24 [place] urine collectio positive for nephrotic range proteinuria; SPEP, renal serologies negative   - CT A/P w/o hydro; Started on HD 1/6 via temp HD cath  - Pt requires chronic HD, transition from temp to perm HD cath and renal bx   - Transferred to [facility] on 1/12 for HD perm and bx, IR consult order place, nephrology consulted as well to inform of transfer   - CTM electrolytes, creatinine, strict I/Os, and cath site   - f/u AM with IR/Nephro - admitted to  for renal failure on CKD stage 3 likely 2/2 dehydration and hypertensive emergency from 1/3/24,   - s/p CT w/IV dye 1/3/23 possibly contributing to rise in Cr per  nephrology notes  - 24 [place] urine collection positive for nephrotic range proteinuria; SPEP, renal serologies negative   - CT A/P w/o hydro; Started on HD 1/6 via temp HD cath  - Pt requires chronic HD, transition from temp to perm HD cath and renal bx, HD catheter placed 01/06  - Transferred to [facility] on 1/12 for HD perm and bx, IR consult order place, nephrology consulted as well to inform of transfer   - CTM electrolytes, creatinine, strict I/Os, and cath site   - f/u AM with IR/Nephro - admitted to  for renal failure on CKD stage 3 likely 2/2 dehydration and hypertensive emergency from 1/3/24,   - s/p CT w/IV dye 1/3/23 possibly contributing to rise in Cr per  nephrology notes  - 24 [place] urine collection positive for nephrotic range proteinuria; SPEP, renal serologies negative   - CT A/P w/o hydro; Started on HD 1/6 via temp HD cath  - Pt requires chronic HD, transition from temp to perm HD cath and renal bx, HD catheter placed 01/06  - Transferred to [facility] on 1/12 for HD perm and bx, IR consult order place, nephrology consulted as well to inform of transfer   - CTM electrolytes, creatinine, strict I/Os, and cath site   - f/u AM with IR/Nephro

## 2024-01-12 NOTE — H&P ADULT - PROBLEM SELECTOR PLAN 4
- p/w abd pain/n/v, CT abd/pelv c/f distended prox colonic loops consistent with colitis, no bowel obstruction.  - pt found to be +ETEC stool positive, s/p ceftriaxone and metronidazole RESOLVED  - p/w abd pain/n/v, CT abd/pelv c/f distended prox colonic loops consistent with colitis, no bowel obstruction.  - pt found to be +ETEC stool positive, s/p ceftriaxone and metronidazole  - 1/12: afebrile, euvolemic, no diarrhea, no abd pain/n/v RESOLVED  - p/w abd pain/n/v, CT abd/pelv c/f distended prox colonic loops consistent with colitis, no bowel obstruction.  - pt found to be +ETEC stool positive, s/p ceftriaxone and metronidazole course   - 1/12: afebrile, euvolemic, no diarrhea, no abd pain/n/v

## 2024-01-12 NOTE — H&P ADULT - ASSESSMENT
57yo M with no known PMHx (not followed by PCP), but FHx (brother) w/ESRD s/p tplant who presented to [facility] with n/v/abdominal pain, admitted for renal failure and r/o ACS. Hospital course c/b flu A, sepsis 2/2 ETEC, anemia and NSTEMI evaluation s/p stress test. Now pt transferred to [facility] and pending renal bx and perm HD cath placement; last HD session 1/12.  Accepting physician: Dr. Chery.    57yo M with no known PMHx (not followed by PCP), but FHx (brother) w/ESRD s/p tplant who presented to [facility] with n/v/abdominal pain, admitted for renal failure and r/o ACS. Hospital course c/b flu A, sepsis 2/2 ETEC, anemia and NSTEMI evaluation s/p stress test. Now pt transferred to [facility] and pending renal bx and perm HD cath placement; last HD session 1/12.  Accepting physician: Dr. Chery.

## 2024-01-12 NOTE — H&P ADULT - PROBLEM SELECTOR PLAN 2
- New ESRD with temp HD cath placement on 1/6/24  - Record of HD: 1/6, 1/8, 1/10, 1/11, 1/12 @ [facility]  - Transfer to [facility] 1/12 for HD cath placement (perm)  - [facility]: Chikis Laird [phone]  - IR consult for HD placement, consult order placed, nephrology consulted   - Change dressing and check temp HD line on arrival - New ESRD with temp HD cath placement on 1/6/24  - Record of HD: 1/6, 1/8, 1/10, 1/11, 1/12 @ [facility]  - Transfer to [facility] 1/12 for HD cath placement (perm)  - [facility]: Chikis Laird [phone]  - IR consult for HD placement, consult order placed, nephrology consulted   - Change dressing and check temp HD line on arrival - New ESRD with temp HD cath placement on 1/6/24  - Record of HD: 1/6, 1/8, 1/10, 1/11, 1/12 @ [facility]  - Transfer to [facility] 1/12 for HD cath placement (perm)  - [facility]: Chikis Laird [phone]  - IR consult for HD placement, consult order placed, nephrology consulted, f/u in AM - New ESRD with temp HD cath placement on 1/6/24  - Record of HD: 1/6, 1/8, 1/10, 1/11, 1/12 @ [facility]  - Transfer to [facility] 1/12 for HD cath placement (perm)  - [facility]: Chikis Laird [phone]  - IR consult for HD placement, consult order placed, nephrology consulted, f/u in AM - New ESRD with temp HD cath placement on 1/6/24  - Record of HD: 1/6, 1/8, 1/10, 1/11, 1/12 @ [facility]  - Transfer to [facility] 1/12 for HD cath placement (perm)  - [facility]: Chikis Laird [phone]  - IR consult for HD placement and renal biopsy in the setting of nephrotic proteinuria, consult order placed, nephrology consulted, f/u in AM  -phos and vitamin D hydroxy, PTH ordered in AM - New ESRD with temp HD cath placement on 1/6/24  - Record of HD: 1/6, 1/8, 1/10, 1/11, 1/12 @ [facility]  - Transfer to [facility] 1/12 for HD cath placement (perm)  - [facility]: Chikis Laird [phone]  - IR consult for HD placement and renal biopsy in the setting of nephrotic proteinuria, consult order placed, nephrology consulted, f/u in AM  -phos and vitamin D hydroxy, PTH ordered in AM

## 2024-01-12 NOTE — PROGRESS NOTE ADULT - PROVIDER SPECIALTY LIST ADULT
Cardiology
Family Medicine
Nephrology
Cardiology
Family Medicine
Nephrology
Family Medicine
Family Medicine
Gastroenterology
Family Medicine
Family Medicine
Gastroenterology
Family Medicine

## 2024-01-13 DIAGNOSIS — N17.9 ACUTE KIDNEY FAILURE, UNSPECIFIED: ICD-10-CM

## 2024-01-13 DIAGNOSIS — Z71.89 OTHER SPECIFIED COUNSELING: ICD-10-CM

## 2024-01-13 PROBLEM — I10 ESSENTIAL (PRIMARY) HYPERTENSION: Chronic | Status: ACTIVE | Noted: 2024-01-03

## 2024-01-13 LAB
24R-OH-CALCIDIOL SERPL-MCNC: 10.7 NG/ML — LOW (ref 30–80)
24R-OH-CALCIDIOL SERPL-MCNC: 10.7 NG/ML — LOW (ref 30–80)
ALBUMIN SERPL ELPH-MCNC: 3.1 G/DL — LOW (ref 3.3–5)
ALBUMIN SERPL ELPH-MCNC: 3.1 G/DL — LOW (ref 3.3–5)
ALP SERPL-CCNC: 116 U/L — SIGNIFICANT CHANGE UP (ref 40–120)
ALP SERPL-CCNC: 116 U/L — SIGNIFICANT CHANGE UP (ref 40–120)
ALT FLD-CCNC: 38 U/L — SIGNIFICANT CHANGE UP (ref 4–41)
ALT FLD-CCNC: 38 U/L — SIGNIFICANT CHANGE UP (ref 4–41)
ANION GAP SERPL CALC-SCNC: 13 MMOL/L — SIGNIFICANT CHANGE UP (ref 7–14)
ANION GAP SERPL CALC-SCNC: 13 MMOL/L — SIGNIFICANT CHANGE UP (ref 7–14)
AST SERPL-CCNC: 31 U/L — SIGNIFICANT CHANGE UP (ref 4–40)
AST SERPL-CCNC: 31 U/L — SIGNIFICANT CHANGE UP (ref 4–40)
BASOPHILS # BLD AUTO: 0.06 K/UL — SIGNIFICANT CHANGE UP (ref 0–0.2)
BASOPHILS # BLD AUTO: 0.06 K/UL — SIGNIFICANT CHANGE UP (ref 0–0.2)
BASOPHILS NFR BLD AUTO: 0.9 % — SIGNIFICANT CHANGE UP (ref 0–2)
BASOPHILS NFR BLD AUTO: 0.9 % — SIGNIFICANT CHANGE UP (ref 0–2)
BILIRUB SERPL-MCNC: 0.4 MG/DL — SIGNIFICANT CHANGE UP (ref 0.2–1.2)
BILIRUB SERPL-MCNC: 0.4 MG/DL — SIGNIFICANT CHANGE UP (ref 0.2–1.2)
BLD GP AB SCN SERPL QL: NEGATIVE — SIGNIFICANT CHANGE UP
BLD GP AB SCN SERPL QL: NEGATIVE — SIGNIFICANT CHANGE UP
BUN SERPL-MCNC: 25 MG/DL — HIGH (ref 7–23)
BUN SERPL-MCNC: 25 MG/DL — HIGH (ref 7–23)
BURR CELLS BLD QL SMEAR: SIGNIFICANT CHANGE UP
BURR CELLS BLD QL SMEAR: SIGNIFICANT CHANGE UP
CALCIUM SERPL-MCNC: 8 MG/DL — LOW (ref 8.4–10.5)
CALCIUM SERPL-MCNC: 8 MG/DL — LOW (ref 8.4–10.5)
CHLORIDE SERPL-SCNC: 100 MMOL/L — SIGNIFICANT CHANGE UP (ref 98–107)
CHLORIDE SERPL-SCNC: 100 MMOL/L — SIGNIFICANT CHANGE UP (ref 98–107)
CO2 SERPL-SCNC: 23 MMOL/L — SIGNIFICANT CHANGE UP (ref 22–31)
CO2 SERPL-SCNC: 23 MMOL/L — SIGNIFICANT CHANGE UP (ref 22–31)
CREAT SERPL-MCNC: 7.44 MG/DL — HIGH (ref 0.5–1.3)
CREAT SERPL-MCNC: 7.44 MG/DL — HIGH (ref 0.5–1.3)
EGFR: 8 ML/MIN/1.73M2 — LOW
EGFR: 8 ML/MIN/1.73M2 — LOW
EOSINOPHIL # BLD AUTO: 0.25 K/UL — SIGNIFICANT CHANGE UP (ref 0–0.5)
EOSINOPHIL # BLD AUTO: 0.25 K/UL — SIGNIFICANT CHANGE UP (ref 0–0.5)
EOSINOPHIL NFR BLD AUTO: 3.5 % — SIGNIFICANT CHANGE UP (ref 0–6)
EOSINOPHIL NFR BLD AUTO: 3.5 % — SIGNIFICANT CHANGE UP (ref 0–6)
GLUCOSE SERPL-MCNC: 92 MG/DL — SIGNIFICANT CHANGE UP (ref 70–99)
GLUCOSE SERPL-MCNC: 92 MG/DL — SIGNIFICANT CHANGE UP (ref 70–99)
HCT VFR BLD CALC: 29.4 % — LOW (ref 39–50)
HCT VFR BLD CALC: 29.4 % — LOW (ref 39–50)
HGB BLD-MCNC: 9.9 G/DL — LOW (ref 13–17)
HGB BLD-MCNC: 9.9 G/DL — LOW (ref 13–17)
IANC: 4.45 K/UL — SIGNIFICANT CHANGE UP (ref 1.8–7.4)
IANC: 4.45 K/UL — SIGNIFICANT CHANGE UP (ref 1.8–7.4)
LYMPHOCYTES # BLD AUTO: 0.68 K/UL — LOW (ref 1–3.3)
LYMPHOCYTES # BLD AUTO: 0.68 K/UL — LOW (ref 1–3.3)
LYMPHOCYTES # BLD AUTO: 9.5 % — LOW (ref 13–44)
LYMPHOCYTES # BLD AUTO: 9.5 % — LOW (ref 13–44)
MAGNESIUM SERPL-MCNC: 2.1 MG/DL — SIGNIFICANT CHANGE UP (ref 1.6–2.6)
MAGNESIUM SERPL-MCNC: 2.1 MG/DL — SIGNIFICANT CHANGE UP (ref 1.6–2.6)
MANUAL SMEAR VERIFICATION: SIGNIFICANT CHANGE UP
MANUAL SMEAR VERIFICATION: SIGNIFICANT CHANGE UP
MCHC RBC-ENTMCNC: 27.1 PG — SIGNIFICANT CHANGE UP (ref 27–34)
MCHC RBC-ENTMCNC: 27.1 PG — SIGNIFICANT CHANGE UP (ref 27–34)
MCHC RBC-ENTMCNC: 33.7 GM/DL — SIGNIFICANT CHANGE UP (ref 32–36)
MCHC RBC-ENTMCNC: 33.7 GM/DL — SIGNIFICANT CHANGE UP (ref 32–36)
MCV RBC AUTO: 80.5 FL — SIGNIFICANT CHANGE UP (ref 80–100)
MCV RBC AUTO: 80.5 FL — SIGNIFICANT CHANGE UP (ref 80–100)
MONOCYTES # BLD AUTO: 0.99 K/UL — HIGH (ref 0–0.9)
MONOCYTES # BLD AUTO: 0.99 K/UL — HIGH (ref 0–0.9)
MONOCYTES NFR BLD AUTO: 13.9 % — SIGNIFICANT CHANGE UP (ref 2–14)
MONOCYTES NFR BLD AUTO: 13.9 % — SIGNIFICANT CHANGE UP (ref 2–14)
NEUTROPHILS # BLD AUTO: 5.09 K/UL — SIGNIFICANT CHANGE UP (ref 1.8–7.4)
NEUTROPHILS # BLD AUTO: 5.09 K/UL — SIGNIFICANT CHANGE UP (ref 1.8–7.4)
NEUTROPHILS NFR BLD AUTO: 71.3 % — SIGNIFICANT CHANGE UP (ref 43–77)
NEUTROPHILS NFR BLD AUTO: 71.3 % — SIGNIFICANT CHANGE UP (ref 43–77)
PHOSPHATE SERPL-MCNC: 4.1 MG/DL — SIGNIFICANT CHANGE UP (ref 2.5–4.5)
PHOSPHATE SERPL-MCNC: 4.1 MG/DL — SIGNIFICANT CHANGE UP (ref 2.5–4.5)
PLAT MORPH BLD: NORMAL — SIGNIFICANT CHANGE UP
PLAT MORPH BLD: NORMAL — SIGNIFICANT CHANGE UP
PLATELET # BLD AUTO: 368 K/UL — SIGNIFICANT CHANGE UP (ref 150–400)
PLATELET # BLD AUTO: 368 K/UL — SIGNIFICANT CHANGE UP (ref 150–400)
PLATELET COUNT - ESTIMATE: NORMAL — SIGNIFICANT CHANGE UP
PLATELET COUNT - ESTIMATE: NORMAL — SIGNIFICANT CHANGE UP
POIKILOCYTOSIS BLD QL AUTO: SIGNIFICANT CHANGE UP
POIKILOCYTOSIS BLD QL AUTO: SIGNIFICANT CHANGE UP
POLYCHROMASIA BLD QL SMEAR: SLIGHT — SIGNIFICANT CHANGE UP
POLYCHROMASIA BLD QL SMEAR: SLIGHT — SIGNIFICANT CHANGE UP
POTASSIUM SERPL-MCNC: 4.8 MMOL/L — SIGNIFICANT CHANGE UP (ref 3.5–5.3)
POTASSIUM SERPL-MCNC: 4.8 MMOL/L — SIGNIFICANT CHANGE UP (ref 3.5–5.3)
POTASSIUM SERPL-SCNC: 4.8 MMOL/L — SIGNIFICANT CHANGE UP (ref 3.5–5.3)
POTASSIUM SERPL-SCNC: 4.8 MMOL/L — SIGNIFICANT CHANGE UP (ref 3.5–5.3)
PROT SERPL-MCNC: 6.8 G/DL — SIGNIFICANT CHANGE UP (ref 6–8.3)
PROT SERPL-MCNC: 6.8 G/DL — SIGNIFICANT CHANGE UP (ref 6–8.3)
PTH-INTACT FLD-MCNC: 535 PG/ML — HIGH (ref 15–65)
PTH-INTACT FLD-MCNC: 535 PG/ML — HIGH (ref 15–65)
RBC # BLD: 3.65 M/UL — LOW (ref 4.2–5.8)
RBC # BLD: 3.65 M/UL — LOW (ref 4.2–5.8)
RBC # FLD: 14.4 % — SIGNIFICANT CHANGE UP (ref 10.3–14.5)
RBC # FLD: 14.4 % — SIGNIFICANT CHANGE UP (ref 10.3–14.5)
RBC BLD AUTO: ABNORMAL
RBC BLD AUTO: ABNORMAL
RH IG SCN BLD-IMP: POSITIVE — SIGNIFICANT CHANGE UP
RH IG SCN BLD-IMP: POSITIVE — SIGNIFICANT CHANGE UP
SODIUM SERPL-SCNC: 136 MMOL/L — SIGNIFICANT CHANGE UP (ref 135–145)
SODIUM SERPL-SCNC: 136 MMOL/L — SIGNIFICANT CHANGE UP (ref 135–145)
VARIANT LYMPHS # BLD: 0.9 % — SIGNIFICANT CHANGE UP (ref 0–6)
VARIANT LYMPHS # BLD: 0.9 % — SIGNIFICANT CHANGE UP (ref 0–6)
WBC # BLD: 7.14 K/UL — SIGNIFICANT CHANGE UP (ref 3.8–10.5)
WBC # BLD: 7.14 K/UL — SIGNIFICANT CHANGE UP (ref 3.8–10.5)
WBC # FLD AUTO: 7.14 K/UL — SIGNIFICANT CHANGE UP (ref 3.8–10.5)
WBC # FLD AUTO: 7.14 K/UL — SIGNIFICANT CHANGE UP (ref 3.8–10.5)

## 2024-01-13 PROCEDURE — 99232 SBSQ HOSP IP/OBS MODERATE 35: CPT | Mod: GC

## 2024-01-13 PROCEDURE — 99233 SBSQ HOSP IP/OBS HIGH 50: CPT | Mod: GC

## 2024-01-13 RX ORDER — HEPARIN SODIUM 5000 [USP'U]/ML
5000 INJECTION INTRAVENOUS; SUBCUTANEOUS EVERY 8 HOURS
Refills: 0 | Status: DISCONTINUED | OUTPATIENT
Start: 2024-01-13 | End: 2024-01-16

## 2024-01-13 RX ORDER — CHOLECALCIFEROL (VITAMIN D3) 125 MCG
50000 CAPSULE ORAL
Refills: 0 | Status: DISCONTINUED | OUTPATIENT
Start: 2024-01-13 | End: 2024-01-14

## 2024-01-13 RX ORDER — INFLUENZA VIRUS VACCINE 15; 15; 15; 15 UG/.5ML; UG/.5ML; UG/.5ML; UG/.5ML
0.5 SUSPENSION INTRAMUSCULAR ONCE
Refills: 0 | Status: DISCONTINUED | OUTPATIENT
Start: 2024-01-13 | End: 2024-01-25

## 2024-01-13 RX ADMIN — HEPARIN SODIUM 5000 UNIT(S): 5000 INJECTION INTRAVENOUS; SUBCUTANEOUS at 22:03

## 2024-01-13 RX ADMIN — Medication 2001 MILLIGRAM(S): at 17:26

## 2024-01-13 RX ADMIN — Medication 100 MILLIGRAM(S): at 13:30

## 2024-01-13 RX ADMIN — Medication 650 MILLIGRAM(S): at 05:37

## 2024-01-13 RX ADMIN — Medication 650 MILLIGRAM(S): at 12:30

## 2024-01-13 RX ADMIN — Medication 650 MILLIGRAM(S): at 22:03

## 2024-01-13 RX ADMIN — Medication 2001 MILLIGRAM(S): at 09:15

## 2024-01-13 RX ADMIN — Medication 650 MILLIGRAM(S): at 17:26

## 2024-01-13 RX ADMIN — ATORVASTATIN CALCIUM 40 MILLIGRAM(S): 80 TABLET, FILM COATED ORAL at 22:03

## 2024-01-13 RX ADMIN — Medication 100 MILLIGRAM(S): at 05:38

## 2024-01-13 RX ADMIN — Medication 2001 MILLIGRAM(S): at 12:30

## 2024-01-13 RX ADMIN — Medication 100 MILLIGRAM(S): at 22:03

## 2024-01-13 NOTE — PATIENT PROFILE ADULT - FALL HARM RISK - UNIVERSAL INTERVENTIONS
Bed in lowest position, wheels locked, appropriate side rails in place/Call bell, personal items and telephone in reach/Instruct patient to call for assistance before getting out of bed or chair/Non-slip footwear when patient is out of bed/[place] to call system/Physically safe environment - no spills, clutter or unnecessary equipment/Purposeful Proactive Rounding/Room/bathroom lighting operational, light cord in reach Bed in lowest position, wheels locked, appropriate side rails in place/Call bell, personal items and telephone in reach/Instruct patient to call for assistance before getting out of bed or chair/Non-slip footwear when patient is out of bed/[place] to call system/Physically safe environment - no spills, clutter or unnecessary equipment/Purposeful Proactive Rounding/Room/bathroom lighting operational, light cord in reach

## 2024-01-13 NOTE — PROGRESS NOTE ADULT - PROBLEM SELECTOR PLAN 2
- New ESRD with temp HD cath placement on 1/6/24  - Record of HD: 1/6, 1/8, 1/10, 1/11, 1/12 @ [facility]  - Transfer to [facility] 1/12 for HD cath placement (perm)  - [facility]: Chikis Laird [phone]  - IR consult for HD placement and renal biopsy in the setting of nephrotic proteinuria, consult order placed, nephrology consulted, f/u in AM  -phos and vitamin D hydroxy, PTH ordered in AM - New ESRD with temp HD cath placement on 1/6/24  - Record of HD: 1/6, 1/8, 1/10, 1/11, 1/12 @ [facility]  - Transfer to [facility] 1/12 for HD cath placement (perm)  - [facility]: Chikis Laird [phone]  - IR consult for HD placement and renal biopsy in the setting of nephrotic proteinuria, consult order placed, nephrology consulted, f/u in AM  -phos and vitamin D hydroxy, PTH ordered in AM

## 2024-01-13 NOTE — PROGRESS NOTE ADULT - PROBLEM SELECTOR PLAN 1
- admitted to  for renal failure on CKD stage 3 likely 2/2 dehydration and hypertensive emergency from 1/3/24,   - s/p CT w/IV dye 1/3/23 possibly contributing to rise in Cr per  nephrology notes  - 24 [place] urine collection positive for nephrotic range proteinuria; SPEP, renal serologies negative   - CT A/P w/o hydro; Started on HD 1/6 via temp HD cath  - Pt requires chronic HD, transition from temp to perm HD cath and renal bx, HD catheter placed 01/06  - Transferred to [facility] on 1/12 for HD perm and bx, IR consult order place, nephrology consulted as well to inform of transfer   - CTM electrolytes, creatinine, strict I/Os, and cath site   - f/u AM with IR/Nephro - admitted to  for renal failure on CKD stage 3 likely 2/2 dehydration and hypertensive emergency from 1/3/24,   - s/p CT w/IV dye 1/3/23 possibly contributing to rise in Cr per  nephrology notes  - 24 [place] urine collection positive for nephrotic range proteinuria; SPEP, renal serologies negative   - CT A/P w/o hydro; Started on HD 1/6 via temp HD cath  - Pt requires chronic HD, transition from temp to perm HD cath and renal bx, HD catheter placed 01/06  - Transferred to [facility] on 1/12 for HD perm and bx, IR consult order place, nephrology consulted as well to inform of transfer   - CTM electrolytes, creatinine, strict I/Os, and cath site   - f/u AM with IR/Nephro

## 2024-01-13 NOTE — PROGRESS NOTE ADULT - PROBLEM SELECTOR PLAN 3
- At [facility] pt with anemia, hgb 6.5, s/p 1u pRBC i/s/o renal failure  - hx of blood transfusion at  during admission: 1/4/24 and 1/8/24   - hgb stable at 10, pending type and screen, f/u CBC

## 2024-01-13 NOTE — PATIENT PROFILE ADULT - FUNCTIONAL ASSESSMENT - BASIC MOBILITY 6.
Duration Of Freeze Thaw-Cycle (Seconds): 6 Number Of Freeze-Thaw Cycles: 2 freeze-thaw cycles Post-Care Instructions: I reviewed with the patient in detail post-care instructions. Patient is to wear sunprotection, and avoid picking at any of the treated lesions. Pt may apply Vaseline to crusted or scabbing areas. Detail Level: Detailed Render Post-Care Instructions In Note?: no Consent: The patient's consent was obtained including but not limited to risks of crusting, scabbing, blistering, scarring, darker or lighter pigmentary change, recurrence, incomplete removal and infection. 4 = No assist / stand by assistance

## 2024-01-13 NOTE — CONSULT NOTE ADULT - ASSESSMENT
Interventional Radiology    Evaluate for Procedure: tunneled dialysis catheter placement, kidney biopsy    HPI: 59y Male with ESRD on HD via right IJ non-tunneled HD catheter. IR consulted for tunneled HD catheter placement and image-guided kidney biopsy.     Allergies: No Known Allergies    Medications (Abx/Cardiac/Anticoagulation/Blood Products)  amLODIPine   Tablet: 5 milliGRAM(s) Oral (01-12 @ 12:38)  heparin   Injectable: 5000 Unit(s) SubCutaneous (01-12 @ 17:25)  labetalol: 100 milliGRAM(s) Oral (01-13 @ 13:30)  labetalol: 200 milliGRAM(s) Oral (01-12 @ 12:39)    Data:  165.1  54.4  T(C): 36.9  HR: 70  BP: 122/78  RR: 17  SpO2: 99%    -WBC 7.14 / HgB 9.9 / Hct 29.4 / Plt 368  -Na 136 / Cl 100 / BUN 25 / Glucose 92  -K 4.8 / CO2 23 / Cr 7.44  -ALT 38 / Alk Phos 116 / T.Bili 0.4  -INR 0.96 / PTT 29.5    Assessment/Plan:   59y Male with ESRD on HD via right IJ non-tunneled HD catheter. IR consulted for tunneled HD catheter placement and image-guided kidney biopsy.   -- IR will plan to perform image-guided renal biopsy and tunneled HD catheter placement on Wednesday 1/18  -- NPO at midnight on Tuesday 1/17  -- hold a.m. anticoagulation on Wednesday 1/18  -- please maintain SBP < 150  -- please complete IR pre-procedure note  -- please place IR procedure request order under Dr. Torrez    --  Leon Hu DO/HARSHAL/NATE, PGY-5  Vascular and Interventional Radiology   Available on Microsoft Teams    - Non-emergent consults: Place IR consult order in [place]  - Emergent issues (pager): [facility] [phone]; [facility] [phone]; [phone]  - Scheduling questions: [facility] [phone]; [facility] [phone]  - Clinic/outpatient booking: [facility] [phone]; [facility] [phone] Interventional Radiology    Evaluate for Procedure: tunneled dialysis catheter placement, kidney biopsy    HPI: 59y Male with ESRD on HD via right IJ non-tunneled HD catheter. IR consulted for tunneled HD catheter placement and image-guided kidney biopsy.     Allergies: No Known Allergies    Medications (Abx/Cardiac/Anticoagulation/Blood Products)  amLODIPine   Tablet: 5 milliGRAM(s) Oral (01-12 @ 12:38)  heparin   Injectable: 5000 Unit(s) SubCutaneous (01-12 @ 17:25)  labetalol: 100 milliGRAM(s) Oral (01-13 @ 13:30)  labetalol: 200 milliGRAM(s) Oral (01-12 @ 12:39)    Data:  165.1  54.4  T(C): 36.9  HR: 70  BP: 122/78  RR: 17  SpO2: 99%    -WBC 7.14 / HgB 9.9 / Hct 29.4 / Plt 368  -Na 136 / Cl 100 / BUN 25 / Glucose 92  -K 4.8 / CO2 23 / Cr 7.44  -ALT 38 / Alk Phos 116 / T.Bili 0.4  -INR 0.96 / PTT 29.5    Assessment/Plan:   59y Male with ESRD on HD via right IJ non-tunneled HD catheter. IR consulted for tunneled HD catheter placement and image-guided kidney biopsy.   -- IR will plan to perform image-guided renal biopsy and tunneled HD catheter placement on Wednesday 1/18  -- NPO at midnight on Tuesday 1/17  -- hold a.m. anticoagulation on Wednesday 1/18  -- please maintain SBP < 150  -- please complete IR pre-procedure note  -- please place IR procedure request order under Dr. Torrez    --  Leon Hu DO/HARSHAL/NATE, PGY-5  Vascular and Interventional Radiology   Available on Microsoft Teams    - Non-emergent consults: Place IR consult order in [place]  - Emergent issues (pager): [facility] [phone]; [facility] [phone]; [id number]  - Scheduling questions: [facility] [phone]; [facility] [phone]  - Clinic/outpatient booking: [facility] [phone]; [facility] [phone] Interventional Radiology    Evaluate for Procedure: tunneled dialysis catheter placement, kidney biopsy    HPI: 59y Male with ESRD on HD via right IJ non-tunneled HD catheter. IR consulted for tunneled HD catheter placement and image-guided kidney biopsy.     Allergies: No Known Allergies    Medications (Abx/Cardiac/Anticoagulation/Blood Products)  amLODIPine   Tablet: 5 milliGRAM(s) Oral (01-12 @ 12:38)  heparin   Injectable: 5000 Unit(s) SubCutaneous (01-12 @ 17:25)  labetalol: 100 milliGRAM(s) Oral (01-13 @ 13:30)  labetalol: 200 milliGRAM(s) Oral (01-12 @ 12:39)    Data:  165.1  54.4  T(C): 36.9  HR: 70  BP: 122/78  RR: 17  SpO2: 99%    -WBC 7.14 / HgB 9.9 / Hct 29.4 / Plt 368  -Na 136 / Cl 100 / BUN 25 / Glucose 92  -K 4.8 / CO2 23 / Cr 7.44  -ALT 38 / Alk Phos 116 / T.Bili 0.4  -INR 0.96 / PTT 29.5    Assessment/Plan:   59y Male with ESRD on HD via right IJ non-tunneled HD catheter. IR consulted for tunneled HD catheter placement and image-guided kidney biopsy.   -- IR will plan to perform image-guided renal biopsy and tunneled HD catheter placement on Wednesday 1/18  -- NPO at midnight on Tuesday 1/17  -- hold a.m. anticoagulation on Wednesday 1/18  -- please maintain SBP < 150  -- please complete IR pre-procedure note  -- please place IR procedure request order under Dr. Torrez    --  Leon Hu DO/HARSHAL/NATE, PGY-5  Vascular and Interventional Radiology   Available on Microsoft Teams    - Non-emergent consults: Place IR consult order in [place]  - Emergent issues (pager): [facility] [phone]; [facility] [phone]; [id number]  - Scheduling questions: [facility] [phone]; [facility] [phone]  - Clinic/outpatient booking: [facility] [phone]; [facility] [phone] Interventional Radiology    Evaluate for Procedure: tunneled dialysis catheter placement, kidney biopsy    HPI: 59y Male with ESRD on HD via right IJ non-tunneled HD catheter. IR consulted for tunneled HD catheter placement and image-guided kidney biopsy.     Allergies: No Known Allergies    Medications (Abx/Cardiac/Anticoagulation/Blood Products)  amLODIPine   Tablet: 5 milliGRAM(s) Oral (01-12 @ 12:38)  heparin   Injectable: 5000 Unit(s) SubCutaneous (01-12 @ 17:25)  labetalol: 100 milliGRAM(s) Oral (01-13 @ 13:30)  labetalol: 200 milliGRAM(s) Oral (01-12 @ 12:39)    Data:  165.1  54.4  T(C): 36.9  HR: 70  BP: 122/78  RR: 17  SpO2: 99%    -WBC 7.14 / HgB 9.9 / Hct 29.4 / Plt 368  -Na 136 / Cl 100 / BUN 25 / Glucose 92  -K 4.8 / CO2 23 / Cr 7.44  -ALT 38 / Alk Phos 116 / T.Bili 0.4  -INR 0.96 / PTT 29.5    Assessment/Plan:   59y Male with ESRD on HD via right IJ non-tunneled HD catheter. IR consulted for tunneled HD catheter placement and image-guided kidney biopsy.   -- IR will plan to perform image-guided renal biopsy and tunneled HD catheter placement on Wednesday 1/18  -- NPO at midnight on Tuesday 1/17  -- hold AM anticoagulation on Wednesday 1/18  -- BP goal <140/90  -- please complete IR pre-procedure note  -- please place IR procedure request order under Dr. Torrez    --  Leon Hu DO/HARSHAL/NATE, PGY-5  Vascular and Interventional Radiology   Available on Microsoft Teams    - Non-emergent consults: Place IR consult order in [place]  - Emergent issues (pager): [facility] [phone]; [facility] [phone]; [id number]  - Scheduling questions: [facility] [phone]; [facility] [phone]  - Clinic/outpatient booking: [facility] [phone]; [facility] [phone] Interventional Radiology    Evaluate for Procedure: tunneled dialysis catheter placement, kidney biopsy    HPI: 59y Male with ESRD on HD via right IJ non-tunneled HD catheter. IR consulted for tunneled HD catheter placement and image-guided kidney biopsy.     Allergies: No Known Allergies    Medications (Abx/Cardiac/Anticoagulation/Blood Products)  amLODIPine   Tablet: 5 milliGRAM(s) Oral (01-12 @ 12:38)  heparin   Injectable: 5000 Unit(s) SubCutaneous (01-12 @ 17:25)  labetalol: 100 milliGRAM(s) Oral (01-13 @ 13:30)  labetalol: 200 milliGRAM(s) Oral (01-12 @ 12:39)    Data:  165.1  54.4  T(C): 36.9  HR: 70  BP: 122/78  RR: 17  SpO2: 99%    -WBC 7.14 / HgB 9.9 / Hct 29.4 / Plt 368  -Na 136 / Cl 100 / BUN 25 / Glucose 92  -K 4.8 / CO2 23 / Cr 7.44  -ALT 38 / Alk Phos 116 / T.Bili 0.4  -INR 0.96 / PTT 29.5    Assessment/Plan:   59y Male with ESRD on HD via right IJ non-tunneled HD catheter. IR consulted for tunneled HD catheter placement and image-guided kidney biopsy.   -- IR will plan to perform image-guided renal biopsy and tunneled HD catheter placement on Wednesday 1/18  -- NPO at midnight on Tuesday 1/17  -- hold AM anticoagulation on Wednesday 1/18  -- BP goal <140/90  -- please complete IR pre-procedure note  -- please place IR procedure request order under Dr. Torrez    --  Leon Hu DO/HARSHAL/NATE, PGY-5  Vascular and Interventional Radiology   Available on Microsoft Teams    - Non-emergent consults: Place IR consult order in [place]  - Emergent issues (pager): [facility] [phone]; [facility] [phone]; [phone]  - Scheduling questions: [facility] [phone]; [facility] [phone]  - Clinic/outpatient booking: [facility] [phone]; [facility] [phone] Interventional Radiology    Evaluate for Procedure: tunneled dialysis catheter placement, kidney biopsy    HPI: 59y Male with ESRD on HD via right IJ non-tunneled HD catheter. IR consulted for tunneled HD catheter placement and image-guided kidney biopsy.     Allergies: No Known Allergies    Medications (Abx/Cardiac/Anticoagulation/Blood Products)  amLODIPine   Tablet: 5 milliGRAM(s) Oral (01-12 @ 12:38)  heparin   Injectable: 5000 Unit(s) SubCutaneous (01-12 @ 17:25)  labetalol: 100 milliGRAM(s) Oral (01-13 @ 13:30)  labetalol: 200 milliGRAM(s) Oral (01-12 @ 12:39)    Data:  165.1  54.4  T(C): 36.9  HR: 70  BP: 122/78  RR: 17  SpO2: 99%    -WBC 7.14 / HgB 9.9 / Hct 29.4 / Plt 368  -Na 136 / Cl 100 / BUN 25 / Glucose 92  -K 4.8 / CO2 23 / Cr 7.44  -ALT 38 / Alk Phos 116 / T.Bili 0.4  -INR 0.96 / PTT 29.5    Assessment/Plan:   59y Male with ESRD on HD via right IJ non-tunneled HD catheter. IR consulted for tunneled HD catheter placement and image-guided kidney biopsy.   -- IR will plan to perform image-guided renal biopsy and tunneled HD catheter placement on Wednesday 1/18  -- NPO at midnight on Tuesday 1/17  -- hold AM anticoagulation on Wednesday 1/18  -- BP goal <140/90  -- please complete IR pre-procedure note  -- please place IR procedure request order under Dr. Torrez    --  Leon Hu DO/HARSHAL/NATE, PGY-5  Vascular and Interventional Radiology   Available on Microsoft Teams    - Non-emergent consults: Place IR consult order in [place]  - Emergent issues (pager): [facility] [phone]; [facility] [phone]; [id number]  - Scheduling questions: [facility] [phone]; [facility] [phone]  - Clinic/outpatient booking: [facility] [phone]; [facility] [phone] Interventional Radiology    Evaluate for Procedure: tunneled dialysis catheter placement, kidney biopsy    HPI: 59y Male with ESRD on HD via right IJ non-tunneled HD catheter. IR consulted for tunneled HD catheter placement and image-guided kidney biopsy.     Allergies: No Known Allergies    Medications (Abx/Cardiac/Anticoagulation/Blood Products)  amLODIPine   Tablet: 5 milliGRAM(s) Oral (01-12 @ 12:38)  heparin   Injectable: 5000 Unit(s) SubCutaneous (01-12 @ 17:25)  labetalol: 100 milliGRAM(s) Oral (01-13 @ 13:30)  labetalol: 200 milliGRAM(s) Oral (01-12 @ 12:39)    Data:  165.1  54.4  T(C): 36.9  HR: 70  BP: 122/78  RR: 17  SpO2: 99%    -WBC 7.14 / HgB 9.9 / Hct 29.4 / Plt 368  -Na 136 / Cl 100 / BUN 25 / Glucose 92  -K 4.8 / CO2 23 / Cr 7.44  -ALT 38 / Alk Phos 116 / T.Bili 0.4  -INR 0.96 / PTT 29.5    Assessment/Plan:   59y Male with ESRD on HD via right IJ non-tunneled HD catheter. IR consulted for tunneled HD catheter placement and image-guided kidney biopsy.   -- IR will plan to perform image-guided renal biopsy and tunneled HD catheter placement on Wednesday 1/18  -- NPO at midnight on Tuesday 1/16  -- hold AM anticoagulation on Wednesday 1/17  -- BP goal <140/90  -- please complete IR pre-procedure note  -- please place IR procedure request order under Dr. Torrez    --  Leon Hu DO/HARSHAL/NATE, PGY-5  Vascular and Interventional Radiology   Available on Microsoft Teams    - Non-emergent consults: Place IR consult order in [place]  - Emergent issues (pager): [facility] [phone]; [facility] [phone]; [phone]  - Scheduling questions: [facility] [phone]; [facility] [phone]  - Clinic/outpatient booking: [facility] [phone]; [facility] [phone] Interventional Radiology    Evaluate for Procedure: tunneled dialysis catheter placement, kidney biopsy    HPI: 59y Male with ESRD on HD via right IJ non-tunneled HD catheter. IR consulted for tunneled HD catheter placement and image-guided kidney biopsy.     Allergies: No Known Allergies    Medications (Abx/Cardiac/Anticoagulation/Blood Products)  amLODIPine   Tablet: 5 milliGRAM(s) Oral (01-12 @ 12:38)  heparin   Injectable: 5000 Unit(s) SubCutaneous (01-12 @ 17:25)  labetalol: 100 milliGRAM(s) Oral (01-13 @ 13:30)  labetalol: 200 milliGRAM(s) Oral (01-12 @ 12:39)    Data:  165.1  54.4  T(C): 36.9  HR: 70  BP: 122/78  RR: 17  SpO2: 99%    -WBC 7.14 / HgB 9.9 / Hct 29.4 / Plt 368  -Na 136 / Cl 100 / BUN 25 / Glucose 92  -K 4.8 / CO2 23 / Cr 7.44  -ALT 38 / Alk Phos 116 / T.Bili 0.4  -INR 0.96 / PTT 29.5    Assessment/Plan:   59y Male with ESRD on HD via right IJ non-tunneled HD catheter. IR consulted for tunneled HD catheter placement and image-guided kidney biopsy.   -- IR will plan to perform image-guided renal biopsy and tunneled HD catheter placement on Wednesday 1/18  -- NPO at midnight on Tuesday 1/16  -- hold AM anticoagulation on Wednesday 1/17  -- BP goal <140/90  -- please complete IR pre-procedure note  -- please place IR procedure request order under Dr. Torrez    --  Leon Hu DO/HARSHAL/NATE, PGY-5  Vascular and Interventional Radiology   Available on Microsoft Teams    - Non-emergent consults: Place IR consult order in [place]  - Emergent issues (pager): [facility] [phone]; [facility] [phone]; [id number]  - Scheduling questions: [facility] [phone]; [facility] [phone]  - Clinic/outpatient booking: [facility] [phone]; [facility] [phone] Interventional Radiology    Evaluate for Procedure: tunneled dialysis catheter placement, kidney biopsy    HPI: 59y Male with ESRD on HD via right IJ non-tunneled HD catheter. IR consulted for tunneled HD catheter placement and image-guided kidney biopsy.     Allergies: No Known Allergies    Medications (Abx/Cardiac/Anticoagulation/Blood Products)  amLODIPine   Tablet: 5 milliGRAM(s) Oral (01-12 @ 12:38)  heparin   Injectable: 5000 Unit(s) SubCutaneous (01-12 @ 17:25)  labetalol: 100 milliGRAM(s) Oral (01-13 @ 13:30)  labetalol: 200 milliGRAM(s) Oral (01-12 @ 12:39)    Data:  165.1  54.4  T(C): 36.9  HR: 70  BP: 122/78  RR: 17  SpO2: 99%    -WBC 7.14 / HgB 9.9 / Hct 29.4 / Plt 368  -Na 136 / Cl 100 / BUN 25 / Glucose 92  -K 4.8 / CO2 23 / Cr 7.44  -ALT 38 / Alk Phos 116 / T.Bili 0.4  -INR 0.96 / PTT 29.5    Assessment/Plan:   59y Male with ESRD on HD via right IJ non-tunneled HD catheter. IR consulted for tunneled HD catheter placement and image-guided kidney biopsy.   -- IR will plan to perform image-guided renal biopsy and tunneled HD catheter placement on Wednesday 1/18  -- NPO at midnight on Tuesday 1/16  -- hold AM anticoagulation on Wednesday 1/17  -- BP goal <140/90  -- please complete IR pre-procedure note  -- please place IR procedure request order under Dr. Torrez    --  Leon Hu DO/HARSHAL/NATE, PGY-5  Vascular and Interventional Radiology   Available on Microsoft Teams    - Non-emergent consults: Place IR consult order in [place]  - Emergent issues (pager): [facility] [phone]; [facility] [phone]; [id number]  - Scheduling questions: [facility] [phone]; [facility] [phone]  - Clinic/outpatient booking: [facility] [phone]; [facility] [phone]

## 2024-01-13 NOTE — PROGRESS NOTE ADULT - PROBLEM SELECTOR PLAN 6
RESOLVED-NSTEMI in the setting of demand ischemia in the setting of HTN emergency  - presented at  with N/V/D and 4 years of chest pain withe exertion, progressively worsening and no prior PCP or physician evaluation; on admission at , elevated troponin with peak to 715.  - EKG nonischemic, Cr elevated +/- demand i/s/o HTN urgency; echo with mod pulm htn, nuclear stress test unremarkable, most likely i/s/o demand ischemia   - Echo: consistent with normal LVEF 65-70%, LVH and moderate pulm HTN  -Cards recommended non-invasive ischemic eval given contrast induced nephropathy and new renal failure: Stress test on 1/9/23: LVEF 66%  -- Given normal myocardial perfusion, CV stable  - C/W statin, and BB and outpt cardiology follow-up with [facility]   - held hep for 1/13 AM given possibility for bx/HD placement RESOLVED-NSTEMI in the setting of demand ischemia in the setting of HTN emergency  - presented at  with N/V/D and 4 years of chest pain withe exertion, progressively worsening and no prior PCP or physician evaluation; on admission at , elevated troponin with peak to 715.  - EKG nonischemic, Cr elevated +/- demand i/s/o HTN urgency; echo with mod pulm htn, nuclear stress test unremarkable, most likely i/s/o demand ischemia   - Echo: consistent with normal LVEF 65-70%, LVH and moderate pulm HTN  -Cards recommended non-invasive ischemic eval given contrast induced nephropathy and new renal failure: Stress test on 1/9/23: LVEF 66%  -- Given normal myocardial perfusion, CV stable  - C/W statin, and BB and outpt cardiology follow-up with [facility]   - held hep for 1/13 AM given possibility for bx/HD placement

## 2024-01-13 NOTE — PROGRESS NOTE ADULT - ASSESSMENT
59yo M with no known PMHx (not followed by PCP), but FHx (brother) w/ESRD s/p tplant who presented to [facility] with n/v/abdominal pain, admitted for renal failure and r/o ACS. Hospital course c/b flu A, sepsis 2/2 ETEC, anemia and NSTEMI evaluation s/p stress test. Now pt transferred to [facility] and pending renal bx and perm HD cath placement; last HD session 1/12.  Accepting physician: Dr. Chery.    57yo M with no known PMHx (not followed by PCP), but FHx (brother) w/ESRD s/p tplant who presented to [facility] with n/v/abdominal pain, admitted for renal failure and r/o ACS. Hospital course c/b flu A, sepsis 2/2 ETEC, anemia and NSTEMI evaluation s/p stress test. Now pt transferred to [facility] and pending renal bx and perm HD cath placement; last HD session 1/12.  Accepting physician: Dr. Chery.

## 2024-01-13 NOTE — CONSULT NOTE ADULT - PROBLEM SELECTOR RECOMMENDATION 9
DESIREE with proteinuria. R/o GN. No know baseline renal fx available. Recent admission at [facility] Cr 17 (Froy 3), received contrast (Froy 3) for cardiac work up.  UA pos for proteins, and RBC 12. Proteinuria of 3.6 gm per labs at [facility].   S/p placement of temp HD cath to initiate dialysis w/EPO on 1/6/24. Pt now transferred to [facility] for placement of HD catheter and work up for DESIREE    Recs:  -Labs and physical exam stable. No indication for dialysis today  -Patient needs Strict I/O and avoid ACEi/ARBs  -Will monitor renal fx  -Repeat UPCR  -Check DAVID, ds-DNA, RPR, HIV, HBV, HCV, C3, C4, ANCA, Phospholipase A2 receptor Ab, SPEP, Immunofixation, Serum Free Light Chains  -Depending on serology will plan for renal biopsy    BP controlled now; cw management    Reji Bravo  Nephrology Fellow  Feel free to contact me on TEAMS  After 4 pm please contact the on-call Fellow. DESIREE with proteinuria. R/o GN. No know baseline renal fx available. Recent admission at [facility] Cr 17 (Froy 3), received contrast (Froy 3) for cardiac work up.  UA pos for proteins, and RBC 12. Proteinuria of 3.6 gm per labs at [facility].   S/p placement of temp HD cath to initiate dialysis w/EPO on 1/6/24. Pt now transferred to [facility] for placement of HD catheter and work up for DESIREE    Recs:  -Labs and physical exam stable. No indication for dialysis today  -Patient needs Strict I/O and avoid ACEi/ARBs  -Will monitor renal fx  -Repeat UPCR  -Check DAVID, ds-DNA, RPR, HIV, HBV, HCV, C3, C4, ANCA, Phospholipase A2 receptor Ab, SPEP, Immunofixation, Serum Free Light Chains  -Depending on serology will plan for renal biopsy    BP controlled now; cw management    Reji Bravo  Nephrology Fellow  Feel free to contact me on TEAMS  After 4 pm please contact the on-call Fellow. DESIREE with proteinuria. R/o GN. No know baseline renal fx available. Recent admission at [facility] Cr 17 (Froy 3), received contrast (Froy 3) for cardiac work up.  UA pos for proteins, and RBC 12. Proteinuria of 3.6 gm per labs at [facility].   S/p placement of temp HD cath to initiate dialysis w/EPO on 1/6/24. Pt now transferred to [facility] for placement of HD catheter and work up for DESIREE    Recs:  -Labs and physical exam stable. No indication for dialysis today  -Check CK, DAVID, ds-DNA, RPR, HIV, HBV, HCV, C3, C4, ANCA, Phospholipase A2 receptor Ab, SPEP, Immunofixation, Serum Free Light Chains  -Repeat UPCR  -Patient needs Strict I/O and avoid ACEi/ARBs  -Will monitor renal fx  -Depending on serology will plan for renal biopsy    BP controlled now; cw management    Reji Bravo  Nephrology Fellow  Feel free to contact me on TEAMS  After 4 pm please contact the on-call Fellow. DESIREE with proteinuria. R/o GN. No know baseline renal fx available. Recent admission at [facility] Cr 17 (Froy 3), received contrast (Froy 3) for cardiac work up.  UA pos for proteins, and RBC 12. Proteinuria of 3.6 gm per labs at [facility].   S/p placement of temp HD cath to initiate dialysis w/EPO on 1/6/24. Pt now transferred to [facility] for placement of HD catheter and work up for DESIREE    Recs:  -Labs and physical exam stable. No indication for dialysis today  -Check CK, DAVID, ds-DNA, RPR, HIV, HBV, HCV, C3, C4, ANCA, Phospholipase A2 receptor Ab, SPEP, Immunofixation, Serum Free Light Chains  -Repeat UPCR  -Patient needs Strict I/O and avoid ACEi/ARBs  -Will monitor renal fx  -Depending on serology will plan for renal biopsy    BP controlled now; cw management    Reji Bravo  Nephrology Fellow  Feel free to contact me on TEAMS  After 4 pm please contact the on-call Fellow. DESIREE with proteinuria. R/o GN. No know baseline renal fx available. Recent admission at [facility] Cr 17 (Froy 3), received contrast (Froy 3) for cardiac work up.  UA pos for proteins, and RBC 12. Proteinuria of 3.6 gm per labs at [facility].   S/p placement of temp HD cath to initiate dialysis w/EPO on 1/6/24. Last HD 1/12 @ [place]. Pt now transferred to [facility] for placement of HD catheter and work up for DESIREE    Recs:  -Labs and physical exam stable. No indication for dialysis today  -Check CK, DAVID, ds-DNA, RPR, HIV, HBV, HCV, C3, C4, ANCA, Phospholipase A2 receptor Ab, SPEP, Immunofixation, Serum Free Light Chains  -Repeat UPCR  -Patient needs Strict I/O and avoid ACEi/ARBs  -Will monitor renal fx  -Depending on serology will plan for renal biopsy    BP controlled now; cw management    Reji Bravo  Nephrology Fellow  Feel free to contact me on TEAMS  After 4 pm please contact the on-call Fellow. DESIREE with proteinuria. R/o GN. No know baseline renal fx available. Recent admission at [facility] Cr 17 (Froy 3), received contrast (Froy 3) for cardiac work up.  UA pos for proteins, and RBC 12. Proteinuria of 3.6 gm per labs at [facility].   S/p placement of temp HD cath to initiate dialysis w/EPO on 1/6/24. Last HD 1/12 @ [place]. Pt now transferred to [facility] for placement of HD catheter and work up for DESIREE    Recs:  -Labs and physical exam stable. No indication for dialysis today  -Check CK, DAVID, ds-DNA, RPR, HIV, HBV, HCV, C3, C4, ANCA, Phospholipase A2 receptor Ab, SPEP, Immunofixation, Serum Free Light Chains  -Repeat UPCR  -Patient needs Strict I/O and avoid ACEi/ARBs  -Will monitor renal fx  -Depending on serology will plan for renal biopsy    BP controlled now; cw management    Reji Bravo  Nephrology Fellow  Feel free to contact me on TEAMS  After 4 pm please contact the on-call Fellow.

## 2024-01-13 NOTE — PATIENT PROFILE ADULT - CHOOSE INDICATION TO IMMUNIZE (AN ORDER WILL BE GENERATED WHEN THIS NOTE IS SAVED):
Repeat Colonoscopy in 2 years for surveillance of long standing ulcerative colitis    Patient is not pregnant (male or female)

## 2024-01-13 NOTE — PROGRESS NOTE ADULT - PROBLEM SELECTOR PLAN 4
RESOLVED  - p/w abd pain/n/v, CT abd/pelv c/f distended prox colonic loops consistent with colitis, no bowel obstruction.  - pt found to be +ETEC stool positive, s/p ceftriaxone and metronidazole course   - 1/12: afebrile, euvolemic, no diarrhea, no abd pain/n/v

## 2024-01-13 NOTE — CONSULT NOTE ADULT - SUBJECTIVE AND OBJECTIVE BOX
[facility] DIVISION OF KIDNEY DISEASES AND HYPERTENSION -- [phone]  -- INITIAL CONSULT NOTE  --------------------------------------------------------------------------------  HPI:  Pt is a 59M with HTN who presented to [place] on 1/3/24 with abdominal pain with assoc n/v/d and chest pain.   The patient was found to have Cr 17+, with no known baseline renal fx.  and HTN urgency with SBP +200 requiring hydralazine and admitted for renal failure and r/o ACS. At [place], the pt had CT with IV dye on 1/3/24 with concerns for contrast induced nephropathy with subsequent significant proteinuria, and placement of temp HD cath to initiate dialysis w/EPO on 1/6/24.   Hospital course c/b ETEC sepsis treated with abx/IVF, influenza A managed conservatively.  Pt transferred to [facility] for placement of HD catheter and work up for DESIREE including renal bx.  Nephrology consulted for DESIREE management.          PAST HISTORY  --------------------------------------------------------------------------------  PAST MEDICAL & SURGICAL HISTORY:  H/O gastroesophageal reflux (GERD)      Hypertension      ESRD on dialysis      S/P Cholecystectomy        FAMILY HISTORY:  FH: renal failure (Sibling)      PAST SOCIAL HISTORY:    ALLERGIES & MEDICATIONS  --------------------------------------------------------------------------------  Allergies    No Known Allergies    Intolerances      Standing Inpatient Medications  acetaminophen     Tablet .. 650 milliGRAM(s) Oral every 6 hours  atorvastatin 40 milliGRAM(s) Oral at bedtime  calcium acetate 2001 milliGRAM(s) Oral three times a day with meals  cholecalciferol 03789 Unit(s) Oral <User Schedule>  heparin   Injectable 5000 Unit(s) SubCutaneous every 8 hours  influenza   Vaccine 0.5 milliLiter(s) IntraMuscular once  labetalol 100 milliGRAM(s) Oral every 8 hours    PRN Inpatient Medications  aluminum hydroxide/magnesium hydroxide/simethicone Suspension 30 milliLiter(s) Oral every 4 hours PRN  melatonin 3 milliGRAM(s) Oral at bedtime PRN      REVIEW OF SYSTEMS  --------------------------------------------------------------------------------  Gen: No fevers/chills  Head/Eyes/Ears: No HA  Respiratory: No dyspnea, cough  CV: No chest pain  GI: No abdominal pain, diarrhea  : No dysuria, hematuria  MSK: No  edema  Skin: No rashes  Heme: No easy bruising or bleeding    All other systems were reviewed and are negative, except as noted.    VITALS/PHYSICAL EXAM  --------------------------------------------------------------------------------  T(C): 36.9 (01-13-24 @ 12:20), Max: 37 (01-12-24 @ 17:00)  HR: 70 (01-13-24 @ 12:20) (70 - 79)  BP: 122/78 (01-13-24 @ 12:20) (122/78 - 161/78)  RR: 17 (01-13-24 @ 12:20) (16 - 18)  SpO2: 99% (01-13-24 @ 12:20) (96% - 99%)  Wt(kg): --  Height (cm): 165.1 (01-12-24 @ 19:43)  Weight (kg): 54.4 (01-12-24 @ 19:43)  BMI (kg/m2): 20 (01-12-24 @ 19:43)  BSA (m2): 1.59 (01-12-24 @ 19:43)        Physical Exam:  	Gen: NAD  	HEENT: Anicteric  	Pulm: CTA B/L  	CV: S1S2+  	Abd: Soft, +BS    	Ext: No LE edema B/L  	Neuro: Awake  	Skin: Warm and dry  	Dialysis access: [facility]    LABS/STUDIES  --------------------------------------------------------------------------------              9.9    7.14  >-----------<  368      [01-13-24 @ 07:14]              29.4     136  |  100  |  25  ----------------------------<  92      [01-13-24 @ 07:14]  4.8   |  23  |  7.44        Ca     8.0     [01-13-24 @ 07:14]      Mg     2.10     [01-13-24 @ 07:14]      Phos  4.1     [01-13-24 @ 07:14]    TPro  6.8  /  Alb  3.1  /  TBili  0.4  /  DBili  x   /  AST  31  /  ALT  38  /  AlkPhos  116  [01-13-24 @ 07:14]          Creatinine Trend:  SCr 7.44 [01-13 @ 07:14]  SCr 10.15 [01-12 @ 05:56]  SCr 8.44 [01-11 @ 06:32]  SCr 12.33 [01-10 @ 07:56]  SCr 11.22 [01-09 @ 07:56]    Urinalysis - [01-13-24 @ 07:14]      Color  / Appearance  / SG  / pH       Gluc 92 / Ketone   / Bili  / Urobili        Blood  / Protein  / Leuk Est  / Nitrite       RBC  / WBC  / Hyaline  / Gran  / Sq Epi  / Non Sq Epi  / Bacteria       HBsAb Reactive      [01-08-24 @ 05:50]  HBsAg Nonreact      [01-08-24 @ 05:50]  HCV 0.20, Nonreact      [01-08-24 @ 05:50]    DAVID: titer 1:160, pattern Speckled      [01-04-24 @ 06:55]  dsDNA <12      [01-03-24 @ 11:15]  C3 Complement 83      [01-04-24 @ 06:55]  C4 Complement 23      [01-04-24 @ 06:55]  ANCA: cANCA Negative, pANCA Negative, atypical ANCA Indeterminate Method interference due to DAVID Fluorescence      [01-04-24 @ 06:55]  anti-GBM <0.2      [01-04-24 @ 06:55]  Immunofixation Serum:   No Monoclonal Band Identified      Reference Range: None Detected      [01-03-24 @ 11:15]  SPEP Interpretation: Normal Electrophoresis Pattern      [01-04-24 @ 06:55]    Tacrolimus  Cyclosporine  Sirolimus  Mycophenolate  BK PCR  CMV PCR  Parvo PCR  EBV PCR [facility] DIVISION OF KIDNEY DISEASES AND HYPERTENSION -- [phone]  -- INITIAL CONSULT NOTE  --------------------------------------------------------------------------------  HPI:  Pt is a 59M with HTN who presented to [place] on 1/3/24 with abdominal pain with assoc n/v/d and chest pain.   The patient was found to have Cr 17+, with no known baseline renal fx.  and HTN urgency with SBP +200 requiring hydralazine and admitted for renal failure and r/o ACS. At [place], the pt had CT with IV dye on 1/3/24 with concerns for contrast induced nephropathy with subsequent significant proteinuria, and placement of temp HD cath to initiate dialysis w/EPO on 1/6/24.   Hospital course c/b ETEC sepsis treated with abx/IVF, influenza A managed conservatively.  Pt transferred to [facility] for placement of HD catheter and work up for DESIREE including renal bx.  Nephrology consulted for DESIREE management.          PAST HISTORY  --------------------------------------------------------------------------------  PAST MEDICAL & SURGICAL HISTORY:  H/O gastroesophageal reflux (GERD)      Hypertension      ESRD on dialysis      S/P Cholecystectomy        FAMILY HISTORY:  FH: renal failure (Sibling)      PAST SOCIAL HISTORY:    ALLERGIES & MEDICATIONS  --------------------------------------------------------------------------------  Allergies    No Known Allergies    Intolerances      Standing Inpatient Medications  acetaminophen     Tablet .. 650 milliGRAM(s) Oral every 6 hours  atorvastatin 40 milliGRAM(s) Oral at bedtime  calcium acetate 2001 milliGRAM(s) Oral three times a day with meals  cholecalciferol 92164 Unit(s) Oral <User Schedule>  heparin   Injectable 5000 Unit(s) SubCutaneous every 8 hours  influenza   Vaccine 0.5 milliLiter(s) IntraMuscular once  labetalol 100 milliGRAM(s) Oral every 8 hours    PRN Inpatient Medications  aluminum hydroxide/magnesium hydroxide/simethicone Suspension 30 milliLiter(s) Oral every 4 hours PRN  melatonin 3 milliGRAM(s) Oral at bedtime PRN      REVIEW OF SYSTEMS  --------------------------------------------------------------------------------  Gen: No fevers/chills  Head/Eyes/Ears: No HA  Respiratory: No dyspnea, cough  CV: No chest pain  GI: No abdominal pain, diarrhea  : No dysuria, hematuria  MSK: No  edema  Skin: No rashes  Heme: No easy bruising or bleeding    All other systems were reviewed and are negative, except as noted.    VITALS/PHYSICAL EXAM  --------------------------------------------------------------------------------  T(C): 36.9 (01-13-24 @ 12:20), Max: 37 (01-12-24 @ 17:00)  HR: 70 (01-13-24 @ 12:20) (70 - 79)  BP: 122/78 (01-13-24 @ 12:20) (122/78 - 161/78)  RR: 17 (01-13-24 @ 12:20) (16 - 18)  SpO2: 99% (01-13-24 @ 12:20) (96% - 99%)  Wt(kg): --  Height (cm): 165.1 (01-12-24 @ 19:43)  Weight (kg): 54.4 (01-12-24 @ 19:43)  BMI (kg/m2): 20 (01-12-24 @ 19:43)  BSA (m2): 1.59 (01-12-24 @ 19:43)        Physical Exam:  	Gen: NAD  	HEENT: Anicteric  	Pulm: CTA B/L  	CV: S1S2+  	Abd: Soft, +BS    	Ext: No LE edema B/L  	Neuro: Awake  	Skin: Warm and dry  	Dialysis access: [facility][note 2]    LABS/STUDIES  --------------------------------------------------------------------------------              9.9    7.14  >-----------<  368      [01-13-24 @ 07:14]              29.4     136  |  100  |  25  ----------------------------<  92      [01-13-24 @ 07:14]  4.8   |  23  |  7.44        Ca     8.0     [01-13-24 @ 07:14]      Mg     2.10     [01-13-24 @ 07:14]      Phos  4.1     [01-13-24 @ 07:14]    TPro  6.8  /  Alb  3.1  /  TBili  0.4  /  DBili  x   /  AST  31  /  ALT  38  /  AlkPhos  116  [01-13-24 @ 07:14]          Creatinine Trend:  SCr 7.44 [01-13 @ 07:14]  SCr 10.15 [01-12 @ 05:56]  SCr 8.44 [01-11 @ 06:32]  SCr 12.33 [01-10 @ 07:56]  SCr 11.22 [01-09 @ 07:56]    Urinalysis - [01-13-24 @ 07:14]      Color  / Appearance  / SG  / pH       Gluc 92 / Ketone   / Bili  / Urobili        Blood  / Protein  / Leuk Est  / Nitrite       RBC  / WBC  / Hyaline  / Gran  / Sq Epi  / Non Sq Epi  / Bacteria       HBsAb Reactive      [01-08-24 @ 05:50]  HBsAg Nonreact      [01-08-24 @ 05:50]  HCV 0.20, Nonreact      [01-08-24 @ 05:50]    DAVID: titer 1:160, pattern Speckled      [01-04-24 @ 06:55]  dsDNA <12      [01-03-24 @ 11:15]  C3 Complement 83      [01-04-24 @ 06:55]  C4 Complement 23      [01-04-24 @ 06:55]  ANCA: cANCA Negative, pANCA Negative, atypical ANCA Indeterminate Method interference due to DAVID Fluorescence      [01-04-24 @ 06:55]  anti-GBM <0.2      [01-04-24 @ 06:55]  Immunofixation Serum:   No Monoclonal Band Identified      Reference Range: None Detected      [01-03-24 @ 11:15]  SPEP Interpretation: Normal Electrophoresis Pattern      [01-04-24 @ 06:55]    Tacrolimus  Cyclosporine  Sirolimus  Mycophenolate  BK PCR  CMV PCR  Parvo PCR  EBV PCR

## 2024-01-13 NOTE — CONSULT NOTE ADULT - ATTENDING COMMENTS
Patient examined and ROS reviewed. A  case of DESIREE with nephrotic range proteinuria and high serum creatinine and got contrast for cardiac w/u at [facility] and was initiated on HD and transfer to [facility]. Advised HD today and w/u for glomerular diseases. Patient examined and ROS reviewed. A  case of DESIREE with nephrotic range proteinuria and high serum creatinine and got contrast for cardiac w/u at [facility] and was initiated on HD and transfer to [facility]. Advised HD today and w/u for glomerular diseases.

## 2024-01-13 NOTE — PROGRESS NOTE ADULT - ATTENDING COMMENTS
Pt is stable at this time, feeling well without complaint. Appreciate Nephrology recommendations. Pt is consented for tunneled dialysis catheter. Anticipate catheter placement and renal biopsy this admission.

## 2024-01-13 NOTE — PROGRESS NOTE ADULT - SUBJECTIVE AND OBJECTIVE BOX
Jessica Tejada, PGY1    Patient is a 59y old  Male who presents with a chief complaint of ESRD on HD transferred for placement of permanent HD catheter and renal biopsy (12 Jan 2024 22:12)      SUBJECTIVE / OVERNIGHT EVENTS: NAEO. Pt denies chest pain, SOB, N/V, fever/chills, or changes in bowel movements.    MEDICATIONS  (STANDING):  acetaminophen     Tablet .. 650 milliGRAM(s) Oral every 6 hours  atorvastatin 40 milliGRAM(s) Oral at bedtime  calcium acetate 2001 milliGRAM(s) Oral three times a day with meals  influenza   Vaccine 0.5 milliLiter(s) IntraMuscular once  labetalol 100 milliGRAM(s) Oral every 8 hours    MEDICATIONS  (PRN):  aluminum hydroxide/magnesium hydroxide/simethicone Suspension 30 milliLiter(s) Oral every 4 hours PRN Dyspepsia  melatonin 3 milliGRAM(s) Oral at bedtime PRN Insomnia      CAPILLARY BLOOD GLUCOSE        I&O's Summary      Vital Signs Last 24 Hrs  T(C): 36.7 (13 Jan 2024 05:25), Max: 37 (12 Jan 2024 17:00)  T(F): 98 (13 Jan 2024 05:25), Max: 98.6 (12 Jan 2024 17:00)  HR: 73 (13 Jan 2024 05:25) (71 - 79)  BP: 153/75 (13 Jan 2024 05:25) (130/77 - 161/78)  BP(mean): --  RR: 18 (13 Jan 2024 05:25) (16 - 18)  SpO2: 96% (13 Jan 2024 05:25) (96% - 99%)    Parameters below as of 13 Jan 2024 05:25  Patient On (Oxygen Delivery Method): room air        PHYSICAL EXAM:  GENERAL: NAD, well-developed, well-nourished  HEAD: Atraumatic, Normocephalic  EYES: Conjunctiva and sclera clear  CHEST/LUNG: Clear to auscultation bilaterally; No wheezes or crackles  HEART: Normal S1/S2; Regular rate and rhythm; No murmurs, rubs, or gallops  ABDOMEN: Soft, Nontender, Nondistended; Bowel sounds present  EXTREMITIES: No clubbing, cyanosis, or edema  PSYCH: A&Ox3      LABS:                        10.1   6.94  )-----------( 332      ( 12 Jan 2024 05:56 )             28.8      01-12    134<L>  |  97  |  46<H>  ----------------------------<  98  4.4   |  25  |  10.15<H>    Ca    7.5<L>      12 Jan 2024 05:56  Phos  5.0     01-12  Mg     2.0     01-12            Urinalysis Basic - ( 12 Jan 2024 05:56 )    Color: x / Appearance: x / SG: x / pH: x  Gluc: 98 mg/dL / Ketone: x  / Bili: x / Urobili: x   Blood: x / Protein: x / Nitrite: x   Leuk Esterase: x / RBC: x / WBC x   Sq Epi: x / Non Sq Epi: x / Bacteria: x        RADIOLOGY & ADDITIONAL TESTS:     Jessica Tejada, PGY1    Patient is a 59y old  Male who presents with a chief complaint of ESRD on HD transferred for placement of permanent HD catheter and renal biopsy (12 Jan 2024 22:12)      SUBJECTIVE / OVERNIGHT EVENTS: NAEO. Pt denies chest pain, SOB, N/V, fever/chills, or changes in bowel movements.    MEDICATIONS  (STANDING):  acetaminophen     Tablet .. 650 milliGRAM(s) Oral every 6 hours  atorvastatin 40 milliGRAM(s) Oral at bedtime  calcium acetate 2001 milliGRAM(s) Oral three times a day with meals  influenza   Vaccine 0.5 milliLiter(s) IntraMuscular once  labetalol 100 milliGRAM(s) Oral every 8 hours    MEDICATIONS  (PRN):  aluminum hydroxide/magnesium hydroxide/simethicone Suspension 30 milliLiter(s) Oral every 4 hours PRN Dyspepsia  melatonin 3 milliGRAM(s) Oral at bedtime PRN Insomnia      CAPILLARY BLOOD GLUCOSE        I&O's Summary      Vital Signs Last 24 Hrs  T(C): 36.7 (13 Jan 2024 05:25), Max: 37 (12 Jan 2024 17:00)  T(F): 98 (13 Jan 2024 05:25), Max: 98.6 (12 Jan 2024 17:00)  HR: 73 (13 Jan 2024 05:25) (71 - 79)  BP: 153/75 (13 Jan 2024 05:25) (130/77 - 161/78)  BP(mean): --  RR: 18 (13 Jan 2024 05:25) (16 - 18)  SpO2: 96% (13 Jan 2024 05:25) (96% - 99%)    Parameters below as of 13 Jan 2024 05:25  Patient On (Oxygen Delivery Method): room air      PHYSICAL EXAM  GENERAL: NAD, Resting in bed  HEENT:  Head atraumatic, EOMI, PERRLA, conjunctiva and sclera clear; Moist mucous membranes, normal oropharynx  NECK: Supple, No JVD, +R supraclav cath in place (1/6)  CHEST/LUNG: Clear to auscultation bilaterally; No rales, rhonchi, wheezing, or rubs. Unlabored respirations on room air  HEART: Regular rate and rhythm; No murmurs, rubs, or gallops  ABDOMEN: Bowel sounds present; Soft, Nontender, Nondistended  EXTREMITIES:  2+ Peripheral Pulses, brisk capillary refill. No clubbing, cyanosis, or edema  NERVOUS SYSTEM:  Alert & Oriented X3, non-focal and spontaneous movements of all extremities  SKIN: No rashes or lesions      LABS:                        10.1   6.94  )-----------( 332      ( 12 Jan 2024 05:56 )             28.8      01-12    134<L>  |  97  |  46<H>  ----------------------------<  98  4.4   |  25  |  10.15<H>    Ca    7.5<L>      12 Jan 2024 05:56  Phos  5.0     01-12  Mg     2.0     01-12            Urinalysis Basic - ( 12 Jan 2024 05:56 )    Color: x / Appearance: x / SG: x / pH: x  Gluc: 98 mg/dL / Ketone: x  / Bili: x / Urobili: x   Blood: x / Protein: x / Nitrite: x   Leuk Esterase: x / RBC: x / WBC x   Sq Epi: x / Non Sq Epi: x / Bacteria: x        RADIOLOGY & ADDITIONAL TESTS:

## 2024-01-13 NOTE — CHART NOTE - NSCHARTNOTEFT_GEN_A_CORE
Consulted for this pt with DESIREE s/p HD sent here for TDC placement and work up for DESIREE with proteinuria. Pt seen and examined, labs reviewed, no urgent need for dialysis today. HD consent obtained and placed in patient's chart. Full consult note to follow.      Reji Bravo  Nephrology Fellow  Feel free to contact me on TEAMS  After 4 pm please contact the on-call Fellow.

## 2024-01-13 NOTE — PROGRESS NOTE ADULT - PROBLEM SELECTOR PLAN 7
- pt denies any previous hx of HTN, however, per chart review - pt has hx of taking hypertensive med  - initially admitted to  for hypertensive emergency and renal failure, s/p hydralazine and cardio eval   -- @  pt continued on 200 mg Labetalol q8 hrs for hypertension   - 1/12 SBP 160s, resumed Labetalol 100 mg BID, up titrate as needed

## 2024-01-14 LAB
ALBUMIN SERPL ELPH-MCNC: 3.1 G/DL — LOW (ref 3.3–5)
ALBUMIN SERPL ELPH-MCNC: 3.1 G/DL — LOW (ref 3.3–5)
ALP SERPL-CCNC: 111 U/L — SIGNIFICANT CHANGE UP (ref 40–120)
ALP SERPL-CCNC: 111 U/L — SIGNIFICANT CHANGE UP (ref 40–120)
ALT FLD-CCNC: 31 U/L — SIGNIFICANT CHANGE UP (ref 4–41)
ALT FLD-CCNC: 31 U/L — SIGNIFICANT CHANGE UP (ref 4–41)
ANION GAP SERPL CALC-SCNC: 13 MMOL/L — SIGNIFICANT CHANGE UP (ref 7–14)
ANION GAP SERPL CALC-SCNC: 13 MMOL/L — SIGNIFICANT CHANGE UP (ref 7–14)
AST SERPL-CCNC: 22 U/L — SIGNIFICANT CHANGE UP (ref 4–40)
AST SERPL-CCNC: 22 U/L — SIGNIFICANT CHANGE UP (ref 4–40)
BASOPHILS # BLD AUTO: 0.02 K/UL — SIGNIFICANT CHANGE UP (ref 0–0.2)
BASOPHILS # BLD AUTO: 0.02 K/UL — SIGNIFICANT CHANGE UP (ref 0–0.2)
BASOPHILS NFR BLD AUTO: 0.3 % — SIGNIFICANT CHANGE UP (ref 0–2)
BASOPHILS NFR BLD AUTO: 0.3 % — SIGNIFICANT CHANGE UP (ref 0–2)
BILIRUB SERPL-MCNC: 0.4 MG/DL — SIGNIFICANT CHANGE UP (ref 0.2–1.2)
BILIRUB SERPL-MCNC: 0.4 MG/DL — SIGNIFICANT CHANGE UP (ref 0.2–1.2)
BUN SERPL-MCNC: 40 MG/DL — HIGH (ref 7–23)
BUN SERPL-MCNC: 40 MG/DL — HIGH (ref 7–23)
C3 SERPL-MCNC: 94 MG/DL — SIGNIFICANT CHANGE UP (ref 90–180)
C3 SERPL-MCNC: 94 MG/DL — SIGNIFICANT CHANGE UP (ref 90–180)
C4 SERPL-MCNC: 27 MG/DL — SIGNIFICANT CHANGE UP (ref 10–40)
C4 SERPL-MCNC: 27 MG/DL — SIGNIFICANT CHANGE UP (ref 10–40)
CALCIUM SERPL-MCNC: 7.9 MG/DL — LOW (ref 8.4–10.5)
CALCIUM SERPL-MCNC: 7.9 MG/DL — LOW (ref 8.4–10.5)
CHLORIDE SERPL-SCNC: 100 MMOL/L — SIGNIFICANT CHANGE UP (ref 98–107)
CHLORIDE SERPL-SCNC: 100 MMOL/L — SIGNIFICANT CHANGE UP (ref 98–107)
CK SERPL-CCNC: 11 U/L — LOW (ref 30–200)
CK SERPL-CCNC: 11 U/L — LOW (ref 30–200)
CO2 SERPL-SCNC: 22 MMOL/L — SIGNIFICANT CHANGE UP (ref 22–31)
CO2 SERPL-SCNC: 22 MMOL/L — SIGNIFICANT CHANGE UP (ref 22–31)
CREAT SERPL-MCNC: 9.14 MG/DL — HIGH (ref 0.5–1.3)
CREAT SERPL-MCNC: 9.14 MG/DL — HIGH (ref 0.5–1.3)
EGFR: 6 ML/MIN/1.73M2 — LOW
EGFR: 6 ML/MIN/1.73M2 — LOW
EOSINOPHIL # BLD AUTO: 0.19 K/UL — SIGNIFICANT CHANGE UP (ref 0–0.5)
EOSINOPHIL # BLD AUTO: 0.19 K/UL — SIGNIFICANT CHANGE UP (ref 0–0.5)
EOSINOPHIL NFR BLD AUTO: 2.9 % — SIGNIFICANT CHANGE UP (ref 0–6)
EOSINOPHIL NFR BLD AUTO: 2.9 % — SIGNIFICANT CHANGE UP (ref 0–6)
GLUCOSE SERPL-MCNC: 88 MG/DL — SIGNIFICANT CHANGE UP (ref 70–99)
GLUCOSE SERPL-MCNC: 88 MG/DL — SIGNIFICANT CHANGE UP (ref 70–99)
HAV IGM SER-ACNC: SIGNIFICANT CHANGE UP
HAV IGM SER-ACNC: SIGNIFICANT CHANGE UP
HBV CORE IGM SER-ACNC: SIGNIFICANT CHANGE UP
HBV CORE IGM SER-ACNC: SIGNIFICANT CHANGE UP
HBV SURFACE AG SER-ACNC: SIGNIFICANT CHANGE UP
HBV SURFACE AG SER-ACNC: SIGNIFICANT CHANGE UP
HCT VFR BLD CALC: 27.8 % — LOW (ref 39–50)
HCT VFR BLD CALC: 27.8 % — LOW (ref 39–50)
HCV AB S/CO SERPL IA: 0.19 S/CO — SIGNIFICANT CHANGE UP (ref 0–0.99)
HCV AB S/CO SERPL IA: 0.19 S/CO — SIGNIFICANT CHANGE UP (ref 0–0.99)
HCV AB SERPL-IMP: SIGNIFICANT CHANGE UP
HCV AB SERPL-IMP: SIGNIFICANT CHANGE UP
HGB BLD-MCNC: 9.6 G/DL — LOW (ref 13–17)
HGB BLD-MCNC: 9.6 G/DL — LOW (ref 13–17)
HIV 1+2 AB+HIV1 P24 AG SERPL QL IA: SIGNIFICANT CHANGE UP
HIV 1+2 AB+HIV1 P24 AG SERPL QL IA: SIGNIFICANT CHANGE UP
IANC: 3.99 K/UL — SIGNIFICANT CHANGE UP (ref 1.8–7.4)
IANC: 3.99 K/UL — SIGNIFICANT CHANGE UP (ref 1.8–7.4)
IMM GRANULOCYTES NFR BLD AUTO: 1.4 % — HIGH (ref 0–0.9)
IMM GRANULOCYTES NFR BLD AUTO: 1.4 % — HIGH (ref 0–0.9)
LYMPHOCYTES # BLD AUTO: 1.31 K/UL — SIGNIFICANT CHANGE UP (ref 1–3.3)
LYMPHOCYTES # BLD AUTO: 1.31 K/UL — SIGNIFICANT CHANGE UP (ref 1–3.3)
LYMPHOCYTES # BLD AUTO: 20 % — SIGNIFICANT CHANGE UP (ref 13–44)
LYMPHOCYTES # BLD AUTO: 20 % — SIGNIFICANT CHANGE UP (ref 13–44)
MAGNESIUM SERPL-MCNC: 2.3 MG/DL — SIGNIFICANT CHANGE UP (ref 1.6–2.6)
MAGNESIUM SERPL-MCNC: 2.3 MG/DL — SIGNIFICANT CHANGE UP (ref 1.6–2.6)
MCHC RBC-ENTMCNC: 27.7 PG — SIGNIFICANT CHANGE UP (ref 27–34)
MCHC RBC-ENTMCNC: 27.7 PG — SIGNIFICANT CHANGE UP (ref 27–34)
MCHC RBC-ENTMCNC: 34.5 GM/DL — SIGNIFICANT CHANGE UP (ref 32–36)
MCHC RBC-ENTMCNC: 34.5 GM/DL — SIGNIFICANT CHANGE UP (ref 32–36)
MCV RBC AUTO: 80.1 FL — SIGNIFICANT CHANGE UP (ref 80–100)
MCV RBC AUTO: 80.1 FL — SIGNIFICANT CHANGE UP (ref 80–100)
MONOCYTES # BLD AUTO: 0.96 K/UL — HIGH (ref 0–0.9)
MONOCYTES # BLD AUTO: 0.96 K/UL — HIGH (ref 0–0.9)
MONOCYTES NFR BLD AUTO: 14.6 % — HIGH (ref 2–14)
MONOCYTES NFR BLD AUTO: 14.6 % — HIGH (ref 2–14)
NEUTROPHILS # BLD AUTO: 3.99 K/UL — SIGNIFICANT CHANGE UP (ref 1.8–7.4)
NEUTROPHILS # BLD AUTO: 3.99 K/UL — SIGNIFICANT CHANGE UP (ref 1.8–7.4)
NEUTROPHILS NFR BLD AUTO: 60.8 % — SIGNIFICANT CHANGE UP (ref 43–77)
NEUTROPHILS NFR BLD AUTO: 60.8 % — SIGNIFICANT CHANGE UP (ref 43–77)
NRBC # BLD: 0 /100 WBCS — SIGNIFICANT CHANGE UP (ref 0–0)
NRBC # BLD: 0 /100 WBCS — SIGNIFICANT CHANGE UP (ref 0–0)
NRBC # FLD: 0 K/UL — SIGNIFICANT CHANGE UP (ref 0–0)
NRBC # FLD: 0 K/UL — SIGNIFICANT CHANGE UP (ref 0–0)
PHOSPHATE SERPL-MCNC: 4.1 MG/DL — SIGNIFICANT CHANGE UP (ref 2.5–4.5)
PHOSPHATE SERPL-MCNC: 4.1 MG/DL — SIGNIFICANT CHANGE UP (ref 2.5–4.5)
PLATELET # BLD AUTO: 370 K/UL — SIGNIFICANT CHANGE UP (ref 150–400)
PLATELET # BLD AUTO: 370 K/UL — SIGNIFICANT CHANGE UP (ref 150–400)
POTASSIUM SERPL-MCNC: 5.1 MMOL/L — SIGNIFICANT CHANGE UP (ref 3.5–5.3)
POTASSIUM SERPL-MCNC: 5.1 MMOL/L — SIGNIFICANT CHANGE UP (ref 3.5–5.3)
POTASSIUM SERPL-SCNC: 5.1 MMOL/L — SIGNIFICANT CHANGE UP (ref 3.5–5.3)
POTASSIUM SERPL-SCNC: 5.1 MMOL/L — SIGNIFICANT CHANGE UP (ref 3.5–5.3)
PROT SERPL-MCNC: 6.5 G/DL — SIGNIFICANT CHANGE UP (ref 6–8.3)
RBC # BLD: 3.47 M/UL — LOW (ref 4.2–5.8)
RBC # BLD: 3.47 M/UL — LOW (ref 4.2–5.8)
RBC # FLD: 14.2 % — SIGNIFICANT CHANGE UP (ref 10.3–14.5)
RBC # FLD: 14.2 % — SIGNIFICANT CHANGE UP (ref 10.3–14.5)
SODIUM SERPL-SCNC: 135 MMOL/L — SIGNIFICANT CHANGE UP (ref 135–145)
SODIUM SERPL-SCNC: 135 MMOL/L — SIGNIFICANT CHANGE UP (ref 135–145)
WBC # BLD: 6.56 K/UL — SIGNIFICANT CHANGE UP (ref 3.8–10.5)
WBC # BLD: 6.56 K/UL — SIGNIFICANT CHANGE UP (ref 3.8–10.5)
WBC # FLD AUTO: 6.56 K/UL — SIGNIFICANT CHANGE UP (ref 3.8–10.5)
WBC # FLD AUTO: 6.56 K/UL — SIGNIFICANT CHANGE UP (ref 3.8–10.5)

## 2024-01-14 PROCEDURE — 99233 SBSQ HOSP IP/OBS HIGH 50: CPT | Mod: GC

## 2024-01-14 PROCEDURE — 84165 PROTEIN E-PHORESIS SERUM: CPT | Mod: 26

## 2024-01-14 PROCEDURE — 99232 SBSQ HOSP IP/OBS MODERATE 35: CPT | Mod: GC

## 2024-01-14 PROCEDURE — 86334 IMMUNOFIX E-PHORESIS SERUM: CPT | Mod: 26

## 2024-01-14 RX ORDER — ERGOCALCIFEROL 1.25 MG/1
50000 CAPSULE ORAL
Refills: 0 | Status: DISCONTINUED | OUTPATIENT
Start: 2024-01-14 | End: 2024-01-25

## 2024-01-14 RX ADMIN — HEPARIN SODIUM 5000 UNIT(S): 5000 INJECTION INTRAVENOUS; SUBCUTANEOUS at 05:53

## 2024-01-14 RX ADMIN — HEPARIN SODIUM 5000 UNIT(S): 5000 INJECTION INTRAVENOUS; SUBCUTANEOUS at 21:34

## 2024-01-14 RX ADMIN — HEPARIN SODIUM 5000 UNIT(S): 5000 INJECTION INTRAVENOUS; SUBCUTANEOUS at 14:54

## 2024-01-14 RX ADMIN — Medication 650 MILLIGRAM(S): at 23:38

## 2024-01-14 RX ADMIN — Medication 650 MILLIGRAM(S): at 18:01

## 2024-01-14 RX ADMIN — Medication 100 MILLIGRAM(S): at 14:55

## 2024-01-14 RX ADMIN — ERGOCALCIFEROL 50000 UNIT(S): 1.25 CAPSULE ORAL at 11:57

## 2024-01-14 RX ADMIN — Medication 2001 MILLIGRAM(S): at 08:41

## 2024-01-14 RX ADMIN — Medication 2001 MILLIGRAM(S): at 11:57

## 2024-01-14 RX ADMIN — Medication 650 MILLIGRAM(S): at 05:52

## 2024-01-14 RX ADMIN — Medication 650 MILLIGRAM(S): at 11:57

## 2024-01-14 RX ADMIN — Medication 2001 MILLIGRAM(S): at 18:01

## 2024-01-14 RX ADMIN — Medication 100 MILLIGRAM(S): at 05:52

## 2024-01-14 RX ADMIN — ATORVASTATIN CALCIUM 40 MILLIGRAM(S): 80 TABLET, FILM COATED ORAL at 21:31

## 2024-01-14 NOTE — PROGRESS NOTE ADULT - PROBLEM SELECTOR PLAN 1
- admitted to  for renal failure on CKD stage 3 likely 2/2 dehydration and hypertensive emergency from 1/3/24,   - s/p CT w/IV dye 1/3/23 possibly contributing to rise in Cr per  nephrology notes  - 24 eden urine collection positive for nephrotic range proteinuria; SPEP, renal serologies negative   - CT A/P w/o hydro; Started on HD 1/6 via temp HD cath  - Pt requires chronic HD, transition from temp to perm HD cath and renal bx, HD catheter placed 01/06  - Transferred to [facility] on 1/12 for HD perm and bx, IR consult order place, nephrology consulted as well to inform of transfer   - CTM electrolytes, creatinine, strict I/Os, and cath site   - pending permcath placement this week - admitted to  for renal failure on CKD stage 3 likely 2/2 dehydration and hypertensive emergency from 1/3/24,   - s/p CT w/IV dye 1/3/23 possibly contributing to rise in Cr per  nephrology notes  - 24 eden urine collection positive for nephrotic range proteinuria; SPEP, renal serologies negative   - CT A/P w/o hydro; Started on HD 1/6 via temp HD cath  - Pt requires chronic HD, transition from temp to perm HD cath and renal bx, HD catheter placed 01/06  - Transferred to [facility] on 1/12 for HD perm and bx, IR consult order place, nephrology consulted as well to inform of transfer   - CTM electrolytes, creatinine, strict I/Os, and cath site   - pending permcath placement this week

## 2024-01-14 NOTE — PROGRESS NOTE ADULT - SUBJECTIVE AND OBJECTIVE BOX
PROGRESS NOTE:     Patient is a 59y old  Male who presents with a chief complaint of ESRD on HD transferred for placement of permanent HD catheter and renal biopsy.      INTERVAL HISTORY: NAEO. VSS. ROS negative.    MEDICATIONS  (STANDING):  acetaminophen     Tablet .. 650 milliGRAM(s) Oral every 6 hours  atorvastatin 40 milliGRAM(s) Oral at bedtime  calcium acetate 2001 milliGRAM(s) Oral three times a day with meals  ergocalciferol 48811 Unit(s) Oral every week  heparin   Injectable 5000 Unit(s) SubCutaneous every 8 hours  influenza   Vaccine 0.5 milliLiter(s) IntraMuscular once  labetalol 100 milliGRAM(s) Oral every 8 hours    MEDICATIONS  (PRN):  aluminum hydroxide/magnesium hydroxide/simethicone Suspension 30 milliLiter(s) Oral every 4 hours PRN Dyspepsia  melatonin 3 milliGRAM(s) Oral at bedtime PRN Insomnia      CAPILLARY BLOOD GLUCOSE        I&O's Summary      PHYSICAL EXAM:  Vital Signs Last 24 Hrs  T(C): 36.5 (14 Jan 2024 05:26), Max: 36.9 (13 Jan 2024 12:20)  T(F): 97.7 (14 Jan 2024 05:26), Max: 98.4 (13 Jan 2024 12:20)  HR: 73 (14 Jan 2024 05:26) (70 - 75)  BP: 145/86 (14 Jan 2024 05:26) (122/78 - 145/86)  BP(mean): --  RR: 17 (14 Jan 2024 05:26) (17 - 17)  SpO2: 98% (14 Jan 2024 05:26) (98% - 99%)    Parameters below as of 14 Jan 2024 05:26  Patient On (Oxygen Delivery Method): room air        CONSTITUTIONAL: NAD, well-developed  HEENT: EMILIE donaldson in place- non-bleeding   RESPIRATORY: Normal respiratory effort; lungs are clear to auscultation bilaterally  CARDIOVASCULAR: Regular rate and rhythm, normal S1 and S2, no murmur/rub/gallop; No lower extremity edema; Peripheral pulses are 2+ bilaterally  ABDOMEN: Nontender to palpation, normoactive bowel sounds, no rebound/guarding; No hepatosplenomegaly  MUSCULOSKELETAL: no clubbing or cyanosis of digits; no joint swelling or tenderness to palpation  PSYCH: A+O to person, place, and time; affect appropriate    LABS:                        9.6    6.56  )-----------( 370      ( 14 Jan 2024 05:51 )             27.8     01-14    135  |  100  |  40<H>  ----------------------------<  88  5.1   |  22  |  9.14<H>    Ca    7.9<L>      14 Jan 2024 05:51  Phos  4.1     01-14  Mg     2.30     01-14    TPro  6.5  /  Alb  3.1<L>  /  TBili  0.4  /  DBili  x   /  AST  22  /  ALT  31  /  AlkPhos  111  01-14      CARDIAC MARKERS ( 14 Jan 2024 05:51 )  x     / x     / 11 U/L / x     / x          Urinalysis Basic - ( 14 Jan 2024 05:51 )    Color: x / Appearance: x / SG: x / pH: x  Gluc: 88 mg/dL / Ketone: x  / Bili: x / Urobili: x   Blood: x / Protein: x / Nitrite: x   Leuk Esterase: x / RBC: x / WBC x   Sq Epi: x / Non Sq Epi: x / Bacteria: x      ******************************  Authored By: Cheryl Naidu MD PGY2  Internal Medicine  MS Teams  ******************************   PROGRESS NOTE:     Patient is a 59y old  Male who presents with a chief complaint of ESRD on HD transferred for placement of permanent HD catheter and renal biopsy.      INTERVAL HISTORY: NAEO. VSS. ROS negative.    MEDICATIONS  (STANDING):  acetaminophen     Tablet .. 650 milliGRAM(s) Oral every 6 hours  atorvastatin 40 milliGRAM(s) Oral at bedtime  calcium acetate 2001 milliGRAM(s) Oral three times a day with meals  ergocalciferol 05029 Unit(s) Oral every week  heparin   Injectable 5000 Unit(s) SubCutaneous every 8 hours  influenza   Vaccine 0.5 milliLiter(s) IntraMuscular once  labetalol 100 milliGRAM(s) Oral every 8 hours    MEDICATIONS  (PRN):  aluminum hydroxide/magnesium hydroxide/simethicone Suspension 30 milliLiter(s) Oral every 4 hours PRN Dyspepsia  melatonin 3 milliGRAM(s) Oral at bedtime PRN Insomnia      CAPILLARY BLOOD GLUCOSE        I&O's Summary      PHYSICAL EXAM:  Vital Signs Last 24 Hrs  T(C): 36.5 (14 Jan 2024 05:26), Max: 36.9 (13 Jan 2024 12:20)  T(F): 97.7 (14 Jan 2024 05:26), Max: 98.4 (13 Jan 2024 12:20)  HR: 73 (14 Jan 2024 05:26) (70 - 75)  BP: 145/86 (14 Jan 2024 05:26) (122/78 - 145/86)  BP(mean): --  RR: 17 (14 Jan 2024 05:26) (17 - 17)  SpO2: 98% (14 Jan 2024 05:26) (98% - 99%)    Parameters below as of 14 Jan 2024 05:26  Patient On (Oxygen Delivery Method): room air        CONSTITUTIONAL: NAD, well-developed  HEENT: EMILIE donaldson in place- non-bleeding   RESPIRATORY: Normal respiratory effort; lungs are clear to auscultation bilaterally  CARDIOVASCULAR: Regular rate and rhythm, normal S1 and S2, no murmur/rub/gallop; No lower extremity edema; Peripheral pulses are 2+ bilaterally  ABDOMEN: Nontender to palpation, normoactive bowel sounds, no rebound/guarding; No hepatosplenomegaly  MUSCULOSKELETAL: no clubbing or cyanosis of digits; no joint swelling or tenderness to palpation  PSYCH: A+O to person, place, and time; affect appropriate    LABS:                        9.6    6.56  )-----------( 370      ( 14 Jan 2024 05:51 )             27.8     01-14    135  |  100  |  40<H>  ----------------------------<  88  5.1   |  22  |  9.14<H>    Ca    7.9<L>      14 Jan 2024 05:51  Phos  4.1     01-14  Mg     2.30     01-14    TPro  6.5  /  Alb  3.1<L>  /  TBili  0.4  /  DBili  x   /  AST  22  /  ALT  31  /  AlkPhos  111  01-14      CARDIAC MARKERS ( 14 Jan 2024 05:51 )  x     / x     / 11 U/L / x     / x          Urinalysis Basic - ( 14 Jan 2024 05:51 )    Color: x / Appearance: x / SG: x / pH: x  Gluc: 88 mg/dL / Ketone: x  / Bili: x / Urobili: x   Blood: x / Protein: x / Nitrite: x   Leuk Esterase: x / RBC: x / WBC x   Sq Epi: x / Non Sq Epi: x / Bacteria: x      ******************************  Authored By: Cheryl Naidu MD PGY2  Internal Medicine  MS Teams  ******************************

## 2024-01-14 NOTE — PROGRESS NOTE ADULT - SUBJECTIVE AND OBJECTIVE BOX
[facility] DIVISION OF KIDNEY DISEASES AND HYPERTENSION   FOLLOW UP NOTE    --------------------------------------------------------------------------------  Chief Complaint:    24 hour events/subjective: Pt. was seen and examined today. Feels well. Denies any shortness of breath, chest pain, nausea or vomiting, abd pain.        PAST HISTORY  --------------------------------------------------------------------------------  No significant changes to PMH, PSH, FHx, SHx, unless otherwise noted    ALLERGIES & MEDICATIONS  --------------------------------------------------------------------------------  Allergies    No Known Allergies    Intolerances      Standing Inpatient Medications  acetaminophen     Tablet .. 650 milliGRAM(s) Oral every 6 hours  atorvastatin 40 milliGRAM(s) Oral at bedtime  calcium acetate 2001 milliGRAM(s) Oral three times a day with meals  ergocalciferol 26192 Unit(s) Oral every week  heparin   Injectable 5000 Unit(s) SubCutaneous every 8 hours  influenza   Vaccine 0.5 milliLiter(s) IntraMuscular once  labetalol 100 milliGRAM(s) Oral every 8 hours    PRN Inpatient Medications  aluminum hydroxide/magnesium hydroxide/simethicone Suspension 30 milliLiter(s) Oral every 4 hours PRN  melatonin 3 milliGRAM(s) Oral at bedtime PRN      REVIEW OF SYSTEMS  --------------------------------------------------------------------------------  as above    VITALS/PHYSICAL EXAM  --------------------------------------------------------------------------------  T(C): 36.1 (01-14-24 @ 13:05), Max: 36.6 (01-13-24 @ 21:41)  HR: 70 (01-14-24 @ 14:54) (70 - 75)  BP: 136/83 (01-14-24 @ 14:54) (127/76 - 145/86)  RR: 17 (01-14-24 @ 13:05) (17 - 17)  SpO2: 99% (01-14-24 @ 13:05) (98% - 99%)  Wt(kg): --  Height (cm): 165.1 (01-12-24 @ 19:43)  Weight (kg): 54.4 (01-12-24 @ 19:43)  BMI (kg/m2): 20 (01-12-24 @ 19:43)  BSA (m2): 1.59 (01-12-24 @ 19:43)        Physical Exam:  	Gen: NAD  	HEENT: Anicteric  	Pulm: CTA B/L  	CV: S1S2+  	Abd: Soft, +BS    	Ext: No LE edema B/L  	Neuro: Awake  	Skin: Warm and dry  	Dialysis access: [facility]      LABS/STUDIES  --------------------------------------------------------------------------------              9.6    6.56  >-----------<  370      [01-14-24 @ 05:51]              27.8     135  |  100  |  40  ----------------------------<  88      [01-14-24 @ 05:51]  5.1   |  22  |  9.14        Ca     7.9     [01-14-24 @ 05:51]      Mg     2.30     [01-14-24 @ 05:51]      Phos  4.1     [01-14-24 @ 05:51]    TPro  6.5  /  Alb  3.1  /  TBili  0.4  /  DBili  x   /  AST  22  /  ALT  31  /  AlkPhos  111  [01-14-24 @ 05:51]        CK 11      [01-14-24 @ 05:51]    Creatinine Trend:  SCr 9.14 [01-14 @ 05:51]  SCr 7.44 [01-13 @ 07:14]  SCr 10.15 [01-12 @ 05:56]  SCr 8.44 [01-11 @ 06:32]  SCr 12.33 [01-10 @ 07:56]    Urinalysis - [01-14-24 @ 05:51]      Color  / Appearance  / SG  / pH       Gluc 88 / Ketone   / Bili  / Urobili        Blood  / Protein  / Leuk Est  / Nitrite       RBC  / WBC  / Hyaline  / Gran  / Sq Epi  / Non Sq Epi  / Bacteria       HBsAb Reactive      [01-08-24 @ 05:50]  HBsAg Nonreact      [01-14-24 @ 05:51]  HCV 0.19, Nonreact      [01-14-24 @ 05:51]  HIV Nonreact      [01-14-24 @ 05:51]    DAVID: titer 1:160, pattern Speckled      [01-04-24 @ 06:55]  dsDNA <12      [01-03-24 @ 11:15]  C3 Complement 94      [01-14-24 @ 05:51]  C4 Complement 27      [01-14-24 @ 05:51]  ANCA: cANCA Negative, pANCA Negative, atypical ANCA Indeterminate Method interference due to DAVID Fluorescence      [01-04-24 @ 06:55]  anti-GBM <0.2      [01-04-24 @ 06:55]  Immunofixation Serum:   No Monoclonal Band Identified      Reference Range: None Detected      [01-03-24 @ 11:15]  SPEP Interpretation: Normal Electrophoresis Pattern      [01-04-24 @ 06:55]    Tacrolimus  Cyclosporine  Sirolimus  Mycophenolate  BK PCR  CMV PCR  Parvo PCR  EBV PCR [facility] DIVISION OF KIDNEY DISEASES AND HYPERTENSION   FOLLOW UP NOTE    --------------------------------------------------------------------------------  Chief Complaint:    24 hour events/subjective: Pt. was seen and examined today. Feels well. Denies any shortness of breath, chest pain, nausea or vomiting, abd pain.        PAST HISTORY  --------------------------------------------------------------------------------  No significant changes to PMH, PSH, FHx, SHx, unless otherwise noted    ALLERGIES & MEDICATIONS  --------------------------------------------------------------------------------  Allergies    No Known Allergies    Intolerances      Standing Inpatient Medications  acetaminophen     Tablet .. 650 milliGRAM(s) Oral every 6 hours  atorvastatin 40 milliGRAM(s) Oral at bedtime  calcium acetate 2001 milliGRAM(s) Oral three times a day with meals  ergocalciferol 73947 Unit(s) Oral every week  heparin   Injectable 5000 Unit(s) SubCutaneous every 8 hours  influenza   Vaccine 0.5 milliLiter(s) IntraMuscular once  labetalol 100 milliGRAM(s) Oral every 8 hours    PRN Inpatient Medications  aluminum hydroxide/magnesium hydroxide/simethicone Suspension 30 milliLiter(s) Oral every 4 hours PRN  melatonin 3 milliGRAM(s) Oral at bedtime PRN      REVIEW OF SYSTEMS  --------------------------------------------------------------------------------  as above    VITALS/PHYSICAL EXAM  --------------------------------------------------------------------------------  T(C): 36.1 (01-14-24 @ 13:05), Max: 36.6 (01-13-24 @ 21:41)  HR: 70 (01-14-24 @ 14:54) (70 - 75)  BP: 136/83 (01-14-24 @ 14:54) (127/76 - 145/86)  RR: 17 (01-14-24 @ 13:05) (17 - 17)  SpO2: 99% (01-14-24 @ 13:05) (98% - 99%)  Wt(kg): --  Height (cm): 165.1 (01-12-24 @ 19:43)  Weight (kg): 54.4 (01-12-24 @ 19:43)  BMI (kg/m2): 20 (01-12-24 @ 19:43)  BSA (m2): 1.59 (01-12-24 @ 19:43)        Physical Exam:  	Gen: NAD  	HEENT: Anicteric  	Pulm: CTA B/L  	CV: S1S2+  	Abd: Soft, +BS    	Ext: No LE edema B/L  	Neuro: Awake  	Skin: Warm and dry  	Dialysis access: [facility]      LABS/STUDIES  --------------------------------------------------------------------------------              9.6    6.56  >-----------<  370      [01-14-24 @ 05:51]              27.8     135  |  100  |  40  ----------------------------<  88      [01-14-24 @ 05:51]  5.1   |  22  |  9.14        Ca     7.9     [01-14-24 @ 05:51]      Mg     2.30     [01-14-24 @ 05:51]      Phos  4.1     [01-14-24 @ 05:51]    TPro  6.5  /  Alb  3.1  /  TBili  0.4  /  DBili  x   /  AST  22  /  ALT  31  /  AlkPhos  111  [01-14-24 @ 05:51]        CK 11      [01-14-24 @ 05:51]    Creatinine Trend:  SCr 9.14 [01-14 @ 05:51]  SCr 7.44 [01-13 @ 07:14]  SCr 10.15 [01-12 @ 05:56]  SCr 8.44 [01-11 @ 06:32]  SCr 12.33 [01-10 @ 07:56]    Urinalysis - [01-14-24 @ 05:51]      Color  / Appearance  / SG  / pH       Gluc 88 / Ketone   / Bili  / Urobili        Blood  / Protein  / Leuk Est  / Nitrite       RBC  / WBC  / Hyaline  / Gran  / Sq Epi  / Non Sq Epi  / Bacteria       HBsAb Reactive      [01-08-24 @ 05:50]  HBsAg Nonreact      [01-14-24 @ 05:51]  HCV 0.19, Nonreact      [01-14-24 @ 05:51]  HIV Nonreact      [01-14-24 @ 05:51]    DAVID: titer 1:160, pattern Speckled      [01-04-24 @ 06:55]  dsDNA <12      [01-03-24 @ 11:15]  C3 Complement 94      [01-14-24 @ 05:51]  C4 Complement 27      [01-14-24 @ 05:51]  ANCA: cANCA Negative, pANCA Negative, atypical ANCA Indeterminate Method interference due to DAVID Fluorescence      [01-04-24 @ 06:55]  anti-GBM <0.2      [01-04-24 @ 06:55]  Immunofixation Serum:   No Monoclonal Band Identified      Reference Range: None Detected      [01-03-24 @ 11:15]  SPEP Interpretation: Normal Electrophoresis Pattern      [01-04-24 @ 06:55]    Tacrolimus  Cyclosporine  Sirolimus  Mycophenolate  BK PCR  CMV PCR  Parvo PCR  EBV PCR

## 2024-01-14 NOTE — PROGRESS NOTE ADULT - ATTENDING COMMENTS
Patient examined and ROS reviewed. A case of DESIREE with nephrotic range of proteinuria being w/u for glomerular diseases and being dialyzed to support kidney function. Advised HD tomorrow.

## 2024-01-14 NOTE — PROGRESS NOTE ADULT - PROBLEM SELECTOR PLAN 1
DESIREE with proteinuria. R/o GN. No know baseline renal fx available. Recent admission at [facility] Cr 17 (Froy 3), received contrast (Froy 3) for cardiac work up.  UA pos for proteins, and RBC 12. Proteinuria of 3.6 gm per labs at [facility].   S/p placement of temp HD cath to initiate dialysis w/EPO on 1/6/24. Last HD 1/12 @ [facility]. Pt now transferred to [facility] for placement of HD catheter and work up for DESIREE    Recs:  -Labs and physical exam stable. please give Lokelma 5 gm today (Jan 14) as K borderline high  -Will plan for dialysis tomorrow Froy 15  -Await CK, DAVID, ds-DNA, RPR, HIV, HBV, HCV, C3, C4, ANCA, Phospholipase A2 receptor Ab, SPEP, Immunofixation, Serum Free Light Chains, UPCR  -Patient needs Strict I/O and avoid ACEi/ARBs  -Will monitor renal fx  -Depending on serology will plan for renal biopsy    BP controlled now; cw management    Reji Bravo  Nephrology Fellow  Feel free to contact me on TEAMS  After 4 pm please contact the on-call Fellow. DESIREE with proteinuria. R/o GN. No know baseline renal fx available. Recent admission at [facility] Cr 17 (Froy 3), received contrast (Froy 3) for cardiac work up.  UA pos for proteins, and RBC 12. Proteinuria of 3.6 gm per labs at [facility].   S/p placement of temp HD cath to initiate dialysis w/EPO on 1/6/24. Last HD 1/12 @ [facility]. Pt now transferred to [facility] for placement of HD catheter and work up for DESIREE    Recs:  -Labs and physical exam stable. please give Lokelma 5 gm today (Jan 14) as K borderline high  -Will plan for dialysis tomorrow Froy 15  -Await CK, DAVID, ds-DNA, RPR, HIV, HBV, HCV, C3, C4, ANCA, Phospholipase A2 receptor Ab, SPEP, Immunofixation, Serum Free Light Chains, UPCR  -Patient needs Strict I/O and avoid ACEi/ARBs  -Will monitor renal fx  -Depending on serology will plan for renal biopsy    BP controlled now; cw management    Reji Bravo  Nephrology Fellow  Feel free to contact me on TEAMS  After 4 pm please contact the on-call Fellow.

## 2024-01-14 NOTE — PROGRESS NOTE ADULT - PROBLEM SELECTOR PLAN 2
- New ESRD with temp HD cath placement on 1/6/24  - Record of HD: 1/6, 1/8, 1/10, 1/11, 1/12 @ [facility]  - Transfer to [facility] 1/12 for HD cath placement (perm)  - [facility]: Chikis Laird [phone]  - IR consult for HD placement and renal biopsy in the setting of nephrotic proteinuria, consult order placed, nephrology consulted, f/u in AM  -phos and vitamin D hydroxy, PTH ordered in AM - New ESRD with temp HD cath placement on 1/6/24  - Record of HD: 1/6, 1/8, 1/10, 1/11, 1/12 @ [facility]  - Transfer to [facility] 1/12 for HD cath placement (perm)  - [facility]: Chikis Laird [phone]  - IR consult for HD placement and renal biopsy in the setting of nephrotic proteinuria, consult order placed, nephrology consulted, f/u in AM  -phos and vitamin D hydroxy, PTH ordered in AM

## 2024-01-14 NOTE — PROGRESS NOTE ADULT - PROBLEM SELECTOR PLAN 8
DVT PPx:SCDs for now, to be re-evaluated for chemical DVT prophylaxis once VIR plan determined.   Diet: Renal  Code: Full  Dispo: PT/OT Pending Hospital Course

## 2024-01-14 NOTE — PROGRESS NOTE ADULT - ASSESSMENT
57yo M with no known PMHx (not followed by PCP), but FHx (brother) w/ESRD s/p tplant who presented to [facility] with n/v/abdominal pain, admitted for renal failure and r/o ACS. Hospital course c/b flu A, sepsis 2/2 ETEC, anemia and NSTEMI evaluation s/p stress test. Now pt transferred to [facility] and pending renal bx and perm HD cath placement; last HD session 1/12.  Accepting physician: Dr. Chery.    59yo M with no known PMHx (not followed by PCP), but FHx (brother) w/ESRD s/p tplant who presented to [facility] with n/v/abdominal pain, admitted for renal failure and r/o ACS. Hospital course c/b flu A, sepsis 2/2 ETEC, anemia and NSTEMI evaluation s/p stress test. Now pt transferred to [facility] and pending renal bx and perm HD cath placement; last HD session 1/12.  Accepting physician: Dr. Chery.

## 2024-01-14 NOTE — PROGRESS NOTE ADULT - PROBLEM SELECTOR PLAN 6
RESOLVED-NSTEMI in the setting of demand ischemia in the setting of HTN emergency  - presented at  with N/V/D and 4 years of chest pain withe exertion, progressively worsening and no prior PCP or physician evaluation; on admission at , elevated troponin with peak to 715.  - EKG nonischemic, Cr elevated +/- demand i/s/o HTN urgency; echo with mod pulm htn, nuclear stress test unremarkable, most likely i/s/o demand ischemia   - Echo: consistent with normal LVEF 65-70%, LVH and moderate pulm HTN  -Cards recommended non-invasive ischemic eval given contrast induced nephropathy and new renal failure: Stress test on 1/9/23: LVEF 66%  -- Given normal myocardial perfusion, CV stable  - C/W statin, and BB and outpt cardiology follow-up with [facility]   - held hep for 1/13 AM given possibility for bx/HD placement RESOLVED-NSTEMI in the setting of demand ischemia in the setting of HTN emergency  - presented at  with N/V/D and 4 years of chest pain withe exertion, progressively worsening and no prior PCP or physician evaluation; on admission at , elevated troponin with peak to 715.  - EKG nonischemic, Cr elevated +/- demand i/s/o HTN urgency; echo with mod pulm htn, nuclear stress test unremarkable, most likely i/s/o demand ischemia   - Echo: consistent with normal LVEF 65-70%, LVH and moderate pulm HTN  -Cards recommended non-invasive ischemic eval given contrast induced nephropathy and new renal failure: Stress test on 1/9/23: LVEF 66%  -- Given normal myocardial perfusion, CV stable  - C/W statin, and BB and outpt cardiology follow-up with [facility]   - held hep for 1/13 AM given possibility for bx/HD placement

## 2024-01-14 NOTE — PROGRESS NOTE ADULT - ATTENDING COMMENTS
Pt doing well without complaint. No apparent need for HD today. Anticipate permacath and renal biopsy this week by IR.

## 2024-01-15 LAB
ALBUMIN SERPL ELPH-MCNC: 3.1 G/DL — LOW (ref 3.3–5)
ALBUMIN SERPL ELPH-MCNC: 3.1 G/DL — LOW (ref 3.3–5)
ALP SERPL-CCNC: 107 U/L — SIGNIFICANT CHANGE UP (ref 40–120)
ALP SERPL-CCNC: 107 U/L — SIGNIFICANT CHANGE UP (ref 40–120)
ALT FLD-CCNC: 25 U/L — SIGNIFICANT CHANGE UP (ref 4–41)
ALT FLD-CCNC: 25 U/L — SIGNIFICANT CHANGE UP (ref 4–41)
ANION GAP SERPL CALC-SCNC: 15 MMOL/L — HIGH (ref 7–14)
ANION GAP SERPL CALC-SCNC: 15 MMOL/L — HIGH (ref 7–14)
AST SERPL-CCNC: 25 U/L — SIGNIFICANT CHANGE UP (ref 4–40)
AST SERPL-CCNC: 25 U/L — SIGNIFICANT CHANGE UP (ref 4–40)
BASOPHILS # BLD AUTO: 0.02 K/UL — SIGNIFICANT CHANGE UP (ref 0–0.2)
BASOPHILS # BLD AUTO: 0.02 K/UL — SIGNIFICANT CHANGE UP (ref 0–0.2)
BASOPHILS NFR BLD AUTO: 0.3 % — SIGNIFICANT CHANGE UP (ref 0–2)
BASOPHILS NFR BLD AUTO: 0.3 % — SIGNIFICANT CHANGE UP (ref 0–2)
BILIRUB SERPL-MCNC: 0.4 MG/DL — SIGNIFICANT CHANGE UP (ref 0.2–1.2)
BILIRUB SERPL-MCNC: 0.4 MG/DL — SIGNIFICANT CHANGE UP (ref 0.2–1.2)
BUN SERPL-MCNC: 53 MG/DL — HIGH (ref 7–23)
BUN SERPL-MCNC: 53 MG/DL — HIGH (ref 7–23)
CALCIUM SERPL-MCNC: 7.8 MG/DL — LOW (ref 8.4–10.5)
CALCIUM SERPL-MCNC: 7.8 MG/DL — LOW (ref 8.4–10.5)
CHLORIDE SERPL-SCNC: 99 MMOL/L — SIGNIFICANT CHANGE UP (ref 98–107)
CHLORIDE SERPL-SCNC: 99 MMOL/L — SIGNIFICANT CHANGE UP (ref 98–107)
CO2 SERPL-SCNC: 21 MMOL/L — LOW (ref 22–31)
CO2 SERPL-SCNC: 21 MMOL/L — LOW (ref 22–31)
CREAT SERPL-MCNC: 10.66 MG/DL — HIGH (ref 0.5–1.3)
CREAT SERPL-MCNC: 10.66 MG/DL — HIGH (ref 0.5–1.3)
EGFR: 5 ML/MIN/1.73M2 — LOW
EGFR: 5 ML/MIN/1.73M2 — LOW
EOSINOPHIL # BLD AUTO: 0.16 K/UL — SIGNIFICANT CHANGE UP (ref 0–0.5)
EOSINOPHIL # BLD AUTO: 0.16 K/UL — SIGNIFICANT CHANGE UP (ref 0–0.5)
EOSINOPHIL NFR BLD AUTO: 2.5 % — SIGNIFICANT CHANGE UP (ref 0–6)
EOSINOPHIL NFR BLD AUTO: 2.5 % — SIGNIFICANT CHANGE UP (ref 0–6)
GLUCOSE SERPL-MCNC: 109 MG/DL — HIGH (ref 70–99)
GLUCOSE SERPL-MCNC: 109 MG/DL — HIGH (ref 70–99)
HCT VFR BLD CALC: 27.6 % — LOW (ref 39–50)
HCT VFR BLD CALC: 27.6 % — LOW (ref 39–50)
HGB BLD-MCNC: 9.4 G/DL — LOW (ref 13–17)
HGB BLD-MCNC: 9.4 G/DL — LOW (ref 13–17)
IANC: 4.6 K/UL — SIGNIFICANT CHANGE UP (ref 1.8–7.4)
IANC: 4.6 K/UL — SIGNIFICANT CHANGE UP (ref 1.8–7.4)
IMM GRANULOCYTES NFR BLD AUTO: 0.8 % — SIGNIFICANT CHANGE UP (ref 0–0.9)
IMM GRANULOCYTES NFR BLD AUTO: 0.8 % — SIGNIFICANT CHANGE UP (ref 0–0.9)
LYMPHOCYTES # BLD AUTO: 1.03 K/UL — SIGNIFICANT CHANGE UP (ref 1–3.3)
LYMPHOCYTES # BLD AUTO: 1.03 K/UL — SIGNIFICANT CHANGE UP (ref 1–3.3)
LYMPHOCYTES # BLD AUTO: 16 % — SIGNIFICANT CHANGE UP (ref 13–44)
LYMPHOCYTES # BLD AUTO: 16 % — SIGNIFICANT CHANGE UP (ref 13–44)
MAGNESIUM SERPL-MCNC: 2.5 MG/DL — SIGNIFICANT CHANGE UP (ref 1.6–2.6)
MAGNESIUM SERPL-MCNC: 2.5 MG/DL — SIGNIFICANT CHANGE UP (ref 1.6–2.6)
MCHC RBC-ENTMCNC: 27.6 PG — SIGNIFICANT CHANGE UP (ref 27–34)
MCHC RBC-ENTMCNC: 27.6 PG — SIGNIFICANT CHANGE UP (ref 27–34)
MCHC RBC-ENTMCNC: 34.1 GM/DL — SIGNIFICANT CHANGE UP (ref 32–36)
MCHC RBC-ENTMCNC: 34.1 GM/DL — SIGNIFICANT CHANGE UP (ref 32–36)
MCV RBC AUTO: 80.9 FL — SIGNIFICANT CHANGE UP (ref 80–100)
MCV RBC AUTO: 80.9 FL — SIGNIFICANT CHANGE UP (ref 80–100)
MONOCYTES # BLD AUTO: 0.58 K/UL — SIGNIFICANT CHANGE UP (ref 0–0.9)
MONOCYTES # BLD AUTO: 0.58 K/UL — SIGNIFICANT CHANGE UP (ref 0–0.9)
MONOCYTES NFR BLD AUTO: 9 % — SIGNIFICANT CHANGE UP (ref 2–14)
MONOCYTES NFR BLD AUTO: 9 % — SIGNIFICANT CHANGE UP (ref 2–14)
NEUTROPHILS # BLD AUTO: 4.6 K/UL — SIGNIFICANT CHANGE UP (ref 1.8–7.4)
NEUTROPHILS # BLD AUTO: 4.6 K/UL — SIGNIFICANT CHANGE UP (ref 1.8–7.4)
NEUTROPHILS NFR BLD AUTO: 71.4 % — SIGNIFICANT CHANGE UP (ref 43–77)
NEUTROPHILS NFR BLD AUTO: 71.4 % — SIGNIFICANT CHANGE UP (ref 43–77)
NRBC # BLD: 0 /100 WBCS — SIGNIFICANT CHANGE UP (ref 0–0)
NRBC # BLD: 0 /100 WBCS — SIGNIFICANT CHANGE UP (ref 0–0)
NRBC # FLD: 0 K/UL — SIGNIFICANT CHANGE UP (ref 0–0)
NRBC # FLD: 0 K/UL — SIGNIFICANT CHANGE UP (ref 0–0)
PHOSPHATE SERPL-MCNC: 4.8 MG/DL — HIGH (ref 2.5–4.5)
PHOSPHATE SERPL-MCNC: 4.8 MG/DL — HIGH (ref 2.5–4.5)
PLATELET # BLD AUTO: 398 K/UL — SIGNIFICANT CHANGE UP (ref 150–400)
PLATELET # BLD AUTO: 398 K/UL — SIGNIFICANT CHANGE UP (ref 150–400)
POTASSIUM SERPL-MCNC: 5.5 MMOL/L — HIGH (ref 3.5–5.3)
POTASSIUM SERPL-MCNC: 5.5 MMOL/L — HIGH (ref 3.5–5.3)
POTASSIUM SERPL-SCNC: 5.5 MMOL/L — HIGH (ref 3.5–5.3)
POTASSIUM SERPL-SCNC: 5.5 MMOL/L — HIGH (ref 3.5–5.3)
PROT SERPL-MCNC: 6.2 G/DL — SIGNIFICANT CHANGE UP (ref 6–8.3)
PROT SERPL-MCNC: 6.2 G/DL — SIGNIFICANT CHANGE UP (ref 6–8.3)
RBC # BLD: 3.41 M/UL — LOW (ref 4.2–5.8)
RBC # BLD: 3.41 M/UL — LOW (ref 4.2–5.8)
RBC # FLD: 14.6 % — HIGH (ref 10.3–14.5)
RBC # FLD: 14.6 % — HIGH (ref 10.3–14.5)
SODIUM SERPL-SCNC: 135 MMOL/L — SIGNIFICANT CHANGE UP (ref 135–145)
SODIUM SERPL-SCNC: 135 MMOL/L — SIGNIFICANT CHANGE UP (ref 135–145)
WBC # BLD: 6.44 K/UL — SIGNIFICANT CHANGE UP (ref 3.8–10.5)
WBC # BLD: 6.44 K/UL — SIGNIFICANT CHANGE UP (ref 3.8–10.5)
WBC # FLD AUTO: 6.44 K/UL — SIGNIFICANT CHANGE UP (ref 3.8–10.5)
WBC # FLD AUTO: 6.44 K/UL — SIGNIFICANT CHANGE UP (ref 3.8–10.5)

## 2024-01-15 PROCEDURE — 90935 HEMODIALYSIS ONE EVALUATION: CPT | Mod: GC

## 2024-01-15 PROCEDURE — 99233 SBSQ HOSP IP/OBS HIGH 50: CPT | Mod: GC

## 2024-01-15 RX ADMIN — Medication 100 MILLIGRAM(S): at 21:07

## 2024-01-15 RX ADMIN — HEPARIN SODIUM 5000 UNIT(S): 5000 INJECTION INTRAVENOUS; SUBCUTANEOUS at 21:09

## 2024-01-15 RX ADMIN — ATORVASTATIN CALCIUM 40 MILLIGRAM(S): 80 TABLET, FILM COATED ORAL at 21:07

## 2024-01-15 RX ADMIN — HEPARIN SODIUM 5000 UNIT(S): 5000 INJECTION INTRAVENOUS; SUBCUTANEOUS at 15:12

## 2024-01-15 RX ADMIN — Medication 2001 MILLIGRAM(S): at 18:02

## 2024-01-15 RX ADMIN — Medication 2001 MILLIGRAM(S): at 09:09

## 2024-01-15 RX ADMIN — Medication 100 MILLIGRAM(S): at 06:02

## 2024-01-15 RX ADMIN — Medication 650 MILLIGRAM(S): at 06:13

## 2024-01-15 RX ADMIN — HEPARIN SODIUM 5000 UNIT(S): 5000 INJECTION INTRAVENOUS; SUBCUTANEOUS at 06:03

## 2024-01-15 RX ADMIN — Medication 2001 MILLIGRAM(S): at 15:12

## 2024-01-15 RX ADMIN — Medication 100 MILLIGRAM(S): at 15:12

## 2024-01-15 RX ADMIN — Medication 650 MILLIGRAM(S): at 18:01

## 2024-01-15 NOTE — PROGRESS NOTE ADULT - ATTENDING COMMENTS
Pt doing well without acute event. Appreciate Nephrology input for HD. Plan permacath palcement and renal biopsy via IR this week.

## 2024-01-15 NOTE — PROGRESS NOTE ADULT - SUBJECTIVE AND OBJECTIVE BOX
Jessica Tejada, PGY1    Patient is a 59y old  Male who presents with a chief complaint of ESRD on HD transferred for placement of permanent HD catheter and renal biopsy (14 Jan 2024 18:24)      SUBJECTIVE / OVERNIGHT EVENTS: NAEO. Pt denies chest pain, SOB, N/V, fever/chills, or changes in bowel movements.    MEDICATIONS  (STANDING):  acetaminophen     Tablet .. 650 milliGRAM(s) Oral every 6 hours  atorvastatin 40 milliGRAM(s) Oral at bedtime  calcium acetate 2001 milliGRAM(s) Oral three times a day with meals  ergocalciferol 66314 Unit(s) Oral every week  heparin   Injectable 5000 Unit(s) SubCutaneous every 8 hours  influenza   Vaccine 0.5 milliLiter(s) IntraMuscular once  labetalol 100 milliGRAM(s) Oral every 8 hours    MEDICATIONS  (PRN):  aluminum hydroxide/magnesium hydroxide/simethicone Suspension 30 milliLiter(s) Oral every 4 hours PRN Dyspepsia  melatonin 3 milliGRAM(s) Oral at bedtime PRN Insomnia      CAPILLARY BLOOD GLUCOSE        I&O's Summary    14 Jan 2024 07:01  -  15 Froy 2024 07:00  --------------------------------------------------------  IN: 200 mL / OUT: 0 mL / NET: 200 mL        Vital Signs Last 24 Hrs  T(C): 36.7 (15 Froy 2024 05:15), Max: 36.7 (15 Froy 2024 05:15)  T(F): 98.1 (15 Froy 2024 05:15), Max: 98.1 (15 Froy 2024 05:15)  HR: 74 (15 Froy 2024 05:15) (68 - 75)  BP: 163/89 (15 Froy 2024 05:15) (127/76 - 163/89)  BP(mean): --  RR: 18 (15 Froy 2024 05:15) (17 - 18)  SpO2: 98% (15 Froy 2024 05:15) (98% - 99%)    Parameters below as of 15 Froy 2024 05:15  Patient On (Oxygen Delivery Method): room air        PHYSICAL EXAM:  GENERAL: NAD, well-developed, well-nourished  HEAD: Atraumatic, Normocephalic  EYES: Conjunctiva and sclera clear  CHEST/LUNG: Clear to auscultation bilaterally; No wheezes or crackles  HEART: Normal S1/S2; Regular rate and rhythm; No murmurs, rubs, or gallops  ABDOMEN: Soft, Nontender, Nondistended; Bowel sounds present  EXTREMITIES: No clubbing, cyanosis, or edema  PSYCH: A&Ox3      LABS:                        9.6    6.56  )-----------( 370      ( 14 Jan 2024 05:51 )             27.8      01-14    135  |  100  |  40<H>  ----------------------------<  88  5.1   |  22  |  9.14<H>    Ca    7.9<L>      14 Jan 2024 05:51  Phos  4.1     01-14  Mg     2.30     01-14    TPro  6.5  /  Alb  3.1<L>  /  TBili  0.4  /  DBili  x   /  AST  22  /  ALT  31  /  AlkPhos  111  01-14      CARDIAC MARKERS ( 14 Jan 2024 05:51 )  x     / x     / 11 U/L / x     / x          Urinalysis Basic - ( 14 Jan 2024 05:51 )    Color: x / Appearance: x / SG: x / pH: x  Gluc: 88 mg/dL / Ketone: x  / Bili: x / Urobili: x   Blood: x / Protein: x / Nitrite: x   Leuk Esterase: x / RBC: x / WBC x   Sq Epi: x / Non Sq Epi: x / Bacteria: x        RADIOLOGY & ADDITIONAL TESTS:     Jessica Tejada, PGY1    Patient is a 59y old  Male who presents with a chief complaint of ESRD on HD transferred for placement of permanent HD catheter and renal biopsy (14 Jan 2024 18:24)      SUBJECTIVE / OVERNIGHT EVENTS: NAEO. Pt denies chest pain, SOB, N/V, fever/chills, or changes in bowel movements.    MEDICATIONS  (STANDING):  acetaminophen     Tablet .. 650 milliGRAM(s) Oral every 6 hours  atorvastatin 40 milliGRAM(s) Oral at bedtime  calcium acetate 2001 milliGRAM(s) Oral three times a day with meals  ergocalciferol 04779 Unit(s) Oral every week  heparin   Injectable 5000 Unit(s) SubCutaneous every 8 hours  influenza   Vaccine 0.5 milliLiter(s) IntraMuscular once  labetalol 100 milliGRAM(s) Oral every 8 hours    MEDICATIONS  (PRN):  aluminum hydroxide/magnesium hydroxide/simethicone Suspension 30 milliLiter(s) Oral every 4 hours PRN Dyspepsia  melatonin 3 milliGRAM(s) Oral at bedtime PRN Insomnia      CAPILLARY BLOOD GLUCOSE        I&O's Summary    14 Jan 2024 07:01  -  15 Froy 2024 07:00  --------------------------------------------------------  IN: 200 mL / OUT: 0 mL / NET: 200 mL        Vital Signs Last 24 Hrs  T(C): 36.7 (15 Froy 2024 05:15), Max: 36.7 (15 Froy 2024 05:15)  T(F): 98.1 (15 Froy 2024 05:15), Max: 98.1 (15 Froy 2024 05:15)  HR: 74 (15 Froy 2024 05:15) (68 - 75)  BP: 163/89 (15 Froy 2024 05:15) (127/76 - 163/89)  BP(mean): --  RR: 18 (15 Froy 2024 05:15) (17 - 18)  SpO2: 98% (15 Froy 2024 05:15) (98% - 99%)    Parameters below as of 15 Froy 2024 05:15  Patient On (Oxygen Delivery Method): room air        PHYSICAL EXAM:  GENERAL: NAD, well-developed, well-nourished  HEAD: Atraumatic, Normocephalic  EYES: Conjunctiva and sclera clear  CHEST/LUNG: Clear to auscultation bilaterally; No wheezes or crackles  HEART: Normal S1/S2; Regular rate and rhythm; No murmurs, rubs, or gallops  ABDOMEN: Soft, Nontender, Nondistended; Bowel sounds present  EXTREMITIES: No clubbing, cyanosis, or edema  PSYCH: A&Ox3      LABS:                        9.6    6.56  )-----------( 370      ( 14 Jan 2024 05:51 )             27.8      01-14    135  |  100  |  40<H>  ----------------------------<  88  5.1   |  22  |  9.14<H>    Ca    7.9<L>      14 Jan 2024 05:51  Phos  4.1     01-14  Mg     2.30     01-14    TPro  6.5  /  Alb  3.1<L>  /  TBili  0.4  /  DBili  x   /  AST  22  /  ALT  31  /  AlkPhos  111  01-14      CARDIAC MARKERS ( 14 Jan 2024 05:51 )  x     / x     / 11 U/L / x     / x          Urinalysis Basic - ( 14 Jan 2024 05:51 )    Color: x / Appearance: x / SG: x / pH: x  Gluc: 88 mg/dL / Ketone: x  / Bili: x / Urobili: x   Blood: x / Protein: x / Nitrite: x   Leuk Esterase: x / RBC: x / WBC x   Sq Epi: x / Non Sq Epi: x / Bacteria: x        RADIOLOGY & ADDITIONAL TESTS:     Jessica Tejada, PGY1    Patient is a 59y old  Male who presents with a chief complaint of ESRD on HD transferred for placement of permanent HD catheter and renal biopsy (14 Jan 2024 18:24)      SUBJECTIVE / OVERNIGHT EVENTS: NAEO. Pt denies chest pain, SOB, N/V, fever/chills, or changes in bowel movements.    MEDICATIONS  (STANDING):  acetaminophen     Tablet .. 650 milliGRAM(s) Oral every 6 hours  atorvastatin 40 milliGRAM(s) Oral at bedtime  calcium acetate 2001 milliGRAM(s) Oral three times a day with meals  ergocalciferol 76664 Unit(s) Oral every week  heparin   Injectable 5000 Unit(s) SubCutaneous every 8 hours  influenza   Vaccine 0.5 milliLiter(s) IntraMuscular once  labetalol 100 milliGRAM(s) Oral every 8 hours    MEDICATIONS  (PRN):  aluminum hydroxide/magnesium hydroxide/simethicone Suspension 30 milliLiter(s) Oral every 4 hours PRN Dyspepsia  melatonin 3 milliGRAM(s) Oral at bedtime PRN Insomnia      CAPILLARY BLOOD GLUCOSE        I&O's Summary    14 Jan 2024 07:01  -  15 Froy 2024 07:00  --------------------------------------------------------  IN: 200 mL / OUT: 0 mL / NET: 200 mL        Vital Signs Last 24 Hrs  T(C): 36.7 (15 Froy 2024 05:15), Max: 36.7 (15 Froy 2024 05:15)  T(F): 98.1 (15 Froy 2024 05:15), Max: 98.1 (15 Froy 2024 05:15)  HR: 74 (15 Froy 2024 05:15) (68 - 75)  BP: 163/89 (15 Froy 2024 05:15) (127/76 - 163/89)  BP(mean): --  RR: 18 (15 Froy 2024 05:15) (17 - 18)  SpO2: 98% (15 Froy 2024 05:15) (98% - 99%)    Parameters below as of 15 Froy 2024 05:15  Patient On (Oxygen Delivery Method): room air        PHYSICAL EXAM:  GENERAL: NAD, well-developed, well-nourished  HEAD: Atraumatic, Normocephalic  EYES: Conjunctiva and sclera clear  CHEST/LUNG: Clear to auscultation bilaterally; No wheezes or crackles  HEART: Normal S1/S2; Regular rate and rhythm; No murmurs, rubs, or gallops  ABDOMEN: Soft, Nontender, Nondistended; Bowel sounds present  EXTREMITIES: No clubbing, cyanosis, or edema  PSYCH: A&Ox3      LABS:                        9.6    6.56  )-----------( 370      ( 14 Jan 2024 05:51 )             27.8      01-14    135  |  100  |  40<H>  ----------------------------<  88  5.1   |  22  |  9.14<H>    Ca    7.9<L>      14 Jan 2024 05:51  Phos  4.1     01-14  Mg     2.30     01-14    TPro  6.5  /  Alb  3.1<L>  /  TBili  0.4  /  DBili  x   /  AST  22  /  ALT  31  /  AlkPhos  111  01-14      CARDIAC MARKERS ( 14 Jan 2024 05:51 )  x     / x     / 11 U/L / x     / x          Urinalysis Basic - ( 14 Jan 2024 05:51 )    Color: x / Appearance: x / SG: x / pH: x  Gluc: 88 mg/dL / Ketone: x  / Bili: x / Urobili: x   Blood: x / Protein: x / Nitrite: x   Leuk Esterase: x / RBC: x / WBC x   Sq Epi: x / Non Sq Epi: x / Bacteria: x        RADIOLOGY & ADDITIONAL TESTS:     Jessica Tejada, PGY1    Patient is a 59y old  Male who presents with a chief complaint of ESRD on HD transferred for placement of permanent HD catheter and renal biopsy (14 Jan 2024 18:24)      SUBJECTIVE / OVERNIGHT EVENTS: NAEO. Pt denies chest pain, SOB, N/V, fever/chills, or changes in bowel movements.    MEDICATIONS  (STANDING):  acetaminophen     Tablet .. 650 milliGRAM(s) Oral every 6 hours  atorvastatin 40 milliGRAM(s) Oral at bedtime  calcium acetate 2001 milliGRAM(s) Oral three times a day with meals  ergocalciferol 77466 Unit(s) Oral every week  heparin   Injectable 5000 Unit(s) SubCutaneous every 8 hours  influenza   Vaccine 0.5 milliLiter(s) IntraMuscular once  labetalol 100 milliGRAM(s) Oral every 8 hours    MEDICATIONS  (PRN):  aluminum hydroxide/magnesium hydroxide/simethicone Suspension 30 milliLiter(s) Oral every 4 hours PRN Dyspepsia  melatonin 3 milliGRAM(s) Oral at bedtime PRN Insomnia      CAPILLARY BLOOD GLUCOSE        I&O's Summary    14 Jan 2024 07:01  -  15 Froy 2024 07:00  --------------------------------------------------------  IN: 200 mL / OUT: 0 mL / NET: 200 mL        Vital Signs Last 24 Hrs  T(C): 36.7 (15 Froy 2024 05:15), Max: 36.7 (15 Froy 2024 05:15)  T(F): 98.1 (15 Froy 2024 05:15), Max: 98.1 (15 Froy 2024 05:15)  HR: 74 (15 Froy 2024 05:15) (68 - 75)  BP: 163/89 (15 Froy 2024 05:15) (127/76 - 163/89)  BP(mean): --  RR: 18 (15 Froy 2024 05:15) (17 - 18)  SpO2: 98% (15 Froy 2024 05:15) (98% - 99%)    Parameters below as of 15 Froy 2024 05:15  Patient On (Oxygen Delivery Method): room air        PHYSICAL EXAM:  GENERAL: NAD, well-developed, well-nourished  HEAD: Atraumatic, Normocephalic  EYES: Conjunctiva and sclera clear  CHEST/LUNG: Clear to auscultation bilaterally; No wheezes or crackles  HEART: Normal S1/S2; Regular rate and rhythm; No murmurs, rubs, or gallops  ABDOMEN: Soft, Nontender, Nondistended; Bowel sounds present  EXTREMITIES: No clubbing, cyanosis, or edema  PSYCH: A&Ox3      LABS:                        9.6    6.56  )-----------( 370      ( 14 Jan 2024 05:51 )             27.8      01-14    135  |  100  |  40<H>  ----------------------------<  88  5.1   |  22  |  9.14<H>    Ca    7.9<L>      14 Jan 2024 05:51  Phos  4.1     01-14  Mg     2.30     01-14    TPro  6.5  /  Alb  3.1<L>  /  TBili  0.4  /  DBili  x   /  AST  22  /  ALT  31  /  AlkPhos  111  01-14      CARDIAC MARKERS ( 14 Jan 2024 05:51 )  x     / x     / 11 U/L / x     / x          Urinalysis Basic - ( 14 Jan 2024 05:51 )    Color: x / Appearance: x / SG: x / pH: x  Gluc: 88 mg/dL / Ketone: x  / Bili: x / Urobili: x   Blood: x / Protein: x / Nitrite: x   Leuk Esterase: x / RBC: x / WBC x   Sq Epi: x / Non Sq Epi: x / Bacteria: x        RADIOLOGY & ADDITIONAL TESTS:     Jessica Tejada, PGY1    Patient is a 59y old  Male who presents with a chief complaint of ESRD on HD transferred for placement of permanent HD catheter and renal biopsy (14 Jan 2024 18:24)      SUBJECTIVE / OVERNIGHT EVENTS: NAEO. Pt denies chest pain, SOB, N/V, fever/chills, or changes in bowel movements.    MEDICATIONS  (STANDING):  acetaminophen     Tablet .. 650 milliGRAM(s) Oral every 6 hours  atorvastatin 40 milliGRAM(s) Oral at bedtime  calcium acetate 2001 milliGRAM(s) Oral three times a day with meals  ergocalciferol 38955 Unit(s) Oral every week  heparin   Injectable 5000 Unit(s) SubCutaneous every 8 hours  influenza   Vaccine 0.5 milliLiter(s) IntraMuscular once  labetalol 100 milliGRAM(s) Oral every 8 hours    MEDICATIONS  (PRN):  aluminum hydroxide/magnesium hydroxide/simethicone Suspension 30 milliLiter(s) Oral every 4 hours PRN Dyspepsia  melatonin 3 milliGRAM(s) Oral at bedtime PRN Insomnia      CAPILLARY BLOOD GLUCOSE        I&O's Summary    14 Jan 2024 07:01  -  15 Froy 2024 07:00  --------------------------------------------------------  IN: 200 mL / OUT: 0 mL / NET: 200 mL        Vital Signs Last 24 Hrs  T(C): 36.7 (15 Froy 2024 05:15), Max: 36.7 (15 Froy 2024 05:15)  T(F): 98.1 (15 Froy 2024 05:15), Max: 98.1 (15 Froy 2024 05:15)  HR: 74 (15 Froy 2024 05:15) (68 - 75)  BP: 163/89 (15 Froy 2024 05:15) (127/76 - 163/89)  BP(mean): --  RR: 18 (15 Froy 2024 05:15) (17 - 18)  SpO2: 98% (15 Froy 2024 05:15) (98% - 99%)    Parameters below as of 15 Froy 2024 05:15  Patient On (Oxygen Delivery Method): room air        PHYSICAL EXAM:  GENERAL: NAD, well-developed, well-nourished  HEAD: Atraumatic, Normocephalic  NECK:  RIJ shiley in place- non-bleeding   EYES: Conjunctiva and sclera clear  CHEST/LUNG: Clear to auscultation bilaterally; No wheezes or crackles  HEART: Normal S1/S2; Regular rate and rhythm; No murmurs, rubs, or gallops  ABDOMEN: Soft, Nontender, Nondistended; Bowel sounds present  EXTREMITIES: No clubbing, cyanosis, or edema  PSYCH: A&Ox3      LABS:                        9.6    6.56  )-----------( 370      ( 14 Jan 2024 05:51 )             27.8      01-14    135  |  100  |  40<H>  ----------------------------<  88  5.1   |  22  |  9.14<H>    Ca    7.9<L>      14 Jan 2024 05:51  Phos  4.1     01-14  Mg     2.30     01-14    TPro  6.5  /  Alb  3.1<L>  /  TBili  0.4  /  DBili  x   /  AST  22  /  ALT  31  /  AlkPhos  111  01-14      CARDIAC MARKERS ( 14 Jan 2024 05:51 )  x     / x     / 11 U/L / x     / x          Urinalysis Basic - ( 14 Jan 2024 05:51 )    Color: x / Appearance: x / SG: x / pH: x  Gluc: 88 mg/dL / Ketone: x  / Bili: x / Urobili: x   Blood: x / Protein: x / Nitrite: x   Leuk Esterase: x / RBC: x / WBC x   Sq Epi: x / Non Sq Epi: x / Bacteria: x        RADIOLOGY & ADDITIONAL TESTS:     Jessica Tejada, PGY1    Patient is a 59y old  Male who presents with a chief complaint of ESRD on HD transferred for placement of permanent HD catheter and renal biopsy (14 Jan 2024 18:24)      SUBJECTIVE / OVERNIGHT EVENTS: NAEO. Pt denies chest pain, SOB, N/V, fever/chills, or changes in bowel movements.    MEDICATIONS  (STANDING):  acetaminophen     Tablet .. 650 milliGRAM(s) Oral every 6 hours  atorvastatin 40 milliGRAM(s) Oral at bedtime  calcium acetate 2001 milliGRAM(s) Oral three times a day with meals  ergocalciferol 36599 Unit(s) Oral every week  heparin   Injectable 5000 Unit(s) SubCutaneous every 8 hours  influenza   Vaccine 0.5 milliLiter(s) IntraMuscular once  labetalol 100 milliGRAM(s) Oral every 8 hours    MEDICATIONS  (PRN):  aluminum hydroxide/magnesium hydroxide/simethicone Suspension 30 milliLiter(s) Oral every 4 hours PRN Dyspepsia  melatonin 3 milliGRAM(s) Oral at bedtime PRN Insomnia      CAPILLARY BLOOD GLUCOSE        I&O's Summary    14 Jan 2024 07:01  -  15 Froy 2024 07:00  --------------------------------------------------------  IN: 200 mL / OUT: 0 mL / NET: 200 mL        Vital Signs Last 24 Hrs  T(C): 36.7 (15 Froy 2024 05:15), Max: 36.7 (15 Froy 2024 05:15)  T(F): 98.1 (15 Froy 2024 05:15), Max: 98.1 (15 Froy 2024 05:15)  HR: 74 (15 Froy 2024 05:15) (68 - 75)  BP: 163/89 (15 Froy 2024 05:15) (127/76 - 163/89)  BP(mean): --  RR: 18 (15 Froy 2024 05:15) (17 - 18)  SpO2: 98% (15 Froy 2024 05:15) (98% - 99%)    Parameters below as of 15 Froy 2024 05:15  Patient On (Oxygen Delivery Method): room air        PHYSICAL EXAM:  GENERAL: NAD, well-developed, well-nourished  HEAD: Atraumatic, Normocephalic  NECK:  RIJ shiley in place- non-bleeding   EYES: Conjunctiva and sclera clear  CHEST/LUNG: Clear to auscultation bilaterally; No wheezes or crackles  HEART: Normal S1/S2; Regular rate and rhythm; No murmurs, rubs, or gallops  ABDOMEN: Soft, Nontender, Nondistended; Bowel sounds present  EXTREMITIES: No clubbing, cyanosis, or edema  PSYCH: A&Ox3      LABS:                        9.6    6.56  )-----------( 370      ( 14 Jan 2024 05:51 )             27.8      01-14    135  |  100  |  40<H>  ----------------------------<  88  5.1   |  22  |  9.14<H>    Ca    7.9<L>      14 Jan 2024 05:51  Phos  4.1     01-14  Mg     2.30     01-14    TPro  6.5  /  Alb  3.1<L>  /  TBili  0.4  /  DBili  x   /  AST  22  /  ALT  31  /  AlkPhos  111  01-14      CARDIAC MARKERS ( 14 Jan 2024 05:51 )  x     / x     / 11 U/L / x     / x          Urinalysis Basic - ( 14 Jan 2024 05:51 )    Color: x / Appearance: x / SG: x / pH: x  Gluc: 88 mg/dL / Ketone: x  / Bili: x / Urobili: x   Blood: x / Protein: x / Nitrite: x   Leuk Esterase: x / RBC: x / WBC x   Sq Epi: x / Non Sq Epi: x / Bacteria: x        RADIOLOGY & ADDITIONAL TESTS:     Jessica Tejada, PGY1    Patient is a 59y old  Male who presents with a chief complaint of ESRD on HD transferred for placement of permanent HD catheter and renal biopsy (14 Jan 2024 18:24)      SUBJECTIVE / OVERNIGHT EVENTS: NAEO. Pt denies chest pain, SOB, N/V, fever/chills, or changes in bowel movements.    MEDICATIONS  (STANDING):  acetaminophen     Tablet .. 650 milliGRAM(s) Oral every 6 hours  atorvastatin 40 milliGRAM(s) Oral at bedtime  calcium acetate 2001 milliGRAM(s) Oral three times a day with meals  ergocalciferol 07469 Unit(s) Oral every week  heparin   Injectable 5000 Unit(s) SubCutaneous every 8 hours  influenza   Vaccine 0.5 milliLiter(s) IntraMuscular once  labetalol 100 milliGRAM(s) Oral every 8 hours    MEDICATIONS  (PRN):  aluminum hydroxide/magnesium hydroxide/simethicone Suspension 30 milliLiter(s) Oral every 4 hours PRN Dyspepsia  melatonin 3 milliGRAM(s) Oral at bedtime PRN Insomnia      CAPILLARY BLOOD GLUCOSE        I&O's Summary    14 Jan 2024 07:01  -  15 Froy 2024 07:00  --------------------------------------------------------  IN: 200 mL / OUT: 0 mL / NET: 200 mL        Vital Signs Last 24 Hrs  T(C): 36.7 (15 Froy 2024 05:15), Max: 36.7 (15 Froy 2024 05:15)  T(F): 98.1 (15 Froy 2024 05:15), Max: 98.1 (15 Froy 2024 05:15)  HR: 74 (15 Froy 2024 05:15) (68 - 75)  BP: 163/89 (15 Froy 2024 05:15) (127/76 - 163/89)  BP(mean): --  RR: 18 (15 Froy 2024 05:15) (17 - 18)  SpO2: 98% (15 Froy 2024 05:15) (98% - 99%)    Parameters below as of 15 Froy 2024 05:15  Patient On (Oxygen Delivery Method): room air        PHYSICAL EXAM:  GENERAL: NAD, well-developed, well-nourished  HEAD: Atraumatic, Normocephalic  NECK:  RIJ shiley in place- non-bleeding   EYES: Conjunctiva and sclera clear  CHEST/LUNG: Clear to auscultation bilaterally; No wheezes or crackles  HEART: Normal S1/S2; Regular rate and rhythm; No murmurs, rubs, or gallops  ABDOMEN: Soft, Nontender, Nondistended; Bowel sounds present  EXTREMITIES: No clubbing, cyanosis, or edema  PSYCH: A&Ox3      LABS:                        9.6    6.56  )-----------( 370      ( 14 Jan 2024 05:51 )             27.8      01-14    135  |  100  |  40<H>  ----------------------------<  88  5.1   |  22  |  9.14<H>    Ca    7.9<L>      14 Jan 2024 05:51  Phos  4.1     01-14  Mg     2.30     01-14    TPro  6.5  /  Alb  3.1<L>  /  TBili  0.4  /  DBili  x   /  AST  22  /  ALT  31  /  AlkPhos  111  01-14      CARDIAC MARKERS ( 14 Jan 2024 05:51 )  x     / x     / 11 U/L / x     / x          Urinalysis Basic - ( 14 Jan 2024 05:51 )    Color: x / Appearance: x / SG: x / pH: x  Gluc: 88 mg/dL / Ketone: x  / Bili: x / Urobili: x   Blood: x / Protein: x / Nitrite: x   Leuk Esterase: x / RBC: x / WBC x   Sq Epi: x / Non Sq Epi: x / Bacteria: x        RADIOLOGY & ADDITIONAL TESTS:     Jessica Tejada, PGY1    Patient is a 59y old  Male who presents with a chief complaint of ESRD on HD transferred for placement of permanent HD catheter and renal biopsy (14 Jan 2024 18:24)      SUBJECTIVE / OVERNIGHT EVENTS: NAEO. Pt denies chest pain, SOB, N/V, fever/chills, or changes in bowel movements.    MEDICATIONS  (STANDING):  acetaminophen     Tablet .. 650 milliGRAM(s) Oral every 6 hours  atorvastatin 40 milliGRAM(s) Oral at bedtime  calcium acetate 2001 milliGRAM(s) Oral three times a day with meals  ergocalciferol 50640 Unit(s) Oral every week  heparin   Injectable 5000 Unit(s) SubCutaneous every 8 hours  influenza   Vaccine 0.5 milliLiter(s) IntraMuscular once  labetalol 100 milliGRAM(s) Oral every 8 hours    MEDICATIONS  (PRN):  aluminum hydroxide/magnesium hydroxide/simethicone Suspension 30 milliLiter(s) Oral every 4 hours PRN Dyspepsia  melatonin 3 milliGRAM(s) Oral at bedtime PRN Insomnia      CAPILLARY BLOOD GLUCOSE        I&O's Summary    14 Jan 2024 07:01  -  15 Froy 2024 07:00  --------------------------------------------------------  IN: 200 mL / OUT: 0 mL / NET: 200 mL        Vital Signs Last 24 Hrs  T(C): 36.7 (15 Froy 2024 05:15), Max: 36.7 (15 Froy 2024 05:15)  T(F): 98.1 (15 Froy 2024 05:15), Max: 98.1 (15 Froy 2024 05:15)  HR: 74 (15 Froy 2024 05:15) (68 - 75)  BP: 163/89 (15 Froy 2024 05:15) (127/76 - 163/89)  BP(mean): --  RR: 18 (15 Froy 2024 05:15) (17 - 18)  SpO2: 98% (15 Froy 2024 05:15) (98% - 99%)    Parameters below as of 15 Froy 2024 05:15  Patient On (Oxygen Delivery Method): room air        PHYSICAL EXAM:  GENERAL: NAD, well-developed, well-nourished  HEAD: Atraumatic, Normocephalic  NECK:  RIJ shiley in place- non-bleeding   EYES: Conjunctiva and sclera clear  CHEST/LUNG: Clear to auscultation bilaterally; No wheezes or crackles  HEART: Normal S1/S2; Regular rate and rhythm; No murmurs, rubs, or gallops  ABDOMEN: Soft, Nontender, Nondistended; Bowel sounds present  EXTREMITIES: No clubbing, cyanosis, or edema  PSYCH: A&Ox3      LABS:                        9.6    6.56  )-----------( 370      ( 14 Jan 2024 05:51 )             27.8      01-14    135  |  100  |  40<H>  ----------------------------<  88  5.1   |  22  |  9.14<H>    Ca    7.9<L>      14 Jan 2024 05:51  Phos  4.1     01-14  Mg     2.30     01-14    TPro  6.5  /  Alb  3.1<L>  /  TBili  0.4  /  DBili  x   /  AST  22  /  ALT  31  /  AlkPhos  111  01-14      CARDIAC MARKERS ( 14 Jan 2024 05:51 )  x     / x     / 11 U/L / x     / x          Urinalysis Basic - ( 14 Jan 2024 05:51 )    Color: x / Appearance: x / SG: x / pH: x  Gluc: 88 mg/dL / Ketone: x  / Bili: x / Urobili: x   Blood: x / Protein: x / Nitrite: x   Leuk Esterase: x / RBC: x / WBC x   Sq Epi: x / Non Sq Epi: x / Bacteria: x        RADIOLOGY & ADDITIONAL TESTS:

## 2024-01-15 NOTE — PROVIDER CONTACT NOTE (OTHER) - ASSESSMENT
Patients blood pressure 163/89 heart rate 74. Patient asymptomatic. Patient administered due Labetalol

## 2024-01-15 NOTE — PROGRESS NOTE ADULT - ASSESSMENT
57yo M with no known PMHx (not followed by PCP), but FHx (brother) w/ESRD s/p tplant who presented to [facility] with n/v/abdominal pain, admitted for renal failure and r/o ACS. Hospital course c/b flu A, sepsis 2/2 ETEC, anemia and NSTEMI evaluation s/p stress test. Now pt transferred to [facility] and pending renal bx and perm HD cath placement; last HD session 1/12.  Accepting physician: Dr. Chery.    59yo M with no known PMHx (not followed by PCP), but FHx (brother) w/ESRD s/p tplant who presented to [facility] with n/v/abdominal pain, admitted for renal failure and r/o ACS. Hospital course c/b flu A, sepsis 2/2 ETEC, anemia and NSTEMI evaluation s/p stress test. Now pt transferred to [facility] and pending renal bx and perm HD cath placement; last HD session 1/12.  Accepting physician: Dr. Chery.    59yo M with no known PMHx (not followed by PCP), but FHx (brother) w/ESRD s/p tplant who presented to [facility] with n/v/abdominal pain, admitted for renal failure and r/o ACS. Hospital course c/b flu A, sepsis 2/2 ETEC, anemia and NSTEMI evaluation s/p stress test. Now pt transferred to [facility] and pending renal bx and perm HD cath placement; last HD session 1/12.  Accepting physician: Dr. Chery.    57yo M with no known PMHx (not followed by PCP), but FHx (brother) w/ESRD s/p tplant who presented to [facility] with n/v/abdominal pain, admitted for renal failure and r/o ACS. Hospital course c/b flu A, sepsis 2/2 ETEC, anemia and NSTEMI evaluation s/p stress test. Now pt transferred to [facility] and pending renal bx and perm HD cath placement; last HD session 1/12.  Accepting physician: Dr. Chery.

## 2024-01-15 NOTE — CHART NOTE - NSCHARTNOTEFT_GEN_A_CORE
IR Pre-Procedure Note    Patient Age:   59y    Patient Gender:   Male    Procedure (including site / side if known):  image-guided renal biopsy and tunneled HD catheter placement    Diagnosis / Indication: Patient is a 59y old  Male who presents with a chief complaint of ESRD on HD transferred for placement of permanent HD catheter and renal biopsy (15 Froy 2024 07:56)      Interventional Radiology Attending Physician:  Dr. Torrez      Ordering Attending Physician: Dr. Aragon    PAST MEDICAL & SURGICAL HISTORY:  H/O gastroesophageal reflux (GERD)      Hypertension      ESRD on dialysis      S/P Cholecystectomy           Pertinent Labs:   CBC Full  -  ( 14 Jan 2024 05:51 )  WBC Count : 6.56 K/uL  RBC Count : 3.47 M/uL  Hemoglobin : 9.6 g/dL  Hematocrit : 27.8 %  Platelet Count - Automated : 370 K/uL  Mean Cell Volume : 80.1 fL  Mean Cell Hemoglobin : 27.7 pg  Mean Cell Hemoglobin Concentration : 34.5 gm/dL  Auto Neutrophil # : 3.99 K/uL  Auto Lymphocyte # : 1.31 K/uL  Auto Monocyte # : 0.96 K/uL  Auto Eosinophil # : 0.19 K/uL  Auto Basophil # : 0.02 K/uL  Auto Neutrophil % : 60.8 %  Auto Lymphocyte % : 20.0 %  Auto Monocyte % : 14.6 %  Auto Eosinophil % : 2.9 %  Auto Basophil % : 0.3 %    01-14    135  |  100  |  40<H>  ----------------------------<  88  5.1   |  22  |  9.14<H>    Ca    7.9<L>      14 Jan 2024 05:51  Phos  4.1     01-14  Mg     2.30     01-14    TPro  6.5  /  Alb  3.1<L>  /  TBili  0.4  /  DBili  x   /  AST  22  /  ALT  31  /  AlkPhos  111  01-14        Patient / Family aware of procedure:   [  ] Y   [  ] PANCHITO Tejada MD IR Pre-Procedure Note    Patient Age:   59y    Patient Gender:   Male    Procedure (including site / side if known):  image-guided renal biopsy and tunneled HD catheter placement    Diagnosis / Indication: Patient is a 59y old  Male who presents with a chief complaint of ESRD on HD transferred for placement of permanent HD catheter and renal biopsy (15 Froy 2024 07:56)      Interventional Radiology Attending Physician:  Dr. Torrez      Ordering Attending Physician: Dr. Aragon    PAST MEDICAL & SURGICAL HISTORY:  H/O gastroesophageal reflux (GERD)      Hypertension      ESRD on dialysis      S/P Cholecystectomy           Pertinent Labs:   CBC Full  -  ( 14 Jan 2024 05:51 )  WBC Count : 6.56 K/uL  RBC Count : 3.47 M/uL  Hemoglobin : 9.6 g/dL  Hematocrit : 27.8 %  Platelet Count - Automated : 370 K/uL  Mean Cell Volume : 80.1 fL  Mean Cell Hemoglobin : 27.7 pg  Mean Cell Hemoglobin Concentration : 34.5 gm/dL  Auto Neutrophil # : 3.99 K/uL  Auto Lymphocyte # : 1.31 K/uL  Auto Monocyte # : 0.96 K/uL  Auto Eosinophil # : 0.19 K/uL  Auto Basophil # : 0.02 K/uL  Auto Neutrophil % : 60.8 %  Auto Lymphocyte % : 20.0 %  Auto Monocyte % : 14.6 %  Auto Eosinophil % : 2.9 %  Auto Basophil % : 0.3 %    01-14    135  |  100  |  40<H>  ----------------------------<  88  5.1   |  22  |  9.14<H>    Ca    7.9<L>      14 Jan 2024 05:51  Phos  4.1     01-14  Mg     2.30     01-14    TPro  6.5  /  Alb  3.1<L>  /  TBili  0.4  /  DBili  x   /  AST  22  /  ALT  31  /  AlkPhos  111  01-14        Patient / Family aware of procedure:   [ X ] Y   [  ] N    Jessica Tejada MD

## 2024-01-15 NOTE — PROGRESS NOTE ADULT - PROBLEM SELECTOR PLAN 1
- admitted to  for renal failure on CKD stage 3 likely 2/2 dehydration and hypertensive emergency from 1/3/24,   - s/p CT w/IV dye 1/3/23 possibly contributing to rise in Cr per  nephrology notes  - 24 eden urine collection positive for nephrotic range proteinuria; SPEP, renal serologies negative   - CT A/P w/o hydro; Started on HD 1/6 via temp HD cath  - Pt requires chronic HD, transition from temp to perm HD cath and renal bx, HD catheter placed 01/06  - Transferred to [facility] on 1/12 for HD perm and bx, IR consult order place, nephrology consulted as well to inform of transfer   - CTM electrolytes, creatinine, strict I/Os, and cath site   - pending permcath placement this week - admitted to  for renal failure on CKD stage 3 likely 2/2 dehydration and hypertensive emergency from 1/3/24,   - s/p CT w/IV dye 1/3/23 possibly contributing to rise in Cr per  nephrology notes  - 24 eden urine collection positive for nephrotic range proteinuria; SPEP, renal serologies negative   - CT A/P w/o hydro; Started on HD 1/6 via temp HD cath  - Pt requires chronic HD, transition from temp to perm HD cath and renal bx, HD catheter placed 01/06  - Transferred to [facility] on 1/12 for HD perm and bx, IR consult order place, nephrology consulted as well to inform of transfer   - CTM electrolytes, creatinine, strict I/Os, and cath site   - pending permcath placement this week - admitted to  for renal failure on CKD stage 3 likely 2/2 dehydration and hypertensive emergency from 1/3/24,   - s/p CT w/IV dye 1/3/23 possibly contributing to rise in Cr per  nephrology notes  - 24 eden urine collection positive for nephrotic range proteinuria; SPEP, renal serologies negative   - CT A/P w/o hydro; Started on HD 1/6 via temp HD cath  - Pt requires chronic HD, transition from temp to perm HD cath and renal bx, HD catheter placed 01/06  - Transferred to [facility] on 1/12 for HD perm and bx, IR consult order place, nephrology consulted as well to inform of transfer   - CTM electrolytes, creatinine, strict I/Os, and cath site   - pending permcath placement this week - admitted to  for renal failure on CKD stage 3 likely 2/2 dehydration and hypertensive emergency from 1/3/24,   - s/p CT w/IV dye 1/3/23 possibly contributing to rise in Cr per  nephrology notes  - 24 eden urine collection positive for nephrotic range proteinuria; SPEP, renal serologies negative   - CT A/P w/o hydro; Started on HD 1/6 via temp HD cath  - Pt requires chronic HD, transition from temp to perm HD cath and renal bx, HD catheter placed 01/06  - Transferred to [facility] on 1/12 for HD perm and bx, IR consult order place, nephrology consulted as well to inform of transfer   - CTM electrolytes, creatinine, strict I/Os, and cath site   - pending permcath placement this week - admitted to  for renal failure on CKD stage 3 likely 2/2 dehydration and hypertensive emergency from 1/3/24,   - s/p CT w/IV dye 1/3/23 possibly contributing to rise in Cr per  nephrology notes  - 24 eden urine collection positive for nephrotic range proteinuria; SPEP, renal serologies negative   - CT A/P w/o hydro; Started on HD 1/6 via temp HD cath  - Pt requires chronic HD, transition from temp to perm HD cath and renal bx, HD catheter placed 01/06  - Transferred to [facility] on 1/12 for HD perm and bx, IR consult order place, nephrology consulted as well to inform of transfer   - CTM electrolytes, creatinine, strict I/Os, and cath site   - pending permcath placement on 1/17 - admitted to  for renal failure on CKD stage 3 likely 2/2 dehydration and hypertensive emergency from 1/3/24,   - s/p CT w/IV dye 1/3/23 possibly contributing to rise in Cr per  nephrology notes  - 24 eden urine collection positive for nephrotic range proteinuria; SPEP, renal serologies negative   - CT A/P w/o hydro; Started on HD 1/6 via temp HD cath  - Pt requires chronic HD, transition from temp to perm HD cath and renal bx, HD catheter placed 01/06  - Transferred to [facility] on 1/12 for HD perm and bx, IR consult order place, nephrology consulted as well to inform of transfer   - CTM electrolytes, creatinine, strict I/Os, and cath site   - pending permcath placement on 1/17 - admitted to  for renal failure on CKD stage 3 likely 2/2 dehydration and hypertensive emergency from 1/3/24,   - s/p CT w/IV dye 1/3/23 possibly contributing to rise in Cr per  nephrology notes  - 24 eden urine collection positive for nephrotic range proteinuria; SPEP, renal serologies negative   - CT A/P w/o hydro; Started on HD 1/6 via temp HD cath  - Pt requires chronic HD, transition from temp to perm HD cath and renal bx, HD catheter placed 01/06  - Transferred to [facility] on 1/12 for HD perm and bx, IR consult order place, nephrology consulted as well to inform of transfer   - CTM electrolytes, creatinine, strict I/Os, and cath site   - pending permcath placement on 1/17 - admitted to  for renal failure on CKD stage 3 likely 2/2 dehydration and hypertensive emergency from 1/3/24,   - s/p CT w/IV dye 1/3/23 possibly contributing to rise in Cr per  nephrology notes  - 24 eden urine collection positive for nephrotic range proteinuria; SPEP, renal serologies negative   - CT A/P w/o hydro; Started on HD 1/6 via temp HD cath  - Pt requires chronic HD, transition from temp to perm HD cath and renal bx, HD catheter placed 01/06  - Transferred to [facility] on 1/12 for HD perm and bx, IR consult order place, nephrology consulted as well to inform of transfer   - CTM electrolytes, creatinine, strict I/Os, and cath site   - pending permcath placement on 1/17

## 2024-01-15 NOTE — PROGRESS NOTE ADULT - PROBLEM SELECTOR PLAN 6
RESOLVED-NSTEMI in the setting of demand ischemia in the setting of HTN emergency  - presented at  with N/V/D and 4 years of chest pain withe exertion, progressively worsening and no prior PCP or physician evaluation; on admission at , elevated troponin with peak to 715.  - EKG nonischemic, Cr elevated +/- demand i/s/o HTN urgency; echo with mod pulm htn, nuclear stress test unremarkable, most likely i/s/o demand ischemia   - Echo: consistent with normal LVEF 65-70%, LVH and moderate pulm HTN  -Cards recommended non-invasive ischemic eval given contrast induced nephropathy and new renal failure: Stress test on 1/9/23: LVEF 66%  -- Given normal myocardial perfusion, CV stable  - C/W statin, and BB and outpt cardiology follow-up with [facility]   - held hep for 1/13 AM given possibility for bx/HD placement RESOLVED-NSTEMI in the setting of demand ischemia in the setting of HTN emergency  - presented at  with N/V/D and 4 years of chest pain withe exertion, progressively worsening and no prior PCP or physician evaluation; on admission at , elevated troponin with peak to 715.  - EKG nonischemic, Cr elevated +/- demand i/s/o HTN urgency; echo with mod pulm htn, nuclear stress test unremarkable, most likely i/s/o demand ischemia   - Echo: consistent with normal LVEF 65-70%, LVH and moderate pulm HTN  -Cards recommended non-invasive ischemic eval given contrast induced nephropathy and new renal failure: Stress test on 1/9/23: LVEF 66%  -- Given normal myocardial perfusion, CV stable  - C/W statin, and BB and outpt cardiology follow-up with [facility]   - held hep for 1/13 AM given possibility for bx/HD placement RESOLVED-NSTEMI in the setting of demand ischemia in the setting of HTN emergency  - presented at  with N/V/D and 4 years of chest pain withe exertion, progressively worsening and no prior PCP or physician evaluation; on admission at , elevated troponin with peak to 715.  - EKG nonischemic, Cr elevated +/- demand i/s/o HTN urgency; echo with mod pulm htn, nuclear stress test unremarkable, most likely i/s/o demand ischemia   - Echo: consistent with normal LVEF 65-70%, LVH and moderate pulm HTN  -Cards recommended non-invasive ischemic eval given contrast induced nephropathy and new renal failure: Stress test on 1/9/23: LVEF 66%  -- Given normal myocardial perfusion, CV stable  - C/W statin, and BB and outpt cardiology follow-up with [facility]   - held hep for 1/13 AM given possibility for bx/HD placement RESOLVED-NSTEMI in the setting of demand ischemia in the setting of HTN emergency  - presented at  with N/V/D and 4 years of chest pain withe exertion, progressively worsening and no prior PCP or physician evaluation; on admission at , elevated troponin with peak to 715.  - EKG nonischemic, Cr elevated +/- demand i/s/o HTN urgency; echo with mod pulm htn, nuclear stress test unremarkable, most likely i/s/o demand ischemia   - Echo: consistent with normal LVEF 65-70%, LVH and moderate pulm HTN  -Cards recommended non-invasive ischemic eval given contrast induced nephropathy and new renal failure: Stress test on 1/9/23: LVEF 66%  -- Given normal myocardial perfusion, CV stable  - C/W statin, and BB and outpt cardiology follow-up with [facility]   - held hep for 1/13 AM given possibility for bx/HD placement

## 2024-01-15 NOTE — PROGRESS NOTE ADULT - SUBJECTIVE AND OBJECTIVE BOX
[facility] DIVISION OF KIDNEY DISEASES AND HYPERTENSION -- HEMODIALYSIS NOTE  --------------------------------------------------------------------------------  Chief Complaint: ESRD/Ongoing hemodialysis requirement    24 hour events/subjective:        PAST HISTORY  --------------------------------------------------------------------------------  No significant changes to PMH, PSH, FHx, SHx, unless otherwise noted    ALLERGIES & MEDICATIONS  --------------------------------------------------------------------------------  Allergies    No Known Allergies    Intolerances      Standing Inpatient Medications  acetaminophen     Tablet .. 650 milliGRAM(s) Oral every 6 hours  atorvastatin 40 milliGRAM(s) Oral at bedtime  calcium acetate 2001 milliGRAM(s) Oral three times a day with meals  ergocalciferol 41095 Unit(s) Oral every week  heparin   Injectable 5000 Unit(s) SubCutaneous every 8 hours  influenza   Vaccine 0.5 milliLiter(s) IntraMuscular once  labetalol 100 milliGRAM(s) Oral every 8 hours    PRN Inpatient Medications  aluminum hydroxide/magnesium hydroxide/simethicone Suspension 30 milliLiter(s) Oral every 4 hours PRN  melatonin 3 milliGRAM(s) Oral at bedtime PRN      REVIEW OF SYSTEMS  --------------------------------------------------------------------------------  Gen: No weight changes, fatigue, fevers/chills, weakness  Skin: No rashes  Head/Eyes/Ears/Mouth: No headache; Normal hearing; Normal vision w/o blurriness; No sinus pain/discomfort, sore throat  Respiratory: No dyspnea, cough, wheezing, hemoptysis  CV: No chest pain, PND, orthopnea  GI: No abdominal pain, diarrhea, constipation, nausea, vomiting, melena, hematochezia  : No increased frequency, dysuria, hematuria, nocturia  MSK: No joint pain/swelling; no back pain; no edema  Neuro: No dizziness/lightheadedness, weakness, seizures, numbness, tingling  Heme: No easy bruising or bleeding  Endo: No heat/cold intolerance  Psych: No significant nervousness, anxiety, stress, depression    All other systems were reviewed and are negative, except as noted.    VITALS/PHYSICAL EXAM  --------------------------------------------------------------------------------  T(C): 36.4 (01-15-24 @ 14:27), Max: 36.7 (01-15-24 @ 05:15)  HR: 71 (01-15-24 @ 14:27) (64 - 74)  BP: 146/83 (01-15-24 @ 14:27) (146/70 - 163/89)  RR: 17 (01-15-24 @ 14:27) (16 - 18)  SpO2: 100% (01-15-24 @ 14:27) (98% - 100%)  Wt(kg): --        01-14-24 @ 07:01  -  01-15-24 @ 07:00  --------------------------------------------------------  IN: 200 mL / OUT: 0 mL / NET: 200 mL    01-15-24 @ 07:01  -  01-15-24 @ 16:50  --------------------------------------------------------  IN: 400 mL / OUT: 900 mL / NET: -500 mL      Physical Exam:  	Gen: NAD, well-appearing  	HEENT: PERRL, supple neck, clear oropharynx  	Pulm: CTA B/L  	CV: RRR, S1S2; no rub  	Back: No spinal or CVA tenderness; no sacral edema  	Abd: +BS, soft, nontender/nondistended  	: No suprapubic tenderness  	UE: Warm, FROM, no clubbing, intact strength; no edema; no asterixis  	LE: Warm, FROM, no clubbing, intact strength; no edema  	Neuro: No focal deficits, intact gait  	Psych: Normal affect and mood  	Skin: Warm, without rashes  	Vascular access:    LABS/STUDIES  --------------------------------------------------------------------------------              9.4    6.44  >-----------<  398      [01-15-24 @ 10:15]              27.6     135  |  99  |  53  ----------------------------<  109      [01-15-24 @ 10:15]  5.5   |  21  |  10.66        Ca     7.8     [01-15-24 @ 10:15]      Mg     2.50     [01-15-24 @ 10:15]      Phos  4.8     [01-15-24 @ 10:15]    TPro  6.2  /  Alb  3.1  /  TBili  0.4  /  DBili  x   /  AST  25  /  ALT  25  /  AlkPhos  107  [01-15-24 @ 10:15]        CK 11      [01-14-24 @ 05:51]    Iron 20, TIBC --, %sat --      [01-03-24 @ 11:15]  Ferritin 478      [01-03-24 @ 11:15]  PTH -- (Ca --)      [01-13-24 @ 07:14]   535  Vitamin D (25OH) 10.7      [01-13-24 @ 07:14]  Lipid: chol 103, TG 57, HDL 60, LDL --      [01-03-24 @ 11:15]    HBsAb Reactive      [01-08-24 @ 05:50]  HBsAg Nonreact      [01-14-24 @ 05:51]  HCV 0.19, Nonreact      [01-14-24 @ 05:51]  HIV Nonreact      [01-14-24 @ 05:51]   [facility] DIVISION OF KIDNEY DISEASES AND HYPERTENSION -- HEMODIALYSIS NOTE  --------------------------------------------------------------------------------  Chief Complaint: ESRD/Ongoing hemodialysis requirement    24 hour events/subjective:        PAST HISTORY  --------------------------------------------------------------------------------  No significant changes to PMH, PSH, FHx, SHx, unless otherwise noted    ALLERGIES & MEDICATIONS  --------------------------------------------------------------------------------  Allergies    No Known Allergies    Intolerances      Standing Inpatient Medications  acetaminophen     Tablet .. 650 milliGRAM(s) Oral every 6 hours  atorvastatin 40 milliGRAM(s) Oral at bedtime  calcium acetate 2001 milliGRAM(s) Oral three times a day with meals  ergocalciferol 75871 Unit(s) Oral every week  heparin   Injectable 5000 Unit(s) SubCutaneous every 8 hours  influenza   Vaccine 0.5 milliLiter(s) IntraMuscular once  labetalol 100 milliGRAM(s) Oral every 8 hours    PRN Inpatient Medications  aluminum hydroxide/magnesium hydroxide/simethicone Suspension 30 milliLiter(s) Oral every 4 hours PRN  melatonin 3 milliGRAM(s) Oral at bedtime PRN      REVIEW OF SYSTEMS  --------------------------------------------------------------------------------  Gen: No weight changes, fatigue, fevers/chills, weakness  Skin: No rashes  Head/Eyes/Ears/Mouth: No headache; Normal hearing; Normal vision w/o blurriness; No sinus pain/discomfort, sore throat  Respiratory: No dyspnea, cough, wheezing, hemoptysis  CV: No chest pain, PND, orthopnea  GI: No abdominal pain, diarrhea, constipation, nausea, vomiting, melena, hematochezia  : No increased frequency, dysuria, hematuria, nocturia  MSK: No joint pain/swelling; no back pain; no edema  Neuro: No dizziness/lightheadedness, weakness, seizures, numbness, tingling  Heme: No easy bruising or bleeding  Endo: No heat/cold intolerance  Psych: No significant nervousness, anxiety, stress, depression    All other systems were reviewed and are negative, except as noted.    VITALS/PHYSICAL EXAM  --------------------------------------------------------------------------------  T(C): 36.4 (01-15-24 @ 14:27), Max: 36.7 (01-15-24 @ 05:15)  HR: 71 (01-15-24 @ 14:27) (64 - 74)  BP: 146/83 (01-15-24 @ 14:27) (146/70 - 163/89)  RR: 17 (01-15-24 @ 14:27) (16 - 18)  SpO2: 100% (01-15-24 @ 14:27) (98% - 100%)  Wt(kg): --        01-14-24 @ 07:01  -  01-15-24 @ 07:00  --------------------------------------------------------  IN: 200 mL / OUT: 0 mL / NET: 200 mL    01-15-24 @ 07:01  -  01-15-24 @ 16:50  --------------------------------------------------------  IN: 400 mL / OUT: 900 mL / NET: -500 mL      Physical Exam:  	Gen: NAD, well-appearing  	HEENT: PERRL, supple neck, clear oropharynx  	Pulm: CTA B/L  	CV: RRR, S1S2; no rub  	Back: No spinal or CVA tenderness; no sacral edema  	Abd: +BS, soft, nontender/nondistended  	: No suprapubic tenderness  	UE: Warm, FROM, no clubbing, intact strength; no edema; no asterixis  	LE: Warm, FROM, no clubbing, intact strength; no edema  	Neuro: No focal deficits, intact gait  	Psych: Normal affect and mood  	Skin: Warm, without rashes  	Vascular access:    LABS/STUDIES  --------------------------------------------------------------------------------              9.4    6.44  >-----------<  398      [01-15-24 @ 10:15]              27.6     135  |  99  |  53  ----------------------------<  109      [01-15-24 @ 10:15]  5.5   |  21  |  10.66        Ca     7.8     [01-15-24 @ 10:15]      Mg     2.50     [01-15-24 @ 10:15]      Phos  4.8     [01-15-24 @ 10:15]    TPro  6.2  /  Alb  3.1  /  TBili  0.4  /  DBili  x   /  AST  25  /  ALT  25  /  AlkPhos  107  [01-15-24 @ 10:15]        CK 11      [01-14-24 @ 05:51]    Iron 20, TIBC --, %sat --      [01-03-24 @ 11:15]  Ferritin 478      [01-03-24 @ 11:15]  PTH -- (Ca --)      [01-13-24 @ 07:14]   535  Vitamin D (25OH) 10.7      [01-13-24 @ 07:14]  Lipid: chol 103, TG 57, HDL 60, LDL --      [01-03-24 @ 11:15]    HBsAb Reactive      [01-08-24 @ 05:50]  HBsAg Nonreact      [01-14-24 @ 05:51]  HCV 0.19, Nonreact      [01-14-24 @ 05:51]  HIV Nonreact      [01-14-24 @ 05:51]   [facility] DIVISION OF KIDNEY DISEASES AND HYPERTENSION -- HEMODIALYSIS NOTE  --------------------------------------------------------------------------------  Chief Complaint: ESRD/Ongoing hemodialysis requirement    24 hour events/subjective:        PAST HISTORY  --------------------------------------------------------------------------------  No significant changes to PMH, PSH, FHx, SHx, unless otherwise noted    ALLERGIES & MEDICATIONS  --------------------------------------------------------------------------------  Allergies    No Known Allergies    Intolerances      Standing Inpatient Medications  acetaminophen     Tablet .. 650 milliGRAM(s) Oral every 6 hours  atorvastatin 40 milliGRAM(s) Oral at bedtime  calcium acetate 2001 milliGRAM(s) Oral three times a day with meals  ergocalciferol 35618 Unit(s) Oral every week  heparin   Injectable 5000 Unit(s) SubCutaneous every 8 hours  influenza   Vaccine 0.5 milliLiter(s) IntraMuscular once  labetalol 100 milliGRAM(s) Oral every 8 hours    PRN Inpatient Medications  aluminum hydroxide/magnesium hydroxide/simethicone Suspension 30 milliLiter(s) Oral every 4 hours PRN  melatonin 3 milliGRAM(s) Oral at bedtime PRN      REVIEW OF SYSTEMS  --------------------------------------------------------------------------------  Gen: No weight changes, fatigue, fevers/chills, weakness  Skin: No rashes  Head/Eyes/Ears/Mouth: No headache; Normal hearing; Normal vision w/o blurriness; No sinus pain/discomfort, sore throat  Respiratory: No dyspnea, cough, wheezing, hemoptysis  CV: No chest pain, PND, orthopnea  GI: No abdominal pain, diarrhea, constipation, nausea, vomiting, melena, hematochezia  : No increased frequency, dysuria, hematuria, nocturia  MSK: No joint pain/swelling; no back pain; no edema  Neuro: No dizziness/lightheadedness, weakness, seizures, numbness, tingling  Heme: No easy bruising or bleeding  Endo: No heat/cold intolerance  Psych: No significant nervousness, anxiety, stress, depression    All other systems were reviewed and are negative, except as noted.    VITALS/PHYSICAL EXAM  --------------------------------------------------------------------------------  T(C): 36.4 (01-15-24 @ 14:27), Max: 36.7 (01-15-24 @ 05:15)  HR: 71 (01-15-24 @ 14:27) (64 - 74)  BP: 146/83 (01-15-24 @ 14:27) (146/70 - 163/89)  RR: 17 (01-15-24 @ 14:27) (16 - 18)  SpO2: 100% (01-15-24 @ 14:27) (98% - 100%)  Wt(kg): --        01-14-24 @ 07:01  -  01-15-24 @ 07:00  --------------------------------------------------------  IN: 200 mL / OUT: 0 mL / NET: 200 mL    01-15-24 @ 07:01  -  01-15-24 @ 16:50  --------------------------------------------------------  IN: 400 mL / OUT: 900 mL / NET: -500 mL      Physical Exam:  	Gen: NAD, well-appearing  	HEENT: PERRL, supple neck, clear oropharynx  	Pulm: CTA B/L  	CV: RRR, S1S2; no rub  	Back: No spinal or CVA tenderness; no sacral edema  	Abd: +BS, soft, nontender/nondistended  	: No suprapubic tenderness  	UE: Warm, FROM, no clubbing, intact strength; no edema; no asterixis  	LE: Warm, FROM, no clubbing, intact strength; no edema  	Neuro: No focal deficits, intact gait  	Psych: Normal affect and mood  	Skin: Warm, without rashes  	Vascular access:    LABS/STUDIES  --------------------------------------------------------------------------------              9.4    6.44  >-----------<  398      [01-15-24 @ 10:15]              27.6     135  |  99  |  53  ----------------------------<  109      [01-15-24 @ 10:15]  5.5   |  21  |  10.66        Ca     7.8     [01-15-24 @ 10:15]      Mg     2.50     [01-15-24 @ 10:15]      Phos  4.8     [01-15-24 @ 10:15]    TPro  6.2  /  Alb  3.1  /  TBili  0.4  /  DBili  x   /  AST  25  /  ALT  25  /  AlkPhos  107  [01-15-24 @ 10:15]        CK 11      [01-14-24 @ 05:51]    Iron 20, TIBC --, %sat --      [01-03-24 @ 11:15]  Ferritin 478      [01-03-24 @ 11:15]  PTH -- (Ca --)      [01-13-24 @ 07:14]   535  Vitamin D (25OH) 10.7      [01-13-24 @ 07:14]  Lipid: chol 103, TG 57, HDL 60, LDL --      [01-03-24 @ 11:15]    HBsAb Reactive      [01-08-24 @ 05:50]  HBsAg Nonreact      [01-14-24 @ 05:51]  HCV 0.19, Nonreact      [01-14-24 @ 05:51]  HIV Nonreact      [01-14-24 @ 05:51]

## 2024-01-15 NOTE — PROGRESS NOTE ADULT - PROBLEM SELECTOR PLAN 3
- At [facility] pt with anemia, hgb 6.5, s/p 1u pRBC i/s/o renal failure  - hx of blood transfusion at  during admission: 1/4/24 and 1/8/24   - hgb stable at 10, pending type and screen, f/u CBC - At [facility] pt with anemia, hgb 6.5, s/p 1u pRBC i/s/o renal failure  - hx of blood transfusion at  during admission: 1/4/24 and 1/8/24   - hgb stable at 10, pending type and screen, f/u CBC

## 2024-01-15 NOTE — PROGRESS NOTE ADULT - PROBLEM SELECTOR PLAN 2
- New ESRD with temp HD cath placement on 1/6/24  - Record of HD: 1/6, 1/8, 1/10, 1/11, 1/12 @ [facility]  - Transfer to [facility] 1/12 for HD cath placement (perm)  - [facility]: Chikis Laird [phone]  - IR consult for HD placement and renal biopsy in the setting of nephrotic proteinuria, consult order placed, nephrology consulted, f/u in AM  -phos and vitamin D hydroxy, PTH ordered in AM - New ESRD with temp HD cath placement on 1/6/24  - Record of HD: 1/6, 1/8, 1/10, 1/11, 1/12 @ [facility]  - Transfer to [facility] 1/12 for HD cath placement (perm)  - [facility]: Chikis Laird [phone]  - IR consult for HD placement and renal biopsy in the setting of nephrotic proteinuria, consult order placed, nephrology consulted, f/u in AM  -phos and vitamin D hydroxy, PTH ordered in AM - New ESRD with temp HD cath placement on 1/6/24  - Record of HD: 1/6, 1/8, 1/10, 1/11, 1/12 @ [facility]  - Transfer to [facility] 1/12 for HD cath placement (perm)  - [facility]: Chikis Laird [phone]  - IR consult for HD placement and renal biopsy in the setting of nephrotic proteinuria, consult order placed, nephrology consulted, f/u in AM  -phos and vitamin D hydroxy, PTH ordered in AM - New ESRD with temp HD cath placement on 1/6/24  - Record of HD: 1/6, 1/8, 1/10, 1/11, 1/12 @ [facility]  - Transfer to [facility] 1/12 for HD cath placement (perm)  - [facility]: Chikis Laird [phone]  - IR consult for HD placement and renal biopsy in the setting of nephrotic proteinuria, consult order placed, nephrology consulted, f/u in AM  -phos and vitamin D hydroxy, PTH ordered in AM - New ESRD with temp HD cath placement on 1/6/24  - Record of HD: 1/6, 1/8, 1/10, 1/11, 1/12 @ [facility]  - Transfer to [facility] 1/12 for HD cath placement (perm)  - [facility]: Chikis Laird [phone]  - IR consult for HD placement and renal biopsy in the setting of nephrotic proteinuria, consult order placed, nephrology consulted, f/u in AM  - vitamin D hydroxy low, PTH elevated - New ESRD with temp HD cath placement on 1/6/24  - Record of HD: 1/6, 1/8, 1/10, 1/11, 1/12 @ [facility]  - Transfer to [facility] 1/12 for HD cath placement (perm)  - [facility]: Chikis Laird [phone]  - IR consult for HD placement and renal biopsy in the setting of nephrotic proteinuria, consult order placed, nephrology consulted, f/u in AM  - vitamin D hydroxy low, PTH elevated - New ESRD with temp HD cath placement on 1/6/24  - Record of HD: 1/6, 1/8, 1/10, 1/11, 1/12 @ [facility]  - Transfer to [facility] 1/12 for HD cath placement (perm)  - [facility]: Chikis Laird [phone]  - IR consult for HD placement and renal biopsy in the setting of nephrotic proteinuria, consult order placed, nephrology consulted, f/u in AM  - vitamin D hydroxy low, PTH elevated - New ESRD with temp HD cath placement on 1/6/24  - Record of HD: 1/6, 1/8, 1/10, 1/11, 1/12 @ [place]  - Transfer to [facility] 1/12 for HD cath placement (perm)  - [facility]: Chikis Laird [phone]  - IR consult for HD placement and renal biopsy in the setting of nephrotic proteinuria, consult order placed, nephrology consulted, f/u in AM  - vitamin D hydroxy low, PTH elevated

## 2024-01-15 NOTE — PROGRESS NOTE ADULT - ASSESSMENT
Patient examined and ROS reviewed. A case of DESIREE with proteinuria examined during HD. Patient is tolerating dialysis well. Blood flow through access is adequate. Advised to continue UF.

## 2024-01-16 LAB
ALBUMIN SERPL ELPH-MCNC: 3.1 G/DL — LOW (ref 3.3–5)
ALP SERPL-CCNC: 112 U/L — SIGNIFICANT CHANGE UP (ref 40–120)
ALT FLD-CCNC: 28 U/L — SIGNIFICANT CHANGE UP (ref 4–41)
ANION GAP SERPL CALC-SCNC: 12 MMOL/L — SIGNIFICANT CHANGE UP (ref 7–14)
AST SERPL-CCNC: 24 U/L — SIGNIFICANT CHANGE UP (ref 4–40)
AUTO DIFF PNL BLD: ABNORMAL
BASOPHILS # BLD AUTO: 0.03 K/UL — SIGNIFICANT CHANGE UP (ref 0–0.2)
BASOPHILS NFR BLD AUTO: 0.5 % — SIGNIFICANT CHANGE UP (ref 0–2)
BILIRUB SERPL-MCNC: 0.4 MG/DL — SIGNIFICANT CHANGE UP (ref 0.2–1.2)
BUN SERPL-MCNC: 38 MG/DL — HIGH (ref 7–23)
C-ANCA SER-ACNC: NEGATIVE — SIGNIFICANT CHANGE UP
CALCIUM SERPL-MCNC: 7.9 MG/DL — LOW (ref 8.4–10.5)
CHLORIDE SERPL-SCNC: 102 MMOL/L — SIGNIFICANT CHANGE UP (ref 98–107)
CO2 SERPL-SCNC: 24 MMOL/L — SIGNIFICANT CHANGE UP (ref 22–31)
CREAT SERPL-MCNC: 8.54 MG/DL — HIGH (ref 0.5–1.3)
DSDNA AB SER-ACNC: <12 IU/ML — SIGNIFICANT CHANGE UP
EGFR: 7 ML/MIN/1.73M2 — LOW
EOSINOPHIL # BLD AUTO: 0.18 K/UL — SIGNIFICANT CHANGE UP (ref 0–0.5)
EOSINOPHIL NFR BLD AUTO: 2.8 % — SIGNIFICANT CHANGE UP (ref 0–6)
GLUCOSE SERPL-MCNC: 80 MG/DL — SIGNIFICANT CHANGE UP (ref 70–99)
HCT VFR BLD CALC: 29.6 % — LOW (ref 39–50)
HGB BLD-MCNC: 10.1 G/DL — LOW (ref 13–17)
IANC: 4.18 K/UL — SIGNIFICANT CHANGE UP (ref 1.8–7.4)
IMM GRANULOCYTES NFR BLD AUTO: 0.9 % — SIGNIFICANT CHANGE UP (ref 0–0.9)
KAPPA LC SER QL IFE: 25.22 MG/DL — HIGH (ref 0.33–1.94)
KAPPA/LAMBDA FREE LIGHT CHAIN RATIO, SERUM: 1.3 RATIO — SIGNIFICANT CHANGE UP (ref 0.26–1.65)
LAMBDA LC SER QL IFE: 19.4 MG/DL — HIGH (ref 0.57–2.63)
LYMPHOCYTES # BLD AUTO: 1.36 K/UL — SIGNIFICANT CHANGE UP (ref 1–3.3)
LYMPHOCYTES # BLD AUTO: 20.8 % — SIGNIFICANT CHANGE UP (ref 13–44)
MAGNESIUM SERPL-MCNC: 2.3 MG/DL — SIGNIFICANT CHANGE UP (ref 1.6–2.6)
MCHC RBC-ENTMCNC: 28 PG — SIGNIFICANT CHANGE UP (ref 27–34)
MCHC RBC-ENTMCNC: 34.1 GM/DL — SIGNIFICANT CHANGE UP (ref 32–36)
MCV RBC AUTO: 82 FL — SIGNIFICANT CHANGE UP (ref 80–100)
MONOCYTES # BLD AUTO: 0.73 K/UL — SIGNIFICANT CHANGE UP (ref 0–0.9)
MONOCYTES NFR BLD AUTO: 11.2 % — SIGNIFICANT CHANGE UP (ref 2–14)
NEUTROPHILS # BLD AUTO: 4.18 K/UL — SIGNIFICANT CHANGE UP (ref 1.8–7.4)
NEUTROPHILS NFR BLD AUTO: 63.8 % — SIGNIFICANT CHANGE UP (ref 43–77)
NRBC # BLD: 0 /100 WBCS — SIGNIFICANT CHANGE UP (ref 0–0)
NRBC # FLD: 0 K/UL — SIGNIFICANT CHANGE UP (ref 0–0)
P-ANCA SER-ACNC: NEGATIVE — SIGNIFICANT CHANGE UP
PHOSPHATE SERPL-MCNC: 4.7 MG/DL — HIGH (ref 2.5–4.5)
PHOSPHOLIPASE A2 RECEPTOR ELISA: <1.8 RU/ML — SIGNIFICANT CHANGE UP (ref 0–19.9)
PLATELET # BLD AUTO: 411 K/UL — HIGH (ref 150–400)
POTASSIUM SERPL-MCNC: 4.7 MMOL/L — SIGNIFICANT CHANGE UP (ref 3.5–5.3)
POTASSIUM SERPL-SCNC: 4.7 MMOL/L — SIGNIFICANT CHANGE UP (ref 3.5–5.3)
PROT SERPL-MCNC: 6.4 G/DL — SIGNIFICANT CHANGE UP (ref 6–8.3)
RBC # BLD: 3.61 M/UL — LOW (ref 4.2–5.8)
RBC # FLD: 15.3 % — HIGH (ref 10.3–14.5)
RPR SER-TITR: SIGNIFICANT CHANGE UP
RPR SERPL-ACNC: SIGNIFICANT CHANGE UP
SODIUM SERPL-SCNC: 138 MMOL/L — SIGNIFICANT CHANGE UP (ref 135–145)
T PALLIDUM AB TITR SER: POSITIVE
T PALLIDUM IGG SER QL IF: REACTIVE
WBC # BLD: 6.54 K/UL — SIGNIFICANT CHANGE UP (ref 3.8–10.5)
WBC # FLD AUTO: 6.54 K/UL — SIGNIFICANT CHANGE UP (ref 3.8–10.5)

## 2024-01-16 PROCEDURE — 99233 SBSQ HOSP IP/OBS HIGH 50: CPT | Mod: GC

## 2024-01-16 PROCEDURE — 99232 SBSQ HOSP IP/OBS MODERATE 35: CPT

## 2024-01-16 RX ORDER — CHLORHEXIDINE GLUCONATE 213 G/1000ML
1 SOLUTION TOPICAL DAILY
Refills: 0 | Status: DISCONTINUED | OUTPATIENT
Start: 2024-01-16 | End: 2024-01-25

## 2024-01-16 RX ORDER — LABETALOL HCL 100 MG
200 TABLET ORAL EVERY 8 HOURS
Refills: 0 | Status: DISCONTINUED | OUTPATIENT
Start: 2024-01-16 | End: 2024-01-25

## 2024-01-16 RX ADMIN — Medication 650 MILLIGRAM(S): at 12:34

## 2024-01-16 RX ADMIN — Medication 2001 MILLIGRAM(S): at 12:35

## 2024-01-16 RX ADMIN — Medication 2001 MILLIGRAM(S): at 09:03

## 2024-01-16 RX ADMIN — Medication 200 MILLIGRAM(S): at 15:17

## 2024-01-16 RX ADMIN — Medication 200 MILLIGRAM(S): at 22:15

## 2024-01-16 RX ADMIN — Medication 650 MILLIGRAM(S): at 00:11

## 2024-01-16 RX ADMIN — Medication 650 MILLIGRAM(S): at 05:33

## 2024-01-16 RX ADMIN — Medication 650 MILLIGRAM(S): at 17:34

## 2024-01-16 RX ADMIN — Medication 100 MILLIGRAM(S): at 05:33

## 2024-01-16 RX ADMIN — CHLORHEXIDINE GLUCONATE 1 APPLICATION(S): 213 SOLUTION TOPICAL at 12:34

## 2024-01-16 RX ADMIN — Medication 650 MILLIGRAM(S): at 05:37

## 2024-01-16 RX ADMIN — HEPARIN SODIUM 5000 UNIT(S): 5000 INJECTION INTRAVENOUS; SUBCUTANEOUS at 15:17

## 2024-01-16 RX ADMIN — ATORVASTATIN CALCIUM 40 MILLIGRAM(S): 80 TABLET, FILM COATED ORAL at 22:13

## 2024-01-16 RX ADMIN — HEPARIN SODIUM 5000 UNIT(S): 5000 INJECTION INTRAVENOUS; SUBCUTANEOUS at 05:32

## 2024-01-16 RX ADMIN — Medication 650 MILLIGRAM(S): at 00:09

## 2024-01-16 RX ADMIN — Medication 2001 MILLIGRAM(S): at 17:34

## 2024-01-16 NOTE — PROGRESS NOTE ADULT - TIME BILLING
Review of laboratory data, radiology results, consultants' recommendations, documentation in [place], discussion with patient/house staff and interdisciplinary staff (such as , social workers, etc). Interventions were performed as documented above. Review of laboratory data, radiology results, consultants' recommendations, documentation in [place], discussion with patient/house staff and interdisciplinary staff (such as , social workers, etc). Interventions were performed as documented above.

## 2024-01-16 NOTE — PROGRESS NOTE ADULT - PROBLEM SELECTOR PLAN 1
DESIREE with proteinuria. R/o GN. No know baseline renal fx available. Recent admission at [facility] Cr 17 (Froy 3), received contrast (Froy 3) for cardiac work up. UA pos for proteins, and RBC 12. Proteinuria of 3.6 gm per labs at [facility]. S/p placement of temp HD cath to initiate dialysis w/EPO on 1/6/24. Last HD in [facility] was on 1/12. Pt  transferred to [facility] for placement of HD catheter and work up for DESIREE. HD was done on Monday 1/15/24. Serology was positive for DAVID 1:160 homogenous and speckled with indeterminate ANCA +ve. Patient needs Strict I/O and avoid nephrotoxins as much as much as possible. Will monitor renal fx. Renal biopsy is warranted. Biopsy schedule as per IR. BP controlled now; cw management. Pt was explained about his kidney condition and the plan of management. He verbalized the understanding. DESIREE with proteinuria. R/o GN. No know baseline renal fx available. Recent admission at [facility] Cr 17 (Froy 3), received contrast (Froy 3) for cardiac work up. UA pos for proteins, and RBC 12. Proteinuria of 3.6 gm per labs at [facility]. S/p placement of temp HD cath to initiate dialysis w/EPO on 1/6/24. Last HD in [facility] was on 1/12. Pt  transferred to [facility] for placement of HD catheter and work up for DESIREE. HD was done on Monday 1/15/24. Serology was positive for DAVID 1:160 homogenous and speckled with indeterminate ANCA +ve. Patient needs Strict I/O and avoid nephrotoxins as much as much as possible. Will monitor renal fx. Renal biopsy is warranted. Biopsy schedule as per IR. BP controlled now; cw management. Pt was explained about his kidney condition and the plan of management. He verbalized the understanding. DESIREE with proteinuria. R/o GN. No know baseline renal fx available. Recent admission at [place] Cr 17 (Froy 3), received contrast (Froy 3) for cardiac work up. UA pos for proteins, and RBC 12. Proteinuria of 3.6 gm per labs at [facility]. S/p placement of temp HD cath to initiate dialysis w/EPO on 1/6/24. Last HD in [facility] was on 1/12. Pt  transferred to [facility] for placement of HD catheter and work up for DESIREE. HD was done on Monday 1/15/24. Serology was positive for DAVID 1:160 homogenous and speckled with indeterminate ANCA +ve. Patient needs Strict I/O and avoid nephrotoxins as much as much as possible. Will monitor renal fx. Renal biopsy is warranted. Biopsy schedule as per IR. BP controlled now; cw management. Pt was explained about his kidney condition and the plan of management. He verbalized the understanding. DESIREE with proteinuria. R/o GN. No know baseline renal fx available. Recent admission at [place] Cr 17 (Froy 3), received contrast (Froy 3) for cardiac work up. UA pos for proteins, and RBC 12. Proteinuria of 3.6 gm per labs at [facility]. S/p placement of temp HD cath to initiate dialysis w/EPO on 1/6/24. Last HD in [facility] was on 1/12. Pt  transferred to [facility] for placement of HD catheter and work up for DESIREE. HD was done on Monday 1/15/24. Serology was positive for DAVID 1:160 homogenous and speckled with indeterminate ANCA +ve. Patient needs Strict I/O and avoid nephrotoxins as much as much as possible. Will monitor renal fx. Renal biopsy is warranted. Biopsy schedule as per IR. BP controlled now; cw management. Pt was explained about his kidney condition and the plan of management. He verbalized the understanding.

## 2024-01-16 NOTE — PROGRESS NOTE ADULT - ATTENDING COMMENTS
seen and evaluated this morning.   s/p yesterday.  used  phone to update patient on kidney condition.  Plan is to do biopsy to understand diagnosis and prognosis.

## 2024-01-16 NOTE — PROGRESS NOTE ADULT - PROBLEM SELECTOR PLAN 7
- pt denies any previous hx of HTN, however, per chart review - pt has hx of taking hypertensive med  - initially admitted to  for hypertensive emergency and renal failure, s/p hydralazine and cardio eval   -- @  pt continued on 200 mg Labetalol q8 hrs for hypertension   - 1/12 SBP 160s, resumed Labetalol 100 mg BID, up titrate as needed - pt denies any previous hx of HTN, however, per chart review - pt has hx of taking hypertensive med  - initially admitted to  for hypertensive emergency and renal failure, s/p hydralazine and cardio eval   -- @  pt continued on 200 mg Labetalol q8 hrs for hypertension   - 1/12 SBP 160s, resumed Labetalol 100 mg BID  - 1/16 SBP 160s, will increase Labetalol to 200mg q8h from 100mg q8h

## 2024-01-16 NOTE — PROGRESS NOTE ADULT - PROBLEM SELECTOR PLAN 2
- New ESRD with temp HD cath placement on 1/6/24  - Record of HD: 1/6, 1/8, 1/10, 1/11, 1/12 @ [facility]  - Transfer to [facility] 1/12 for HD cath placement (perm)  - [facility]: Chikis Laird [phone]  - IR consult for HD placement and renal biopsy in the setting of nephrotic proteinuria, consult order placed, nephrology consulted, f/u in AM  - vitamin D hydroxy low, PTH elevated - New ESRD with temp HD cath placement on 1/6/24  - Record of HD: 1/6, 1/8, 1/10, 1/11, 1/12 @ [facility]  - Transfer to [facility] 1/12 for HD cath placement (perm)  - [facility]: Chikis Laird [phone]  - IR consult for HD placement and renal biopsy in the setting of nephrotic proteinuria, consult order placed, nephrology consulted, f/u in AM  - vitamin D hydroxy low, PTH elevated

## 2024-01-16 NOTE — PROGRESS NOTE ADULT - ATTENDING COMMENTS
58M with no prior medical care who presents with acute renal failure, course complicated by flu A and ETEC, now resolved. Pt transferred from  to [facility] for IR renal biopsy and permacath placement.    No acute overnight events, pt feels well    # Acute renal failure  - pending IR guided renal bx tomorrow  - plan for permacath placement tomorrow  - HD per nephrology    # HTN  - pt required better BP control prior to renal bx  - increase labetalol    # +syphilis screen  - treponema testing +, RPR negative  - will f/u with patient regarding previous infection/treatment, if not may need to check VDRL to further assess for infection 58M with no prior medical care who presents with acute renal failure, course complicated by flu A and ETEC, now resolved. Pt transferred from  to [facility] for IR renal biopsy and permacath placement.    No acute overnight events, pt feels well    # Acute renal failure  - pending IR guided renal bx tomorrow  - plan for permacath placement tomorrow  - HD per nephrology    # HTN  - pt required better BP control prior to renal bx  - increase labetalol    # +syphilis screen  - treponema testing +, RPR negative  - will f/u with patient regarding previous infection/treatment, if not may need to check VDRL to further assess for infection

## 2024-01-16 NOTE — PROGRESS NOTE ADULT - SUBJECTIVE AND OBJECTIVE BOX
Jessica Tejada, PGY1    Patient is a 59y old  Male who presents with a chief complaint of ESRD on HD transferred for placement of permanent HD catheter and renal biopsy (15 Froy 2024 16:49)      SUBJECTIVE / OVERNIGHT EVENTS: NAEO. Pt denies chest pain, SOB, N/V, fever/chills, or changes in bowel movements.    MEDICATIONS  (STANDING):  acetaminophen     Tablet .. 650 milliGRAM(s) Oral every 6 hours  atorvastatin 40 milliGRAM(s) Oral at bedtime  calcium acetate 2001 milliGRAM(s) Oral three times a day with meals  ergocalciferol 42723 Unit(s) Oral every week  heparin   Injectable 5000 Unit(s) SubCutaneous every 8 hours  influenza   Vaccine 0.5 milliLiter(s) IntraMuscular once  labetalol 100 milliGRAM(s) Oral every 8 hours    MEDICATIONS  (PRN):  aluminum hydroxide/magnesium hydroxide/simethicone Suspension 30 milliLiter(s) Oral every 4 hours PRN Dyspepsia  melatonin 3 milliGRAM(s) Oral at bedtime PRN Insomnia      CAPILLARY BLOOD GLUCOSE        I&O's Summary    15 Froy 2024 07:01  -  16 Jan 2024 07:00  --------------------------------------------------------  IN: 950 mL / OUT: 1600 mL / NET: -650 mL        Vital Signs Last 24 Hrs  T(C): 36.7 (16 Jan 2024 05:25), Max: 36.8 (15 Froy 2024 20:53)  T(F): 98 (16 Jan 2024 05:25), Max: 98.2 (15 Froy 2024 20:53)  HR: 85 (16 Jan 2024 05:25) (64 - 85)  BP: 164/99 (16 Jan 2024 05:25) (146/70 - 164/99)  BP(mean): --  RR: 16 (16 Jan 2024 05:25) (16 - 17)  SpO2: 97% (16 Jan 2024 05:25) (97% - 100%)    Parameters below as of 16 Jan 2024 05:25  Patient On (Oxygen Delivery Method): room air        PHYSICAL EXAM:  GENERAL: NAD, well-developed, well-nourished  HEAD: Atraumatic, Normocephalic  EYES: Conjunctiva and sclera clear  CHEST/LUNG: Clear to auscultation bilaterally; No wheezes or crackles  HEART: Normal S1/S2; Regular rate and rhythm; No murmurs, rubs, or gallops  ABDOMEN: Soft, Nontender, Nondistended; Bowel sounds present  EXTREMITIES: No clubbing, cyanosis, or edema  PSYCH: A&Ox3      LABS:                        9.4    6.44  )-----------( 398      ( 15 Froy 2024 10:15 )             27.6      01-15    135  |  99  |  53<H>  ----------------------------<  109<H>  5.5<H>   |  21<L>  |  10.66<H>    Ca    7.8<L>      15 Froy 2024 10:15  Phos  4.8     01-15  Mg     2.50     01-15    TPro  6.2  /  Alb  3.1<L>  /  TBili  0.4  /  DBili  x   /  AST  25  /  ALT  25  /  AlkPhos  107  01-15          Urinalysis Basic - ( 15 Froy 2024 10:15 )    Color: x / Appearance: x / SG: x / pH: x  Gluc: 109 mg/dL / Ketone: x  / Bili: x / Urobili: x   Blood: x / Protein: x / Nitrite: x   Leuk Esterase: x / RBC: x / WBC x   Sq Epi: x / Non Sq Epi: x / Bacteria: x        RADIOLOGY & ADDITIONAL TESTS:     Jessica Tejada, PGY1    Patient is a 59y old  Male who presents with a chief complaint of ESRD on HD transferred for placement of permanent HD catheter and renal biopsy (15 Froy 2024 16:49)      SUBJECTIVE / OVERNIGHT EVENTS: NAEO. Pt denies chest pain, SOB, N/V, fever/chills, or changes in bowel movements.    MEDICATIONS  (STANDING):  acetaminophen     Tablet .. 650 milliGRAM(s) Oral every 6 hours  atorvastatin 40 milliGRAM(s) Oral at bedtime  calcium acetate 2001 milliGRAM(s) Oral three times a day with meals  ergocalciferol 64717 Unit(s) Oral every week  heparin   Injectable 5000 Unit(s) SubCutaneous every 8 hours  influenza   Vaccine 0.5 milliLiter(s) IntraMuscular once  labetalol 100 milliGRAM(s) Oral every 8 hours    MEDICATIONS  (PRN):  aluminum hydroxide/magnesium hydroxide/simethicone Suspension 30 milliLiter(s) Oral every 4 hours PRN Dyspepsia  melatonin 3 milliGRAM(s) Oral at bedtime PRN Insomnia      CAPILLARY BLOOD GLUCOSE        I&O's Summary    15 Froy 2024 07:01  -  16 Jan 2024 07:00  --------------------------------------------------------  IN: 950 mL / OUT: 1600 mL / NET: -650 mL        Vital Signs Last 24 Hrs  T(C): 36.7 (16 Jan 2024 05:25), Max: 36.8 (15 Froy 2024 20:53)  T(F): 98 (16 Jan 2024 05:25), Max: 98.2 (15 Froy 2024 20:53)  HR: 85 (16 Jan 2024 05:25) (64 - 85)  BP: 164/99 (16 Jan 2024 05:25) (146/70 - 164/99)  BP(mean): --  RR: 16 (16 Jan 2024 05:25) (16 - 17)  SpO2: 97% (16 Jan 2024 05:25) (97% - 100%)    Parameters below as of 16 Jan 2024 05:25  Patient On (Oxygen Delivery Method): room air        PHYSICAL EXAM:  GENERAL: NAD, well-developed, well-nourished  HEAD: Atraumatic, Normocephalic  EYES: Conjunctiva and sclera clear  CHEST/LUNG: Clear to auscultation bilaterally; No wheezes or crackles  HEART: Normal S1/S2; Regular rate and rhythm; No murmurs, rubs, or gallops  ABDOMEN: Soft, Nontender, Nondistended; Bowel sounds present  EXTREMITIES: No clubbing, cyanosis, or edema  PSYCH: A&Ox3      LABS:                        9.4    6.44  )-----------( 398      ( 15 Froy 2024 10:15 )             27.6      01-15    135  |  99  |  53<H>  ----------------------------<  109<H>  5.5<H>   |  21<L>  |  10.66<H>    Ca    7.8<L>      15 Froy 2024 10:15  Phos  4.8     01-15  Mg     2.50     01-15    TPro  6.2  /  Alb  3.1<L>  /  TBili  0.4  /  DBili  x   /  AST  25  /  ALT  25  /  AlkPhos  107  01-15          Urinalysis Basic - ( 15 Froy 2024 10:15 )    Color: x / Appearance: x / SG: x / pH: x  Gluc: 109 mg/dL / Ketone: x  / Bili: x / Urobili: x   Blood: x / Protein: x / Nitrite: x   Leuk Esterase: x / RBC: x / WBC x   Sq Epi: x / Non Sq Epi: x / Bacteria: x        RADIOLOGY & ADDITIONAL TESTS:     Jessica Tejada, PGY1    Patient is a 59y old  Male who presents with a chief complaint of ESRD on HD transferred for placement of permanent HD catheter and renal biopsy (15 Froy 2024 16:49)      SUBJECTIVE / OVERNIGHT EVENTS: NAEO. Pt denies chest pain, SOB, N/V, fever/chills, or changes in bowel movements.    MEDICATIONS  (STANDING):  acetaminophen     Tablet .. 650 milliGRAM(s) Oral every 6 hours  atorvastatin 40 milliGRAM(s) Oral at bedtime  calcium acetate 2001 milliGRAM(s) Oral three times a day with meals  ergocalciferol 92074 Unit(s) Oral every week  heparin   Injectable 5000 Unit(s) SubCutaneous every 8 hours  influenza   Vaccine 0.5 milliLiter(s) IntraMuscular once  labetalol 100 milliGRAM(s) Oral every 8 hours    MEDICATIONS  (PRN):  aluminum hydroxide/magnesium hydroxide/simethicone Suspension 30 milliLiter(s) Oral every 4 hours PRN Dyspepsia  melatonin 3 milliGRAM(s) Oral at bedtime PRN Insomnia      CAPILLARY BLOOD GLUCOSE        I&O's Summary    15 Froy 2024 07:01  -  16 Jan 2024 07:00  --------------------------------------------------------  IN: 950 mL / OUT: 1600 mL / NET: -650 mL        Vital Signs Last 24 Hrs  T(C): 36.7 (16 Jan 2024 05:25), Max: 36.8 (15 Froy 2024 20:53)  T(F): 98 (16 Jan 2024 05:25), Max: 98.2 (15 Froy 2024 20:53)  HR: 85 (16 Jan 2024 05:25) (64 - 85)  BP: 164/99 (16 Jan 2024 05:25) (146/70 - 164/99)  BP(mean): --  RR: 16 (16 Jan 2024 05:25) (16 - 17)  SpO2: 97% (16 Jan 2024 05:25) (97% - 100%)    Parameters below as of 16 Jan 2024 05:25  Patient On (Oxygen Delivery Method): room air        PHYSICAL EXAM:  GENERAL: NAD, well-developed, well-nourished  HEAD: Atraumatic, Normocephalic  NECK:  RIJ shiley in place- non-bleeding   EYES: Conjunctiva and sclera clear  CHEST/LUNG: Clear to auscultation bilaterally; No wheezes or crackles  HEART: Normal S1/S2; Regular rate and rhythm; No murmurs, rubs, or gallops  ABDOMEN: Soft, Nontender, Nondistended; Bowel sounds present  EXTREMITIES: No clubbing, cyanosis, or edema  PSYCH: A&Ox3    LABS:                        9.4    6.44  )-----------( 398      ( 15 Froy 2024 10:15 )             27.6      01-15    135  |  99  |  53<H>  ----------------------------<  109<H>  5.5<H>   |  21<L>  |  10.66<H>    Ca    7.8<L>      15 Froy 2024 10:15  Phos  4.8     01-15  Mg     2.50     01-15    TPro  6.2  /  Alb  3.1<L>  /  TBili  0.4  /  DBili  x   /  AST  25  /  ALT  25  /  AlkPhos  107  01-15          Urinalysis Basic - ( 15 Froy 2024 10:15 )    Color: x / Appearance: x / SG: x / pH: x  Gluc: 109 mg/dL / Ketone: x  / Bili: x / Urobili: x   Blood: x / Protein: x / Nitrite: x   Leuk Esterase: x / RBC: x / WBC x   Sq Epi: x / Non Sq Epi: x / Bacteria: x        RADIOLOGY & ADDITIONAL TESTS:     Jessica Tejada, PGY1    Patient is a 59y old  Male who presents with a chief complaint of ESRD on HD transferred for placement of permanent HD catheter and renal biopsy (15 Froy 2024 16:49)      SUBJECTIVE / OVERNIGHT EVENTS: NAEO. Pt denies chest pain, SOB, N/V, fever/chills, or changes in bowel movements.    MEDICATIONS  (STANDING):  acetaminophen     Tablet .. 650 milliGRAM(s) Oral every 6 hours  atorvastatin 40 milliGRAM(s) Oral at bedtime  calcium acetate 2001 milliGRAM(s) Oral three times a day with meals  ergocalciferol 77494 Unit(s) Oral every week  heparin   Injectable 5000 Unit(s) SubCutaneous every 8 hours  influenza   Vaccine 0.5 milliLiter(s) IntraMuscular once  labetalol 100 milliGRAM(s) Oral every 8 hours    MEDICATIONS  (PRN):  aluminum hydroxide/magnesium hydroxide/simethicone Suspension 30 milliLiter(s) Oral every 4 hours PRN Dyspepsia  melatonin 3 milliGRAM(s) Oral at bedtime PRN Insomnia      CAPILLARY BLOOD GLUCOSE        I&O's Summary    15 Froy 2024 07:01  -  16 Jan 2024 07:00  --------------------------------------------------------  IN: 950 mL / OUT: 1600 mL / NET: -650 mL        Vital Signs Last 24 Hrs  T(C): 36.7 (16 Jan 2024 05:25), Max: 36.8 (15 Froy 2024 20:53)  T(F): 98 (16 Jan 2024 05:25), Max: 98.2 (15 Froy 2024 20:53)  HR: 85 (16 Jan 2024 05:25) (64 - 85)  BP: 164/99 (16 Jan 2024 05:25) (146/70 - 164/99)  BP(mean): --  RR: 16 (16 Jan 2024 05:25) (16 - 17)  SpO2: 97% (16 Jan 2024 05:25) (97% - 100%)    Parameters below as of 16 Jan 2024 05:25  Patient On (Oxygen Delivery Method): room air        PHYSICAL EXAM:  GENERAL: NAD, well-developed, well-nourished  HEAD: Atraumatic, Normocephalic  NECK:  RIJ shiley in place- non-bleeding   EYES: Conjunctiva and sclera clear  CHEST/LUNG: Clear to auscultation bilaterally; No wheezes or crackles  HEART: Normal S1/S2; Regular rate and rhythm; No murmurs, rubs, or gallops  ABDOMEN: Soft, Nontender, Nondistended; Bowel sounds present  EXTREMITIES: No clubbing, cyanosis, or edema  PSYCH: A&Ox3    LABS:                        9.4    6.44  )-----------( 398      ( 15 Froy 2024 10:15 )             27.6      01-15    135  |  99  |  53<H>  ----------------------------<  109<H>  5.5<H>   |  21<L>  |  10.66<H>    Ca    7.8<L>      15 Froy 2024 10:15  Phos  4.8     01-15  Mg     2.50     01-15    TPro  6.2  /  Alb  3.1<L>  /  TBili  0.4  /  DBili  x   /  AST  25  /  ALT  25  /  AlkPhos  107  01-15          Urinalysis Basic - ( 15 Froy 2024 10:15 )    Color: x / Appearance: x / SG: x / pH: x  Gluc: 109 mg/dL / Ketone: x  / Bili: x / Urobili: x   Blood: x / Protein: x / Nitrite: x   Leuk Esterase: x / RBC: x / WBC x   Sq Epi: x / Non Sq Epi: x / Bacteria: x        RADIOLOGY & ADDITIONAL TESTS:

## 2024-01-16 NOTE — PROGRESS NOTE ADULT - PROBLEM SELECTOR PLAN 1
- admitted to  for renal failure on CKD stage 3 likely 2/2 dehydration and hypertensive emergency from 1/3/24,   - s/p CT w/IV dye 1/3/23 possibly contributing to rise in Cr per  nephrology notes  - 24 eden urine collection positive for nephrotic range proteinuria; SPEP, renal serologies negative   - CT A/P w/o hydro; Started on HD 1/6 via temp HD cath  - Pt requires chronic HD, transition from temp to perm HD cath and renal bx, HD catheter placed 01/06  - Transferred to [facility] on 1/12 for HD perm and bx, IR consult order place, nephrology consulted as well to inform of transfer   - CTM electrolytes, creatinine, strict I/Os, and cath site   - pending permcath placement on 1/17 - admitted to  for renal failure on CKD stage 3 likely 2/2 dehydration and hypertensive emergency from 1/3/24,   - s/p CT w/IV dye 1/3/23 possibly contributing to rise in Cr per  nephrology notes  - 24 eden urine collection positive for nephrotic range proteinuria; SPEP, renal serologies negative   - CT A/P w/o hydro; Started on HD 1/6 via temp HD cath  - Pt requires chronic HD, transition from temp to perm HD cath and renal bx, HD catheter placed 01/06  - Transferred to [facility] on 1/12 for HD perm and bx, IR consult order place, nephrology consulted as well to inform of transfer   - CTM electrolytes, creatinine, strict I/Os, and cath site   - pending permcath placement on 1/17

## 2024-01-16 NOTE — PROGRESS NOTE ADULT - ASSESSMENT
57yo M with no known PMHx (not followed by PCP), but FHx (brother) w/ESRD s/p tplant who presented to [facility] with n/v/abdominal pain, admitted for renal failure and r/o ACS. Hospital course c/b flu A, sepsis 2/2 ETEC, anemia and NSTEMI evaluation s/p stress test. Now pt transferred to [facility] and pending renal bx and perm HD cath placement; last HD session 1/12.  Accepting physician: Dr. Chery.    59yo M with no known PMHx (not followed by PCP), but FHx (brother) w/ESRD s/p tplant who presented to [facility] with n/v/abdominal pain, admitted for renal failure and r/o ACS. Hospital course c/b flu A, sepsis 2/2 ETEC, anemia and NSTEMI evaluation s/p stress test. Now pt transferred to [facility] and pending renal bx and perm HD cath placement; last HD session 1/12.  Accepting physician: Dr. Chery.    59yo M with no known PMHx (not followed by PCP), but FHx (brother) w/ESRD s/p tplant who presented to [facility] with n/v/abdominal pain, admitted for renal failure and r/o ACS. Hospital course c/b flu A, sepsis 2/2 ETEC, anemia and NSTEMI evaluation s/p stress test. Now pt transferred to [facility] and pending renal bx and perm HD cath placement.  Accepting physician: Dr. Chery.    57yo M with no known PMHx (not followed by PCP), but FHx (brother) w/ESRD s/p tplant who presented to [facility] with n/v/abdominal pain, admitted for renal failure and r/o ACS. Hospital course c/b flu A, sepsis 2/2 ETEC, anemia and NSTEMI evaluation s/p stress test. Now pt transferred to [facility] and pending renal bx and perm HD cath placement.  Accepting physician: Dr. Chery.

## 2024-01-16 NOTE — PROGRESS NOTE ADULT - PROBLEM SELECTOR PLAN 8
DVT PPx:SCDs for now, to be re-evaluated for chemical DVT prophylaxis once VIR plan determined.   Diet: Renal  Code: Full  Dispo: PT/OT Pending Hospital Course DVT PPx: SQ; will hold prior to IR procedure  Diet: Renal  Code: Full  Dispo: PT/OT Pending Hospital Course

## 2024-01-16 NOTE — PROGRESS NOTE ADULT - PROBLEM SELECTOR PLAN 6
RESOLVED-NSTEMI in the setting of demand ischemia in the setting of HTN emergency  - presented at  with N/V/D and 4 years of chest pain withe exertion, progressively worsening and no prior PCP or physician evaluation; on admission at , elevated troponin with peak to 715.  - EKG nonischemic, Cr elevated +/- demand i/s/o HTN urgency; echo with mod pulm htn, nuclear stress test unremarkable, most likely i/s/o demand ischemia   - Echo: consistent with normal LVEF 65-70%, LVH and moderate pulm HTN  -Cards recommended non-invasive ischemic eval given contrast induced nephropathy and new renal failure: Stress test on 1/9/23: LVEF 66%  -- Given normal myocardial perfusion, CV stable  - C/W statin, and BB and outpt cardiology follow-up with [facility]   - held hep for 1/13 AM given possibility for bx/HD placement RESOLVED-NSTEMI in the setting of demand ischemia in the setting of HTN emergency  - presented at  with N/V/D and 4 years of chest pain withe exertion, progressively worsening and no prior PCP or physician evaluation; on admission at , elevated troponin with peak to 715.  - EKG nonischemic, Cr elevated +/- demand i/s/o HTN urgency; echo with mod pulm htn, nuclear stress test unremarkable, most likely i/s/o demand ischemia   - Echo: consistent with normal LVEF 65-70%, LVH and moderate pulm HTN  -Cards recommended non-invasive ischemic eval given contrast induced nephropathy and new renal failure: Stress test on 1/9/23: LVEF 66%  -- Given normal myocardial perfusion, CV stable  - C/W statin, and BB and outpt cardiology follow-up with [facility]   - held hep for 1/13 AM given possibility for bx/HD placement

## 2024-01-16 NOTE — PROGRESS NOTE ADULT - SUBJECTIVE AND OBJECTIVE BOX
[facility] Division of Kidney Diseases & Hypertension  FOLLOW UP NOTE  [phone]--------------------------------------------------------------------------------  Chief Complaint:Chronic kidney disease    24 hour events/subjective:  Pt was seen and examined at the bedside. Used [employer] service. ID - [id number]. No acute overnight events. No fresh complaints. He endorsed feeling better.   Pt denies SOB/ Constipation/ Diarrhea/ Nausea/ Vomiting/ abdominal pain/ chest pain/ tingling/ numbness.         PAST HISTORY  --------------------------------------------------------------------------------  No significant changes to PMH, PSH, FHx, SHx, unless otherwise noted    ALLERGIES & MEDICATIONS  --------------------------------------------------------------------------------  Allergies    No Known Allergies    Intolerances      Standing Inpatient Medications  acetaminophen     Tablet .. 650 milliGRAM(s) Oral every 6 hours  atorvastatin 40 milliGRAM(s) Oral at bedtime  calcium acetate 2001 milliGRAM(s) Oral three times a day with meals  chlorhexidine 2% Cloths 1 Application(s) Topical daily  ergocalciferol 44628 Unit(s) Oral every week  heparin   Injectable 5000 Unit(s) SubCutaneous every 8 hours  influenza   Vaccine 0.5 milliLiter(s) IntraMuscular once  labetalol 200 milliGRAM(s) Oral every 8 hours    PRN Inpatient Medications  aluminum hydroxide/magnesium hydroxide/simethicone Suspension 30 milliLiter(s) Oral every 4 hours PRN  melatonin 3 milliGRAM(s) Oral at bedtime PRN      REVIEW OF SYSTEMS  --------------------------------------------------------------------------------  As above.     VITALS/PHYSICAL EXAM  --------------------------------------------------------------------------------  T(C): 36.7 (01-16-24 @ 05:25), Max: 36.8 (01-15-24 @ 20:53)  HR: 85 (01-16-24 @ 05:25) (71 - 85)  BP: 164/99 (01-16-24 @ 05:25) (146/83 - 164/99)  RR: 16 (01-16-24 @ 05:25) (16 - 17)  SpO2: 97% (01-16-24 @ 05:25) (97% - 100%)  Wt(kg): --        01-15-24 @ 07:01  -  01-16-24 @ 07:00  --------------------------------------------------------  IN: 950 mL / OUT: 1600 mL / NET: -650 mL      Physical Exam:  Gen: NAD,  HEENT: PERRL, supple neck, clear oropharynx  Pulm: CTA B/L  CV: RRR, S1S2; no rub  Back: No spinal or CVA tenderness; no sacral edema  Abd: +BS, soft, nontender/nondistended  : No suprapubic tenderness  UE: Warm, FROM, no clubbing, intact strength; no edema; no asterixis  LE: Warm, FROM, no clubbing, intact strength; no edema  Neuro: No focal deficits, intact gait  Psych: Normal affect and mood  Skin: Warm, without rashes  Rt IJ NTHD cath.       LABS/STUDIES  --------------------------------------------------------------------------------              10.1   6.54  >-----------<  411      [01-16-24 @ 05:35]              29.6     138  |  102  |  38  ----------------------------<  80      [01-16-24 @ 05:35]  4.7   |  24  |  8.54        Ca     7.9     [01-16-24 @ 05:35]      Mg     2.30     [01-16-24 @ 05:35]      Phos  4.7     [01-16-24 @ 05:35]    TPro  6.4  /  Alb  3.1  /  TBili  0.4  /  DBili  x   /  AST  24  /  ALT  28  /  AlkPhos  112  [01-16-24 @ 05:35]          Creatinine Trend:  SCr 8.54 [01-16 @ 05:35]  SCr 10.66 [01-15 @ 10:15]  SCr 9.14 [01-14 @ 05:51]  SCr 7.44 [01-13 @ 07:14]  SCr 10.15 [01-12 @ 05:56]    Urinalysis - [01-16-24 @ 05:35]      Color  / Appearance  / SG  / pH       Gluc 80 / Ketone   / Bili  / Urobili        Blood  / Protein  / Leuk Est  / Nitrite       RBC  / WBC  / Hyaline  / Gran  / Sq Epi  / Non Sq Epi  / Bacteria       PTH -- (Ca --)      [01-13-24 @ 07:14]   535  Vitamin D (25OH) 10.7      [01-13-24 @ 07:14]    HBsAg Nonreact      [01-14-24 @ 05:51]  HCV 0.19, Nonreact      [01-14-24 @ 05:51]  HIV Nonreact      [01-14-24 @ 05:51]    C3 Complement 94      [01-14-24 @ 05:51]  C4 Complement 27      [01-14-24 @ 05:51]  ANCA: cANCA Negative, pANCA Negative, atypical ANCA Indeterminate Method interference due to DAVID fluorescence      [01-14-24 @ 05:51]  Syphilis Screen (Treponema Pallidum Ab) Positive      [01-14-24 @ 05:51]  Syphilis Screen (RPR Titer) <1:1      [01-14-24 @ 05:51]   [facility] Division of Kidney Diseases & Hypertension  FOLLOW UP NOTE  [phone]--------------------------------------------------------------------------------  Chief Complaint:Chronic kidney disease    24 hour events/subjective:  Pt was seen and examined at the bedside. Used [employer] service. ID - [id number]. No acute overnight events. No fresh complaints. He endorsed feeling better.   Pt denies SOB/ Constipation/ Diarrhea/ Nausea/ Vomiting/ abdominal pain/ chest pain/ tingling/ numbness.         PAST HISTORY  --------------------------------------------------------------------------------  No significant changes to PMH, PSH, FHx, SHx, unless otherwise noted    ALLERGIES & MEDICATIONS  --------------------------------------------------------------------------------  Allergies    No Known Allergies    Intolerances      Standing Inpatient Medications  acetaminophen     Tablet .. 650 milliGRAM(s) Oral every 6 hours  atorvastatin 40 milliGRAM(s) Oral at bedtime  calcium acetate 2001 milliGRAM(s) Oral three times a day with meals  chlorhexidine 2% Cloths 1 Application(s) Topical daily  ergocalciferol 42292 Unit(s) Oral every week  heparin   Injectable 5000 Unit(s) SubCutaneous every 8 hours  influenza   Vaccine 0.5 milliLiter(s) IntraMuscular once  labetalol 200 milliGRAM(s) Oral every 8 hours    PRN Inpatient Medications  aluminum hydroxide/magnesium hydroxide/simethicone Suspension 30 milliLiter(s) Oral every 4 hours PRN  melatonin 3 milliGRAM(s) Oral at bedtime PRN      REVIEW OF SYSTEMS  --------------------------------------------------------------------------------  As above.     VITALS/PHYSICAL EXAM  --------------------------------------------------------------------------------  T(C): 36.7 (01-16-24 @ 05:25), Max: 36.8 (01-15-24 @ 20:53)  HR: 85 (01-16-24 @ 05:25) (71 - 85)  BP: 164/99 (01-16-24 @ 05:25) (146/83 - 164/99)  RR: 16 (01-16-24 @ 05:25) (16 - 17)  SpO2: 97% (01-16-24 @ 05:25) (97% - 100%)  Wt(kg): --        01-15-24 @ 07:01  -  01-16-24 @ 07:00  --------------------------------------------------------  IN: 950 mL / OUT: 1600 mL / NET: -650 mL      Physical Exam:  Gen: NAD,  HEENT: PERRL, supple neck, clear oropharynx  Pulm: CTA B/L  CV: RRR, S1S2; no rub  Back: No spinal or CVA tenderness; no sacral edema  Abd: +BS, soft, nontender/nondistended  : No suprapubic tenderness  UE: Warm, FROM, no clubbing, intact strength; no edema; no asterixis  LE: Warm, FROM, no clubbing, intact strength; no edema  Neuro: No focal deficits, intact gait  Psych: Normal affect and mood  Skin: Warm, without rashes  Rt IJ NTHD cath.       LABS/STUDIES  --------------------------------------------------------------------------------              10.1   6.54  >-----------<  411      [01-16-24 @ 05:35]              29.6     138  |  102  |  38  ----------------------------<  80      [01-16-24 @ 05:35]  4.7   |  24  |  8.54        Ca     7.9     [01-16-24 @ 05:35]      Mg     2.30     [01-16-24 @ 05:35]      Phos  4.7     [01-16-24 @ 05:35]    TPro  6.4  /  Alb  3.1  /  TBili  0.4  /  DBili  x   /  AST  24  /  ALT  28  /  AlkPhos  112  [01-16-24 @ 05:35]          Creatinine Trend:  SCr 8.54 [01-16 @ 05:35]  SCr 10.66 [01-15 @ 10:15]  SCr 9.14 [01-14 @ 05:51]  SCr 7.44 [01-13 @ 07:14]  SCr 10.15 [01-12 @ 05:56]    Urinalysis - [01-16-24 @ 05:35]      Color  / Appearance  / SG  / pH       Gluc 80 / Ketone   / Bili  / Urobili        Blood  / Protein  / Leuk Est  / Nitrite       RBC  / WBC  / Hyaline  / Gran  / Sq Epi  / Non Sq Epi  / Bacteria       PTH -- (Ca --)      [01-13-24 @ 07:14]   535  Vitamin D (25OH) 10.7      [01-13-24 @ 07:14]    HBsAg Nonreact      [01-14-24 @ 05:51]  HCV 0.19, Nonreact      [01-14-24 @ 05:51]  HIV Nonreact      [01-14-24 @ 05:51]    C3 Complement 94      [01-14-24 @ 05:51]  C4 Complement 27      [01-14-24 @ 05:51]  ANCA: cANCA Negative, pANCA Negative, atypical ANCA Indeterminate Method interference due to DAVID fluorescence      [01-14-24 @ 05:51]  Syphilis Screen (Treponema Pallidum Ab) Positive      [01-14-24 @ 05:51]  Syphilis Screen (RPR Titer) <1:1      [01-14-24 @ 05:51]   [facility] [place] [facility] Division of Kidney Diseases & Hypertension  FOLLOW UP NOTE  [phone]--------------------------------------------------------------------------------    Chief Complaint: Chronic kidney disease    24 hour events/subjective:  Pt was seen and examined at the bedside. Used [employer] service. ID - [id number]. No acute overnight events. No fresh complaints. He endorsed feeling better.   Pt denies SOB / Constipation / Diarrhea/ Nausea/ Vomiting/ abdominal pain/ chest pain/ tingling/ numbness.     PAST HISTORY  --------------------------------------------------------------------------------  No significant changes to PMH, PSH, FHx, SHx, unless otherwise noted    ALLERGIES & MEDICATIONS  --------------------------------------------------------------------------------  Allergies    No Known Allergies    Intolerances      Standing Inpatient Medications  acetaminophen     Tablet .. 650 milliGRAM(s) Oral every 6 hours  atorvastatin 40 milliGRAM(s) Oral at bedtime  calcium acetate 2001 milliGRAM(s) Oral three times a day with meals  chlorhexidine 2% Cloths 1 Application(s) Topical daily  ergocalciferol 36770 Unit(s) Oral every week  heparin   Injectable 5000 Unit(s) SubCutaneous every 8 hours  influenza   Vaccine 0.5 milliLiter(s) IntraMuscular once  labetalol 200 milliGRAM(s) Oral every 8 hours    PRN Inpatient Medications  aluminum hydroxide/magnesium hydroxide/simethicone Suspension 30 milliLiter(s) Oral every 4 hours PRN  melatonin 3 milliGRAM(s) Oral at bedtime PRN      REVIEW OF SYSTEMS  --------------------------------------------------------------------------------  As above.     VITALS/PHYSICAL EXAM  --------------------------------------------------------------------------------  T(C): 36.7 (01-16-24 @ 05:25), Max: 36.8 (01-15-24 @ 20:53)  HR: 85 (01-16-24 @ 05:25) (71 - 85)  BP: 164/99 (01-16-24 @ 05:25) (146/83 - 164/99)  RR: 16 (01-16-24 @ 05:25) (16 - 17)  SpO2: 97% (01-16-24 @ 05:25) (97% - 100%)  Wt(kg): --        01-15-24 @ 07:01  -  01-16-24 @ 07:00  --------------------------------------------------------  IN: 950 mL / OUT: 1600 mL / NET: -650 mL      Physical Exam:  Gen: NAD,  HEENT: anicteric  Pulm: CTA B/L  CV: RRR, S1S2; no rub  Abd: soft  : No suprapubic tenderness  Skin: Warm, without rashes  Rt IJ NTHD cath.       LABS/STUDIES  --------------------------------------------------------------------------------              10.1   6.54  >-----------<  411      [01-16-24 @ 05:35]              29.6     138  |  102  |  38  ----------------------------<  80      [01-16-24 @ 05:35]  4.7   |  24  |  8.54        Ca     7.9     [01-16-24 @ 05:35]      Mg     2.30     [01-16-24 @ 05:35]      Phos  4.7     [01-16-24 @ 05:35]    TPro  6.4  /  Alb  3.1  /  TBili  0.4  /  DBili  x   /  AST  24  /  ALT  28  /  AlkPhos  112  [01-16-24 @ 05:35]          Creatinine Trend:  SCr 8.54 [01-16 @ 05:35]  SCr 10.66 [01-15 @ 10:15]  SCr 9.14 [01-14 @ 05:51]  SCr 7.44 [01-13 @ 07:14]  SCr 10.15 [01-12 @ 05:56]    Urinalysis - [01-16-24 @ 05:35]      Color  / Appearance  / SG  / pH       Gluc 80 / Ketone   / Bili  / Urobili        Blood  / Protein  / Leuk Est  / Nitrite       RBC  / WBC  / Hyaline  / Gran  / Sq Epi  / Non Sq Epi  / Bacteria       PTH -- (Ca --)      [01-13-24 @ 07:14]   535  Vitamin D (25OH) 10.7      [01-13-24 @ 07:14]    HBsAg Nonreact      [01-14-24 @ 05:51]  HCV 0.19, Nonreact      [01-14-24 @ 05:51]  HIV Nonreact      [01-14-24 @ 05:51]    C3 Complement 94      [01-14-24 @ 05:51]  C4 Complement 27      [01-14-24 @ 05:51]  ANCA: cANCA Negative, pANCA Negative, atypical ANCA Indeterminate Method interference due to DAVID fluorescence      [01-14-24 @ 05:51]  Syphilis Screen (Treponema Pallidum Ab) Positive      [01-14-24 @ 05:51]  Syphilis Screen (RPR Titer) <1:1      [01-14-24 @ 05:51]   [facility] Division of Kidney Diseases & Hypertension  FOLLOW UP NOTE  [phone]--------------------------------------------------------------------------------    Chief Complaint: Chronic kidney disease    24 hour events/subjective:  Pt was seen and examined at the bedside. Used [employer] service. ID - [id number]. No acute overnight events. No fresh complaints. He endorsed feeling better.   Pt denies SOB / Constipation / Diarrhea/ Nausea/ Vomiting/ abdominal pain/ chest pain/ tingling/ numbness.     PAST HISTORY  --------------------------------------------------------------------------------  No significant changes to PMH, PSH, FHx, SHx, unless otherwise noted    ALLERGIES & MEDICATIONS  --------------------------------------------------------------------------------  Allergies    No Known Allergies    Intolerances      Standing Inpatient Medications  acetaminophen     Tablet .. 650 milliGRAM(s) Oral every 6 hours  atorvastatin 40 milliGRAM(s) Oral at bedtime  calcium acetate 2001 milliGRAM(s) Oral three times a day with meals  chlorhexidine 2% Cloths 1 Application(s) Topical daily  ergocalciferol 19030 Unit(s) Oral every week  heparin   Injectable 5000 Unit(s) SubCutaneous every 8 hours  influenza   Vaccine 0.5 milliLiter(s) IntraMuscular once  labetalol 200 milliGRAM(s) Oral every 8 hours    PRN Inpatient Medications  aluminum hydroxide/magnesium hydroxide/simethicone Suspension 30 milliLiter(s) Oral every 4 hours PRN  melatonin 3 milliGRAM(s) Oral at bedtime PRN      REVIEW OF SYSTEMS  --------------------------------------------------------------------------------  As above.     VITALS/PHYSICAL EXAM  --------------------------------------------------------------------------------  T(C): 36.7 (01-16-24 @ 05:25), Max: 36.8 (01-15-24 @ 20:53)  HR: 85 (01-16-24 @ 05:25) (71 - 85)  BP: 164/99 (01-16-24 @ 05:25) (146/83 - 164/99)  RR: 16 (01-16-24 @ 05:25) (16 - 17)  SpO2: 97% (01-16-24 @ 05:25) (97% - 100%)  Wt(kg): --        01-15-24 @ 07:01  -  01-16-24 @ 07:00  --------------------------------------------------------  IN: 950 mL / OUT: 1600 mL / NET: -650 mL      Physical Exam:  Gen: NAD,  HEENT: anicteric  Pulm: CTA B/L  CV: RRR, S1S2; no rub  Abd: soft  : No suprapubic tenderness  Skin: Warm, without rashes  Rt IJ NTHD cath.       LABS/STUDIES  --------------------------------------------------------------------------------              10.1   6.54  >-----------<  411      [01-16-24 @ 05:35]              29.6     138  |  102  |  38  ----------------------------<  80      [01-16-24 @ 05:35]  4.7   |  24  |  8.54        Ca     7.9     [01-16-24 @ 05:35]      Mg     2.30     [01-16-24 @ 05:35]      Phos  4.7     [01-16-24 @ 05:35]    TPro  6.4  /  Alb  3.1  /  TBili  0.4  /  DBili  x   /  AST  24  /  ALT  28  /  AlkPhos  112  [01-16-24 @ 05:35]          Creatinine Trend:  SCr 8.54 [01-16 @ 05:35]  SCr 10.66 [01-15 @ 10:15]  SCr 9.14 [01-14 @ 05:51]  SCr 7.44 [01-13 @ 07:14]  SCr 10.15 [01-12 @ 05:56]    Urinalysis - [01-16-24 @ 05:35]      Color  / Appearance  / SG  / pH       Gluc 80 / Ketone   / Bili  / Urobili        Blood  / Protein  / Leuk Est  / Nitrite       RBC  / WBC  / Hyaline  / Gran  / Sq Epi  / Non Sq Epi  / Bacteria       PTH -- (Ca --)      [01-13-24 @ 07:14]   535  Vitamin D (25OH) 10.7      [01-13-24 @ 07:14]    HBsAg Nonreact      [01-14-24 @ 05:51]  HCV 0.19, Nonreact      [01-14-24 @ 05:51]  HIV Nonreact      [01-14-24 @ 05:51]    C3 Complement 94      [01-14-24 @ 05:51]  C4 Complement 27      [01-14-24 @ 05:51]  ANCA: cANCA Negative, pANCA Negative, atypical ANCA Indeterminate Method interference due to DAVID fluorescence      [01-14-24 @ 05:51]  Syphilis Screen (Treponema Pallidum Ab) Positive      [01-14-24 @ 05:51]  Syphilis Screen (RPR Titer) <1:1      [01-14-24 @ 05:51]

## 2024-01-17 LAB
% ALBUMIN: 48.2 % — SIGNIFICANT CHANGE UP
% ALPHA 1: 4.5 % — SIGNIFICANT CHANGE UP
% ALPHA 2: 14.6 % — SIGNIFICANT CHANGE UP
% BETA: 11.5 % — SIGNIFICANT CHANGE UP
% GAMMA: 21.2 % — SIGNIFICANT CHANGE UP
ALBUMIN SERPL ELPH-MCNC: 3 G/DL — LOW (ref 3.3–5)
ALBUMIN SERPL ELPH-MCNC: 3.13 G/DL — LOW (ref 3.3–4.4)
ALBUMIN/GLOB SERPL ELPH: 0.9 RATIO — SIGNIFICANT CHANGE UP
ALP SERPL-CCNC: 105 U/L — SIGNIFICANT CHANGE UP (ref 40–120)
ALPHA1 GLOB SERPL ELPH-MCNC: 0.29 G/DL — SIGNIFICANT CHANGE UP (ref 0.1–0.3)
ALPHA2 GLOB SERPL ELPH-MCNC: 0.9 G/DL — SIGNIFICANT CHANGE UP (ref 0.6–1)
ALT FLD-CCNC: 22 U/L — SIGNIFICANT CHANGE UP (ref 4–41)
ANION GAP SERPL CALC-SCNC: 13 MMOL/L — SIGNIFICANT CHANGE UP (ref 7–14)
APTT BLD: 28.4 SEC — SIGNIFICANT CHANGE UP (ref 24.5–35.6)
AST SERPL-CCNC: 24 U/L — SIGNIFICANT CHANGE UP (ref 4–40)
B-GLOBULIN SERPL ELPH-MCNC: 0.75 G/DL — SIGNIFICANT CHANGE UP (ref 0.6–1.1)
BILIRUB SERPL-MCNC: 0.3 MG/DL — SIGNIFICANT CHANGE UP (ref 0.2–1.2)
BLD GP AB SCN SERPL QL: NEGATIVE — SIGNIFICANT CHANGE UP
BUN SERPL-MCNC: 53 MG/DL — HIGH (ref 7–23)
CALCIUM SERPL-MCNC: 7.6 MG/DL — LOW (ref 8.4–10.5)
CHLORIDE SERPL-SCNC: 101 MMOL/L — SIGNIFICANT CHANGE UP (ref 98–107)
CO2 SERPL-SCNC: 23 MMOL/L — SIGNIFICANT CHANGE UP (ref 22–31)
CREAT SERPL-MCNC: 9.71 MG/DL — HIGH (ref 0.5–1.3)
EGFR: 6 ML/MIN/1.73M2 — LOW
GAMMA GLOBULIN: 1.38 G/DL — SIGNIFICANT CHANGE UP (ref 0.7–1.7)
GLUCOSE BLDC GLUCOMTR-MCNC: 87 MG/DL — SIGNIFICANT CHANGE UP (ref 70–99)
GLUCOSE SERPL-MCNC: 120 MG/DL — HIGH (ref 70–99)
HCT VFR BLD CALC: 27.3 % — LOW (ref 39–50)
HGB BLD-MCNC: 9.3 G/DL — LOW (ref 13–17)
INR BLD: 0.95 RATIO — SIGNIFICANT CHANGE UP (ref 0.85–1.18)
MAGNESIUM SERPL-MCNC: 2.5 MG/DL — SIGNIFICANT CHANGE UP (ref 1.6–2.6)
MCHC RBC-ENTMCNC: 27.5 PG — SIGNIFICANT CHANGE UP (ref 27–34)
MCHC RBC-ENTMCNC: 34.1 GM/DL — SIGNIFICANT CHANGE UP (ref 32–36)
MCV RBC AUTO: 80.8 FL — SIGNIFICANT CHANGE UP (ref 80–100)
MRSA PCR RESULT.: SIGNIFICANT CHANGE UP
NRBC # BLD: 0 /100 WBCS — SIGNIFICANT CHANGE UP (ref 0–0)
NRBC # FLD: 0 K/UL — SIGNIFICANT CHANGE UP (ref 0–0)
PHOSPHATE SERPL-MCNC: 4.8 MG/DL — HIGH (ref 2.5–4.5)
PLATELET # BLD AUTO: 360 K/UL — SIGNIFICANT CHANGE UP (ref 150–400)
POTASSIUM SERPL-MCNC: 4.1 MMOL/L — SIGNIFICANT CHANGE UP (ref 3.5–5.3)
POTASSIUM SERPL-SCNC: 4.1 MMOL/L — SIGNIFICANT CHANGE UP (ref 3.5–5.3)
PROT PATTERN SERPL ELPH-IMP: SIGNIFICANT CHANGE UP
PROT SERPL-MCNC: 6 G/DL — SIGNIFICANT CHANGE UP (ref 6–8.3)
PROT SERPL-MCNC: 6.5 G/DL — SIGNIFICANT CHANGE UP
PROTHROM AB SERPL-ACNC: 10.8 SEC — SIGNIFICANT CHANGE UP (ref 9.5–13)
RBC # BLD: 3.38 M/UL — LOW (ref 4.2–5.8)
RBC # FLD: 15.2 % — HIGH (ref 10.3–14.5)
RH IG SCN BLD-IMP: POSITIVE — SIGNIFICANT CHANGE UP
S AUREUS DNA NOSE QL NAA+PROBE: SIGNIFICANT CHANGE UP
SODIUM SERPL-SCNC: 137 MMOL/L — SIGNIFICANT CHANGE UP (ref 135–145)
WBC # BLD: 6.85 K/UL — SIGNIFICANT CHANGE UP (ref 3.8–10.5)
WBC # FLD AUTO: 6.85 K/UL — SIGNIFICANT CHANGE UP (ref 3.8–10.5)

## 2024-01-17 PROCEDURE — 90935 HEMODIALYSIS ONE EVALUATION: CPT

## 2024-01-17 PROCEDURE — 99232 SBSQ HOSP IP/OBS MODERATE 35: CPT | Mod: GC

## 2024-01-17 RX ORDER — HYDRALAZINE HCL 50 MG
50 TABLET ORAL EVERY 8 HOURS
Refills: 0 | Status: DISCONTINUED | OUTPATIENT
Start: 2024-01-17 | End: 2024-01-25

## 2024-01-17 RX ORDER — HYDRALAZINE HCL 50 MG
50 TABLET ORAL EVERY 8 HOURS
Refills: 0 | Status: DISCONTINUED | OUTPATIENT
Start: 2024-01-17 | End: 2024-01-17

## 2024-01-17 RX ADMIN — Medication 200 MILLIGRAM(S): at 21:14

## 2024-01-17 RX ADMIN — CHLORHEXIDINE GLUCONATE 1 APPLICATION(S): 213 SOLUTION TOPICAL at 12:16

## 2024-01-17 RX ADMIN — Medication 650 MILLIGRAM(S): at 05:03

## 2024-01-17 RX ADMIN — ATORVASTATIN CALCIUM 40 MILLIGRAM(S): 80 TABLET, FILM COATED ORAL at 21:14

## 2024-01-17 RX ADMIN — Medication 650 MILLIGRAM(S): at 12:11

## 2024-01-17 RX ADMIN — Medication 200 MILLIGRAM(S): at 15:24

## 2024-01-17 RX ADMIN — Medication 650 MILLIGRAM(S): at 05:04

## 2024-01-17 RX ADMIN — Medication 650 MILLIGRAM(S): at 01:18

## 2024-01-17 RX ADMIN — Medication 2001 MILLIGRAM(S): at 17:22

## 2024-01-17 RX ADMIN — Medication 650 MILLIGRAM(S): at 00:33

## 2024-01-17 RX ADMIN — Medication 50 MILLIGRAM(S): at 17:22

## 2024-01-17 RX ADMIN — Medication 650 MILLIGRAM(S): at 17:22

## 2024-01-17 NOTE — PROGRESS NOTE ADULT - SUBJECTIVE AND OBJECTIVE BOX
[facility] Division of Kidney Diseases & Hypertension  FOLLOW UP NOTE  [phone]--------------------------------------------------------------------------------  Chief Complaint: Chronic kidney disease        24 hour events/subjective:  Pt was seen and examined at the bedside, while getting HD. No acute overnight events. No fresh complaints. Pt denies SOB/ Constipation/ Diarrhea/ Nausea/ Vomiting/ abdominal pain/ chest pain/ tingling/ numbness.         PAST HISTORY  --------------------------------------------------------------------------------  No significant changes to PMH, PSH, FHx, SHx, unless otherwise noted    ALLERGIES & MEDICATIONS  --------------------------------------------------------------------------------  Allergies    No Known Allergies    Intolerances      Standing Inpatient Medications  acetaminophen     Tablet .. 650 milliGRAM(s) Oral every 6 hours  atorvastatin 40 milliGRAM(s) Oral at bedtime  calcium acetate 2001 milliGRAM(s) Oral three times a day with meals  chlorhexidine 2% Cloths 1 Application(s) Topical daily  ergocalciferol 42017 Unit(s) Oral every week  influenza   Vaccine 0.5 milliLiter(s) IntraMuscular once  labetalol 200 milliGRAM(s) Oral every 8 hours    PRN Inpatient Medications  aluminum hydroxide/magnesium hydroxide/simethicone Suspension 30 milliLiter(s) Oral every 4 hours PRN  melatonin 3 milliGRAM(s) Oral at bedtime PRN      REVIEW OF SYSTEMS  --------------------------------------------------------------------------------  as above.     VITALS/PHYSICAL EXAM  --------------------------------------------------------------------------------  T(C): 37 (01-17-24 @ 09:30), Max: 37.4 (01-17-24 @ 06:20)  HR: 70 (01-17-24 @ 09:30) (65 - 77)  BP: 139/81 (01-17-24 @ 09:30) (139/81 - 169/83)  RR: 17 (01-17-24 @ 09:30) (16 - 17)  SpO2: 95% (01-17-24 @ 05:01) (95% - 100%)  Wt(kg): --        01-16-24 @ 07:01  -  01-17-24 @ 07:00  --------------------------------------------------------  IN: 650 mL / OUT: 845 mL / NET: -195 mL    01-17-24 @ 07:01  -  01-17-24 @ 11:54  --------------------------------------------------------  IN: 400 mL / OUT: 2200 mL / NET: -1800 mL      Physical Exam:  Gen: NAD,  HEENT: anicteric  Pulm: CTA B/L  CV: RRR, S1S2; no rub  Abd: soft  : No suprapubic tenderness  Skin: Warm, without rashes  Rt IJ NTHD cath.     LABS/STUDIES  --------------------------------------------------------------------------------              9.3    6.85  >-----------<  360      [01-17-24 @ 03:48]              27.3     137  |  101  |  53  ----------------------------<  120      [01-17-24 @ 03:48]  4.1   |  23  |  9.71        Ca     7.6     [01-17-24 @ 03:48]      Mg     2.50     [01-17-24 @ 03:48]      Phos  4.8     [01-17-24 @ 03:48]    TPro  6.0  /  Alb  3.0  /  TBili  0.3  /  DBili  x   /  AST  24  /  ALT  22  /  AlkPhos  105  [01-17-24 @ 03:48]    PT/INR: PT 10.8 , INR 0.95       [01-17-24 @ 03:48]  PTT: 28.4       [01-17-24 @ 03:48]      Creatinine Trend:  SCr 9.71 [01-17 @ 03:48]  SCr 8.54 [01-16 @ 05:35]  SCr 10.66 [01-15 @ 10:15]  SCr 9.14 [01-14 @ 05:51]  SCr 7.44 [01-13 @ 07:14]    Urinalysis - [01-17-24 @ 03:48]      Color  / Appearance  / SG  / pH       Gluc 120 / Ketone   / Bili  / Urobili        Blood  / Protein  / Leuk Est  / Nitrite       RBC  / WBC  / Hyaline  / Gran  / Sq Epi  / Non Sq Epi  / Bacteria       PTH -- (Ca --)      [01-13-24 @ 07:14]   535  Vitamin D (25OH) 10.7      [01-13-24 @ 07:14]    HBsAg Nonreact      [01-14-24 @ 05:51]  HCV 0.19, Nonreact      [01-14-24 @ 05:51]  HIV Nonreact      [01-14-24 @ 05:51]    dsDNA <12      [01-14-24 @ 05:51]  C3 Complement 94      [01-14-24 @ 05:51]  C4 Complement 27      [01-14-24 @ 05:51]  ANCA: cANCA Negative, pANCA Negative, atypical ANCA Indeterminate Method interference due to DAVID fluorescence      [01-14-24 @ 05:51]  Syphilis Screen (Treponema Pallidum Ab) Positive      [01-14-24 @ 05:51]  Syphilis Screen (RPR Titer) <1:1      [01-14-24 @ 05:51]  PLA2R: SHERRON <1.8, IFA --      [01-14-24 @ 05:51]  Free Light Chains: kappa 25.22, lambda 19.40, ratio = 1.30      [01-14 @ 05:51]  SPEP Interpretation: Mild Hypoalbuminemia.  BELINDA Powell M.D.      [01-14-24 @ 05:51]   [facility] Division of Kidney Diseases & Hypertension  FOLLOW UP NOTE  [phone]--------------------------------------------------------------------------------    Chief Complaint: Chronic kidney disease    24 hour events/subjective:  Pt was seen and examined at the bedside, while getting HD. No acute overnight events. No fresh complaints. Pt denies SOB/ Constipation/ Diarrhea/ Nausea/ Vomiting/ abdominal pain/ chest pain/ tingling/ numbness.       PAST HISTORY  --------------------------------------------------------------------------------  No significant changes to PMH, PSH, FHx, SHx, unless otherwise noted    ALLERGIES & MEDICATIONS  --------------------------------------------------------------------------------  Allergies    No Known Allergies    Intolerances      Standing Inpatient Medications  acetaminophen     Tablet .. 650 milliGRAM(s) Oral every 6 hours  atorvastatin 40 milliGRAM(s) Oral at bedtime  calcium acetate 2001 milliGRAM(s) Oral three times a day with meals  chlorhexidine 2% Cloths 1 Application(s) Topical daily  ergocalciferol 44927 Unit(s) Oral every week  influenza   Vaccine 0.5 milliLiter(s) IntraMuscular once  labetalol 200 milliGRAM(s) Oral every 8 hours    PRN Inpatient Medications  aluminum hydroxide/magnesium hydroxide/simethicone Suspension 30 milliLiter(s) Oral every 4 hours PRN  melatonin 3 milliGRAM(s) Oral at bedtime PRN      REVIEW OF SYSTEMS  --------------------------------------------------------------------------------  as above.     VITALS/PHYSICAL EXAM  --------------------------------------------------------------------------------  T(C): 37 (01-17-24 @ 09:30), Max: 37.4 (01-17-24 @ 06:20)  HR: 70 (01-17-24 @ 09:30) (65 - 77)  BP: 139/81 (01-17-24 @ 09:30) (139/81 - 169/83)  RR: 17 (01-17-24 @ 09:30) (16 - 17)  SpO2: 95% (01-17-24 @ 05:01) (95% - 100%)  Wt(kg): --        01-16-24 @ 07:01  -  01-17-24 @ 07:00  --------------------------------------------------------  IN: 650 mL / OUT: 845 mL / NET: -195 mL    01-17-24 @ 07:01  -  01-17-24 @ 11:54  --------------------------------------------------------  IN: 400 mL / OUT: 2200 mL / NET: -1800 mL      Physical Exam:  Gen: NAD,  HEENT: anicteric  Pulm: CTA B/L  CV: RRR, S1S2; no rub  Abd: soft  : No suprapubic tenderness  Skin: Warm, without rashes  Rt IJ NTHD cath.     LABS/STUDIES  --------------------------------------------------------------------------------              9.3    6.85  >-----------<  360      [01-17-24 @ 03:48]              27.3     137  |  101  |  53  ----------------------------<  120      [01-17-24 @ 03:48]  4.1   |  23  |  9.71        Ca     7.6     [01-17-24 @ 03:48]      Mg     2.50     [01-17-24 @ 03:48]      Phos  4.8     [01-17-24 @ 03:48]    TPro  6.0  /  Alb  3.0  /  TBili  0.3  /  DBili  x   /  AST  24  /  ALT  22  /  AlkPhos  105  [01-17-24 @ 03:48]    PT/INR: PT 10.8 , INR 0.95       [01-17-24 @ 03:48]  PTT: 28.4       [01-17-24 @ 03:48]      Creatinine Trend:  SCr 9.71 [01-17 @ 03:48]  SCr 8.54 [01-16 @ 05:35]  SCr 10.66 [01-15 @ 10:15]  SCr 9.14 [01-14 @ 05:51]  SCr 7.44 [01-13 @ 07:14]    Urinalysis - [01-17-24 @ 03:48]      Color  / Appearance  / SG  / pH       Gluc 120 / Ketone   / Bili  / Urobili        Blood  / Protein  / Leuk Est  / Nitrite       RBC  / WBC  / Hyaline  / Gran  / Sq Epi  / Non Sq Epi  / Bacteria       PTH -- (Ca --)      [01-13-24 @ 07:14]   535  Vitamin D (25OH) 10.7      [01-13-24 @ 07:14]    HBsAg Nonreact      [01-14-24 @ 05:51]  HCV 0.19, Nonreact      [01-14-24 @ 05:51]  HIV Nonreact      [01-14-24 @ 05:51]    dsDNA <12      [01-14-24 @ 05:51]  C3 Complement 94      [01-14-24 @ 05:51]  C4 Complement 27      [01-14-24 @ 05:51]  ANCA: cANCA Negative, pANCA Negative, atypical ANCA Indeterminate Method interference due to DAVID fluorescence      [01-14-24 @ 05:51]  Syphilis Screen (Treponema Pallidum Ab) Positive      [01-14-24 @ 05:51]  Syphilis Screen (RPR Titer) <1:1      [01-14-24 @ 05:51]  PLA2R: SHERRON <1.8, IFA --      [01-14-24 @ 05:51]  Free Light Chains: kappa 25.22, lambda 19.40, ratio = 1.30      [01-14 @ 05:51]  SPEP Interpretation: Mild Hypoalbuminemia.  BELINDA Powell M.D.      [01-14-24 @ 05:51]

## 2024-01-17 NOTE — PROGRESS NOTE ADULT - PROBLEM SELECTOR PLAN 6
RESOLVED-NSTEMI in the setting of demand ischemia in the setting of HTN emergency  - presented at  with N/V/D and 4 years of chest pain withe exertion, progressively worsening and no prior PCP or physician evaluation; on admission at , elevated troponin with peak to 715.  - EKG nonischemic, Cr elevated +/- demand i/s/o HTN urgency; echo with mod pulm htn, nuclear stress test unremarkable, most likely i/s/o demand ischemia   - Echo: consistent with normal LVEF 65-70%, LVH and moderate pulm HTN  -Cards recommended non-invasive ischemic eval given contrast induced nephropathy and new renal failure: Stress test on 1/9/23: LVEF 66%  -- Given normal myocardial perfusion, CV stable  - C/W statin, and BB and outpt cardiology follow-up with [facility]   - held hep for 1/13 AM given possibility for bx/HD placement

## 2024-01-17 NOTE — PROGRESS NOTE ADULT - ASSESSMENT
59yo M with no known PMHx (not followed by PCP), but FHx (brother) w/ESRD s/p tplant who presented to [facility] with n/v/abdominal pain, admitted for renal failure and r/o ACS. Hospital course c/b flu A, sepsis 2/2 ETEC, anemia and NSTEMI evaluation s/p stress test. Now pt transferred to [facility] and pending renal bx and perm HD cath placement.  Accepting physician: Dr. Chery.

## 2024-01-17 NOTE — PROGRESS NOTE ADULT - ATTENDING COMMENTS
58M with no prior medical care who presents with acute renal failure, course complicated by flu A and ETEC, now resolved. Pt transferred from  to [facility] for IR renal biopsy and permacath placement.    No acute overnight events, pt feels well    # Acute renal failure  - pending IR guided renal bx tomorrow, cancelled today due to HD/uncontrolled HTN  - plan for permacath placement tomorrow  - HD per nephrology    # HTN  - BP improved with increased UF   - continue labetalol, if remains uncontrolled will add hydralazine    # +syphilis screen  - treponema testing +, RPR negative  - will f/u with patient regarding previous infection/treatment    Used  [id number]

## 2024-01-17 NOTE — PROGRESS NOTE ADULT - PROBLEM SELECTOR PLAN 8
DVT PPx: SQ; will hold prior to IR procedure  Diet: Renal  Code: Full  Dispo: PT/OT Pending Hospital Course

## 2024-01-17 NOTE — PROGRESS NOTE ADULT - ATTENDING COMMENTS
seen and evaluated at dialysis  BP was trending high so increased UF rate.  Need to optimize BP for biopsy.

## 2024-01-17 NOTE — PROGRESS NOTE ADULT - PROBLEM SELECTOR PLAN 1
DESIREE with proteinuria. R/o GN. No known baseline renal fx available. Recent admission at [facility] (Froy 3), received contrast (Froy 3) for cardiac work up. UA positive for proteins, and RBC 12. Proteinuria of 3.6 gm per labs at [facility]. S/p placement of temp HD cath to initiate dialysis w/EPO on 1/6/24. Last HD in [facility] was on 1/12. Pt  transferred to [facility] for placement of HD catheter and work up for DESIREE. HD was done on Monday 1/15/24. Serology was positive for DAVID 1:160 homogenous and speckled with indeterminate ANCA +ve.  Will monitor renal fx. Renal biopsy is warranted. Biopsy schedule as per IR. BP controlled now; cw management. Pt was explained about his kidney condition and the plan of management using  service on 1/16. He is seen during HD. good flow. Tolerating well so far. Patient needs Strict I/O and avoid nephrotoxins as much as much as possible. Dose the medications as per the ESRD dosing. Rest of the management as per the primary team. DESIREE with proteinuria. R/o GN. No known baseline renal fx available. Recent admission at [place] [facility] (Froy 3), received contrast (Froy 3) for cardiac work up. UA positive for proteins, and RBC 12. Proteinuria of 3.6 gm per labs at [facility]. S/p placement of temp HD cath to initiate dialysis w/EPO on 1/6/24. Last HD in [facility] was on 1/12. Pt  transferred to [facility] for placement of HD catheter and work up for DESIREE. HD was done on Monday 1/15/24. Serology was positive for DAVID 1:160 homogenous and speckled with indeterminate ANCA +ve.  Will monitor renal fx. Renal biopsy is warranted. Biopsy schedule as per IR. BP controlled now; cw management. Pt was explained about his kidney condition and the plan of management using  service on 1/16. He is seen during HD. good flow. Tolerating well so far. We increased the UF goal to 1.5L today to optimize volume status to improve BP for IR guided biopsy and Tunnelled dialysis cath placement. Patient needs Strict I/O and avoid nephrotoxins as much as much as possible. Dose the medications as per the ESRD dosing. Rest of the management as per the primary team.

## 2024-01-17 NOTE — PROGRESS NOTE ADULT - SUBJECTIVE AND OBJECTIVE BOX
Jessica Tejada, PGY1    Patient is a 59y old  Male who presents with a chief complaint of ESRD on HD transferred for placement of permanent HD catheter and renal biopsy (16 Jan 2024 12:49)      SUBJECTIVE / OVERNIGHT EVENTS: NAEO. Pt denies chest pain, SOB, N/V, fever/chills, or changes in bowel movements.    MEDICATIONS  (STANDING):  acetaminophen     Tablet .. 650 milliGRAM(s) Oral every 6 hours  atorvastatin 40 milliGRAM(s) Oral at bedtime  calcium acetate 2001 milliGRAM(s) Oral three times a day with meals  chlorhexidine 2% Cloths 1 Application(s) Topical daily  ergocalciferol 69783 Unit(s) Oral every week  influenza   Vaccine 0.5 milliLiter(s) IntraMuscular once  labetalol 200 milliGRAM(s) Oral every 8 hours    MEDICATIONS  (PRN):  aluminum hydroxide/magnesium hydroxide/simethicone Suspension 30 milliLiter(s) Oral every 4 hours PRN Dyspepsia  melatonin 3 milliGRAM(s) Oral at bedtime PRN Insomnia      CAPILLARY BLOOD GLUCOSE        I&O's Summary    16 Jan 2024 07:01  -  17 Jan 2024 07:00  --------------------------------------------------------  IN: 650 mL / OUT: 845 mL / NET: -195 mL        Vital Signs Last 24 Hrs  T(C): 37.4 (17 Jan 2024 06:20), Max: 37.4 (17 Jan 2024 06:20)  T(F): 99.4 (17 Jan 2024 06:20), Max: 99.4 (17 Jan 2024 06:20)  HR: 68 (17 Jan 2024 06:20) (65 - 77)  BP: 161/98 (17 Jan 2024 06:20) (144/75 - 169/83)  BP(mean): --  RR: 17 (17 Jan 2024 06:20) (16 - 17)  SpO2: 95% (17 Jan 2024 05:01) (95% - 100%)    Parameters below as of 17 Jan 2024 06:20  Patient On (Oxygen Delivery Method): room air        PHYSICAL EXAM:  GENERAL: NAD, well-developed, well-nourished  HEAD: Atraumatic, Normocephalic  EYES: Conjunctiva and sclera clear  CHEST/LUNG: Clear to auscultation bilaterally; No wheezes or crackles  HEART: Normal S1/S2; Regular rate and rhythm; No murmurs, rubs, or gallops  ABDOMEN: Soft, Nontender, Nondistended; Bowel sounds present  EXTREMITIES: No clubbing, cyanosis, or edema  PSYCH: A&Ox3      LABS:                        9.3    6.85  )-----------( 360      ( 17 Jan 2024 03:48 )             27.3      01-17    137  |  101  |  53<H>  ----------------------------<  120<H>  4.1   |  23  |  9.71<H>    Ca    7.6<L>      17 Jan 2024 03:48  Phos  4.8     01-17  Mg     2.50     01-17    TPro  6.0  /  Alb  3.0<L>  /  TBili  0.3  /  DBili  x   /  AST  24  /  ALT  22  /  AlkPhos  105  01-17    PT/INR - ( 17 Jan 2024 03:48 )   PT: 10.8 sec;   INR: 0.95 ratio         PTT - ( 17 Jan 2024 03:48 )  PTT:28.4 sec      Urinalysis Basic - ( 17 Jan 2024 03:48 )    Color: x / Appearance: x / SG: x / pH: x  Gluc: 120 mg/dL / Ketone: x  / Bili: x / Urobili: x   Blood: x / Protein: x / Nitrite: x   Leuk Esterase: x / RBC: x / WBC x   Sq Epi: x / Non Sq Epi: x / Bacteria: x        RADIOLOGY & ADDITIONAL TESTS:

## 2024-01-17 NOTE — PROGRESS NOTE ADULT - PROBLEM SELECTOR PLAN 7
- pt denies any previous hx of HTN, however, per chart review - pt has hx of taking hypertensive med  - initially admitted to  for hypertensive emergency and renal failure, s/p hydralazine and cardio eval   -- @  pt continued on 200 mg Labetalol q8 hrs for hypertension   - 1/12 SBP 160s, resumed Labetalol 100 mg BID  - 1/16 SBP 160s, will increase Labetalol to 200mg q8h from 100mg q8h

## 2024-01-17 NOTE — PROGRESS NOTE ADULT - PROBLEM SELECTOR PLAN 2
- New ESRD with temp HD cath placement on 1/6/24  - Record of HD: 1/6, 1/8, 1/10, 1/11, 1/12 @ [facility]  - Transfer to [facility] 1/12 for HD cath placement (perm)  - [facility]: Chikis Laird [phone]  - IR consult for HD placement and renal biopsy in the setting of nephrotic proteinuria, consult order placed, nephrology consulted, f/u in AM  - vitamin D hydroxy low, PTH elevated

## 2024-01-17 NOTE — CHART NOTE - NSCHARTNOTEFT_GEN_A_CORE
59 DESIREE with proteinuria on HD through nontunneled catheter.     was scheduled for tunneled hd catheter and renal biopsy today.   patient in hd this am and will require post hd labs.    patient hypertensive although may improve with hd.     would not wait to optimize the above for procedure today because renal biopsy high risk bleeding elective procedure, therefore ideally performed in am in case of possible intervention of bleeding.     patient rescheduled for tomorrow for tunneled hd catheter and renal biopsy  npo amn  goal bp <150/90  hold pharmacologic dvt ppx tonight  discussed with primary team      Manuel Naidu MD   Interventional Radiology Chief resident/PGY6  Contact on [employer] Teams for nonemergent issues    - Nonemergent consults:  [facility] order "Consult- Interventional Radiology", no page required  - Emergent issues (pager): [facility] [phone]; [facility] [phone]; [phone]; DO NOT PAGE FOR SCHEDULING QUESTIONS  - Scheduling questions 8am-6pm : [facility] [phone]; [facility] [phone],   - Clinic/outpatient booking: [facility] [phone]; [facility] [phone] 59 DESIREE with proteinuria on HD through nontunneled catheter.     was scheduled for tunneled hd catheter and renal biopsy today.   patient in hd this am and will require post hd labs.    patient hypertensive although may improve with hd.     would not wait to optimize the above for procedure today because renal biopsy high risk bleeding elective procedure, therefore ideally performed in am in case of possible intervention of bleeding.     patient rescheduled for tomorrow for tunneled hd catheter and renal biopsy  npo amn  goal bp <150/80  hold pharmacologic dvt ppx tonight  discussed with primary team      Manuel Naidu MD   Interventional Radiology Chief resident/PGY6  Contact on [email or web address] Teams for nonemergent issues    - Nonemergent consults:  [facility] order "Consult- Interventional Radiology", no page required  - Emergent issues (pager): [facility] [phone]; [facility] [phone]; [phone]; DO NOT PAGE FOR SCHEDULING QUESTIONS  - Scheduling questions 8am-6pm : [facility] [phone]; [facility] [phone],   - Clinic/outpatient booking: [facility] [phone]; [facility] [phone]

## 2024-01-17 NOTE — PROGRESS NOTE ADULT - TIME BILLING
Review of laboratory data, radiology results, consultants' recommendations, documentation in [place], discussion with patient/house staff and interdisciplinary staff (such as , social workers, etc). Interventions were performed as documented above.

## 2024-01-17 NOTE — PROGRESS NOTE ADULT - PROBLEM SELECTOR PLAN 1
- admitted to  for renal failure on CKD stage 3 likely 2/2 dehydration and hypertensive emergency from 1/3/24,   - s/p CT w/IV dye 1/3/23 possibly contributing to rise in Cr per  nephrology notes  - 24 [place] urine collection positive for nephrotic range proteinuria; SPEP, renal serologies negative   - CT A/P w/o hydro; Started on HD 1/6 via temp HD cath  - Pt requires chronic HD, transition from temp to perm HD cath and renal bx, HD catheter placed 01/06  - Transferred to [facility] on 1/12 for HD perm and bx, IR consult order place, nephrology consulted as well to inform of transfer   - CTM electrolytes, creatinine, strict I/Os, and cath site   - pending permcath placement on 1/17

## 2024-01-18 LAB
ANA PAT FLD IF-IMP: ABNORMAL
ANA TITR SER: ABNORMAL
ANION GAP SERPL CALC-SCNC: 12 MMOL/L — SIGNIFICANT CHANGE UP (ref 7–14)
APTT BLD: 29.1 SEC — SIGNIFICANT CHANGE UP (ref 24.5–35.6)
BUN SERPL-MCNC: 36 MG/DL — HIGH (ref 7–23)
CALCIUM SERPL-MCNC: 7.8 MG/DL — LOW (ref 8.4–10.5)
CHLORIDE SERPL-SCNC: 100 MMOL/L — SIGNIFICANT CHANGE UP (ref 98–107)
CO2 SERPL-SCNC: 24 MMOL/L — SIGNIFICANT CHANGE UP (ref 22–31)
CREAT SERPL-MCNC: 7.46 MG/DL — HIGH (ref 0.5–1.3)
EGFR: 8 ML/MIN/1.73M2 — LOW
GLUCOSE SERPL-MCNC: 110 MG/DL — HIGH (ref 70–99)
HCT VFR BLD CALC: 28.4 % — LOW (ref 39–50)
HGB BLD-MCNC: 9.6 G/DL — LOW (ref 13–17)
INR BLD: <0.9 RATIO — SIGNIFICANT CHANGE UP (ref 0.85–1.18)
INTERPRETATION SERPL IFE-IMP: SIGNIFICANT CHANGE UP
MCHC RBC-ENTMCNC: 27.3 PG — SIGNIFICANT CHANGE UP (ref 27–34)
MCHC RBC-ENTMCNC: 33.8 GM/DL — SIGNIFICANT CHANGE UP (ref 32–36)
MCV RBC AUTO: 80.7 FL — SIGNIFICANT CHANGE UP (ref 80–100)
NRBC # BLD: 0 /100 WBCS — SIGNIFICANT CHANGE UP (ref 0–0)
NRBC # FLD: 0 K/UL — SIGNIFICANT CHANGE UP (ref 0–0)
PLATELET # BLD AUTO: 360 K/UL — SIGNIFICANT CHANGE UP (ref 150–400)
POTASSIUM SERPL-MCNC: 4.3 MMOL/L — SIGNIFICANT CHANGE UP (ref 3.5–5.3)
POTASSIUM SERPL-SCNC: 4.3 MMOL/L — SIGNIFICANT CHANGE UP (ref 3.5–5.3)
PROTHROM AB SERPL-ACNC: 10 SEC — SIGNIFICANT CHANGE UP (ref 9.5–13)
RBC # BLD: 3.52 M/UL — LOW (ref 4.2–5.8)
RBC # FLD: 15.2 % — HIGH (ref 10.3–14.5)
SODIUM SERPL-SCNC: 136 MMOL/L — SIGNIFICANT CHANGE UP (ref 135–145)
WBC # BLD: 6.68 K/UL — SIGNIFICANT CHANGE UP (ref 3.8–10.5)
WBC # FLD AUTO: 6.68 K/UL — SIGNIFICANT CHANGE UP (ref 3.8–10.5)

## 2024-01-18 PROCEDURE — 36558 INSERT TUNNELED CV CATH: CPT

## 2024-01-18 PROCEDURE — 76775 US EXAM ABDO BACK WALL LIM: CPT | Mod: 26

## 2024-01-18 PROCEDURE — 77001 FLUOROGUIDE FOR VEIN DEVICE: CPT | Mod: 26,GC

## 2024-01-18 PROCEDURE — 76937 US GUIDE VASCULAR ACCESS: CPT | Mod: 26

## 2024-01-18 PROCEDURE — 99232 SBSQ HOSP IP/OBS MODERATE 35: CPT

## 2024-01-18 PROCEDURE — 99232 SBSQ HOSP IP/OBS MODERATE 35: CPT | Mod: GC

## 2024-01-18 RX ORDER — HEPARIN SODIUM 5000 [USP'U]/ML
5000 INJECTION INTRAVENOUS; SUBCUTANEOUS EVERY 8 HOURS
Refills: 0 | Status: DISCONTINUED | OUTPATIENT
Start: 2024-01-19 | End: 2024-01-21

## 2024-01-18 RX ORDER — PENICILLIN G BENZATHINE 1200000 [IU]/2ML
2.4 INJECTION, SUSPENSION INTRAMUSCULAR
Refills: 0 | Status: DISCONTINUED | OUTPATIENT
Start: 2024-01-18 | End: 2024-01-24

## 2024-01-18 RX ORDER — CHLORHEXIDINE GLUCONATE 213 G/1000ML
1 SOLUTION TOPICAL
Refills: 0 | Status: DISCONTINUED | OUTPATIENT
Start: 2024-01-18 | End: 2024-01-25

## 2024-01-18 RX ORDER — SODIUM CHLORIDE 9 MG/ML
10 INJECTION INTRAMUSCULAR; INTRAVENOUS; SUBCUTANEOUS
Refills: 0 | Status: DISCONTINUED | OUTPATIENT
Start: 2024-01-18 | End: 2024-01-25

## 2024-01-18 RX ADMIN — Medication 650 MILLIGRAM(S): at 01:00

## 2024-01-18 RX ADMIN — PENICILLIN G BENZATHINE 2.4 MILLION UNIT(S): 1200000 INJECTION, SUSPENSION INTRAMUSCULAR at 20:29

## 2024-01-18 RX ADMIN — ATORVASTATIN CALCIUM 40 MILLIGRAM(S): 80 TABLET, FILM COATED ORAL at 21:23

## 2024-01-18 RX ADMIN — Medication 2001 MILLIGRAM(S): at 18:53

## 2024-01-18 RX ADMIN — Medication 200 MILLIGRAM(S): at 13:27

## 2024-01-18 RX ADMIN — CHLORHEXIDINE GLUCONATE 1 APPLICATION(S): 213 SOLUTION TOPICAL at 13:28

## 2024-01-18 RX ADMIN — Medication 650 MILLIGRAM(S): at 18:52

## 2024-01-18 RX ADMIN — Medication 50 MILLIGRAM(S): at 18:55

## 2024-01-18 RX ADMIN — Medication 650 MILLIGRAM(S): at 11:34

## 2024-01-18 RX ADMIN — Medication 650 MILLIGRAM(S): at 05:49

## 2024-01-18 RX ADMIN — Medication 50 MILLIGRAM(S): at 08:56

## 2024-01-18 RX ADMIN — Medication 50 MILLIGRAM(S): at 01:00

## 2024-01-18 RX ADMIN — Medication 200 MILLIGRAM(S): at 05:49

## 2024-01-18 RX ADMIN — Medication 200 MILLIGRAM(S): at 21:23

## 2024-01-18 NOTE — CHART NOTE - NSCHARTNOTEFT_GEN_A_CORE
IR Pre-Procedure Note    Patient Age:   59y    Patient Gender:   Male    Procedure (including site / side if known): CT-guided renal biopsy    Diagnosis / Indication: Patient is a 59y old  Male who presents with a chief complaint of ESRD on HD transferred for placement of permanent HD catheter and renal biopsy (18 Jan 2024 12:50)      Interventional Radiology Attending Physician: Dr. Pacheco    Ordering Attending Physician: Dr. Soria    PAST MEDICAL & SURGICAL HISTORY:  H/O gastroesophageal reflux (GERD)      Hypertension      ESRD on dialysis      S/P Cholecystectomy           Pertinent Labs:   CBC Full  -  ( 18 Jan 2024 03:14 )  WBC Count : 6.68 K/uL  RBC Count : 3.52 M/uL  Hemoglobin : 9.6 g/dL  Hematocrit : 28.4 %  Platelet Count - Automated : 360 K/uL  Mean Cell Volume : 80.7 fL  Mean Cell Hemoglobin : 27.3 pg  Mean Cell Hemoglobin Concentration : 33.8 gm/dL  Auto Neutrophil # : x  Auto Lymphocyte # : x  Auto Monocyte # : x  Auto Eosinophil # : x  Auto Basophil # : x  Auto Neutrophil % : x  Auto Lymphocyte % : x  Auto Monocyte % : x  Auto Eosinophil % : x  Auto Basophil % : x    01-18    136  |  100  |  36<H>  ----------------------------<  110<H>  4.3   |  24  |  7.46<H>    Ca    7.8<L>      18 Jan 2024 03:14  Phos  4.8     01-17  Mg     2.50     01-17    TPro  6.0  /  Alb  3.0<L>  /  TBili  0.3  /  DBili  x   /  AST  24  /  ALT  22  /  AlkPhos  105  01-17    PT/INR - ( 18 Jan 2024 03:14 )   PT: 10.0 sec;   INR: <0.90 ratio         PTT - ( 18 Jan 2024 03:14 )  PTT:29.1 sec    Patient / Family aware of procedure:   [ X ] Y   [  ] N    Ronald Robles MD  [id number]

## 2024-01-18 NOTE — PROGRESS NOTE ADULT - PROBLEM SELECTOR PLAN 6
RESOLVED-NSTEMI in the setting of demand ischemia in the setting of HTN emergency  - presented at  with N/V/D and 4 years of chest pain withe exertion, progressively worsening and no prior PCP or physician evaluation; on admission at , elevated troponin with peak to 715.  - EKG nonischemic, Cr elevated +/- demand i/s/o HTN urgency; echo with mod pulm htn, nuclear stress test unremarkable, most likely i/s/o demand ischemia   - Echo: consistent with normal LVEF 65-70%, LVH and moderate pulm HTN  -Cards recommended non-invasive ischemic eval given contrast induced nephropathy and new renal failure: Stress test on 1/9/23: LVEF 66%  -- Given normal myocardial perfusion, CV stable  - C/W statin, and BB and outpt cardiology follow-up with [facility]   - held hep for 1/13 AM given possibility for bx/HD placement

## 2024-01-18 NOTE — PROGRESS NOTE ADULT - PROBLEM SELECTOR PLAN 1
DESIREE with proteinuria. R/o GN. No known baseline renal fx available. Recent admission at [facility] (Froy 3), received contrast (Froy 3) for cardiac work up. Urine positive for 3.6 g protein and RBC 12 / HPF. S/p placement of temp HD cath to initiate dialysis on 1/6/24.  Pt  transferred to [facility] for placement of tunneled HD catheter and work up for DESIREE.  Serology at [facility] was positive for DAVID 1:160 homogenous and speckled with indeterminate ANCA +.    Kidney biopsy is warranted. Biopsy schedule as per IR. BP controlled now. Pt was explained about his kidney condition and the plan of management using  service on 1/16 and 1/18. Patient tolerated HD session yesterday 1/17. Plan for next HD tomorrow 1/19. Patient needs Strict I/O and avoid nephrotoxins as much as much as possible. Dose the medications as dialysis.  Rest of the management as per the primary team.

## 2024-01-18 NOTE — PROGRESS NOTE ADULT - PROBLEM SELECTOR PLAN 2
- New ESRD with temp HD cath placement on 1/6/24  - Record of HD: 1/6, 1/8, 1/10, 1/11, 1/12 @ [facility]  - Transfer to [facility] 1/12 for HD cath placement (perm)  - [facility]: Chikis Laird [phone]  - IR consult for HD placement and renal biopsy in the setting of nephrotic proteinuria, consult order placed, nephrology consulted, f/u in AM  - vitamin D hydroxy low, PTH elevated - New ESRD with temp HD cath placement on 1/6/24  - Record of HD: 1/6, 1/8, 1/10, 1/11, 1/12 @ [facility]  - Transfer to [facility] 1/12 for HD cath placement (perm)  - [facility]: Chikis Laird [phone]  - IR consult for HD placement and renal biopsy in the setting of nephrotic proteinuria,  - nephrology consulted, appreciate recs  - vitamin D hydroxy low, PTH elevated

## 2024-01-18 NOTE — PROGRESS NOTE ADULT - ASSESSMENT
57yo M with no known PMHx (not followed by PCP), but FHx (brother) w/ESRD s/p tplant who presented to [facility] with n/v/abdominal pain, admitted for renal failure and r/o ACS. Hospital course c/b flu A, sepsis 2/2 ETEC, anemia and NSTEMI evaluation s/p stress test. Now pt transferred to [facility] and pending renal bx and perm HD cath placement.

## 2024-01-18 NOTE — PROGRESS NOTE ADULT - PROBLEM SELECTOR PLAN 7
- pt denies any previous hx of HTN, however, per chart review - pt has hx of taking hypertensive med  - initially admitted to  for hypertensive emergency and renal failure, s/p hydralazine and cardio eval   -- @  pt continued on 200 mg Labetalol q8 hrs for hypertension   - labetalol 200q8 and hydralazine 50q8

## 2024-01-18 NOTE — PROGRESS NOTE ADULT - PROBLEM SELECTOR PLAN 1
- admitted to  for renal failure on CKD stage 3 likely 2/2 dehydration and hypertensive emergency from 1/3/24,   - s/p CT w/IV dye 1/3/23 possibly contributing to rise in Cr per  nephrology notes  - 24 [place] urine collection positive for nephrotic range proteinuria; SPEP, renal serologies negative   - CT A/P w/o hydro; Started on HD 1/6 via temp HD cath  - Pt requires chronic HD, transition from temp to perm HD cath and renal bx, HD catheter placed 01/06  - Transferred to [facility] on 1/12 for HD perm and bx, IR consult order place, nephrology consulted as well to inform of transfer   - CTM electrolytes, creatinine, strict I/Os, and cath site   - pending permcath placement  and  renal biopsy on 1/18

## 2024-01-18 NOTE — CHART NOTE - NSCHARTNOTEFT_GEN_A_CORE
patient was sedated and placed prone for renal bx prior to tunneled hd catheter placement.    limited us was performed and images were saved.  no window to left kidney due to high intercostal location  severely limited sonographic window for right kidney due to intercostal location and severe echogenicity of kidney. CT guided approach would be safer.     due to the above, renal bx under US was aborted for today.     plan for CT guided renal bx Mon. can put order for IR procedure under Dr Pacheco.  for J- also write pre-IR checklist chart note.  NPO AMN for sedation  please recheck CBC BMP Coags 4 AM  continue to optimize BP <150/90    discussed with primary team      Manuel Naidu MD   Interventional Radiology Chief resident/PGY6  Contact on [employer] Teams for nonemergent issues    - Nonemergent consults:  [facility] order "Consult- Interventional Radiology", no page required  - Emergent issues (pager): [facility] [phone]; [facility] [phone]; [phone]; DO NOT PAGE FOR SCHEDULING QUESTIONS  - Scheduling questions 8am-6pm : [facility] [phone]; [facility] [phone],   - Clinic/outpatient booking: [facility] [phone]; [facility] [phone] patient was sedated and placed prone for renal bx prior to tunneled hd catheter placement.    limited us was performed and images were saved.  no window to left kidney due to high intercostal location  severely limited sonographic window for right kidney due to intercostal location and severe echogenicity of kidney. CT guided approach would be safer.     additionally, patient noted to be hypertensive despite sedation, with SBP 190s    due to the above, renal bx under US was aborted for today.     plan for CT guided renal bx Mon. can put order for IR procedure under Dr Pacheco.  for J- also write pre-IR checklist chart note.  NPO AMN for sedation  please recheck CBC BMP Coags 4 AM  continue to optimize BP <150/90    discussed with primary team      Manuel Naidu MD   Interventional Radiology Chief resident/PGY6  Contact on [employer] Teams for nonemergent issues    - Nonemergent consults:  [facility] order "Consult- Interventional Radiology", no page required  - Emergent issues (pager): [facility] [phone]; [facility] [phone]; [phone]; DO NOT PAGE FOR SCHEDULING QUESTIONS  - Scheduling questions 8am-6pm : [facility] [phone]; [facility] [phone],   - Clinic/outpatient booking: [facility] [phone]; [facility] [phone]

## 2024-01-18 NOTE — PRE PROCEDURE NOTE - PRE PROCEDURE EVALUATION
Interventional Radiology    HPI:  Pt is a 59M with CKD3 and presumed chronic HTN, presented with HTN urgency with SBP +200 requiring hydralazine and admitted for renal failure, s/p placement of temp HD cath to initiate dialysis w/EPO on 1/6/24, also with proteinuria, now presents to [facility] for renal biopsy and perm HD catheter placement   Allergies: No Known Allergies      Medications (Abx/Cardiac/Anticoagulation/Blood Products)  heparin   Injectable: 5000 Unit(s) SubCutaneous (01-16 @ 15:17)  hydrALAZINE: 50 milliGRAM(s) Oral (01-18 @ 08:56)  labetalol: 200 milliGRAM(s) Oral (01-18 @ 13:27)      Home Medications  acetaminophen 325 mg oral tablet: 2 tab(s) orally every 6 hours As needed Temp greater or equal to 38C (100.4F), Mild Pain (1 - 3)  aluminum hydroxide-magnesium hydroxide 200 mg-200 mg/5 mL oral suspension: 30 milliliter(s) orally every 4 hours As needed Dyspepsia  amLODIPine 5 mg oral tablet: 1 tab(s) orally once a day  aspirin 81 mg oral tablet, chewable: 1 tab(s) orally once a day  atorvastatin 40 mg oral tablet: 1 tab(s) orally once a day (at bedtime)  benzonatate 100 mg oral capsule: 1 cap(s) orally 3 times a day As needed Cough  Calphron 667 mg oral tablet: orally 3 times a day  labetalol 200 mg oral tablet: 1 tab(s) orally every 8 hours  melatonin 3 mg oral tablet: 1 tab(s) orally once a day (at bedtime) As needed Insomnia  polyethylene glycol 3350 oral powder for reconstitution: 17 gram(s) orally once a day  senna leaf extract oral tablet: 2 tab(s) orally once a day (at bedtime)      -WBC 6.68 / HgB 9.6 / Hct 28.4 / Plt 360  -Na 136 / Cl 100 / BUN 36 / Glucose 110  -K 4.3 / CO2 24 / Cr 7.46  -ALT -- / Alk Phos -- / T.Bili --  -INR<0.90      -Risks/Benefits/alternatives explained with the patient and/or healthcare proxy and witnessed informed consent obtained.

## 2024-01-18 NOTE — PROGRESS NOTE ADULT - TIME BILLING
Review of laboratory data, radiology results, consultants' recommendations, documentation in [place], discussion with patient/house staff and interdisciplinary staff (such as , social workers, etc). Interventions were performed as documented above.

## 2024-01-18 NOTE — PROGRESS NOTE ADULT - PROBLEM SELECTOR PLAN 3
- At [facility] pt with anemia, hgb 6.5, s/p 1u pRBC i/s/o renal failure  - hx of blood transfusion at  during admission: 1/4/24 and 1/8/24   - hgb stable at 9-10

## 2024-01-18 NOTE — PROGRESS NOTE ADULT - ATTENDING COMMENTS
58M with no prior medical care who presents with acute renal failure, course complicated by flu A and ETEC, now resolved. Pt transferred from  to [facility] for IR renal biopsy and permacath placement.    Pt pending renal biopsy and permacath placement today    # Acute renal failure  - pending IR guided renal bx and permacath placement today  - HD per nephrology    # HTN  - continue current HTN management    # +syphilis screen  - treponema testing +, RPR negative  - pt with no previous known diagnosis  - may be latent infection, will start PCN

## 2024-01-18 NOTE — PROGRESS NOTE ADULT - SUBJECTIVE AND OBJECTIVE BOX
Ronald Robles, PGY3    DATE OF SERVICE: 01-18-24 @ 07:42    Patient is a 59y old  Male who presents with a chief complaint of ESRD on HD transferred for placement of permanent HD catheter and renal biopsy (17 Jan 2024 11:54)      SUBJECTIVE / OVERNIGHT EVENTS:  ID:    No acute events overnight. Patient seen and examined this AM. Notes that he feels well. Denies chest pain, dyspnea, abd pain, n/v. Pending permacath and renal biopsy with IR.     MEDICATIONS  (STANDING):  acetaminophen     Tablet .. 650 milliGRAM(s) Oral every 6 hours  atorvastatin 40 milliGRAM(s) Oral at bedtime  calcium acetate 2001 milliGRAM(s) Oral three times a day with meals  chlorhexidine 2% Cloths 1 Application(s) Topical daily  ergocalciferol 18363 Unit(s) Oral every week  hydrALAZINE 50 milliGRAM(s) Oral every 8 hours  influenza   Vaccine 0.5 milliLiter(s) IntraMuscular once  labetalol 200 milliGRAM(s) Oral every 8 hours    MEDICATIONS  (PRN):  aluminum hydroxide/magnesium hydroxide/simethicone Suspension 30 milliLiter(s) Oral every 4 hours PRN Dyspepsia  melatonin 3 milliGRAM(s) Oral at bedtime PRN Insomnia      Vital Signs Last 24 Hrs  T(C): 36.6 (18 Jan 2024 05:31), Max: 37 (17 Jan 2024 09:30)  T(F): 97.9 (18 Jan 2024 05:31), Max: 98.6 (17 Jan 2024 09:30)  HR: 68 (18 Jan 2024 05:31) (68 - 75)  BP: 135/86 (18 Jan 2024 05:31) (135/80 - 154/85)  BP(mean): --  RR: 18 (18 Jan 2024 05:31) (17 - 19)  SpO2: 100% (18 Jan 2024 05:31) (97% - 100%)    Parameters below as of 18 Jan 2024 05:31  Patient On (Oxygen Delivery Method): room air      CAPILLARY BLOOD GLUCOSE      POCT Blood Glucose.: 87 mg/dL (17 Jan 2024 08:25)    I&O's Summary    17 Jan 2024 07:01  -  18 Jan 2024 07:00  --------------------------------------------------------  IN: 1100 mL / OUT: 2450 mL / NET: -1350 mL        PHYSICAL EXAM:  GENERAL: NAD, well-developed, well-nourished  HEAD: Atraumatic, Normocephalic  EYES: Conjunctiva and sclera clear  CHEST/LUNG: Clear to auscultation bilaterally; No wheezes or crackles  HEART: Normal S1/S2; Regular rate and rhythm; No murmurs, rubs, or gallops  ABDOMEN: Soft, Nontender, Nondistended; Bowel sounds present  EXTREMITIES: No clubbing, cyanosis, or edema  PSYCH: A&Ox3    LABS:                        9.6    6.68  )-----------( 360      ( 18 Jan 2024 03:14 )             28.4     01-18    136  |  100  |  36<H>  ----------------------------<  110<H>  4.3   |  24  |  7.46<H>    Ca    7.8<L>      18 Jan 2024 03:14  Phos  4.8     01-17  Mg     2.50     01-17    TPro  6.0  /  Alb  3.0<L>  /  TBili  0.3  /  DBili  x   /  AST  24  /  ALT  22  /  AlkPhos  105  01-17    PT/INR - ( 18 Jan 2024 03:14 )   PT: 10.0 sec;   INR: <0.90 ratio         PTT - ( 18 Jan 2024 03:14 )  PTT:29.1 sec      Urinalysis Basic - ( 18 Jan 2024 03:14 )    Color: x / Appearance: x / SG: x / pH: x  Gluc: 110 mg/dL / Ketone: x  / Bili: x / Urobili: x   Blood: x / Protein: x / Nitrite: x   Leuk Esterase: x / RBC: x / WBC x   Sq Epi: x / Non Sq Epi: x / Bacteria: x        RADIOLOGY & ADDITIONAL TESTS:    Imaging Personally Reviewed:    Consultant(s) Notes Reviewed:      Care Discussed with Consultants/Other Providers:   Ronald Robles, PGY3    DATE OF SERVICE: 01-18-24 @ 07:42    Patient is a 59y old  Male who presents with a chief complaint of ESRD on HD transferred for placement of permanent HD catheter and renal biopsy (17 Jan 2024 11:54)      SUBJECTIVE / OVERNIGHT EVENTS:  ID: [id number]    No acute events overnight. Patient seen and examined this AM. Notes that he feels well. Denies chest pain, dyspnea, abd pain, n/v. Pending permacath and renal biopsy with IR today.     MEDICATIONS  (STANDING):  acetaminophen     Tablet .. 650 milliGRAM(s) Oral every 6 hours  atorvastatin 40 milliGRAM(s) Oral at bedtime  calcium acetate 2001 milliGRAM(s) Oral three times a day with meals  chlorhexidine 2% Cloths 1 Application(s) Topical daily  ergocalciferol 91575 Unit(s) Oral every week  hydrALAZINE 50 milliGRAM(s) Oral every 8 hours  influenza   Vaccine 0.5 milliLiter(s) IntraMuscular once  labetalol 200 milliGRAM(s) Oral every 8 hours    MEDICATIONS  (PRN):  aluminum hydroxide/magnesium hydroxide/simethicone Suspension 30 milliLiter(s) Oral every 4 hours PRN Dyspepsia  melatonin 3 milliGRAM(s) Oral at bedtime PRN Insomnia      Vital Signs Last 24 Hrs  T(C): 36.6 (18 Jan 2024 05:31), Max: 37 (17 Jan 2024 09:30)  T(F): 97.9 (18 Jan 2024 05:31), Max: 98.6 (17 Jan 2024 09:30)  HR: 68 (18 Jan 2024 05:31) (68 - 75)  BP: 135/86 (18 Jan 2024 05:31) (135/80 - 154/85)  BP(mean): --  RR: 18 (18 Jan 2024 05:31) (17 - 19)  SpO2: 100% (18 Jan 2024 05:31) (97% - 100%)    Parameters below as of 18 Jan 2024 05:31  Patient On (Oxygen Delivery Method): room air      CAPILLARY BLOOD GLUCOSE      POCT Blood Glucose.: 87 mg/dL (17 Jan 2024 08:25)    I&O's Summary    17 Jan 2024 07:01  -  18 Jan 2024 07:00  --------------------------------------------------------  IN: 1100 mL / OUT: 2450 mL / NET: -1350 mL        PHYSICAL EXAM:  GENERAL: NAD, well-developed, well-nourished  HEAD: Atraumatic, Normocephalic  EYES: Conjunctiva and sclera clear  CHEST/LUNG: Clear to auscultation bilaterally; No wheezes or crackles; [facility] shilley in place, clean, dry, intact  HEART: Normal S1/S2; Regular rate and rhythm; No murmurs, rubs, or gallops  ABDOMEN: Soft, Nontender, Nondistended; Bowel sounds present  EXTREMITIES: No clubbing, cyanosis, or edema  PSYCH: A&Ox3    LABS:                        9.6    6.68  )-----------( 360      ( 18 Jan 2024 03:14 )             28.4     01-18    136  |  100  |  36<H>  ----------------------------<  110<H>  4.3   |  24  |  7.46<H>    Ca    7.8<L>      18 Jan 2024 03:14  Phos  4.8     01-17  Mg     2.50     01-17    TPro  6.0  /  Alb  3.0<L>  /  TBili  0.3  /  DBili  x   /  AST  24  /  ALT  22  /  AlkPhos  105  01-17    PT/INR - ( 18 Jan 2024 03:14 )   PT: 10.0 sec;   INR: <0.90 ratio         PTT - ( 18 Jan 2024 03:14 )  PTT:29.1 sec      Urinalysis Basic - ( 18 Jan 2024 03:14 )    Color: x / Appearance: x / SG: x / pH: x  Gluc: 110 mg/dL / Ketone: x  / Bili: x / Urobili: x   Blood: x / Protein: x / Nitrite: x   Leuk Esterase: x / RBC: x / WBC x   Sq Epi: x / Non Sq Epi: x / Bacteria: x        RADIOLOGY & ADDITIONAL TESTS:    Imaging Personally Reviewed:    Consultant(s) Notes Reviewed:      Care Discussed with Consultants/Other Providers:

## 2024-01-18 NOTE — PROGRESS NOTE ADULT - SUBJECTIVE AND OBJECTIVE BOX
[facility] Division of Kidney Diseases & Hypertension  FOLLOW UP NOTE  --------------------------------------------------------------------------------  Chief Complaint: DESIREE    24 hour events/subjective: Patient was seen and evaluated this morning at bedside with fellow.  Discussed with the patient using  phone.  Discussed plans regarding need for biopsy and possible need for long term dialysis.    PAST HISTORY  --------------------------------------------------------------------------------  No significant changes to PMH, PSH, FHx, SHx, unless otherwise noted    ALLERGIES & MEDICATIONS  --------------------------------------------------------------------------------  Allergies    No Known Allergies    Intolerances      Standing Inpatient Medications  acetaminophen     Tablet .. 650 milliGRAM(s) Oral every 6 hours  atorvastatin 40 milliGRAM(s) Oral at bedtime  calcium acetate 2001 milliGRAM(s) Oral three times a day with meals  chlorhexidine 2% Cloths 1 Application(s) Topical daily  ergocalciferol 60964 Unit(s) Oral every week  hydrALAZINE 50 milliGRAM(s) Oral every 8 hours  influenza   Vaccine 0.5 milliLiter(s) IntraMuscular once  labetalol 200 milliGRAM(s) Oral every 8 hours    PRN Inpatient Medications  aluminum hydroxide/magnesium hydroxide/simethicone Suspension 30 milliLiter(s) Oral every 4 hours PRN  melatonin 3 milliGRAM(s) Oral at bedtime PRN      REVIEW OF SYSTEMS  --------------------------------------------------------------------------------  Gen: no fever  Respiratory: no sob  CV: no cp  GI: no ab pain  : no complaints  MSK: no pain    VITALS/PHYSICAL EXAM  --------------------------------------------------------------------------------  T(C): 36.6 (01-18-24 @ 08:54), Max: 36.9 (01-18-24 @ 01:00)  HR: 65 (01-18-24 @ 08:54) (65 - 75)  BP: 147/74 (01-18-24 @ 08:54) (135/80 - 154/85)  ABP: --  ABP(mean): --  RR: 18 (01-18-24 @ 08:54) (18 - 19)  SpO2: 100% (01-18-24 @ 08:54) (97% - 100%)  CVP(mm Hg): --    01-17-24 @ 07:01  -  01-18-24 @ 07:00  --------------------------------------------------------  IN: 1100 mL / OUT: 2450 mL / NET: -1350 mL    01-18-24 @ 07:01  -  01-18-24 @ 12:51  --------------------------------------------------------  IN: 0 mL / OUT: 450 mL / NET: -450 mL    Physical Exam:  Gen: NAD  HEENT: anicteric  Pulm: CTA B/L  CV: RRR, S1S2; no rub  Abd: soft  : No suprapubic tenderness  Skin: Warm, without rashes  Rt IJ NTHD cath.     LABS/STUDIES  --------------------------------------------------------------------------------              9.6    6.68  >-----------<  360      [01-18-24 @ 03:14]              28.4     136  |  100  |  36  ----------------------------<  110      [01-18-24 @ 03:14]  4.3   |  24  |  7.46        Ca     7.8     [01-18-24 @ 03:14]      Mg     2.50     [01-17-24 @ 03:48]      Phos  4.8     [01-17-24 @ 03:48]    TPro  6.0  /  Alb  3.0  /  TBili  0.3  /  DBili  x   /  AST  24  /  ALT  22  /  AlkPhos  105  [01-17-24 @ 03:48]    PT/INR: PT 10.0 , INR <0.90      [01-18-24 @ 03:14]  PTT: 29.1       [01-18-24 @ 03:14]      Creatinine Trend:  SCr 7.46 [01-18 @ 03:14]  SCr 9.71 [01-17 @ 03:48]  SCr 8.54 [01-16 @ 05:35]  SCr 10.66 [01-15 @ 10:15]  SCr 9.14 [01-14 @ 05:51]    Urinalysis - [01-18-24 @ 03:14]      Color  / Appearance  / SG  / pH       Gluc 110 / Ketone   / Bili  / Urobili        Blood  / Protein  / Leuk Est  / Nitrite       RBC  / WBC  / Hyaline  / Gran  / Sq Epi  / Non Sq Epi  / Bacteria       PTH -- (Ca --)      [01-13-24 @ 07:14]   535  Vitamin D (25OH) 10.7      [01-13-24 @ 07:14]    HBsAg Nonreact      [01-14-24 @ 05:51]  HCV 0.19, Nonreact      [01-14-24 @ 05:51]  HIV Nonreact      [01-14-24 @ 05:51]    dsDNA <12      [01-14-24 @ 05:51]  C3 Complement 94      [01-14-24 @ 05:51]  C4 Complement 27      [01-14-24 @ 05:51]  ANCA: cANCA Negative, pANCA Negative, atypical ANCA Indeterminate Method interference due to DAVID fluorescence      [01-14-24 @ 05:51]  Syphilis Screen (Treponema Pallidum Ab) Positive      [01-14-24 @ 05:51]  Syphilis Screen (RPR Titer) <1:1      [01-14-24 @ 05:51]  PLA2R: SHERRON <1.8, IFA --      [01-14-24 @ 05:51]  Free Light Chains: kappa 25.22, lambda 19.40, ratio = 1.30      [01-14 @ 05:51]  Immunofixation Serum:   No Monoclonal Band Identified. BELINDA Powell M.D.  Reference Range: None Detected      [01-14-24 @ 05:51]  SPEP Interpretation: Mild Hypoalbuminemia.  BELINDA Powell M.D.      [01-14-24 @ 05:51]

## 2024-01-19 LAB
ANION GAP SERPL CALC-SCNC: 16 MMOL/L — HIGH (ref 7–14)
BUN SERPL-MCNC: 58 MG/DL — HIGH (ref 7–23)
CALCIUM SERPL-MCNC: 8 MG/DL — LOW (ref 8.4–10.5)
CHLORIDE SERPL-SCNC: 96 MMOL/L — LOW (ref 98–107)
CO2 SERPL-SCNC: 19 MMOL/L — LOW (ref 22–31)
CREAT SERPL-MCNC: 9.65 MG/DL — HIGH (ref 0.5–1.3)
EGFR: 6 ML/MIN/1.73M2 — LOW
GLUCOSE SERPL-MCNC: 134 MG/DL — HIGH (ref 70–99)
HBV SURFACE AB SER-ACNC: 29.1 MIU/ML — SIGNIFICANT CHANGE UP
HCT VFR BLD CALC: 26.9 % — LOW (ref 39–50)
HGB BLD-MCNC: 9.5 G/DL — LOW (ref 13–17)
MAGNESIUM SERPL-MCNC: 2.4 MG/DL — SIGNIFICANT CHANGE UP (ref 1.6–2.6)
MCHC RBC-ENTMCNC: 28.4 PG — SIGNIFICANT CHANGE UP (ref 27–34)
MCHC RBC-ENTMCNC: 35.3 GM/DL — SIGNIFICANT CHANGE UP (ref 32–36)
MCV RBC AUTO: 80.3 FL — SIGNIFICANT CHANGE UP (ref 80–100)
NRBC # BLD: 0 /100 WBCS — SIGNIFICANT CHANGE UP (ref 0–0)
NRBC # FLD: 0 K/UL — SIGNIFICANT CHANGE UP (ref 0–0)
PHOSPHATE SERPL-MCNC: 3.3 MG/DL — SIGNIFICANT CHANGE UP (ref 2.5–4.5)
PLATELET # BLD AUTO: 332 K/UL — SIGNIFICANT CHANGE UP (ref 150–400)
POTASSIUM SERPL-MCNC: 5.7 MMOL/L — HIGH (ref 3.5–5.3)
POTASSIUM SERPL-SCNC: 5.7 MMOL/L — HIGH (ref 3.5–5.3)
RBC # BLD: 3.35 M/UL — LOW (ref 4.2–5.8)
RBC # FLD: 15 % — HIGH (ref 10.3–14.5)
SODIUM SERPL-SCNC: 131 MMOL/L — LOW (ref 135–145)
WBC # BLD: 8.71 K/UL — SIGNIFICANT CHANGE UP (ref 3.8–10.5)
WBC # FLD AUTO: 8.71 K/UL — SIGNIFICANT CHANGE UP (ref 3.8–10.5)

## 2024-01-19 PROCEDURE — 99232 SBSQ HOSP IP/OBS MODERATE 35: CPT | Mod: GC

## 2024-01-19 PROCEDURE — 99232 SBSQ HOSP IP/OBS MODERATE 35: CPT

## 2024-01-19 RX ADMIN — HEPARIN SODIUM 5000 UNIT(S): 5000 INJECTION INTRAVENOUS; SUBCUTANEOUS at 15:12

## 2024-01-19 RX ADMIN — ATORVASTATIN CALCIUM 40 MILLIGRAM(S): 80 TABLET, FILM COATED ORAL at 22:33

## 2024-01-19 RX ADMIN — Medication 650 MILLIGRAM(S): at 17:55

## 2024-01-19 RX ADMIN — Medication 200 MILLIGRAM(S): at 05:08

## 2024-01-19 RX ADMIN — Medication 200 MILLIGRAM(S): at 22:33

## 2024-01-19 RX ADMIN — Medication 2001 MILLIGRAM(S): at 17:56

## 2024-01-19 RX ADMIN — Medication 650 MILLIGRAM(S): at 00:40

## 2024-01-19 RX ADMIN — Medication 2001 MILLIGRAM(S): at 09:16

## 2024-01-19 RX ADMIN — Medication 50 MILLIGRAM(S): at 17:55

## 2024-01-19 RX ADMIN — Medication 200 MILLIGRAM(S): at 15:12

## 2024-01-19 RX ADMIN — Medication 650 MILLIGRAM(S): at 05:08

## 2024-01-19 RX ADMIN — HEPARIN SODIUM 5000 UNIT(S): 5000 INJECTION INTRAVENOUS; SUBCUTANEOUS at 05:09

## 2024-01-19 RX ADMIN — Medication 650 MILLIGRAM(S): at 19:54

## 2024-01-19 RX ADMIN — HEPARIN SODIUM 5000 UNIT(S): 5000 INJECTION INTRAVENOUS; SUBCUTANEOUS at 22:33

## 2024-01-19 RX ADMIN — Medication 50 MILLIGRAM(S): at 00:41

## 2024-01-19 NOTE — PROGRESS NOTE ADULT - TIME BILLING
Review of laboratory data, radiology results, consultants' recommendations, documentation in [place], discussion with patient/house staff and interdisciplinary staff (such as , social workers, etc). Interventions were performed as documented above.

## 2024-01-19 NOTE — DIETITIAN INITIAL EVALUATION ADULT - OTHER INFO
58 year old male with no known PMH presented to [facility] with n/v/abdominal pain, admitted for renal failure and r/o ACS. Hospital course c/b flu A, sepsis 2/2 ETEC, anemia and NSTEMI evaluation s/p stress test. Now pt transferred to [facility] and pending renal bx and perm HD cath placement per chart.    Patient reporting good appetite, noted w/ some intakes 0-100% per RN flow sheet. Receives Nepro 1x daily to help meet nutritional needs. No GI distress reported. Has no food allergies. Unable to obtain UBW. Per previous RD note (1/4), noted w/ -150 lbs. w/ weight at the time 63.7 kg. ABW is 59.4 kg (119), 58.6 kg (1/17) and 61.3 kg (1/15) *post HD indicating a -6.8% weight loss x 2 weeks, likely in setting of new HD. No edema or pressure injuries per RN flow sheet.    Unable to provide renal diet education at this time. Left renal nutrition handouts for patient at bed side.

## 2024-01-19 NOTE — DIETITIAN INITIAL EVALUATION ADULT - PERTINENT MEDS FT
MEDICATIONS  (STANDING):  acetaminophen     Tablet .. 650 milliGRAM(s) Oral every 6 hours  atorvastatin 40 milliGRAM(s) Oral at bedtime  calcium acetate 2001 milliGRAM(s) Oral three times a day with meals  chlorhexidine 2% Cloths 1 Application(s) Topical daily  chlorhexidine 4% Liquid 1 Application(s) Topical <User Schedule>  ergocalciferol 78557 Unit(s) Oral every week  heparin   Injectable 5000 Unit(s) SubCutaneous every 8 hours  hydrALAZINE 50 milliGRAM(s) Oral every 8 hours  influenza   Vaccine 0.5 milliLiter(s) IntraMuscular once  labetalol 200 milliGRAM(s) Oral every 8 hours  penicillin   G benzathine Injectable 2.4 Million Unit(s) IntraMuscular every 7 days    MEDICATIONS  (PRN):  aluminum hydroxide/magnesium hydroxide/simethicone Suspension 30 milliLiter(s) Oral every 4 hours PRN Dyspepsia  melatonin 3 milliGRAM(s) Oral at bedtime PRN Insomnia  sodium chloride 0.9% lock flush 10 milliLiter(s) IV Push every 1 hour PRN Pre/post blood products, medications, blood draw, and to maintain line patency

## 2024-01-19 NOTE — PROGRESS NOTE ADULT - PROBLEM SELECTOR PLAN 2
- New ESRD with temp HD cath placement on 1/6/24  - Record of HD: 1/6, 1/8, 1/10, 1/11, 1/12 @ [facility]  - Transfer to [facility] 1/12 for HD cath placement (perm)  - [facility]: Chikis Laird [phone]  - IR consult for HD placement and renal biopsy in the setting of nephrotic proteinuria,  - nephrology consulted, appreciate recs  - vitamin D hydroxy low, PTH elevated - New ESRD with temp HD cath placement on 1/6/24  - Record of HD: 1/6, 1/8, 1/10, 1/11, 1/12 @ [facility]  - Transfer to [facility] 1/12 for HD cath placement (perm)  - [facility]: Chikis Laird [phone]  - IR consult for HD placement and renal biopsy in the setting of nephrotic proteinuria,  - nephrology consulted, appreciate recs  - vitamin D hydroxy low, PTH elevated  - started Vit D supplementation

## 2024-01-19 NOTE — PHYSICAL THERAPY INITIAL EVALUATION ADULT - PERTINENT HX OF CURRENT PROBLEM, REHAB EVAL
59yo M with no known PMHx (not followed by PCP), but FHx (brother) w/ESRD s/p transplant who presented to [facility] with n/v/abdominal pain, admitted for renal failure and r/o ACS. Hospital course complicated by flu A, sepsis 2/2 ETEC, anemia and NSTEMI evaluation s/p stress test. Now pt transferred to [facility] and pending renal bx and perm HD cath placement

## 2024-01-19 NOTE — PROGRESS NOTE ADULT - SUBJECTIVE AND OBJECTIVE BOX
[facility] Division of Kidney Diseases & Hypertension  FOLLOW UP NOTE  [phone]--------------------------------------------------------------------------------  Chief Complaint:Chronic kidney disease    24 hour events/subjective:  Pt was seen at the bedside. Pt got tunnelled HD cath in the Rt IJ on 1/18 but IR could not find a good window for renal biopsy. No fresh complaints. . Spoke with him using [facility]. Pt denies SOB/ Constipation/ Diarrhea/ Nausea/ Vomiting/ abdominal pain/ chest pain/ tingling/ numbness.         PAST HISTORY  --------------------------------------------------------------------------------  No significant changes to PMH, PSH, FHx, SHx, unless otherwise noted    ALLERGIES & MEDICATIONS  --------------------------------------------------------------------------------  Allergies    No Known Allergies    Intolerances      Standing Inpatient Medications  acetaminophen     Tablet .. 650 milliGRAM(s) Oral every 6 hours  atorvastatin 40 milliGRAM(s) Oral at bedtime  calcium acetate 2001 milliGRAM(s) Oral three times a day with meals  chlorhexidine 2% Cloths 1 Application(s) Topical daily  chlorhexidine 4% Liquid 1 Application(s) Topical <User Schedule>  ergocalciferol 24449 Unit(s) Oral every week  heparin   Injectable 5000 Unit(s) SubCutaneous every 8 hours  hydrALAZINE 50 milliGRAM(s) Oral every 8 hours  influenza   Vaccine 0.5 milliLiter(s) IntraMuscular once  labetalol 200 milliGRAM(s) Oral every 8 hours  penicillin   G benzathine Injectable 2.4 Million Unit(s) IntraMuscular every 7 days    PRN Inpatient Medications  aluminum hydroxide/magnesium hydroxide/simethicone Suspension 30 milliLiter(s) Oral every 4 hours PRN  melatonin 3 milliGRAM(s) Oral at bedtime PRN  sodium chloride 0.9% lock flush 10 milliLiter(s) IV Push every 1 hour PRN      REVIEW OF SYSTEMS  --------------------------------------------------------------------------------  as above.     VITALS/PHYSICAL EXAM  --------------------------------------------------------------------------------  T(C): 36.5 (01-19-24 @ 05:00), Max: 37 (01-18-24 @ 18:45)  HR: 94 (01-19-24 @ 05:00) (69 - 97)  BP: 130/70 (01-19-24 @ 05:00) (129/69 - 152/77)  RR: 18 (01-19-24 @ 05:00) (17 - 18)  SpO2: 99% (01-19-24 @ 05:00) (96% - 99%)  Wt(kg): --        01-18-24 @ 07:01  -  01-19-24 @ 07:00  --------------------------------------------------------  IN: 0 mL / OUT: 850 mL / NET: -850 mL      Physical Exam:  Gen: NAD  HEENT: anicteric  Pulm: CTA B/L  CV: RRR, S1S2; no rub  Abd: soft  : No suprapubic tenderness  Skin: Warm, without rashes  Rt IJ THD cath.     LABS/STUDIES  --------------------------------------------------------------------------------              9.5    8.71  >-----------<  332      [01-19-24 @ 05:45]              26.9     131  |  96  |  58  ----------------------------<  134      [01-19-24 @ 05:45]  5.7   |  19  |  9.65        Ca     8.0     [01-19-24 @ 05:45]      Mg     2.40     [01-19-24 @ 05:45]      Phos  3.3     [01-19-24 @ 05:45]      PT/INR: PT 10.0 , INR <0.90      [01-18-24 @ 03:14]  PTT: 29.1       [01-18-24 @ 03:14]      Creatinine Trend:  SCr 9.65 [01-19 @ 05:45]  SCr 7.46 [01-18 @ 03:14]  SCr 9.71 [01-17 @ 03:48]  SCr 8.54 [01-16 @ 05:35]  SCr 10.66 [01-15 @ 10:15]    Urinalysis - [01-19-24 @ 05:45]      Color  / Appearance  / SG  / pH       Gluc 134 / Ketone   / Bili  / Urobili        Blood  / Protein  / Leuk Est  / Nitrite       RBC  / WBC  / Hyaline  / Gran  / Sq Epi  / Non Sq Epi  / Bacteria       PTH -- (Ca --)      [01-13-24 @ 07:14]   535  Vitamin D (25OH) 10.7      [01-13-24 @ 07:14]    HBsAg Nonreact      [01-14-24 @ 05:51]  HCV 0.19, Nonreact      [01-14-24 @ 05:51]  HIV Nonreact      [01-14-24 @ 05:51]    DAVID: titer 1:160, pattern Homogeneous      [01-14-24 @ 05:51]  dsDNA <12      [01-14-24 @ 05:51]  C3 Complement 94      [01-14-24 @ 05:51]  C4 Complement 27      [01-14-24 @ 05:51]  ANCA: cANCA Negative, pANCA Negative, atypical ANCA Indeterminate Method interference due to DAVID fluorescence      [01-14-24 @ 05:51]  Syphilis Screen (Treponema Pallidum Ab) Positive      [01-14-24 @ 05:51]  Syphilis Screen (RPR Titer) <1:1      [01-14-24 @ 05:51]  PLA2R: SHERRON <1.8, IFA --      [01-14-24 @ 05:51]  Free Light Chains: kappa 25.22, lambda 19.40, ratio = 1.30      [01-14 @ 05:51]  Immunofixation Serum:   No Monoclonal Band Identified. BELINDA Powell M.D.  Reference Range: None Detected      [01-14-24 @ 05:51]  SPEP Interpretation: Mild Hypoalbuminemia.  BELINDA Powell M.D.      [01-14-24 @ 05:51]   [facility] Division of Kidney Diseases & Hypertension  FOLLOW UP NOTE  [phone]--------------------------------------------------------------------------------  Chief Complaint: Chronic kidney disease    24 hour events/subjective:  Pt was seen at the bedside. Pt got tunnelled HD cath in the Rt IJ on 1/18 but IR could not find a good window for renal biopsy. No fresh complaints. . Spoke with him using [facility]. Pt denies SOB/ Constipation/ Diarrhea/ Nausea/ Vomiting/ abdominal pain/ chest pain/ tingling/ numbness.     PAST HISTORY  --------------------------------------------------------------------------------  No significant changes to PMH, PSH, FHx, SHx, unless otherwise noted    ALLERGIES & MEDICATIONS  --------------------------------------------------------------------------------  Allergies    No Known Allergies    Intolerances      Standing Inpatient Medications  acetaminophen     Tablet .. 650 milliGRAM(s) Oral every 6 hours  atorvastatin 40 milliGRAM(s) Oral at bedtime  calcium acetate 2001 milliGRAM(s) Oral three times a day with meals  chlorhexidine 2% Cloths 1 Application(s) Topical daily  chlorhexidine 4% Liquid 1 Application(s) Topical <User Schedule>  ergocalciferol 63248 Unit(s) Oral every week  heparin   Injectable 5000 Unit(s) SubCutaneous every 8 hours  hydrALAZINE 50 milliGRAM(s) Oral every 8 hours  influenza   Vaccine 0.5 milliLiter(s) IntraMuscular once  labetalol 200 milliGRAM(s) Oral every 8 hours  penicillin   G benzathine Injectable 2.4 Million Unit(s) IntraMuscular every 7 days    PRN Inpatient Medications  aluminum hydroxide/magnesium hydroxide/simethicone Suspension 30 milliLiter(s) Oral every 4 hours PRN  melatonin 3 milliGRAM(s) Oral at bedtime PRN  sodium chloride 0.9% lock flush 10 milliLiter(s) IV Push every 1 hour PRN      REVIEW OF SYSTEMS  --------------------------------------------------------------------------------  as above.     VITALS/PHYSICAL EXAM  --------------------------------------------------------------------------------  T(C): 36.5 (01-19-24 @ 05:00), Max: 37 (01-18-24 @ 18:45)  HR: 94 (01-19-24 @ 05:00) (69 - 97)  BP: 130/70 (01-19-24 @ 05:00) (129/69 - 152/77)  RR: 18 (01-19-24 @ 05:00) (17 - 18)  SpO2: 99% (01-19-24 @ 05:00) (96% - 99%)  Wt(kg): --        01-18-24 @ 07:01  -  01-19-24 @ 07:00  --------------------------------------------------------  IN: 0 mL / OUT: 850 mL / NET: -850 mL      Physical Exam:  Gen: NAD  HEENT: anicteric  Pulm: CTA B/L  CV: RRR, S1S2; no rub  Abd: soft  : No suprapubic tenderness  Skin: Warm, without rashes  Rt IJ THD cath.     LABS/STUDIES  --------------------------------------------------------------------------------              9.5    8.71  >-----------<  332      [01-19-24 @ 05:45]              26.9     131  |  96  |  58  ----------------------------<  134      [01-19-24 @ 05:45]  5.7   |  19  |  9.65        Ca     8.0     [01-19-24 @ 05:45]      Mg     2.40     [01-19-24 @ 05:45]      Phos  3.3     [01-19-24 @ 05:45]      PT/INR: PT 10.0 , INR <0.90      [01-18-24 @ 03:14]  PTT: 29.1       [01-18-24 @ 03:14]      Creatinine Trend:  SCr 9.65 [01-19 @ 05:45]  SCr 7.46 [01-18 @ 03:14]  SCr 9.71 [01-17 @ 03:48]  SCr 8.54 [01-16 @ 05:35]  SCr 10.66 [01-15 @ 10:15]    Urinalysis - [01-19-24 @ 05:45]      Color  / Appearance  / SG  / pH       Gluc 134 / Ketone   / Bili  / Urobili        Blood  / Protein  / Leuk Est  / Nitrite       RBC  / WBC  / Hyaline  / Gran  / Sq Epi  / Non Sq Epi  / Bacteria       PTH -- (Ca --)      [01-13-24 @ 07:14]   535  Vitamin D (25OH) 10.7      [01-13-24 @ 07:14]    HBsAg Nonreact      [01-14-24 @ 05:51]  HCV 0.19, Nonreact      [01-14-24 @ 05:51]  HIV Nonreact      [01-14-24 @ 05:51]    DAVID: titer 1:160, pattern Homogeneous      [01-14-24 @ 05:51]  dsDNA <12      [01-14-24 @ 05:51]  C3 Complement 94      [01-14-24 @ 05:51]  C4 Complement 27      [01-14-24 @ 05:51]  ANCA: cANCA Negative, pANCA Negative, atypical ANCA Indeterminate Method interference due to DAVID fluorescence      [01-14-24 @ 05:51]  Syphilis Screen (Treponema Pallidum Ab) Positive      [01-14-24 @ 05:51]  Syphilis Screen (RPR Titer) <1:1      [01-14-24 @ 05:51]  PLA2R: SHERRON <1.8, IFA --      [01-14-24 @ 05:51]  Free Light Chains: kappa 25.22, lambda 19.40, ratio = 1.30      [01-14 @ 05:51]  Immunofixation Serum:   No Monoclonal Band Identified. BELINDA Powell M.D.  Reference Range: None Detected      [01-14-24 @ 05:51]  SPEP Interpretation: Mild Hypoalbuminemia.  BELINDA Powell M.D.      [01-14-24 @ 05:51]

## 2024-01-19 NOTE — DIETITIAN INITIAL EVALUATION ADULT - ORAL INTAKE PTA/DIET HISTORY
Patient seen for assessment. Information obtained w/ help from  #[id number]. Limited information obtained at this time, patient had to go to HD in middle assessment. Checked back later, however patient still of unit. Other information obtained from chart review. Patient reporting good appetite PTA, however per previous RD note (1/4), patient stated poor kerwin x 3 weeks PTA r/t N/V.

## 2024-01-19 NOTE — PROGRESS NOTE ADULT - PROBLEM SELECTOR PLAN 6
RESOLVED-NSTEMI in the setting of demand ischemia in the setting of HTN emergency  - presented at  with N/V/D and 4 years of chest pain withe exertion, progressively worsening and no prior PCP or physician evaluation; on admission at , elevated troponin with peak to 715.  - EKG nonischemic, Cr elevated +/- demand i/s/o HTN urgency; echo with mod pulm htn, nuclear stress test unremarkable, most likely i/s/o demand ischemia   - Echo: consistent with normal LVEF 65-70%, LVH and moderate pulm HTN  -Cards recommended non-invasive ischemic eval given contrast induced nephropathy and new renal failure: Stress test on 1/9/23: LVEF 66%  -- Given normal myocardial perfusion, CV stable  - C/W statin, and BB and outpt cardiology follow-up with [facility]   - held hep for 1/13 AM given possibility for bx/HD placement

## 2024-01-19 NOTE — PROGRESS NOTE ADULT - ASSESSMENT
57yo M with no known PMHx (not followed by PCP), but FHx (brother) w/ESRD s/p tplant who presented to [facility] with n/v/abdominal pain, admitted for renal failure and r/o ACS. Hospital course c/b flu A, sepsis 2/2 ETEC, anemia and NSTEMI evaluation s/p stress test. Now pt transferred to [facility] and pending renal bx and perm HD cath placement.

## 2024-01-19 NOTE — DIETITIAN INITIAL EVALUATION ADULT - PROBLEM SELECTOR PLAN 2
- New ESRD with temp HD cath placement on 1/6/24  - Record of HD: 1/6, 1/8, 1/10, 1/11, 1/12 @ [facility]  - Transfer to [facility] 1/12 for HD cath placement (perm)  - [facility]: Chikis Laird [phone]  - IR consult for HD placement and renal biopsy in the setting of nephrotic proteinuria, consult order placed, nephrology consulted, f/u in AM  -phos and vitamin D hydroxy, PTH ordered in AM

## 2024-01-19 NOTE — PROGRESS NOTE ADULT - ATTENDING COMMENTS
58M with no prior medical care who presents with acute renal failure, course complicated by flu A and ETEC, now resolved. Pt transferred from  to [facility] for IR renal biopsy and permacath placement.     # Acute renal failure  - s/p permacath placement 1/18  - renal biopsy aborted- poor windows and pt hypertensive when proned, plan to attempt again Monday  - HD per nephrology    # HTN  - continue current HTN management, BP well controlled with current regimen  - goal BP <150/90    # +syphilis screen  - treponema testing +, RPR negative  - pt with no previous known diagnosis  - may be latent infection, will start PCN    d/c pending renal biopsy

## 2024-01-19 NOTE — PROGRESS NOTE ADULT - PROBLEM SELECTOR PLAN 9
Patient would like full code status for now after explaining options. He is also considering whether he wants to pursue permanent HD.   Patient to think about feeding tube. Patient would like full code status for now after explaining options. He is also considering whether he wants to pursue permanent HD.

## 2024-01-19 NOTE — DIETITIAN INITIAL EVALUATION ADULT - PROBLEM SELECTOR PLAN 1
- admitted to  for renal failure on CKD stage 3 likely 2/2 dehydration and hypertensive emergency from 1/3/24,   - s/p CT w/IV dye 1/3/23 possibly contributing to rise in Cr per  nephrology notes  - 24 [place]en urine collection positive for nephrotic range proteinuria; SPEP, renal serologies negative   - CT A/P w/o hydro; Started on HD 1/6 via temp HD cath  - Pt requires chronic HD, transition from temp to perm HD cath and renal bx, HD catheter placed 01/06  - Transferred to [facility] on 1/12 for HD perm and bx, IR consult order place, nephrology consulted as well to inform of transfer   - CTM electrolytes, creatinine, strict I/Os, and cath site   - f/u AM with IR/Nephro

## 2024-01-19 NOTE — PROGRESS NOTE ADULT - PROBLEM SELECTOR PLAN 1
- admitted to  for renal failure on CKD stage 3 likely 2/2 dehydration and hypertensive emergency from 1/3/24,   - s/p CT w/IV dye 1/3/23 possibly contributing to rise in Cr per  nephrology notes  - 24 eden urine collection positive for nephrotic range proteinuria; SPEP, renal serologies negative   - CT A/P w/o hydro; Started on HD 1/6 via temp HD cath  - Pt requires chronic HD, transition from temp to perm HD cath and renal bx, HD catheter placed 01/06  - Transferred to [facility] on 1/12 for HD perm and bx, IR consult order place, nephrology consulted as well to inform of transfer   - CTM electrolytes, creatinine, strict I/Os, and cath site   - pending permcath placement  and  renal biopsy on 1/18 - admitted to  for renal failure on CKD stage 3 likely 2/2 dehydration and hypertensive emergency from 1/3/24,   - s/p CT w/IV dye 1/3/23 possibly contributing to rise in Cr per  nephrology notes  - 24 eden urine collection positive for nephrotic range proteinuria; SPEP, renal serologies negative   - CT A/P w/o hydro; Started on HD 1/6 via temp HD cath  - Pt requires chronic HD, transition from temp to perm HD cath and renal bx, HD catheter placed 01/06  - Transferred to [facility] on 1/12 for HD perm and bx, IR consult order place, nephrology consulted as well to inform of transfer   - CTM electrolytes, creatinine, strict I/Os, and cath site   - s/p permcath placement  - pending renal biopsy on 1/22

## 2024-01-19 NOTE — PHYSICAL THERAPY INITIAL EVALUATION ADULT - ADDITIONAL COMMENTS
Pt reports that he lives in an apartment with friends with a flight of stairs to negotiate to bedroom; (+)1 handrail. Prior to hospital admission, pt was completely independent and used no assistive device with ambulation. Pt denies any recent falls.    Pt left comfortable in bed, NAD, all lines intact, all precautions maintained, with call bell in reach, and RN aware of PT evaluation.

## 2024-01-19 NOTE — PROGRESS NOTE ADULT - SUBJECTIVE AND OBJECTIVE BOX
59y Male s/p hemodialysis catheter placement under MAC on 1/18/24     T(C): 36.5 (01-19-24 @ 05:00), Max: 37 (01-18-24 @ 18:45)  HR: 94 (01-19-24 @ 05:00) (69 - 97)  BP: 130/70 (01-19-24 @ 05:00) (129/69 - 152/77)  RR: 18 (01-19-24 @ 05:00) (17 - 18)  SpO2: 99% (01-19-24 @ 05:00) (96% - 99%)  Wt(kg): --    Pt seen, doing well, no anesthesia complications or complaints noted or reported.   Pain and nausea adequately controlled.

## 2024-01-19 NOTE — DIETITIAN INITIAL EVALUATION ADULT - PERTINENT LABORATORY DATA
01-19    131<L>  |  96<L>  |  58<H>  ----------------------------<  134<H>  5.7<H>   |  19<L>  |  9.65<H>    Ca    8.0<L>      19 Jan 2024 05:45  Phos  3.3     01-19  Mg     2.40     01-19    A1C with Estimated Average Glucose Result: 5.6 % (01-03-24 @ 11:15)

## 2024-01-19 NOTE — PROGRESS NOTE ADULT - SUBJECTIVE AND OBJECTIVE BOX
Jessicareyes Tejada, PGY1    Patient is a 59y old  Male who presents with a chief complaint of ESRD on HD transferred for placement of permanent HD catheter and renal biopsy (18 Jan 2024 12:50)      SUBJECTIVE / OVERNIGHT EVENTS: NAEO. Pt denies chest pain, SOB, N/V, fever/chills, or changes in bowel movements.    MEDICATIONS  (STANDING):  acetaminophen     Tablet .. 650 milliGRAM(s) Oral every 6 hours  atorvastatin 40 milliGRAM(s) Oral at bedtime  calcium acetate 2001 milliGRAM(s) Oral three times a day with meals  chlorhexidine 2% Cloths 1 Application(s) Topical daily  chlorhexidine 4% Liquid 1 Application(s) Topical <User Schedule>  ergocalciferol 70243 Unit(s) Oral every week  heparin   Injectable 5000 Unit(s) SubCutaneous every 8 hours  hydrALAZINE 50 milliGRAM(s) Oral every 8 hours  influenza   Vaccine 0.5 milliLiter(s) IntraMuscular once  labetalol 200 milliGRAM(s) Oral every 8 hours  penicillin   G benzathine Injectable 2.4 Million Unit(s) IntraMuscular every 7 days    MEDICATIONS  (PRN):  aluminum hydroxide/magnesium hydroxide/simethicone Suspension 30 milliLiter(s) Oral every 4 hours PRN Dyspepsia  melatonin 3 milliGRAM(s) Oral at bedtime PRN Insomnia  sodium chloride 0.9% lock flush 10 milliLiter(s) IV Push every 1 hour PRN Pre/post blood products, medications, blood draw, and to maintain line patency      CAPILLARY BLOOD GLUCOSE        I&O's Summary    18 Jan 2024 07:01  -  19 Jan 2024 07:00  --------------------------------------------------------  IN: 0 mL / OUT: 850 mL / NET: -850 mL        Vital Signs Last 24 Hrs  T(C): 36.5 (19 Jan 2024 05:00), Max: 37 (18 Jan 2024 18:45)  T(F): 97.7 (19 Jan 2024 05:00), Max: 98.6 (18 Jan 2024 18:45)  HR: 94 (19 Jan 2024 05:00) (65 - 97)  BP: 130/70 (19 Jan 2024 05:00) (129/69 - 152/77)  BP(mean): --  RR: 18 (19 Jan 2024 05:00) (17 - 18)  SpO2: 99% (19 Jan 2024 05:00) (96% - 100%)    Parameters below as of 19 Jan 2024 05:00  Patient On (Oxygen Delivery Method): room air        PHYSICAL EXAM:  GENERAL: NAD, well-developed, well-nourished  HEAD: Atraumatic, Normocephalic  EYES: Conjunctiva and sclera clear  CHEST/LUNG: Clear to auscultation bilaterally; No wheezes or crackles  HEART: Normal S1/S2; Regular rate and rhythm; No murmurs, rubs, or gallops  ABDOMEN: Soft, Nontender, Nondistended; Bowel sounds present  EXTREMITIES: No clubbing, cyanosis, or edema  PSYCH: A&Ox3      LABS:                        9.6    6.68  )-----------( 360      ( 18 Jan 2024 03:14 )             28.4      01-18    136  |  100  |  36<H>  ----------------------------<  110<H>  4.3   |  24  |  7.46<H>    Ca    7.8<L>      18 Jan 2024 03:14      PT/INR - ( 18 Jan 2024 03:14 )   PT: 10.0 sec;   INR: <0.90 ratio         PTT - ( 18 Jan 2024 03:14 )  PTT:29.1 sec      Urinalysis Basic - ( 18 Jan 2024 03:14 )    Color: x / Appearance: x / SG: x / pH: x  Gluc: 110 mg/dL / Ketone: x  / Bili: x / Urobili: x   Blood: x / Protein: x / Nitrite: x   Leuk Esterase: x / RBC: x / WBC x   Sq Epi: x / Non Sq Epi: x / Bacteria: x        RADIOLOGY & ADDITIONAL TESTS:     Jessicareyes Tejada, PGY1    Patient is a 59y old  Male who presents with a chief complaint of ESRD on HD transferred for placement of permanent HD catheter and renal biopsy (18 Jan 2024 12:50)      SUBJECTIVE / OVERNIGHT EVENTS: NAEO. Pt denies chest pain, SOB, N/V, fever/chills, or changes in bowel movements.    MEDICATIONS  (STANDING):  acetaminophen     Tablet .. 650 milliGRAM(s) Oral every 6 hours  atorvastatin 40 milliGRAM(s) Oral at bedtime  calcium acetate 2001 milliGRAM(s) Oral three times a day with meals  chlorhexidine 2% Cloths 1 Application(s) Topical daily  chlorhexidine 4% Liquid 1 Application(s) Topical <User Schedule>  ergocalciferol 89672 Unit(s) Oral every week  heparin   Injectable 5000 Unit(s) SubCutaneous every 8 hours  hydrALAZINE 50 milliGRAM(s) Oral every 8 hours  influenza   Vaccine 0.5 milliLiter(s) IntraMuscular once  labetalol 200 milliGRAM(s) Oral every 8 hours  penicillin   G benzathine Injectable 2.4 Million Unit(s) IntraMuscular every 7 days    MEDICATIONS  (PRN):  aluminum hydroxide/magnesium hydroxide/simethicone Suspension 30 milliLiter(s) Oral every 4 hours PRN Dyspepsia  melatonin 3 milliGRAM(s) Oral at bedtime PRN Insomnia  sodium chloride 0.9% lock flush 10 milliLiter(s) IV Push every 1 hour PRN Pre/post blood products, medications, blood draw, and to maintain line patency      CAPILLARY BLOOD GLUCOSE        I&O's Summary    18 Jan 2024 07:01  -  19 Jan 2024 07:00  --------------------------------------------------------  IN: 0 mL / OUT: 850 mL / NET: -850 mL        Vital Signs Last 24 Hrs  T(C): 36.5 (19 Jan 2024 05:00), Max: 37 (18 Jan 2024 18:45)  T(F): 97.7 (19 Jan 2024 05:00), Max: 98.6 (18 Jan 2024 18:45)  HR: 94 (19 Jan 2024 05:00) (65 - 97)  BP: 130/70 (19 Jan 2024 05:00) (129/69 - 152/77)  BP(mean): --  RR: 18 (19 Jan 2024 05:00) (17 - 18)  SpO2: 99% (19 Jan 2024 05:00) (96% - 100%)    Parameters below as of 19 Jan 2024 05:00  Patient On (Oxygen Delivery Method): room air        PHYSICAL EXAM:  GENERAL: NAD, well-developed, well-nourished  HEAD: Atraumatic, Normocephalic  EYES: Conjunctiva and sclera clear  CHEST/LUNG: Clear to auscultation bilaterally; No wheezes or crackles  HEART: Normal S1/S2; Regular rate and rhythm; No murmurs, rubs, or gallops  ABDOMEN: Soft, Nontender, Nondistended; Bowel sounds present  EXTREMITIES: No clubbing, cyanosis, or edema; RIJ catheter taken out, no signs of infection at site, permacath placed  PSYCH: A&Ox3      LABS:                        9.6    6.68  )-----------( 360      ( 18 Jan 2024 03:14 )             28.4      01-18    136  |  100  |  36<H>  ----------------------------<  110<H>  4.3   |  24  |  7.46<H>    Ca    7.8<L>      18 Jan 2024 03:14      PT/INR - ( 18 Jan 2024 03:14 )   PT: 10.0 sec;   INR: <0.90 ratio         PTT - ( 18 Jan 2024 03:14 )  PTT:29.1 sec      Urinalysis Basic - ( 18 Jan 2024 03:14 )    Color: x / Appearance: x / SG: x / pH: x  Gluc: 110 mg/dL / Ketone: x  / Bili: x / Urobili: x   Blood: x / Protein: x / Nitrite: x   Leuk Esterase: x / RBC: x / WBC x   Sq Epi: x / Non Sq Epi: x / Bacteria: x        RADIOLOGY & ADDITIONAL TESTS:

## 2024-01-19 NOTE — DIETITIAN INITIAL EVALUATION ADULT - NSICDXPASTMEDICALHX_GEN_ALL_CORE_FT
PAST MEDICAL HISTORY:  ESRD on dialysis     H/O gastroesophageal reflux (GERD)     Hypertension

## 2024-01-19 NOTE — PROGRESS NOTE ADULT - PROBLEM SELECTOR PLAN 1
DESIREE with proteinuria. R/o GN. No known baseline renal fx available. Recent admission at [facility] (Froy 3), received contrast (Froy 3) for cardiac work up. Urine positive for 3.6 g protein and RBC 12 / HPF. S/p placement of temp HD cath to initiate dialysis on 1/6/24.  Pt  transferred to [facility] for placement of tunneled HD catheter and work up for DESIREE.  Serology at [facility] was positive for DAVID 1:160 homogenous and speckled with indeterminate ANCA +.    Kidney biopsy is warranted. Biopsy schedule as per IR. BP controlled now. Pt was explained about his kidney condition and the plan of management using  service Patient tolerated HD sessions well. Plan for next HD today, 1/19. No biopsy was done as there was a poor USG window for biopsy as per IR. Patient needs Strict I/O and avoid nephrotoxins as much as much as possible. Dose the medications as dialysis.  Rest of the management as per the primary team. DESIREE with proteinuria. R/o GN. No known baseline renal fx available. Recent admission at [place] [facility] (Froy 3), received contrast (Froy 3) for cardiac work up. Urine positive for 3.6 g protein and RBC 12 / HPF. S/p placement of temp HD cath to initiate dialysis on 1/6/24.  Pt  transferred to [facility] for placement of tunneled HD catheter and work up for DESIREE.  Serology at [facility] was positive for DAVID 1:160 homogenous and speckled with indeterminate ANCA +.    Kidney biopsy is warranted. Biopsy schedule as per IR. BP controlled now. Pt was explained about his kidney condition and the plan of management using  service Patient tolerated HD sessions well. Plan for next HD today, 1/19. No biopsy was done 1/18 as there was a poor USG window for biopsy as per IR - plan for CT guided biopsy. Patient needs Strict I/O and avoid nephrotoxins as much as much as possible. Dose the medications as dialysis.  Rest of the management as per the primary team.

## 2024-01-19 NOTE — DIETITIAN INITIAL EVALUATION ADULT - PROBLEM SELECTOR PLAN 6
RESOLVED-NSTEMI in the setting of demand ischemia in the setting of HTN emergency  - presented at  with N/V/D and 4 years of chest pain withe exertion, progressively worsening and no prior PCP or physician evaluation; on admission at , elevated troponin with peak to 715.  - EKG nonischemic, Cr elevated +/- demand i/s/o HTN urgency; echo with mod pulm htn, nuclear stress test unremarkable, most likely i/s/o demand ischemia   - Echo: consistent with normal LVEF 65-70%, LVH and moderate pulm HTN  -Cards recommended non-invasive ischemic eval given contrast induced nephropathy and new renal failure: Stress test on 1/9/23: LVEF 66%  -- Given normal myocardial perfusion, CV stable  - C/W statin, and BB and outpt cardiology follow-up with [facility]   - held hep for 1/13 AM given possibility for bx/HD placement

## 2024-01-20 LAB
ANION GAP SERPL CALC-SCNC: 12 MMOL/L — SIGNIFICANT CHANGE UP (ref 7–14)
BUN SERPL-MCNC: 33 MG/DL — HIGH (ref 7–23)
CALCIUM SERPL-MCNC: 7.7 MG/DL — LOW (ref 8.4–10.5)
CHLORIDE SERPL-SCNC: 97 MMOL/L — LOW (ref 98–107)
CO2 SERPL-SCNC: 24 MMOL/L — SIGNIFICANT CHANGE UP (ref 22–31)
CREAT SERPL-MCNC: 6.92 MG/DL — HIGH (ref 0.5–1.3)
EGFR: 8 ML/MIN/1.73M2 — LOW
GLUCOSE SERPL-MCNC: 89 MG/DL — SIGNIFICANT CHANGE UP (ref 70–99)
HCT VFR BLD CALC: 25.7 % — LOW (ref 39–50)
HGB BLD-MCNC: 9 G/DL — LOW (ref 13–17)
MAGNESIUM SERPL-MCNC: 2.1 MG/DL — SIGNIFICANT CHANGE UP (ref 1.6–2.6)
MCHC RBC-ENTMCNC: 28 PG — SIGNIFICANT CHANGE UP (ref 27–34)
MCHC RBC-ENTMCNC: 35 GM/DL — SIGNIFICANT CHANGE UP (ref 32–36)
MCV RBC AUTO: 80.1 FL — SIGNIFICANT CHANGE UP (ref 80–100)
NRBC # BLD: 0 /100 WBCS — SIGNIFICANT CHANGE UP (ref 0–0)
NRBC # FLD: 0 K/UL — SIGNIFICANT CHANGE UP (ref 0–0)
PHOSPHATE SERPL-MCNC: 4.5 MG/DL — SIGNIFICANT CHANGE UP (ref 2.5–4.5)
PLATELET # BLD AUTO: 297 K/UL — SIGNIFICANT CHANGE UP (ref 150–400)
POTASSIUM SERPL-MCNC: 4.4 MMOL/L — SIGNIFICANT CHANGE UP (ref 3.5–5.3)
POTASSIUM SERPL-SCNC: 4.4 MMOL/L — SIGNIFICANT CHANGE UP (ref 3.5–5.3)
RBC # BLD: 3.21 M/UL — LOW (ref 4.2–5.8)
RBC # FLD: 15.2 % — HIGH (ref 10.3–14.5)
SODIUM SERPL-SCNC: 133 MMOL/L — LOW (ref 135–145)
WBC # BLD: 8.68 K/UL — SIGNIFICANT CHANGE UP (ref 3.8–10.5)
WBC # FLD AUTO: 8.68 K/UL — SIGNIFICANT CHANGE UP (ref 3.8–10.5)

## 2024-01-20 PROCEDURE — 99231 SBSQ HOSP IP/OBS SF/LOW 25: CPT | Mod: GC

## 2024-01-20 RX ADMIN — Medication 200 MILLIGRAM(S): at 21:39

## 2024-01-20 RX ADMIN — Medication 2001 MILLIGRAM(S): at 18:07

## 2024-01-20 RX ADMIN — Medication 2001 MILLIGRAM(S): at 08:28

## 2024-01-20 RX ADMIN — ATORVASTATIN CALCIUM 40 MILLIGRAM(S): 80 TABLET, FILM COATED ORAL at 21:39

## 2024-01-20 RX ADMIN — HEPARIN SODIUM 5000 UNIT(S): 5000 INJECTION INTRAVENOUS; SUBCUTANEOUS at 14:08

## 2024-01-20 RX ADMIN — Medication 2001 MILLIGRAM(S): at 14:07

## 2024-01-20 RX ADMIN — Medication 650 MILLIGRAM(S): at 14:07

## 2024-01-20 RX ADMIN — Medication 50 MILLIGRAM(S): at 08:28

## 2024-01-20 RX ADMIN — Medication 650 MILLIGRAM(S): at 14:37

## 2024-01-20 RX ADMIN — HEPARIN SODIUM 5000 UNIT(S): 5000 INJECTION INTRAVENOUS; SUBCUTANEOUS at 06:34

## 2024-01-20 RX ADMIN — Medication 50 MILLIGRAM(S): at 01:00

## 2024-01-20 RX ADMIN — Medication 650 MILLIGRAM(S): at 01:01

## 2024-01-20 RX ADMIN — Medication 200 MILLIGRAM(S): at 06:34

## 2024-01-20 RX ADMIN — Medication 200 MILLIGRAM(S): at 14:08

## 2024-01-20 RX ADMIN — Medication 650 MILLIGRAM(S): at 06:34

## 2024-01-20 RX ADMIN — CHLORHEXIDINE GLUCONATE 1 APPLICATION(S): 213 SOLUTION TOPICAL at 14:12

## 2024-01-20 RX ADMIN — Medication 50 MILLIGRAM(S): at 18:07

## 2024-01-20 RX ADMIN — Medication 650 MILLIGRAM(S): at 21:39

## 2024-01-20 RX ADMIN — HEPARIN SODIUM 5000 UNIT(S): 5000 INJECTION INTRAVENOUS; SUBCUTANEOUS at 22:23

## 2024-01-20 NOTE — PROGRESS NOTE ADULT - PROBLEM SELECTOR PROBLEM 7
HTN (hypertension) 39 years old male with alchohol abuse with elevated ciwa with hypokalemia and elevated lfts       IMPROVE VTE Individual Risk Assessment          RISK                                                          Points  [  ] Previous VTE                                                3  [  ] Thrombophilia                                             2  [  ] Lower limb paralysis                                   2        (unable to hold up >15 seconds)    [  ] Current Cancer                                             2         (within 6 months)  [  ] Immobilization > 24 hrs                              1  [  ] ICU/CCU stay > 24 hours                             1  [  ] Age > 60                                                         1    IMPROVE VTE Score: 0

## 2024-01-20 NOTE — PROGRESS NOTE ADULT - PROBLEM SELECTOR PLAN 1
- admitted to  for renal failure on CKD stage 3 likely 2/2 dehydration and hypertensive emergency from 1/3/24,   - s/p CT w/IV dye 1/3/23 possibly contributing to rise in Cr per  nephrology notes  - 24 [place] urine collection positive for nephrotic range proteinuria; SPEP, renal serologies negative   - CT A/P w/o hydro; Started on HD 1/6 via temp HD cath  - Pt requires chronic HD, transition from temp to perm HD cath and renal bx, HD catheter placed 01/06  - Transferred to [facility] on 1/12 for HD perm and bx, IR consult order place, nephrology consulted as well to inform of transfer   - CTM electrolytes, creatinine, strict I/Os, and cath site   - s/p permcath placement  - pending renal biopsy on 1/22

## 2024-01-20 NOTE — PROGRESS NOTE ADULT - PROBLEM SELECTOR PLAN 6
RESOLVED-NSTEMI in the setting of demand ischemia in the setting of HTN emergency  - presented at  with N/V/D and 4 years of chest pain withe exertion, progressively worsening and no prior PCP or physician evaluation; on admission at , elevated troponin with peak to 715.  - EKG nonischemic, Cr elevated +/- demand i/s/o HTN urgency; echo with mod pulm htn, nuclear stress test unremarkable, most likely i/s/o demand ischemia   - Echo: consistent with normal LVEF 65-70%, LVH and moderate pulm HTN  -Cards recommended non-invasive ischemic eval given contrast induced nephropathy and new renal failure: Stress test on 1/9/23: LVEF 66%  -- Given normal myocardial perfusion, CV stable  - C/W statin, and BB and outpt cardiology follow-up with [facility]   - held hep for 1/13 AM given possibility for bx/HD placement

## 2024-01-20 NOTE — PROGRESS NOTE ADULT - PROBLEM SELECTOR PLAN 2
- New ESRD with temp HD cath placement on 1/6/24  - Record of HD: 1/6, 1/8, 1/10, 1/11, 1/12 @ [facility]  - Transfer to [facility] 1/12 for HD cath placement (perm)  - [facility]: Chikis Laird [phone]  - IR consult for HD placement and renal biopsy in the setting of nephrotic proteinuria,  - nephrology consulted, appreciate recs  - vitamin D hydroxy low, PTH elevated  - started Vit D supplementation

## 2024-01-20 NOTE — PROGRESS NOTE ADULT - ATTENDING COMMENTS
58M with no prior medical care who presents with acute renal failure, course complicated by flu A and ETEC, now resolved. Pt transferred from  to [facility] for IR renal biopsy and permacath placement.     Reports feeling well. Well appearing on exam. Abdomen nontender, soft.     # Acute renal failure  - s/p permacath placement 1/18  - renal biopsy aborted- poor windows and pt hypertensive when proned, plan to attempt again Monday  - HD per nephrology    # HTN  - continue current HTN management, BP well controlled with current regimen  - goal BP <150/90    # +syphilis screen  - treponema testing +, RPR negative  - pt with no previous known diagnosis  - may be latent infection, c/w PCN    Rest as above.  Discussed with HS.

## 2024-01-20 NOTE — PROGRESS NOTE ADULT - ASSESSMENT
57yo M with no known PMHx (not followed by PCP), but FHx (brother) w/ESRD s/p tplant who presented to [facility] with n/v/abdominal pain, admitted for renal failure and r/o ACS. Hospital course c/b flu A, sepsis 2/2 ETEC, anemia and NSTEMI evaluation s/p stress test. Now pt transferred to [facility] and pending renal bx and perm HD cath placement.

## 2024-01-20 NOTE — PROGRESS NOTE ADULT - SUBJECTIVE AND OBJECTIVE BOX
Jessica Tejada, PGY1    Patient is a 59y old  Male who presents with a chief complaint of Chronic kidney disease     (19 Jan 2024 12:03)      SUBJECTIVE / OVERNIGHT EVENTS: NAEO. Pt denies chest pain, SOB, N/V, fever/chills, or changes in bowel movements.    MEDICATIONS  (STANDING):  acetaminophen     Tablet .. 650 milliGRAM(s) Oral every 6 hours  atorvastatin 40 milliGRAM(s) Oral at bedtime  calcium acetate 2001 milliGRAM(s) Oral three times a day with meals  chlorhexidine 2% Cloths 1 Application(s) Topical daily  chlorhexidine 4% Liquid 1 Application(s) Topical <User Schedule>  ergocalciferol 56283 Unit(s) Oral every week  heparin   Injectable 5000 Unit(s) SubCutaneous every 8 hours  hydrALAZINE 50 milliGRAM(s) Oral every 8 hours  influenza   Vaccine 0.5 milliLiter(s) IntraMuscular once  labetalol 200 milliGRAM(s) Oral every 8 hours  penicillin   G benzathine Injectable 2.4 Million Unit(s) IntraMuscular every 7 days    MEDICATIONS  (PRN):  aluminum hydroxide/magnesium hydroxide/simethicone Suspension 30 milliLiter(s) Oral every 4 hours PRN Dyspepsia  melatonin 3 milliGRAM(s) Oral at bedtime PRN Insomnia  sodium chloride 0.9% lock flush 10 milliLiter(s) IV Push every 1 hour PRN Pre/post blood products, medications, blood draw, and to maintain line patency      CAPILLARY BLOOD GLUCOSE        I&O's Summary    18 Jan 2024 07:01  -  19 Jan 2024 07:00  --------------------------------------------------------  IN: 0 mL / OUT: 850 mL / NET: -850 mL    19 Jan 2024 07:01  -  20 Jan 2024 06:20  --------------------------------------------------------  IN: 400 mL / OUT: 1275 mL / NET: -875 mL        Vital Signs Last 24 Hrs  T(C): 36.9 (20 Jan 2024 05:51), Max: 36.9 (19 Jan 2024 10:45)  T(F): 98.4 (20 Jan 2024 05:51), Max: 98.5 (19 Jan 2024 10:45)  HR: 78 (20 Jan 2024 05:51) (78 - 90)  BP: 128/71 (20 Jan 2024 05:51) (124/67 - 143/81)  BP(mean): --  RR: 18 (20 Jan 2024 05:51) (17 - 18)  SpO2: 99% (20 Jan 2024 05:51) (95% - 100%)    Parameters below as of 20 Jan 2024 05:51  Patient On (Oxygen Delivery Method): room air        PHYSICAL EXAM:  GENERAL: NAD, well-developed, well-nourished  HEAD: Atraumatic, Normocephalic  EYES: Conjunctiva and sclera clear  CHEST/LUNG: Clear to auscultation bilaterally; No wheezes or crackles  HEART: Normal S1/S2; Regular rate and rhythm; No murmurs, rubs, or gallops  ABDOMEN: Soft, Nontender, Nondistended; Bowel sounds present  EXTREMITIES: No clubbing, cyanosis, or edema  PSYCH: A&Ox3      LABS:                        9.5    8.71  )-----------( 332      ( 19 Jan 2024 05:45 )             26.9      01-19    131<L>  |  96<L>  |  58<H>  ----------------------------<  134<H>  5.7<H>   |  19<L>  |  9.65<H>    Ca    8.0<L>      19 Jan 2024 05:45  Phos  3.3     01-19  Mg     2.40     01-19            Urinalysis Basic - ( 19 Jan 2024 05:45 )    Color: x / Appearance: x / SG: x / pH: x  Gluc: 134 mg/dL / Ketone: x  / Bili: x / Urobili: x   Blood: x / Protein: x / Nitrite: x   Leuk Esterase: x / RBC: x / WBC x   Sq Epi: x / Non Sq Epi: x / Bacteria: x        RADIOLOGY & ADDITIONAL TESTS:     Jessica Tejada, PGY1    Patient is a 59y old  Male who presents with a chief complaint of Chronic kidney disease     (19 Jan 2024 12:03)      SUBJECTIVE / OVERNIGHT EVENTS: NAEO. Pt denies chest pain, SOB, N/V, fever/chills, or changes in bowel movements.    MEDICATIONS  (STANDING):  acetaminophen     Tablet .. 650 milliGRAM(s) Oral every 6 hours  atorvastatin 40 milliGRAM(s) Oral at bedtime  calcium acetate 2001 milliGRAM(s) Oral three times a day with meals  chlorhexidine 2% Cloths 1 Application(s) Topical daily  chlorhexidine 4% Liquid 1 Application(s) Topical <User Schedule>  ergocalciferol 22139 Unit(s) Oral every week  heparin   Injectable 5000 Unit(s) SubCutaneous every 8 hours  hydrALAZINE 50 milliGRAM(s) Oral every 8 hours  influenza   Vaccine 0.5 milliLiter(s) IntraMuscular once  labetalol 200 milliGRAM(s) Oral every 8 hours  penicillin   G benzathine Injectable 2.4 Million Unit(s) IntraMuscular every 7 days    MEDICATIONS  (PRN):  aluminum hydroxide/magnesium hydroxide/simethicone Suspension 30 milliLiter(s) Oral every 4 hours PRN Dyspepsia  melatonin 3 milliGRAM(s) Oral at bedtime PRN Insomnia  sodium chloride 0.9% lock flush 10 milliLiter(s) IV Push every 1 hour PRN Pre/post blood products, medications, blood draw, and to maintain line patency      CAPILLARY BLOOD GLUCOSE        I&O's Summary    18 Jan 2024 07:01  -  19 Jan 2024 07:00  --------------------------------------------------------  IN: 0 mL / OUT: 850 mL / NET: -850 mL    19 Jan 2024 07:01  -  20 Jan 2024 06:20  --------------------------------------------------------  IN: 400 mL / OUT: 1275 mL / NET: -875 mL        Vital Signs Last 24 Hrs  T(C): 36.9 (20 Jan 2024 05:51), Max: 36.9 (19 Jan 2024 10:45)  T(F): 98.4 (20 Jan 2024 05:51), Max: 98.5 (19 Jan 2024 10:45)  HR: 78 (20 Jan 2024 05:51) (78 - 90)  BP: 128/71 (20 Jan 2024 05:51) (124/67 - 143/81)  BP(mean): --  RR: 18 (20 Jan 2024 05:51) (17 - 18)  SpO2: 99% (20 Jan 2024 05:51) (95% - 100%)    Parameters below as of 20 Jan 2024 05:51  Patient On (Oxygen Delivery Method): room air        PHYSICAL EXAM:  GENERAL: NAD, well-developed, well-nourished  HEAD: Atraumatic, Normocephalic  EYES: Conjunctiva and sclera clear  CHEST/LUNG: Clear to auscultation bilaterally; No wheezes or crackles  HEART: Normal S1/S2; Regular rate and rhythm; No murmurs, rubs, or gallops  ABDOMEN: Soft, Nontender, Nondistended; Bowel sounds present  EXTREMITIES: No clubbing, cyanosis, or edema; RIJ catheter taken out, no signs of infection at site, permacath placed  PSYCH: A&Ox3    LABS:                        9.5    8.71  )-----------( 332      ( 19 Jan 2024 05:45 )             26.9      01-19    131<L>  |  96<L>  |  58<H>  ----------------------------<  134<H>  5.7<H>   |  19<L>  |  9.65<H>    Ca    8.0<L>      19 Jan 2024 05:45  Phos  3.3     01-19  Mg     2.40     01-19            Urinalysis Basic - ( 19 Jan 2024 05:45 )    Color: x / Appearance: x / SG: x / pH: x  Gluc: 134 mg/dL / Ketone: x  / Bili: x / Urobili: x   Blood: x / Protein: x / Nitrite: x   Leuk Esterase: x / RBC: x / WBC x   Sq Epi: x / Non Sq Epi: x / Bacteria: x        RADIOLOGY & ADDITIONAL TESTS:     Jessica Tejada, PGY1    Patient is a 59y old  Male who presents with a chief complaint of Chronic kidney disease     (19 Jan 2024 12:03)      SUBJECTIVE / OVERNIGHT EVENTS: NAEO. Says he is feeling well. Denies pain.    MEDICATIONS  (STANDING):  acetaminophen     Tablet .. 650 milliGRAM(s) Oral every 6 hours  atorvastatin 40 milliGRAM(s) Oral at bedtime  calcium acetate 2001 milliGRAM(s) Oral three times a day with meals  chlorhexidine 2% Cloths 1 Application(s) Topical daily  chlorhexidine 4% Liquid 1 Application(s) Topical <User Schedule>  ergocalciferol 74948 Unit(s) Oral every week  heparin   Injectable 5000 Unit(s) SubCutaneous every 8 hours  hydrALAZINE 50 milliGRAM(s) Oral every 8 hours  influenza   Vaccine 0.5 milliLiter(s) IntraMuscular once  labetalol 200 milliGRAM(s) Oral every 8 hours  penicillin   G benzathine Injectable 2.4 Million Unit(s) IntraMuscular every 7 days    MEDICATIONS  (PRN):  aluminum hydroxide/magnesium hydroxide/simethicone Suspension 30 milliLiter(s) Oral every 4 hours PRN Dyspepsia  melatonin 3 milliGRAM(s) Oral at bedtime PRN Insomnia  sodium chloride 0.9% lock flush 10 milliLiter(s) IV Push every 1 hour PRN Pre/post blood products, medications, blood draw, and to maintain line patency      CAPILLARY BLOOD GLUCOSE        I&O's Summary    18 Jan 2024 07:01  -  19 Jan 2024 07:00  --------------------------------------------------------  IN: 0 mL / OUT: 850 mL / NET: -850 mL    19 Jan 2024 07:01  -  20 Jan 2024 06:20  --------------------------------------------------------  IN: 400 mL / OUT: 1275 mL / NET: -875 mL        Vital Signs Last 24 Hrs  T(C): 36.9 (20 Jan 2024 05:51), Max: 36.9 (19 Jan 2024 10:45)  T(F): 98.4 (20 Jan 2024 05:51), Max: 98.5 (19 Jan 2024 10:45)  HR: 78 (20 Jan 2024 05:51) (78 - 90)  BP: 128/71 (20 Jan 2024 05:51) (124/67 - 143/81)  BP(mean): --  RR: 18 (20 Jan 2024 05:51) (17 - 18)  SpO2: 99% (20 Jan 2024 05:51) (95% - 100%)    Parameters below as of 20 Jan 2024 05:51  Patient On (Oxygen Delivery Method): room air        PHYSICAL EXAM:  GENERAL: NAD, well-developed, well-nourished  HEAD: Atraumatic, Normocephalic  EYES: Conjunctiva and sclera clear  CHEST/LUNG: Clear to auscultation bilaterally; No wheezes or crackles  HEART: Normal S1/S2; Regular rate and rhythm; No murmurs, rubs, or gallops  ABDOMEN: Soft, Nontender, Nondistended; Bowel sounds present  EXTREMITIES: No clubbing, cyanosis, or edema; RIJ catheter taken out, no signs of infection at site, permacath placed  PSYCH: A&Ox3    LABS:                        9.5    8.71  )-----------( 332      ( 19 Jan 2024 05:45 )             26.9      01-19    131<L>  |  96<L>  |  58<H>  ----------------------------<  134<H>  5.7<H>   |  19<L>  |  9.65<H>    Ca    8.0<L>      19 Jan 2024 05:45  Phos  3.3     01-19  Mg     2.40     01-19            Urinalysis Basic - ( 19 Jan 2024 05:45 )    Color: x / Appearance: x / SG: x / pH: x  Gluc: 134 mg/dL / Ketone: x  / Bili: x / Urobili: x   Blood: x / Protein: x / Nitrite: x   Leuk Esterase: x / RBC: x / WBC x   Sq Epi: x / Non Sq Epi: x / Bacteria: x        RADIOLOGY & ADDITIONAL TESTS:

## 2024-01-20 NOTE — PROGRESS NOTE ADULT - PROBLEM SELECTOR PLAN 9
Patient would like full code status for now after explaining options. He is also considering whether he wants to pursue permanent HD.

## 2024-01-21 ENCOUNTER — TRANSCRIPTION ENCOUNTER (OUTPATIENT)
Age: 59
End: 2024-01-21

## 2024-01-21 DIAGNOSIS — A53.0 LATENT SYPHILIS, UNSPECIFIED AS EARLY OR LATE: ICD-10-CM

## 2024-01-21 LAB
ANION GAP SERPL CALC-SCNC: 14 MMOL/L — SIGNIFICANT CHANGE UP (ref 7–14)
BUN SERPL-MCNC: 51 MG/DL — HIGH (ref 7–23)
CALCIUM SERPL-MCNC: 8.1 MG/DL — LOW (ref 8.4–10.5)
CHLORIDE SERPL-SCNC: 97 MMOL/L — LOW (ref 98–107)
CO2 SERPL-SCNC: 23 MMOL/L — SIGNIFICANT CHANGE UP (ref 22–31)
CREAT SERPL-MCNC: 9 MG/DL — HIGH (ref 0.5–1.3)
EGFR: 6 ML/MIN/1.73M2 — LOW
GLUCOSE SERPL-MCNC: 89 MG/DL — SIGNIFICANT CHANGE UP (ref 70–99)
HCT VFR BLD CALC: 26 % — LOW (ref 39–50)
HGB BLD-MCNC: 9 G/DL — LOW (ref 13–17)
MAGNESIUM SERPL-MCNC: 2.2 MG/DL — SIGNIFICANT CHANGE UP (ref 1.6–2.6)
MCHC RBC-ENTMCNC: 27.9 PG — SIGNIFICANT CHANGE UP (ref 27–34)
MCHC RBC-ENTMCNC: 34.6 GM/DL — SIGNIFICANT CHANGE UP (ref 32–36)
MCV RBC AUTO: 80.5 FL — SIGNIFICANT CHANGE UP (ref 80–100)
NRBC # BLD: 0 /100 WBCS — SIGNIFICANT CHANGE UP (ref 0–0)
NRBC # FLD: 0 K/UL — SIGNIFICANT CHANGE UP (ref 0–0)
PHOSPHATE SERPL-MCNC: 4.7 MG/DL — HIGH (ref 2.5–4.5)
PLATELET # BLD AUTO: 278 K/UL — SIGNIFICANT CHANGE UP (ref 150–400)
POTASSIUM SERPL-MCNC: 4.4 MMOL/L — SIGNIFICANT CHANGE UP (ref 3.5–5.3)
POTASSIUM SERPL-SCNC: 4.4 MMOL/L — SIGNIFICANT CHANGE UP (ref 3.5–5.3)
RBC # BLD: 3.23 M/UL — LOW (ref 4.2–5.8)
RBC # FLD: 15.1 % — HIGH (ref 10.3–14.5)
SODIUM SERPL-SCNC: 134 MMOL/L — LOW (ref 135–145)
WBC # BLD: 6.19 K/UL — SIGNIFICANT CHANGE UP (ref 3.8–10.5)
WBC # FLD AUTO: 6.19 K/UL — SIGNIFICANT CHANGE UP (ref 3.8–10.5)

## 2024-01-21 PROCEDURE — 99231 SBSQ HOSP IP/OBS SF/LOW 25: CPT | Mod: GC

## 2024-01-21 RX ADMIN — HEPARIN SODIUM 5000 UNIT(S): 5000 INJECTION INTRAVENOUS; SUBCUTANEOUS at 13:42

## 2024-01-21 RX ADMIN — Medication 2001 MILLIGRAM(S): at 17:40

## 2024-01-21 RX ADMIN — Medication 200 MILLIGRAM(S): at 22:21

## 2024-01-21 RX ADMIN — Medication 650 MILLIGRAM(S): at 02:42

## 2024-01-21 RX ADMIN — ATORVASTATIN CALCIUM 40 MILLIGRAM(S): 80 TABLET, FILM COATED ORAL at 22:21

## 2024-01-21 RX ADMIN — Medication 650 MILLIGRAM(S): at 08:45

## 2024-01-21 RX ADMIN — Medication 50 MILLIGRAM(S): at 17:40

## 2024-01-21 RX ADMIN — Medication 200 MILLIGRAM(S): at 13:43

## 2024-01-21 RX ADMIN — Medication 50 MILLIGRAM(S): at 01:18

## 2024-01-21 RX ADMIN — Medication 2001 MILLIGRAM(S): at 08:45

## 2024-01-21 RX ADMIN — ERGOCALCIFEROL 50000 UNIT(S): 1.25 CAPSULE ORAL at 13:42

## 2024-01-21 RX ADMIN — HEPARIN SODIUM 5000 UNIT(S): 5000 INJECTION INTRAVENOUS; SUBCUTANEOUS at 05:47

## 2024-01-21 RX ADMIN — Medication 200 MILLIGRAM(S): at 05:47

## 2024-01-21 RX ADMIN — Medication 50 MILLIGRAM(S): at 08:46

## 2024-01-21 RX ADMIN — CHLORHEXIDINE GLUCONATE 1 APPLICATION(S): 213 SOLUTION TOPICAL at 13:42

## 2024-01-21 RX ADMIN — Medication 2001 MILLIGRAM(S): at 13:42

## 2024-01-21 NOTE — PROGRESS NOTE ADULT - PROBLEM SELECTOR PLAN 9
Patient would like full code status for now after explaining options. He is also considering whether he wants to pursue permanent HD. DVT PPx: SQ; will hold prior to IR procedure  Diet: Renal  Code: Full  Dispo: PT/OT Pending Hospital Course

## 2024-01-21 NOTE — DISCHARGE NOTE PROVIDER - CARE PROVIDER_API CALL
Your PCP,   Phone: [phone]  Fax: (   )    -  Follow Up Time: 2 weeks   Romulo Hallman  Vascular Surgery  [address] [place] [address]  Phone: [phone]  Fax: [phone]  Scheduled Appointment: 01/30/2024

## 2024-01-21 NOTE — PROGRESS NOTE ADULT - PROBLEM SELECTOR PLAN 7
- pt denies any previous hx of HTN, however, per chart review - pt has hx of taking hypertensive med  - initially admitted to  for hypertensive emergency and renal failure, s/p hydralazine and cardio eval   -- @  pt continued on 200 mg Labetalol q8 hrs for hypertension   - labetalol 200q8 and hydralazine 50q8 RESOLVED-NSTEMI in the setting of demand ischemia in the setting of HTN emergency  - presented at  with N/V/D and 4 years of chest pain withe exertion, progressively worsening and no prior PCP or physician evaluation; on admission at , elevated troponin with peak to 715.  - EKG nonischemic, Cr elevated +/- demand i/s/o HTN urgency; echo with mod pulm htn, nuclear stress test unremarkable, most likely i/s/o demand ischemia   - Echo: consistent with normal LVEF 65-70%, LVH and moderate pulm HTN  -Cards recommended non-invasive ischemic eval given contrast induced nephropathy and new renal failure: Stress test on 1/9/23: LVEF 66%  -- Given normal myocardial perfusion, CV stable  - C/W statin, and BB and outpt cardiology follow-up with [facility]   - held hep for 1/13 AM given possibility for bx/HD placement

## 2024-01-21 NOTE — PROGRESS NOTE ADULT - PROBLEM SELECTOR PLAN 6
RESOLVED-NSTEMI in the setting of demand ischemia in the setting of HTN emergency  - presented at  with N/V/D and 4 years of chest pain withe exertion, progressively worsening and no prior PCP or physician evaluation; on admission at , elevated troponin with peak to 715.  - EKG nonischemic, Cr elevated +/- demand i/s/o HTN urgency; echo with mod pulm htn, nuclear stress test unremarkable, most likely i/s/o demand ischemia   - Echo: consistent with normal LVEF 65-70%, LVH and moderate pulm HTN  -Cards recommended non-invasive ischemic eval given contrast induced nephropathy and new renal failure: Stress test on 1/9/23: LVEF 66%  -- Given normal myocardial perfusion, CV stable  - C/W statin, and BB and outpt cardiology follow-up with [facility]   - held hep for 1/13 AM given possibility for bx/HD placement - managed conservatively  - on RA, c/w oral hydration

## 2024-01-21 NOTE — PROGRESS NOTE ADULT - PROBLEM SELECTOR PLAN 5
- managed conservatively  - on RA, c/w oral hydration RESOLVED  - p/w abd pain/n/v, CT abd/pelv c/f distended prox colonic loops consistent with colitis, no bowel obstruction.  - pt found to be +ETEC stool positive, s/p ceftriaxone and metronidazole course   - 1/12: afebrile, euvolemic, no diarrhea, no abd pain/n/v

## 2024-01-21 NOTE — PROGRESS NOTE ADULT - SUBJECTIVE AND OBJECTIVE BOX
Jessicareyes Tejada, PGY1    Patient is a 59y old  Male who presents with a chief complaint of ESRD on HD transferred for placement of permanent HD catheter and renal biopsy (20 Jan 2024 06:19)      SUBJECTIVE / OVERNIGHT EVENTS: NAEO. Pt denies chest pain, SOB, N/V, fever/chills, or changes in bowel movements.    MEDICATIONS  (STANDING):  acetaminophen     Tablet .. 650 milliGRAM(s) Oral every 6 hours  atorvastatin 40 milliGRAM(s) Oral at bedtime  calcium acetate 2001 milliGRAM(s) Oral three times a day with meals  chlorhexidine 2% Cloths 1 Application(s) Topical daily  chlorhexidine 4% Liquid 1 Application(s) Topical <User Schedule>  ergocalciferol 52777 Unit(s) Oral every week  heparin   Injectable 5000 Unit(s) SubCutaneous every 8 hours  hydrALAZINE 50 milliGRAM(s) Oral every 8 hours  influenza   Vaccine 0.5 milliLiter(s) IntraMuscular once  labetalol 200 milliGRAM(s) Oral every 8 hours  penicillin   G benzathine Injectable 2.4 Million Unit(s) IntraMuscular every 7 days    MEDICATIONS  (PRN):  aluminum hydroxide/magnesium hydroxide/simethicone Suspension 30 milliLiter(s) Oral every 4 hours PRN Dyspepsia  melatonin 3 milliGRAM(s) Oral at bedtime PRN Insomnia  sodium chloride 0.9% lock flush 10 milliLiter(s) IV Push every 1 hour PRN Pre/post blood products, medications, blood draw, and to maintain line patency      CAPILLARY BLOOD GLUCOSE        I&O's Summary    20 Jan 2024 07:01  -  21 Jan 2024 07:00  --------------------------------------------------------  IN: 1500 mL / OUT: 350 mL / NET: 1150 mL        Vital Signs Last 24 Hrs  T(C): 36.8 (21 Jan 2024 05:20), Max: 36.8 (21 Jan 2024 05:20)  T(F): 98.3 (21 Jan 2024 05:20), Max: 98.3 (21 Jan 2024 05:20)  HR: 71 (21 Jan 2024 05:20) (55 - 92)  BP: 127/70 (21 Jan 2024 05:20) (124/63 - 147/81)  BP(mean): --  RR: 17 (21 Jan 2024 05:20) (17 - 19)  SpO2: 97% (21 Jan 2024 05:20) (97% - 100%)    Parameters below as of 21 Jan 2024 05:20  Patient On (Oxygen Delivery Method): room air        PHYSICAL EXAM:  GENERAL: NAD, well-developed, well-nourished  HEAD: Atraumatic, Normocephalic  EYES: Conjunctiva and sclera clear  CHEST/LUNG: Clear to auscultation bilaterally; No wheezes or crackles  HEART: Normal S1/S2; Regular rate and rhythm; No murmurs, rubs, or gallops  ABDOMEN: Soft, Nontender, Nondistended; Bowel sounds present  EXTREMITIES: No clubbing, cyanosis, or edema  PSYCH: A&Ox3      LABS:                        9.0    8.68  )-----------( 297      ( 20 Jan 2024 06:50 )             25.7      01-20    133<L>  |  97<L>  |  33<H>  ----------------------------<  89  4.4   |  24  |  6.92<H>    Ca    7.7<L>      20 Jan 2024 06:50  Phos  4.5     01-20  Mg     2.10     01-20            Urinalysis Basic - ( 20 Jan 2024 06:50 )    Color: x / Appearance: x / SG: x / pH: x  Gluc: 89 mg/dL / Ketone: x  / Bili: x / Urobili: x   Blood: x / Protein: x / Nitrite: x   Leuk Esterase: x / RBC: x / WBC x   Sq Epi: x / Non Sq Epi: x / Bacteria: x        RADIOLOGY & ADDITIONAL TESTS:     Jessicareyes Tejada, PGY1    Patient is a 59y old  Male who presents with a chief complaint of ESRD on HD transferred for placement of permanent HD catheter and renal biopsy (20 Jan 2024 06:19)      SUBJECTIVE / OVERNIGHT EVENTS: NAEO. Pt denies chest pain, SOB, N/V, fever/chills, or changes in bowel movements.    MEDICATIONS  (STANDING):  acetaminophen     Tablet .. 650 milliGRAM(s) Oral every 6 hours  atorvastatin 40 milliGRAM(s) Oral at bedtime  calcium acetate 2001 milliGRAM(s) Oral three times a day with meals  chlorhexidine 2% Cloths 1 Application(s) Topical daily  chlorhexidine 4% Liquid 1 Application(s) Topical <User Schedule>  ergocalciferol 43945 Unit(s) Oral every week  heparin   Injectable 5000 Unit(s) SubCutaneous every 8 hours  hydrALAZINE 50 milliGRAM(s) Oral every 8 hours  influenza   Vaccine 0.5 milliLiter(s) IntraMuscular once  labetalol 200 milliGRAM(s) Oral every 8 hours  penicillin   G benzathine Injectable 2.4 Million Unit(s) IntraMuscular every 7 days    MEDICATIONS  (PRN):  aluminum hydroxide/magnesium hydroxide/simethicone Suspension 30 milliLiter(s) Oral every 4 hours PRN Dyspepsia  melatonin 3 milliGRAM(s) Oral at bedtime PRN Insomnia  sodium chloride 0.9% lock flush 10 milliLiter(s) IV Push every 1 hour PRN Pre/post blood products, medications, blood draw, and to maintain line patency      CAPILLARY BLOOD GLUCOSE        I&O's Summary    20 Jan 2024 07:01  -  21 Jan 2024 07:00  --------------------------------------------------------  IN: 1500 mL / OUT: 350 mL / NET: 1150 mL        Vital Signs Last 24 Hrs  T(C): 36.8 (21 Jan 2024 05:20), Max: 36.8 (21 Jan 2024 05:20)  T(F): 98.3 (21 Jan 2024 05:20), Max: 98.3 (21 Jan 2024 05:20)  HR: 71 (21 Jan 2024 05:20) (55 - 92)  BP: 127/70 (21 Jan 2024 05:20) (124/63 - 147/81)  BP(mean): --  RR: 17 (21 Jan 2024 05:20) (17 - 19)  SpO2: 97% (21 Jan 2024 05:20) (97% - 100%)    Parameters below as of 21 Jan 2024 05:20  Patient On (Oxygen Delivery Method): room air        PHYSICAL EXAM:  GENERAL: NAD, well-developed, well-nourished  HEAD: Atraumatic, Normocephalic  EYES: Conjunctiva and sclera clear  CHEST/LUNG: Clear to auscultation bilaterally; No wheezes or crackles  HEART: Normal S1/S2; Regular rate and rhythm; No murmurs, rubs, or gallops  ABDOMEN: Soft, Nontender, Nondistended; Bowel sounds present  EXTREMITIES: No clubbing, cyanosis, or edema; no signs of infection at site permacath site  PSYCH: A&Ox3        LABS:                        9.0    8.68  )-----------( 297      ( 20 Jan 2024 06:50 )             25.7      01-20    133<L>  |  97<L>  |  33<H>  ----------------------------<  89  4.4   |  24  |  6.92<H>    Ca    7.7<L>      20 Jan 2024 06:50  Phos  4.5     01-20  Mg     2.10     01-20            Urinalysis Basic - ( 20 Jan 2024 06:50 )    Color: x / Appearance: x / SG: x / pH: x  Gluc: 89 mg/dL / Ketone: x  / Bili: x / Urobili: x   Blood: x / Protein: x / Nitrite: x   Leuk Esterase: x / RBC: x / WBC x   Sq Epi: x / Non Sq Epi: x / Bacteria: x        RADIOLOGY & ADDITIONAL TESTS:

## 2024-01-21 NOTE — DISCHARGE NOTE PROVIDER - NSDCFUSCHEDAPPT_GEN_ALL_CORE_FT
Romulo Hallman  [facility] Physician [facility]  VASCULAR  74t  Scheduled Appointment: 01/30/2024    [facility] Physician [facility]  [facility]  [facility] [place]  Scheduled Appointment: 02/08/2024     [facility] Physician [facility]  [facility]  [facility]  Scheduled Appointment: 02/08/2024

## 2024-01-21 NOTE — PROGRESS NOTE ADULT - ATTENDING COMMENTS
58M with no prior medical care who presents with acute renal failure, course complicated by flu A and ETEC, now resolved. Pt transferred from  to [facility] for IR renal biopsy and permacath placement.     Reports feeling well. Well appearing on exam. Abdomen nontender, soft.     # Acute renal failure  - s/p permacath placement 1/18  - renal biopsy aborted- poor windows and pt hypertensive when proned, plan to attempt again Monday  - HD per nephrology    # HTN  - continue current HTN management, BP well controlled with current regimen  - goal BP <150/90    # +syphilis screen  - treponema testing +, RPR negative  - pt with no previous known diagnosis  - may be latent infection, c/w PCN    Rest as above.  Discussed with HS.

## 2024-01-21 NOTE — DISCHARGE NOTE PROVIDER - NSDCCPTREATMENT_GEN_ALL_CORE_FT
PRINCIPAL PROCEDURE  Procedure: Biopsy renal  Findings and Treatment: You had a procedure done where a sample of your kidney tissue was taken. Follow up with the nephrology clinic within 2 weeks for results.

## 2024-01-21 NOTE — PROGRESS NOTE ADULT - PROBLEM SELECTOR PLAN 8
DVT PPx: SQ; will hold prior to IR procedure  Diet: Renal  Code: Full  Dispo: PT/OT Pending Hospital Course - pt denies any previous hx of HTN, however, per chart review - pt has hx of taking hypertensive med  - initially admitted to  for hypertensive emergency and renal failure, s/p hydralazine and cardio eval   -- @  pt continued on 200 mg Labetalol q8 hrs for hypertension   - c/w labetalol 200q8 and hydralazine 50q8 with goal of <150/90 prior to renal biopsy

## 2024-01-21 NOTE — DISCHARGE NOTE PROVIDER - NSDCFUADDAPPT_GEN_ALL_CORE_FT
APPTS ARE READY TO BE MADE: [ x] YES    Best Family or Patient Contact (if needed):    Additional Information about above appointments (if needed):    1: Vascular Surgery   2: Nephrology  3:     Other comments or requests:    APPTS ARE READY TO BE MADE: [ x] YES    Best Family or Patient Contact (if needed):    Additional Information about above appointments (if needed):    1: Vascular Surgery   2: Nephrology  3: [facility]    Other comments or requests:    APPTS ARE READY TO BE MADE: [ x] YES    Best Family or Patient Contact (if needed):    Additional Information about above appointments (if needed):    1: Vascular Surgery   2: Nephrology  3: [facility]    Other comments or requests:     You have been scheduled to follow up with the [facility] at [place]. They accept patients without insurance and can provide you care. You have an appointment scheduled on 2/8/24 at 10:30AM. If you need to reschedule, please contact the clinic at [phone].    You have been scheduled with Dr. Romulo Hallman for further vascular surgery evaluation. You have an appointment set at 1/30/24. Please call the office at [phone] if you need to reschedule or have additional questions.   APPTS ARE READY TO BE MADE: [ x] YES    Best Family or Patient Contact (if needed):    Additional Information about above appointments (if needed):    1: Vascular Surgery   2: Nephrology  3: [facility]    Other comments or requests:     You have been scheduled to follow up with the [facility] at [facility]. They accept patients without insurance and can provide you care. You have an appointment scheduled on 2/8/24 at 10:30AM. If you need to reschedule, please contact the clinic at [phone].    You have been scheduled with Dr. Romulo Hallman for further vascular surgery evaluation. You have an appointment set at 1/30/24. Please call the office at [phone] if you need to reschedule or have additional questions.  Prior appt with Dr Lentz on 02/08.

## 2024-01-21 NOTE — PROGRESS NOTE ADULT - ASSESSMENT
57yo M with no known PMHx (not followed by PCP), but FHx (brother) w/ESRD s/p tplant who presented to [facility] with n/v/abdominal pain, admitted for renal failure and r/o ACS. Hospital course c/b flu A, sepsis 2/2 ETEC, anemia and NSTEMI evaluation s/p stress test. Now pt transferred to [facility] and pending renal bx and perm HD cath placement.

## 2024-01-21 NOTE — PROGRESS NOTE ADULT - PROBLEM SELECTOR PLAN 4
RESOLVED  - p/w abd pain/n/v, CT abd/pelv c/f distended prox colonic loops consistent with colitis, no bowel obstruction.  - pt found to be +ETEC stool positive, s/p ceftriaxone and metronidazole course   - 1/12: afebrile, euvolemic, no diarrhea, no abd pain/n/v - At [facility] pt with anemia, hgb 6.5, s/p 1u pRBC i/s/o renal failure  - hx of blood transfusion at  during admission: 1/4/24 and 1/8/24   - hgb stable at 9-10

## 2024-01-21 NOTE — DISCHARGE NOTE PROVIDER - HOSPITAL COURSE
56 y/o male w/ no significant PMH transferred from [place] after being found to have acute renal failure for permacath placement and renal biopsy. Pt had permacath placed on 1/18 and IR renal biopsy completed ___________. Pt's blood pressure was controlled with 200mg labetalol TID and hydralazine 50mg TID. Pt was found to have positive syphilis antibodies. Pt was treated as latent syphilis with penicillin G once a week for three weeks. HPI:  Pt is a 59M with CKD3 and presumed chronic HTN (no PCP/questionable previous anti-HTN use) who presented to [place] on 1/3/24 with abdominal pain with assoc n/v/d and chest pain with exertion over 4 years with no physician follow-up. The patient was found to have Cr 17+, with no known baseline cr and HTN urgency with SBP +200 requiring hydralazine and admitted for renal failure and r/o ACS. At [facility], the pt had CT with IV dye on 1/3/24 with concerns for contrast induced nephropathy with subsequent significant proteinuria, ACD requiring 1u pRBC, and placement of temp HD cath to initiate dialysis w/EPO on 1/6/24. Hospital course c/b ETEC sepsis treated with abx/IVF, influenza A managed conservatively, and elevated troponin with peak to 715. Cardiology recommended pursuing non-invasive cardiac workup iso contrast induced nephropathy and had an echo c/f moderate pulm HTN and subsequent nuclear stress test with LVEF 65% and no c/f ischemic injury to myocardial tissue.    The patient's cardiology workup was completed and cleared at  and nephrology recommended renal biopsy and perm HD catheter placement with transfer to [facility] for higher level of care today, 1/12.    On arrival, the patient is doing well, he is awake, alert, and responsive with no new complaints at this time. BP stable, afebrile, and hgb 10, with last HD session on 1/12. Pt's last hep subq 1/12 at ~1700, Resumed ASA 81 mg and BB (labetalol 200 mg BID) per cardio recommendations. Pt's phoslo increased from 667 TID to 2001 TID per previous order. He states he is making urine without any difficulty, no headache, no abdominal or chest pain, no worsening lower extremity edema, and no subjective fevers.  (12 Jan 2024 22:12)    Hospital Course:  During hospital course, pt had permacath placed on 1/18 and IR renal biopsy completed ___________. Pt's blood pressure was controlled with 200mg labetalol TID and hydralazine 50mg TID. Pt was found to have positive syphilis antibodies. Pt was treated as latent syphilis with penicillin G once a week for three weeks.     Important Medication Changes and Reason:    Active or Pending Issues Requiring Follow-up:    Advanced Directives:   [ ] Full code  [ ] DNR  [ ] Hospice    Discharge Diagnoses:         HPI:  Pt is a 59M with CKD3 and presumed chronic HTN (no PCP/questionable previous anti-HTN use) who presented to [facility] on 1/3/24 with abdominal pain with assoc n/v/d and chest pain with exertion over 4 years with no physician follow-up. The patient was found to have Cr 17+, with no known baseline cr and HTN urgency with SBP +200 requiring hydralazine and admitted for renal failure and r/o ACS. At [facility], the pt had CT with IV dye on 1/3/24 with concerns for contrast induced nephropathy with subsequent significant proteinuria, ACD requiring 1u pRBC, and placement of temp HD cath to initiate dialysis w/EPO on 1/6/24. Hospital course c/b ETEC sepsis treated with abx/IVF, influenza A managed conservatively, and elevated troponin with peak to 715. Cardiology recommended pursuing non-invasive cardiac workup iso contrast induced nephropathy and had an echo c/f moderate pulm HTN and subsequent nuclear stress test with LVEF 65% and no c/f ischemic injury to myocardial tissue.    The patient's cardiology workup was completed and cleared at  and nephrology recommended renal biopsy and perm HD catheter placement with transfer to [facility] for higher level of care today, 1/12.    On arrival, the patient is doing well, he is awake, alert, and responsive with no new complaints at this time. BP stable, afebrile, and hgb 10, with last HD session on 1/12. Pt's last hep subq 1/12 at ~1700, Resumed ASA 81 mg and BB (labetalol 200 mg BID) per cardio recommendations. Pt's phoslo increased from 667 TID to 2001 TID per previous order. He states he is making urine without any difficulty, no headache, no abdominal or chest pain, no worsening lower extremity edema, and no subjective fevers.  (12 Jan 2024 22:12)    Hospital Course:  During hospital course, pt had permacath placed on 1/18 and IR renal biopsy completed on 1/23. Pt's blood pressure was controlled with 200mg labetalol TID and hydralazine 50mg TID and amlodipine 5mg daily. Pt was found to have positive syphilis antibodies. Pt was treated as latent syphilis with penicillin G once a week for three weeks.         Important Medication Changes and Reason:  -    Active or Pending Issues Requiring Follow-up:  -f/u with nephrology clinic in 2 weeks for biopsy results.   -outpt dialysis              HPI:  Pt is a 59M with CKD3 and presumed chronic HTN (no PCP/questionable previous anti-HTN use) who presented to [place] on 1/3/24 with abdominal pain with assoc n/v/d and chest pain with exertion over 4 years with no physician follow-up. The patient was found to have Cr 17+, with no known baseline cr and HTN urgency with SBP +200 requiring hydralazine and admitted for renal failure and r/o ACS. At [place], the pt had CT with IV dye on 1/3/24 with concerns for contrast induced nephropathy with subsequent significant proteinuria, ACD requiring 1u pRBC, and placement of temp HD cath to initiate dialysis w/EPO on 1/6/24. Hospital course c/b ETEC sepsis treated with abx/IVF, influenza A managed conservatively, and elevated troponin with peak to 715. Cardiology recommended pursuing non-invasive cardiac workup iso contrast induced nephropathy and had an echo c/f moderate pulm HTN and subsequent nuclear stress test with LVEF 65% and no c/f ischemic injury to myocardial tissue.    The patient's cardiology workup was completed and cleared at  and nephrology recommended renal biopsy and perm HD catheter placement with transfer to [facility] for higher level of care today, 1/12.    On arrival, the patient is doing well, he is awake, alert, and responsive with no new complaints at this time. BP stable, afebrile, and hgb 10, with last HD session on 1/12. Pt's last hep subq 1/12 at ~1700, Resumed ASA 81 mg and BB (labetalol 200 mg BID) per cardio recommendations. Pt's phoslo increased from 667 TID to 2001 TID per previous order. He states he is making urine without any difficulty, no headache, no abdominal or chest pain, no worsening lower extremity edema, and no subjective fevers.  (12 Jan 2024 22:12)    Hospital Course:  During hospital course, pt had permacath placed on 1/18 and IR renal biopsy completed on 1/23. Pt's blood pressure was controlled with 200mg labetalol TID and hydralazine 50mg TID and amlodipine 5mg daily. Pt was found to have positive syphilis antibodies. Pt was treated as latent syphilis with penicillin G once a week for three weeks. The patient received renal biopsy and was found to have chronic changes in his kidney. He will need ongoing dialysis and was evaluated by vascular surgery for potential AV fistula. The patient was cleared for discharge by nephrology and will follow up with vascular surgery outpatient. The patient is uninsured and will follow with the [facility] as a sliding scale patient.       Active or Pending Issues Requiring Follow-up:  - F/u with nephrology clinic in 2 weeks for biopsy results  - F/u with vascular surgery for AV fistula evaluation  - Outpatient dialysis at [facility], first session 1/26

## 2024-01-21 NOTE — DISCHARGE NOTE PROVIDER - NSFOLLOWUPCLINICSTOKEN_GEN_ALL_ED_FT
751094:1 week|| ||00\01||False;976505:1 week|| ||00\01||False; 864840:1 week|| ||00\01||False;540347:1 week|| ||00\01||False;[id number]: ||2/8/2024||10\30\00||False;

## 2024-01-21 NOTE — DISCHARGE NOTE PROVIDER - NSDCCPCAREPLAN_GEN_ALL_CORE_FT
PRINCIPAL DISCHARGE DIAGNOSIS  Diagnosis: Acute renal failure  Assessment and Plan of Treatment: You were transferred to [facility] from [facility] after being found to have failure of your kidneys. Your kidneys are the organs that filter your blood to get rid of waste in your urine. You had a more permanent catheter placed to make it easier for you to get dialysis which is a procedure done to filter your blood like your kidney does. The interventional radiologists did a procedure to take a sample of your kidney so we can better understand why you have failure of your kidneys. Please follow up with a nephrologist within 1-2 weeks and continue to go for dialysis on the days scheduled for you.   Please come back to the hospital if you miss a session of dialysis, you have increasing swelling in your legs, you have shortness of breath or chest pain, have fever/chills, nausea/vomiting.      SECONDARY DISCHARGE DIAGNOSES  Diagnosis: HTN (hypertension)  Assessment and Plan of Treatment: While you were in the hospital, you were found to have high blood pressure which was controlled with the blood pressure medications we gave you in the hospital. Please continue to take your blood pressure medications as prescribed. Please follow up with a primary care doctor within 1-2 weeks to check your blood pressure.  Please come back to the hospital if you miss a session of dialysis, you have increasing swelling in your legs, you have shortness of breath or chest pain, vision changes, worst headache of your life, have fever/chills, nausea/vomiting.      Diagnosis: Positive serological reaction for syphilis  Assessment and Plan of Treatment: Your labs showed that you most likely have an old infection of syphilis. Syphilis is a sexually transmitted infection (STI) caused by a type of bacteria. In rare cases, syphilis can be spread in ways other than sexual contact. If syphilis isn't treated in an early stage, symptoms may go away but the infection is still in the body. This is called latent syphilis. You were started on antibiotic treatment for latent syphilis with penicillin. You will need to follow up with a primary care doctor to complete your three week course of antibiotics.  Please come back to the hospital if you have new numbness or tingling, have trouble thinking clearly, have a headache, stiff neck, or any changes in your vision or hearing.     PRINCIPAL DISCHARGE DIAGNOSIS  Diagnosis: Acute renal failure  Assessment and Plan of Treatment: You were transferred to [facility] from [facility] after being found to have failure of your kidneys. Your kidneys are the organs that filter your blood to get rid of waste in your urine. You had a more permanent catheter placed to make it easier for you to get dialysis which is a procedure done to filter your blood like your kidney does. The interventional radiologists did a procedure to take a sample of your kidney so we can better understand why you have failure of your kidneys. Please follow up with a nephrologist within 1-2 weeks and continue to go for dialysis on the days scheduled for you. Your first dialysis day is scheduled for 1/26 in [place].  Please come back to the hospital if you miss a session of dialysis, you have increasing swelling in your legs, you have shortness of breath or chest pain, have fever/chills, nausea/vomiting.  Please follow up with your vascular surgeon for evaluation for an AV fistula. This procedure allows you to receive dialysis on an ongoing basis.      SECONDARY DISCHARGE DIAGNOSES  Diagnosis: HTN (hypertension)  Assessment and Plan of Treatment: While you were in the hospital, you were found to have high blood pressure which was controlled with the blood pressure medications we gave you in the hospital. Please continue to take your blood pressure medications as prescribed. Please follow up with a primary care doctor within 1-2 weeks to check your blood pressure.  Please come back to the hospital if you miss a session of dialysis, you have increasing swelling in your legs, you have shortness of breath or chest pain, vision changes, worst headache of your life, have fever/chills, nausea/vomiting.      Diagnosis: Positive serological reaction for syphilis  Assessment and Plan of Treatment: Your labs showed that you most likely have an old infection of syphilis. Syphilis is a sexually transmitted infection (STI) caused by a type of bacteria. In rare cases, syphilis can be spread in ways other than sexual contact. If syphilis isn't treated in an early stage, symptoms may go away but the infection is still in the body. This is called latent syphilis. You were started on antibiotic treatment for latent syphilis with penicillin. You will need to follow up with a primary care doctor to complete your three week course of antibiotics.  Please come back to the hospital if you have new numbness or tingling, have trouble thinking clearly, have a headache, stiff neck, or any changes in your vision or hearing.

## 2024-01-21 NOTE — DISCHARGE NOTE PROVIDER - NSDCCAREPROVSEEN_GEN_ALL_CORE_FT
J Medicine Team 1   Nephrology   Interventional Radiology  J Medicine Team 3  Nephrology   Interventional Radiology

## 2024-01-21 NOTE — DISCHARGE NOTE PROVIDER - NSDCMRMEDTOKEN_GEN_ALL_CORE_FT
acetaminophen 325 mg oral tablet: 2 tab(s) orally every 6 hours As needed Temp greater or equal to 38C (100.4F), Mild Pain (1 - 3)  aluminum hydroxide-magnesium hydroxide 200 mg-200 mg/5 mL oral suspension: 30 milliliter(s) orally every 4 hours As needed Dyspepsia  amLODIPine 5 mg oral tablet: 1 tab(s) orally once a day  aspirin 81 mg oral tablet, chewable: 1 tab(s) orally once a day  atorvastatin 40 mg oral tablet: 1 tab(s) orally once a day (at bedtime)  benzonatate 100 mg oral capsule: 1 cap(s) orally 3 times a day As needed Cough  Calphron 667 mg oral tablet: orally 3 times a day  labetalol 200 mg oral tablet: 1 tab(s) orally every 8 hours  melatonin 3 mg oral tablet: 1 tab(s) orally once a day (at bedtime) As needed Insomnia  polyethylene glycol 3350 oral powder for reconstitution: 17 gram(s) orally once a day  senna leaf extract oral tablet: 2 tab(s) orally once a day (at bedtime)   amLODIPine 5 mg oral tablet: 1 tab(s) orally once a day  aspirin 81 mg oral tablet, chewable: 1 tab(s) orally once a day  atorvastatin 40 mg oral tablet: 1 tab(s) orally once a day (at bedtime)  benzonatate 100 mg oral capsule: 1 cap(s) orally 3 times a day As needed Cough  calcium acetate 667 mg oral tablet: 3 tab(s) orally 3 times a day  epoetin stacie 4000 units/mL preservative-free injectable solution: 4,000 unit(s) intravenously Monday, Wednesday, and Friday  hydrALAZINE 50 mg oral tablet: 1 tab(s) orally every 8 hours  labetalol 200 mg oral tablet: 1 tab(s) orally every 8 hours  penicillin G benzathine: 2.4 million units, intramuscular, 1 dose    Your dose is due on 1/31/24  penicillin G benzathine 2,400,000 units/4 mL intramuscular suspension: 2.4 million unit(s) intramuscularly once TAKE ON 1/31/2024

## 2024-01-21 NOTE — PROGRESS NOTE ADULT - PROBLEM SELECTOR PLAN 3
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

## 2024-01-21 NOTE — DISCHARGE NOTE PROVIDER - NSFOLLOWUPCLINICS_GEN_ALL_ED_FT
[facility] General Internal Medicine  General Internal Medicine  [address] [place] [address]  Phone: [phone]  Fax:   Follow Up Time: 1 week    [facility]  Nephrology  [address] [place] [address], [facility] [place] [address]  Phone: [phone]  Fax:   Follow Up Time: 1 week     [facility] General Internal Medicine  General Internal Medicine  [address] [place] [id number]  Phone: [phone]  Fax:   Follow Up Time: 1 week    [facility]  Nephrology  [address] [place] [address], [address] [place] [address]  Phone: [phone]  Fax:   Follow Up Time: 1 week    [facility]  Family Medicine  [address] [place] [address]  Phone: [phone]  Fax:   Scheduled Appointment: 2/8/2024 10:30 AM

## 2024-01-21 NOTE — PROGRESS NOTE ADULT - PROBLEM SELECTOR PLAN 2
- New ESRD with temp HD cath placement on 1/6/24  - Record of HD: 1/6, 1/8, 1/10, 1/11, 1/12 @ [facility]  - Transfer to [facility] 1/12 for HD cath placement (perm)  - [facility]: Chikis Laird [phone]  - IR consult for HD placement and renal biopsy in the setting of nephrotic proteinuria,  - nephrology consulted, appreciate recs  - vitamin D hydroxy low, PTH elevated  - started Vit D supplementation

## 2024-01-21 NOTE — PROGRESS NOTE ADULT - PROBLEM SELECTOR PLAN 1
- admitted to  for renal failure on CKD stage 3 likely 2/2 dehydration and hypertensive emergency from 1/3/24,   - s/p CT w/IV dye 1/3/23 possibly contributing to rise in Cr per  nephrology notes  - 24 eden urine collection positive for nephrotic range proteinuria; SPEP, renal serologies negative   - CT A/P w/o hydro; Started on HD 1/6 via temp HD cath  - Pt requires chronic HD, transition from temp to perm HD cath and renal bx, HD catheter placed 01/06  - Transferred to [facility] on 1/12 for HD perm and bx, IR consult order place, nephrology consulted as well to inform of transfer   - CTM electrolytes, creatinine, strict I/Os, and cath site   - s/p permcath placement  - pending renal biopsy on 1/22 - admitted to  for renal failure on CKD stage 3 likely 2/2 dehydration and hypertensive emergency from 1/3/24,   - s/p CT w/IV dye 1/3/23 possibly contributing to rise in Cr per  nephrology notes  - 24 eden urine collection positive for nephrotic range proteinuria; SPEP, renal serologies negative   - CT A/P w/o hydro; Started on HD 1/6 via temp HD cath  - Pt requires chronic HD, transition from temp to perm HD cath and renal bx, HD catheter placed 01/06  - Transferred to [facility] on 1/12 for HD perm and bx, IR consult order place, nephrology consulted as well to inform of transfer   - CTM electrolytes, creatinine, strict I/Os, and cath site   - s/p permcath placement 1/18; renal biopsy was canceled at that time due to poor window and hypertension   - pending renal biopsy rescheduled for 1/22

## 2024-01-21 NOTE — DISCHARGE NOTE PROVIDER - PROVIDER TOKENS
FREE:[LAST:[Your PCP],PHONE:[phone]],FAX:[(   )    -],FOLLOWUP:[2 weeks]] PROVIDER:[TOKEN:[[id number]],SCHEDULEDAPPT:[01/30/2024]]

## 2024-01-22 LAB
ALBUMIN SERPL ELPH-MCNC: 3.1 G/DL — LOW (ref 3.3–5)
ALBUMIN SERPL ELPH-MCNC: 3.1 G/DL — LOW (ref 3.3–5)
ALP SERPL-CCNC: 103 U/L — SIGNIFICANT CHANGE UP (ref 40–120)
ALP SERPL-CCNC: 104 U/L — SIGNIFICANT CHANGE UP (ref 40–120)
ALT FLD-CCNC: 20 U/L — SIGNIFICANT CHANGE UP (ref 4–41)
ALT FLD-CCNC: 22 U/L — SIGNIFICANT CHANGE UP (ref 4–41)
ANION GAP SERPL CALC-SCNC: 15 MMOL/L — HIGH (ref 7–14)
ANION GAP SERPL CALC-SCNC: 15 MMOL/L — HIGH (ref 7–14)
APTT BLD: 28 SEC — SIGNIFICANT CHANGE UP (ref 24.5–35.6)
AST SERPL-CCNC: 20 U/L — SIGNIFICANT CHANGE UP (ref 4–40)
AST SERPL-CCNC: 24 U/L — SIGNIFICANT CHANGE UP (ref 4–40)
BILIRUB SERPL-MCNC: 0.3 MG/DL — SIGNIFICANT CHANGE UP (ref 0.2–1.2)
BILIRUB SERPL-MCNC: 0.3 MG/DL — SIGNIFICANT CHANGE UP (ref 0.2–1.2)
BLD GP AB SCN SERPL QL: NEGATIVE — SIGNIFICANT CHANGE UP
BUN SERPL-MCNC: 60 MG/DL — HIGH (ref 7–23)
BUN SERPL-MCNC: 61 MG/DL — HIGH (ref 7–23)
CALCIUM SERPL-MCNC: 8 MG/DL — LOW (ref 8.4–10.5)
CALCIUM SERPL-MCNC: 8.1 MG/DL — LOW (ref 8.4–10.5)
CHLORIDE SERPL-SCNC: 98 MMOL/L — SIGNIFICANT CHANGE UP (ref 98–107)
CHLORIDE SERPL-SCNC: 99 MMOL/L — SIGNIFICANT CHANGE UP (ref 98–107)
CO2 SERPL-SCNC: 20 MMOL/L — LOW (ref 22–31)
CO2 SERPL-SCNC: 20 MMOL/L — LOW (ref 22–31)
CREAT SERPL-MCNC: 10.23 MG/DL — HIGH (ref 0.5–1.3)
CREAT SERPL-MCNC: 10.49 MG/DL — HIGH (ref 0.5–1.3)
EGFR: 5 ML/MIN/1.73M2 — LOW
EGFR: 5 ML/MIN/1.73M2 — LOW
GLUCOSE SERPL-MCNC: 89 MG/DL — SIGNIFICANT CHANGE UP (ref 70–99)
GLUCOSE SERPL-MCNC: 92 MG/DL — SIGNIFICANT CHANGE UP (ref 70–99)
HCT VFR BLD CALC: 26.9 % — LOW (ref 39–50)
HGB BLD-MCNC: 9.1 G/DL — LOW (ref 13–17)
INR BLD: 0.94 RATIO — SIGNIFICANT CHANGE UP (ref 0.85–1.18)
MAGNESIUM SERPL-MCNC: 2.3 MG/DL — SIGNIFICANT CHANGE UP (ref 1.6–2.6)
MAGNESIUM SERPL-MCNC: 2.3 MG/DL — SIGNIFICANT CHANGE UP (ref 1.6–2.6)
MCHC RBC-ENTMCNC: 27.9 PG — SIGNIFICANT CHANGE UP (ref 27–34)
MCHC RBC-ENTMCNC: 33.8 GM/DL — SIGNIFICANT CHANGE UP (ref 32–36)
MCV RBC AUTO: 82.5 FL — SIGNIFICANT CHANGE UP (ref 80–100)
NRBC # BLD: 1 /100 WBCS — HIGH (ref 0–0)
NRBC # FLD: 0.05 K/UL — HIGH (ref 0–0)
PHOSPHATE SERPL-MCNC: 5.3 MG/DL — HIGH (ref 2.5–4.5)
PHOSPHATE SERPL-MCNC: 5.3 MG/DL — HIGH (ref 2.5–4.5)
PLATELET # BLD AUTO: 264 K/UL — SIGNIFICANT CHANGE UP (ref 150–400)
POTASSIUM SERPL-MCNC: 5.1 MMOL/L — SIGNIFICANT CHANGE UP (ref 3.5–5.3)
POTASSIUM SERPL-MCNC: 5.2 MMOL/L — SIGNIFICANT CHANGE UP (ref 3.5–5.3)
POTASSIUM SERPL-SCNC: 5.1 MMOL/L — SIGNIFICANT CHANGE UP (ref 3.5–5.3)
POTASSIUM SERPL-SCNC: 5.2 MMOL/L — SIGNIFICANT CHANGE UP (ref 3.5–5.3)
PROT SERPL-MCNC: 6.2 G/DL — SIGNIFICANT CHANGE UP (ref 6–8.3)
PROT SERPL-MCNC: 6.3 G/DL — SIGNIFICANT CHANGE UP (ref 6–8.3)
PROTHROM AB SERPL-ACNC: 10.6 SEC — SIGNIFICANT CHANGE UP (ref 9.5–13)
RBC # BLD: 3.26 M/UL — LOW (ref 4.2–5.8)
RBC # FLD: 15.5 % — HIGH (ref 10.3–14.5)
RH IG SCN BLD-IMP: POSITIVE — SIGNIFICANT CHANGE UP
SODIUM SERPL-SCNC: 133 MMOL/L — LOW (ref 135–145)
SODIUM SERPL-SCNC: 134 MMOL/L — LOW (ref 135–145)
WBC # BLD: 4.93 K/UL — SIGNIFICANT CHANGE UP (ref 3.8–10.5)
WBC # FLD AUTO: 4.93 K/UL — SIGNIFICANT CHANGE UP (ref 3.8–10.5)

## 2024-01-22 PROCEDURE — 99233 SBSQ HOSP IP/OBS HIGH 50: CPT | Mod: GC

## 2024-01-22 PROCEDURE — 90935 HEMODIALYSIS ONE EVALUATION: CPT

## 2024-01-22 RX ORDER — AMLODIPINE BESYLATE 2.5 MG/1
5 TABLET ORAL DAILY
Refills: 0 | Status: DISCONTINUED | OUTPATIENT
Start: 2024-01-22 | End: 2024-01-25

## 2024-01-22 RX ORDER — LISINOPRIL 2.5 MG/1
5 TABLET ORAL DAILY
Refills: 0 | Status: DISCONTINUED | OUTPATIENT
Start: 2024-01-22 | End: 2024-01-22

## 2024-01-22 RX ADMIN — Medication 200 MILLIGRAM(S): at 13:11

## 2024-01-22 RX ADMIN — Medication 50 MILLIGRAM(S): at 18:02

## 2024-01-22 RX ADMIN — AMLODIPINE BESYLATE 5 MILLIGRAM(S): 2.5 TABLET ORAL at 20:24

## 2024-01-22 RX ADMIN — Medication 2001 MILLIGRAM(S): at 12:45

## 2024-01-22 RX ADMIN — Medication 650 MILLIGRAM(S): at 14:11

## 2024-01-22 RX ADMIN — Medication 650 MILLIGRAM(S): at 20:24

## 2024-01-22 RX ADMIN — Medication 2001 MILLIGRAM(S): at 18:00

## 2024-01-22 RX ADMIN — Medication 650 MILLIGRAM(S): at 13:11

## 2024-01-22 RX ADMIN — ATORVASTATIN CALCIUM 40 MILLIGRAM(S): 80 TABLET, FILM COATED ORAL at 21:11

## 2024-01-22 RX ADMIN — CHLORHEXIDINE GLUCONATE 1 APPLICATION(S): 213 SOLUTION TOPICAL at 13:10

## 2024-01-22 RX ADMIN — Medication 200 MILLIGRAM(S): at 21:11

## 2024-01-22 RX ADMIN — Medication 50 MILLIGRAM(S): at 12:43

## 2024-01-22 NOTE — PROGRESS NOTE ADULT - ATTENDING COMMENTS
Patient seen and evaluated this morning.   phone used again.  additional history as per HPI.  plan for kidney biopsy given unexplained renal failure.  seen and evaluated on hd

## 2024-01-22 NOTE — PROGRESS NOTE ADULT - PROBLEM SELECTOR PLAN 7
RESOLVED-NSTEMI in the setting of demand ischemia in the setting of HTN emergency  - presented at  with N/V/D and 4 years of chest pain withe exertion, progressively worsening and no prior PCP or physician evaluation; on admission at , elevated troponin with peak to 715.  - EKG nonischemic, Cr elevated +/- demand i/s/o HTN urgency; echo with mod pulm htn, nuclear stress test unremarkable, most likely i/s/o demand ischemia   - Echo: consistent with normal LVEF 65-70%, LVH and moderate pulm HTN  -Cards recommended non-invasive ischemic eval given contrast induced nephropathy and new renal failure: Stress test on 1/9/23: LVEF 66%  -- Given normal myocardial perfusion, CV stable  - C/W statin, and BB and outpt cardiology follow-up with [facility]   - held hep for 1/13 AM given possibility for bx/HD placement RESOLVED    NSTEMI in the setting of demand ischemia in the setting of HTN emergency  - presented at  with N/V/D and 4 years of chest pain withe exertion, progressively worsening and no prior PCP or physician evaluation; on admission at , elevated troponin with peak to 715.  - EKG nonischemic, Cr elevated +/- demand i/s/o HTN urgency; echo with mod pulm htn, nuclear stress test unremarkable, most likely i/s/o demand ischemia   - Echo: consistent with normal LVEF 65-70%, LVH and moderate pulm HTN  -Cards recommended non-invasive ischemic eval given contrast induced nephropathy and new renal failure: Stress test on 1/9/23: LVEF 66%  -- Given normal myocardial perfusion, CV stable  - C/W statin, and BB and outpt cardiology follow-up with [facility]   - held hep for 1/13 AM given possibility for bx/HD placement

## 2024-01-22 NOTE — PROGRESS NOTE ADULT - SUBJECTIVE AND OBJECTIVE BOX
INTERVAL HPI/OVERNIGHT EVENTS:  Patient was seen and examined at bedside. As per nurse and patient, no o/n events, patient resting comfortably. No complaints at this time. Patient denies: fever, chills, dizziness, weakness, HA, Changes in vision, CP, palpitations, SOB, cough, N/V/D/C, dysuria, changes in bowel movements, LE edema. ROS otherwise negative.    VITAL SIGNS:  T(F): 98.3 (01-22-24 @ 05:40)  HR: 75 (01-22-24 @ 05:40)  BP: 138/73 (01-22-24 @ 05:40)  RR: 16 (01-22-24 @ 05:40)  SpO2: 100% (01-21-24 @ 22:49)  Wt(kg): --    PHYSICAL EXAM:    General: WN/WD NAD  Neurology: A&Ox3, nonfocal, SCHAEFER x 4  Eyes: PERRLA/ EOMI, Gross vision intact  ENT/Neck: Neck supple, trachea midline, No JVD, Gross hearing intact  Respiratory: CTA B/L, No wheezing, rales, rhonchi  CV: RRR, +S1/S2, -S3/S4, no murmurs, rubs or gallops  Abdominal: Soft, NT, ND +BS, No HSM  MSK: 5/5 strength UE/LE bilaterally  Extremities: No edema, 2+ peripheral pulses  Skin: No Rashes, Hematoma, Ecchymosis  Incisions:   Tubes:    MEDICATIONS  (STANDING):  acetaminophen     Tablet .. 650 milliGRAM(s) Oral every 6 hours  atorvastatin 40 milliGRAM(s) Oral at bedtime  calcium acetate 2001 milliGRAM(s) Oral three times a day with meals  chlorhexidine 2% Cloths 1 Application(s) Topical daily  chlorhexidine 4% Liquid 1 Application(s) Topical <User Schedule>  ergocalciferol 08076 Unit(s) Oral every week  hydrALAZINE 50 milliGRAM(s) Oral every 8 hours  influenza   Vaccine 0.5 milliLiter(s) IntraMuscular once  labetalol 200 milliGRAM(s) Oral every 8 hours  penicillin   G benzathine Injectable 2.4 Million Unit(s) IntraMuscular every 7 days    MEDICATIONS  (PRN):  aluminum hydroxide/magnesium hydroxide/simethicone Suspension 30 milliLiter(s) Oral every 4 hours PRN Dyspepsia  melatonin 3 milliGRAM(s) Oral at bedtime PRN Insomnia  sodium chloride 0.9% lock flush 10 milliLiter(s) IV Push every 1 hour PRN Pre/post blood products, medications, blood draw, and to maintain line patency      Allergies    No Known Allergies    Intolerances        LABS:                        9.1    4.93  )-----------( 264      ( 22 Jan 2024 02:55 )             26.9     01-22    134<L>  |  99  |  61<H>  ----------------------------<  92  5.1   |  20<L>  |  10.49<H>    Ca    8.1<L>      22 Jan 2024 04:36  Phos  5.3     01-22  Mg     2.30     01-22    TPro  6.3  /  Alb  3.1<L>  /  TBili  0.3  /  DBili  x   /  AST  20  /  ALT  20  /  AlkPhos  104  01-22    PT/INR - ( 22 Jan 2024 02:55 )   PT: 10.6 sec;   INR: 0.94 ratio         PTT - ( 22 Jan 2024 02:55 )  PTT:28.0 sec  Urinalysis Basic - ( 22 Jan 2024 04:36 )    Color: x / Appearance: x / SG: x / pH: x  Gluc: 92 mg/dL / Ketone: x  / Bili: x / Urobili: x   Blood: x / Protein: x / Nitrite: x   Leuk Esterase: x / RBC: x / WBC x   Sq Epi: x / Non Sq Epi: x / Bacteria: x        RADIOLOGY & ADDITIONAL TESTS:  Reviewed INTERVAL HPI/OVERNIGHT EVENTS:  Patient was seen and examined at bedside. As per nurse and patient, no o/n events, patient resting comfortably. No complaints at this time. Patient denies: fever, chills, dizziness, weakness, HA, Changes in vision, CP, palpitations, SOB, cough, N/V/D/C, dysuria, changes in bowel movements, LE edema. ROS otherwise negative.    VITAL SIGNS:  T(F): 98.3 (01-22-24 @ 05:40)  HR: 75 (01-22-24 @ 05:40)  BP: 138/73 (01-22-24 @ 05:40)  RR: 16 (01-22-24 @ 05:40)  SpO2: 100% (01-21-24 @ 22:49)  Wt(kg): --    PHYSICAL EXAM:    General: WN/WD NAD  Neurology: A&Ox3, nonfocal, SCHAEFER x 4  Eyes: PERRLA/ EOMI, Gross vision intact  ENT/Neck: Neck supple, trachea midline, No JVD, Gross hearing intact  Respiratory: CTA B/L, No wheezing, rales, rhonchi  CV: RRR, +S1/S2, -S3/S4, no murmurs, rubs or gallops  Abdominal: Soft, NT, ND +BS, No HSM  Extremities: No edema, 2+ peripheral pulses  Skin: No Rashes, Hematoma, Ecchymosis    MEDICATIONS  (STANDING):  acetaminophen     Tablet .. 650 milliGRAM(s) Oral every 6 hours  atorvastatin 40 milliGRAM(s) Oral at bedtime  calcium acetate 2001 milliGRAM(s) Oral three times a day with meals  chlorhexidine 2% Cloths 1 Application(s) Topical daily  chlorhexidine 4% Liquid 1 Application(s) Topical <User Schedule>  ergocalciferol 73098 Unit(s) Oral every week  hydrALAZINE 50 milliGRAM(s) Oral every 8 hours  influenza   Vaccine 0.5 milliLiter(s) IntraMuscular once  labetalol 200 milliGRAM(s) Oral every 8 hours  penicillin   G benzathine Injectable 2.4 Million Unit(s) IntraMuscular every 7 days    MEDICATIONS  (PRN):  aluminum hydroxide/magnesium hydroxide/simethicone Suspension 30 milliLiter(s) Oral every 4 hours PRN Dyspepsia  melatonin 3 milliGRAM(s) Oral at bedtime PRN Insomnia  sodium chloride 0.9% lock flush 10 milliLiter(s) IV Push every 1 hour PRN Pre/post blood products, medications, blood draw, and to maintain line patency      Allergies    No Known Allergies    Intolerances        LABS:                        9.1    4.93  )-----------( 264      ( 22 Jan 2024 02:55 )             26.9     01-22    134<L>  |  99  |  61<H>  ----------------------------<  92  5.1   |  20<L>  |  10.49<H>    Ca    8.1<L>      22 Jan 2024 04:36  Phos  5.3     01-22  Mg     2.30     01-22    TPro  6.3  /  Alb  3.1<L>  /  TBili  0.3  /  DBili  x   /  AST  20  /  ALT  20  /  AlkPhos  104  01-22    PT/INR - ( 22 Jan 2024 02:55 )   PT: 10.6 sec;   INR: 0.94 ratio         PTT - ( 22 Jan 2024 02:55 )  PTT:28.0 sec  Urinalysis Basic - ( 22 Jan 2024 04:36 )    Color: x / Appearance: x / SG: x / pH: x  Gluc: 92 mg/dL / Ketone: x  / Bili: x / Urobili: x   Blood: x / Protein: x / Nitrite: x   Leuk Esterase: x / RBC: x / WBC x   Sq Epi: x / Non Sq Epi: x / Bacteria: x        RADIOLOGY & ADDITIONAL TESTS:  Reviewed

## 2024-01-22 NOTE — PROGRESS NOTE ADULT - PROBLEM SELECTOR PLAN 5
RESOLVED  - p/w abd pain/n/v, CT abd/pelv c/f distended prox colonic loops consistent with colitis, no bowel obstruction.  - pt found to be +ETEC stool positive, s/p ceftriaxone and metronidazole course   - 1/12: afebrile, euvolemic, no diarrhea, no abd pain/n/v RESOLVED    - p/w abd pain/n/v, CT abd/pelv c/f distended prox colonic loops consistent with colitis, no bowel obstruction.  - pt found to be +ETEC stool positive, s/p ceftriaxone and metronidazole course   - 1/12: afebrile, euvolemic, no diarrhea, no abd pain/n/v

## 2024-01-22 NOTE — PROGRESS NOTE ADULT - PROBLEM SELECTOR PLAN 3
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, FTA syphilis confirmatory positive  - pt states he has not been diagnosed or treated for syphilis previously  - discussed with ID on 1/17 with recommendations to treat as latent infection with benzathine penicillin G 2.4 million units IM q week x 3 weeks  - s/p one dose of penicillin G on 1/18 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, FTA syphilis confirmatory positive    - Pt states he has not been diagnosed or treated for syphilis previously  - Discussed with ID on 1/17 with recommendations to treat as latent infection with benzathine penicillin G 2.4 million units IM q week x 3 weeks  - S/p one dose of penicillin G on 1/18

## 2024-01-22 NOTE — PROGRESS NOTE ADULT - PROBLEM SELECTOR PLAN 2
- New ESRD with temp HD cath placement on 1/6/24  - Record of HD: 1/6, 1/8, 1/10, 1/11, 1/12 @ [facility]  - Transfer to [facility] 1/12 for HD cath placement (perm)  - [facility]: Chikis Laird [phone]  - IR consult for HD placement and renal biopsy in the setting of nephrotic proteinuria,  - nephrology consulted, appreciate recs  - vitamin D hydroxy low, PTH elevated  - started Vit D supplementation - New ESRD with temp HD cath placement on 1/6/24  - Record of HD: 1/6, 1/8, 1/10, 1/11, 1/12 @ [facility]  - Transfer to [facility] 1/12 for HD cath placement (perm)  - [facility]: Chikis Laird [phone]  - IR consult for HD placement and renal biopsy in the setting of nephrotic proteinuria  - Nephrology consulted  - Vitamin D hydroxy low, PTH elevated  - Started Vit D supplementation  - Kidney biopsy to occur 1/23

## 2024-01-22 NOTE — PROGRESS NOTE ADULT - PROBLEM SELECTOR PLAN 1
- admitted to  for renal failure on CKD stage 3 likely 2/2 dehydration and hypertensive emergency from 1/3/24,   - s/p CT w/IV dye 1/3/23 possibly contributing to rise in Cr per  nephrology notes  - 24 eden urine collection positive for nephrotic range proteinuria; SPEP, renal serologies negative   - CT A/P w/o hydro; Started on HD 1/6 via temp HD cath  - Pt requires chronic HD, transition from temp to perm HD cath and renal bx, HD catheter placed 01/06  - Transferred to [facility] on 1/12 for HD perm and bx, IR consult order place, nephrology consulted as well to inform of transfer   - CTM electrolytes, creatinine, strict I/Os, and cath site   - s/p permcath placement 1/18; renal biopsy was canceled at that time due to poor window and hypertension   - pending renal biopsy rescheduled for 1/22 Admitted to  for renal failure on CKD stage 3 likely 2/2 dehydration and hypertensive emergency from 1/3/24    - S/p CT w/IV dye 1/3/23 possibly contributing to rise in Cr per  nephrology notes  - 24 hour urine collection positive for nephrotic range proteinuria; SPEP, renal serologies negative   - CT A/P w/o hydro; Started on HD 1/6 via temp HD cath  - Pt requires chronic HD, transition from temp to perm HD cath and renal bx, HD catheter placed 1/06  - Transferred to [facility] on 1/12 for HD perm and bx, IR consult order place, nephrology consulted as well to inform of transfer  - s/p permcath placement 1/18; renal biopsy was canceled at that time due to poor window and hypertension     - CT electrolytes, creatinine, strict I/Os, and cath site   - Renal biopsy scheduled for 1/23

## 2024-01-22 NOTE — PROGRESS NOTE ADULT - PROBLEM SELECTOR PLAN 6
- managed conservatively  - on RA, c/w oral hydration - Managed conservatively  - on RA, c/w oral hydration

## 2024-01-22 NOTE — PROGRESS NOTE ADULT - ASSESSMENT
59yo M with no known PMHx (not followed by PCP), but FHx (brother) w/ESRD s/p tplant who presented to [facility] with n/v/abdominal pain, admitted for renal failure and r/o ACS. Hospital course c/b flu A, sepsis 2/2 ETEC, anemia and NSTEMI evaluation s/p stress test. Now pt transferred to [facility] and pending renal bx and perm HD cath placement.     59yo M with no known PMHx (not followed by PCP), but FHx (brother) w/ESRD s/p transplant who presented to [facility] with n/v/abdominal pain, admitted for renal failure and r/o ACS. Hospital course c/b flu A, sepsis 2/2 ETEC, anemia and NSTEMI evaluation s/p stress test. Now pt transferred to [facility] and pending renal bx and perm HD cath placement.

## 2024-01-22 NOTE — CHART NOTE - NSCHARTNOTEFT_GEN_A_CORE
Brief IR Consult Note:    59y Male with ESRD on HD via right IJ non-tunneled HD catheter. IR placed tunneled HD catheter on 18th. At the time, patients blood pressure was elevated and there was no appropriate ultrasound guided window for biopsy. Patient was rescheduled for 1/22. Patient is receiving dialysis this AM requiring rescheduling of CT guided biopsy to 1/23.     -- IR will plan to perform image-guided renal biopsy Tuesday 1/23/24  -- NPO at midnight on Tuesday 1/22  -- hold AM anticoagulation on Wednesday 1/23  -- BP goal <140/90      Marcos Nicole MD  PGY-II, Diagnostic Radiology Resident    -Available on [employer] TEAMS  -Emergent issues: [facility]-p.[phone]; [facility]-[phone] ([phone])  -Non-emergent consults: Please place a [place] order "IR Consult" with an appropriate callback number  -Scheduling questions: [facility]: [phone]; [facility]: [phone]  -Clinic/Outpatient booking: [facility]: [phone]; [facility]: [phone]

## 2024-01-22 NOTE — PROGRESS NOTE ADULT - PROBLEM SELECTOR PLAN 1
DESIREE with proteinuria. R/o GN. No known baseline renal fx available. Recent admission at [facility] (Froy 3), received contrast (Froy 3) for cardiac work up. Urine positive for 3.6 g protein and RBC 12 / HPF. S/p placement of temp HD cath to initiate dialysis on 1/6/24.  Pt  transferred to [facility] for placement of tunneled HD catheter and work up for DESIREE.  Serology at [facility] was positive for DAVID 1:160 homogenous and speckled with indeterminate ANCA +. Kidney biopsy is warranted. Biopsy schedule as per IR tomorrow. Resume amlodipine 5 for better BP control.  Pt was explained about his kidney condition and the plan of management using  service. Patient tolerating  HD sessions well. Plan for HD today. Patient needs Strict I/O and avoid nephrotoxins as much as much as possible. Dose the medications as dialysis. Rest of the management as per the primary team.

## 2024-01-22 NOTE — PROGRESS NOTE ADULT - ATTENDING COMMENTS
58M with no prior medical care who presents with acute renal failure, course complicated by flu A and ETEC, now resolved. Pt transferred from  to [facility] for IR renal biopsy and permacath placement.     Reports feeling well.     # Acute renal failure  - s/p permacath placement 1/18  - plan for renal bx on 1/23; initially planned for 1/22, but IR said they do not perform bx on HD days  - HD per nephrology: already accepted at o/p HD center, will need to discuss with nephro if plan for d/c following bx with f/u o/p for results    # HTN  - amlodipine restarted  - c/w hydralazine and labetalol  - goal BP <150/90    # +syphilis screen  - treponema testing +, RPR negative  - pt with no previous known diagnosis  - may be latent infection, c/w PCN

## 2024-01-22 NOTE — PROGRESS NOTE ADULT - PROBLEM SELECTOR PLAN 4
- At [facility] pt with anemia, hgb 6.5, s/p 1u pRBC i/s/o renal failure  - hx of blood transfusion at  during admission: 1/4/24 and 1/8/24   - hgb stable at 9-10 At [facility] pt with anemia, hgb 6.5, s/p 1u pRBC i/s/o renal failure    - Hx of blood transfusion at  during admission: 1/4/24 and 1/8/24   - Hgb stable at 9-10

## 2024-01-22 NOTE — PROGRESS NOTE ADULT - SUBJECTIVE AND OBJECTIVE BOX
Pt was seen and examined at the bedside. No acute overnight events. No fresh complaints.   Pt denies SOB/ Constipation/ Diarrhea/ Nausea/ Vomiting/ abdominal pain/ chest pain/ tingling/ numbness. [facility] Division of Kidney Diseases & Hypertension  FOLLOW UP NOTE  [phone]--------------------------------------------------------------------------------  Chief Complaint: Chronic kidney disease        24 hour events/subjective:  Pt was seen in HD unit, while getting HD. No acute overnight events. No fresh complaints. BP was a bit high and advised UF goal to be adjusted. Pt mentioned that he was working [place] Mentioned one of his 10 siblings had chronic kidney disease and did not know the details. Pt denies SOB/ Constipation/ Diarrhea/ Nausea/ Vomiting/ abdominal pain/ chest pain/ tingling/ numbness.   Spoke with the patient using [employer] interpreters.       PAST HISTORY  --------------------------------------------------------------------------------  No significant changes to PMH, PSH, FHx, SHx, unless otherwise noted    ALLERGIES & MEDICATIONS  --------------------------------------------------------------------------------  Allergies    No Known Allergies    Intolerances      Standing Inpatient Medications  acetaminophen     Tablet .. 650 milliGRAM(s) Oral every 6 hours  atorvastatin 40 milliGRAM(s) Oral at bedtime  calcium acetate 2001 milliGRAM(s) Oral three times a day with meals  chlorhexidine 2% Cloths 1 Application(s) Topical daily  chlorhexidine 4% Liquid 1 Application(s) Topical <User Schedule>  ergocalciferol 68827 Unit(s) Oral every week  hydrALAZINE 50 milliGRAM(s) Oral every 8 hours  influenza   Vaccine 0.5 milliLiter(s) IntraMuscular once  labetalol 200 milliGRAM(s) Oral every 8 hours  lisinopril 5 milliGRAM(s) Oral daily  penicillin   G benzathine Injectable 2.4 Million Unit(s) IntraMuscular every 7 days    PRN Inpatient Medications  aluminum hydroxide/magnesium hydroxide/simethicone Suspension 30 milliLiter(s) Oral every 4 hours PRN  melatonin 3 milliGRAM(s) Oral at bedtime PRN  sodium chloride 0.9% lock flush 10 milliLiter(s) IV Push every 1 hour PRN      REVIEW OF SYSTEMS  --------------------------------------------------------------------------------  as above.    VITALS/PHYSICAL EXAM  --------------------------------------------------------------------------------  T(C): 36.8 (01-22-24 @ 08:45), Max: 36.8 (01-22-24 @ 05:40)  HR: 72 (01-22-24 @ 08:45) (72 - 78)  BP: 158/72 (01-22-24 @ 08:45) (126/80 - 165/84)  RR: 16 (01-22-24 @ 08:45) (16 - 19)  SpO2: 100% (01-21-24 @ 22:49) (98% - 100%)  Wt(kg): --        01-21-24 @ 07:01  -  01-22-24 @ 07:00  --------------------------------------------------------  IN: 600 mL / OUT: 0 mL / NET: 600 mL    01-22-24 @ 07:01  -  01-22-24 @ 12:13  --------------------------------------------------------  IN: 400 mL / OUT: 1400 mL / NET: -1000 mL      Physical Exam:  Gen: NAD  HEENT: anicteric  Pulm: CTA B/L  CV: RRR, S1S2; no rub  Abd: soft  : No suprapubic tenderness  Skin: Warm, without rashes  Rt IJ THD cath.    LABS/STUDIES  --------------------------------------------------------------------------------              9.1    4.93  >-----------<  264      [01-22-24 @ 02:55]              26.9     134  |  99  |  61  ----------------------------<  92      [01-22-24 @ 04:36]  5.1   |  20  |  10.49        Ca     8.1     [01-22-24 @ 04:36]      Mg     2.30     [01-22-24 @ 04:36]      Phos  5.3     [01-22-24 @ 04:36]    TPro  6.3  /  Alb  3.1  /  TBili  0.3  /  DBili  x   /  AST  20  /  ALT  20  /  AlkPhos  104  [01-22-24 @ 04:36]    PT/INR: PT 10.6 , INR 0.94       [01-22-24 @ 02:55]  PTT: 28.0       [01-22-24 @ 02:55]      Creatinine Trend:  SCr 10.49 [01-22 @ 04:36]  SCr 10.23 [01-22 @ 02:55]  SCr 9.00 [01-21 @ 05:45]  SCr 6.92 [01-20 @ 06:50]  SCr 9.65 [01-19 @ 05:45]    Urinalysis - [01-22-24 @ 04:36]      Color  / Appearance  / SG  / pH       Gluc 92 / Ketone   / Bili  / Urobili        Blood  / Protein  / Leuk Est  / Nitrite       RBC  / WBC  / Hyaline  / Gran  / Sq Epi  / Non Sq Epi  / Bacteria         HBsAb 29.1      [01-19-24 @ 10:45]     Pt was seen and examined at the bedside. No acute overnight events. No fresh complaints.   Pt denies SOB/ Constipation/ Diarrhea/ Nausea/ Vomiting/ abdominal pain/ chest pain/ tingling/ numbness. [facility] Division of Kidney Diseases & Hypertension  FOLLOW UP NOTE  [phone]--------------------------------------------------------------------------------  Chief Complaint: Chronic kidney disease    24 hour events/subjective:  Pt was seen in HD unit, while getting HD. No acute overnight events. No fresh complaints. BP was a bit high and advised UF goal to be adjusted. Pt mentioned that he was working [place]  back [place] and [place] Mentioned one of his 10 siblings had chronic kidney disease and did not know the details. Pt denies SOB/ Constipation/ Diarrhea/ Nausea/ Vomiting/ abdominal pain/ chest pain/ tingling/ numbness.   Spoke with the patient using [employer] interpreters.        PAST HISTORY  --------------------------------------------------------------------------------  No significant changes to PMH, PSH, FHx, SHx, unless otherwise noted    ALLERGIES & MEDICATIONS  --------------------------------------------------------------------------------  Allergies    No Known Allergies    Intolerances      Standing Inpatient Medications  acetaminophen     Tablet .. 650 milliGRAM(s) Oral every 6 hours  atorvastatin 40 milliGRAM(s) Oral at bedtime  calcium acetate 2001 milliGRAM(s) Oral three times a day with meals  chlorhexidine 2% Cloths 1 Application(s) Topical daily  chlorhexidine 4% Liquid 1 Application(s) Topical <User Schedule>  ergocalciferol 53015 Unit(s) Oral every week  hydrALAZINE 50 milliGRAM(s) Oral every 8 hours  influenza   Vaccine 0.5 milliLiter(s) IntraMuscular once  labetalol 200 milliGRAM(s) Oral every 8 hours  lisinopril 5 milliGRAM(s) Oral daily  penicillin   G benzathine Injectable 2.4 Million Unit(s) IntraMuscular every 7 days    PRN Inpatient Medications  aluminum hydroxide/magnesium hydroxide/simethicone Suspension 30 milliLiter(s) Oral every 4 hours PRN  melatonin 3 milliGRAM(s) Oral at bedtime PRN  sodium chloride 0.9% lock flush 10 milliLiter(s) IV Push every 1 hour PRN      REVIEW OF SYSTEMS  --------------------------------------------------------------------------------  as above.    VITALS/PHYSICAL EXAM  --------------------------------------------------------------------------------  T(C): 36.8 (01-22-24 @ 08:45), Max: 36.8 (01-22-24 @ 05:40)  HR: 72 (01-22-24 @ 08:45) (72 - 78)  BP: 158/72 (01-22-24 @ 08:45) (126/80 - 165/84)  RR: 16 (01-22-24 @ 08:45) (16 - 19)  SpO2: 100% (01-21-24 @ 22:49) (98% - 100%)  Wt(kg): --        01-21-24 @ 07:01  -  01-22-24 @ 07:00  --------------------------------------------------------  IN: 600 mL / OUT: 0 mL / NET: 600 mL    01-22-24 @ 07:01  -  01-22-24 @ 12:13  --------------------------------------------------------  IN: 400 mL / OUT: 1400 mL / NET: -1000 mL      Physical Exam:  Gen: NAD  HEENT: anicteric  Pulm: CTA B/L  CV: RRR, S1S2; no rub  Abd: soft  : No suprapubic tenderness  Skin: Warm, without rashes  Rt IJ THD cath.    LABS/STUDIES  --------------------------------------------------------------------------------              9.1    4.93  >-----------<  264      [01-22-24 @ 02:55]              26.9     134  |  99  |  61  ----------------------------<  92      [01-22-24 @ 04:36]  5.1   |  20  |  10.49        Ca     8.1     [01-22-24 @ 04:36]      Mg     2.30     [01-22-24 @ 04:36]      Phos  5.3     [01-22-24 @ 04:36]    TPro  6.3  /  Alb  3.1  /  TBili  0.3  /  DBili  x   /  AST  20  /  ALT  20  /  AlkPhos  104  [01-22-24 @ 04:36]    PT/INR: PT 10.6 , INR 0.94       [01-22-24 @ 02:55]  PTT: 28.0       [01-22-24 @ 02:55]      Creatinine Trend:  SCr 10.49 [01-22 @ 04:36]  SCr 10.23 [01-22 @ 02:55]  SCr 9.00 [01-21 @ 05:45]  SCr 6.92 [01-20 @ 06:50]  SCr 9.65 [01-19 @ 05:45]    Urinalysis - [01-22-24 @ 04:36]      Color  / Appearance  / SG  / pH       Gluc 92 / Ketone   / Bili  / Urobili        Blood  / Protein  / Leuk Est  / Nitrite       RBC  / WBC  / Hyaline  / Gran  / Sq Epi  / Non Sq Epi  / Bacteria         HBsAb 29.1      [01-19-24 @ 10:45]

## 2024-01-22 NOTE — PROGRESS NOTE ADULT - TIME BILLING
Review of laboratory data, radiology results, consultants' recommendations, documentation in [place], discussion with patient/house staff and interdisciplinary staff (such as , social workers, etc). Interventions were performed as documented above.

## 2024-01-22 NOTE — PROGRESS NOTE ADULT - PROBLEM SELECTOR PLAN 8
- pt denies any previous hx of HTN, however, per chart review - pt has hx of taking hypertensive med  - initially admitted to  for hypertensive emergency and renal failure, s/p hydralazine and cardio eval   -- @  pt continued on 200 mg Labetalol q8 hrs for hypertension   - c/w labetalol 200q8 and hydralazine 50q8 with goal of <150/90 prior to renal biopsy Pt denies any previous hx of HTN, however, per chart review pt has hx of taking hypertensive medication    - Initially admitted to  for hypertensive emergency and renal failure, s/p hydralazine and cardio eval   - c/w labetalol 200q8 and hydralazine 50q8 with goal of <150/90 prior to renal biopsy  - Start home amlodipine 5mg qd

## 2024-01-23 ENCOUNTER — RESULT REVIEW (OUTPATIENT)
Age: 59
End: 2024-01-23

## 2024-01-23 LAB
ALBUMIN SERPL ELPH-MCNC: 3.5 G/DL — SIGNIFICANT CHANGE UP (ref 3.3–5)
ALP SERPL-CCNC: 107 U/L — SIGNIFICANT CHANGE UP (ref 40–120)
ALT FLD-CCNC: 20 U/L — SIGNIFICANT CHANGE UP (ref 4–41)
ANION GAP SERPL CALC-SCNC: 11 MMOL/L — SIGNIFICANT CHANGE UP (ref 7–14)
APTT BLD: 27.3 SEC — SIGNIFICANT CHANGE UP (ref 24.5–35.6)
AST SERPL-CCNC: 22 U/L — SIGNIFICANT CHANGE UP (ref 4–40)
BASOPHILS # BLD AUTO: 0.02 K/UL — SIGNIFICANT CHANGE UP (ref 0–0.2)
BASOPHILS NFR BLD AUTO: 0.4 % — SIGNIFICANT CHANGE UP (ref 0–2)
BILIRUB SERPL-MCNC: 0.3 MG/DL — SIGNIFICANT CHANGE UP (ref 0.2–1.2)
BLD GP AB SCN SERPL QL: NEGATIVE — SIGNIFICANT CHANGE UP
BUN SERPL-MCNC: 46 MG/DL — HIGH (ref 7–23)
CALCIUM SERPL-MCNC: 7.7 MG/DL — LOW (ref 8.4–10.5)
CHLORIDE SERPL-SCNC: 96 MMOL/L — LOW (ref 98–107)
CO2 SERPL-SCNC: 25 MMOL/L — SIGNIFICANT CHANGE UP (ref 22–31)
CREAT SERPL-MCNC: 7.57 MG/DL — HIGH (ref 0.5–1.3)
EGFR: 8 ML/MIN/1.73M2 — LOW
EOSINOPHIL # BLD AUTO: 0.21 K/UL — SIGNIFICANT CHANGE UP (ref 0–0.5)
EOSINOPHIL NFR BLD AUTO: 4.4 % — SIGNIFICANT CHANGE UP (ref 0–6)
GLUCOSE SERPL-MCNC: 99 MG/DL — SIGNIFICANT CHANGE UP (ref 70–99)
HCT VFR BLD CALC: 27.5 % — LOW (ref 39–50)
HCT VFR BLD CALC: 29.7 % — LOW (ref 39–50)
HGB BLD-MCNC: 10.3 G/DL — LOW (ref 13–17)
HGB BLD-MCNC: 9.3 G/DL — LOW (ref 13–17)
IANC: 2.41 K/UL — SIGNIFICANT CHANGE UP (ref 1.8–7.4)
IMM GRANULOCYTES NFR BLD AUTO: 0.2 % — SIGNIFICANT CHANGE UP (ref 0–0.9)
INR BLD: <0.9 RATIO — SIGNIFICANT CHANGE UP (ref 0.85–1.18)
LYMPHOCYTES # BLD AUTO: 1.3 K/UL — SIGNIFICANT CHANGE UP (ref 1–3.3)
LYMPHOCYTES # BLD AUTO: 27.1 % — SIGNIFICANT CHANGE UP (ref 13–44)
MAGNESIUM SERPL-MCNC: 2.4 MG/DL — SIGNIFICANT CHANGE UP (ref 1.6–2.6)
MCHC RBC-ENTMCNC: 27.9 PG — SIGNIFICANT CHANGE UP (ref 27–34)
MCHC RBC-ENTMCNC: 28 PG — SIGNIFICANT CHANGE UP (ref 27–34)
MCHC RBC-ENTMCNC: 33.8 GM/DL — SIGNIFICANT CHANGE UP (ref 32–36)
MCHC RBC-ENTMCNC: 34.7 GM/DL — SIGNIFICANT CHANGE UP (ref 32–36)
MCV RBC AUTO: 80.7 FL — SIGNIFICANT CHANGE UP (ref 80–100)
MCV RBC AUTO: 82.6 FL — SIGNIFICANT CHANGE UP (ref 80–100)
MONOCYTES # BLD AUTO: 0.85 K/UL — SIGNIFICANT CHANGE UP (ref 0–0.9)
MONOCYTES NFR BLD AUTO: 17.7 % — HIGH (ref 2–14)
NEUTROPHILS # BLD AUTO: 2.41 K/UL — SIGNIFICANT CHANGE UP (ref 1.8–7.4)
NEUTROPHILS NFR BLD AUTO: 50.2 % — SIGNIFICANT CHANGE UP (ref 43–77)
NRBC # BLD: 0 /100 WBCS — SIGNIFICANT CHANGE UP (ref 0–0)
NRBC # BLD: 0 /100 WBCS — SIGNIFICANT CHANGE UP (ref 0–0)
NRBC # FLD: 0 K/UL — SIGNIFICANT CHANGE UP (ref 0–0)
NRBC # FLD: 0 K/UL — SIGNIFICANT CHANGE UP (ref 0–0)
PHOSPHATE SERPL-MCNC: 4.5 MG/DL — SIGNIFICANT CHANGE UP (ref 2.5–4.5)
PLATELET # BLD AUTO: 241 K/UL — SIGNIFICANT CHANGE UP (ref 150–400)
PLATELET # BLD AUTO: 258 K/UL — SIGNIFICANT CHANGE UP (ref 150–400)
POTASSIUM SERPL-MCNC: 5 MMOL/L — SIGNIFICANT CHANGE UP (ref 3.5–5.3)
POTASSIUM SERPL-SCNC: 5 MMOL/L — SIGNIFICANT CHANGE UP (ref 3.5–5.3)
PROT SERPL-MCNC: 6.5 G/DL — SIGNIFICANT CHANGE UP (ref 6–8.3)
PROTHROM AB SERPL-ACNC: 9.6 SEC — SIGNIFICANT CHANGE UP (ref 9.5–13)
RBC # BLD: 3.33 M/UL — LOW (ref 4.2–5.8)
RBC # BLD: 3.68 M/UL — LOW (ref 4.2–5.8)
RBC # FLD: 15.1 % — HIGH (ref 10.3–14.5)
RBC # FLD: 15.4 % — HIGH (ref 10.3–14.5)
RH IG SCN BLD-IMP: POSITIVE — SIGNIFICANT CHANGE UP
SODIUM SERPL-SCNC: 132 MMOL/L — LOW (ref 135–145)
WBC # BLD: 4.8 K/UL — SIGNIFICANT CHANGE UP (ref 3.8–10.5)
WBC # BLD: 6.75 K/UL — SIGNIFICANT CHANGE UP (ref 3.8–10.5)
WBC # FLD AUTO: 4.8 K/UL — SIGNIFICANT CHANGE UP (ref 3.8–10.5)
WBC # FLD AUTO: 6.75 K/UL — SIGNIFICANT CHANGE UP (ref 3.8–10.5)

## 2024-01-23 PROCEDURE — 99233 SBSQ HOSP IP/OBS HIGH 50: CPT | Mod: GC

## 2024-01-23 PROCEDURE — 88313 SPECIAL STAINS GROUP 2: CPT | Mod: 26

## 2024-01-23 PROCEDURE — 88350 IMFLUOR EA ADDL 1ANTB STN PX: CPT | Mod: 26

## 2024-01-23 PROCEDURE — 99232 SBSQ HOSP IP/OBS MODERATE 35: CPT

## 2024-01-23 PROCEDURE — 88346 IMFLUOR 1ST 1ANTB STAIN PX: CPT | Mod: 26

## 2024-01-23 PROCEDURE — 88348 ELECTRON MICROSCOPY DX: CPT | Mod: 26

## 2024-01-23 PROCEDURE — 88305 TISSUE EXAM BY PATHOLOGIST: CPT | Mod: 26

## 2024-01-23 RX ORDER — FENTANYL CITRATE 50 UG/ML
50 INJECTION INTRAVENOUS
Refills: 0 | Status: DISCONTINUED | OUTPATIENT
Start: 2024-01-23 | End: 2024-01-24

## 2024-01-23 RX ORDER — ONDANSETRON 8 MG/1
4 TABLET, FILM COATED ORAL ONCE
Refills: 0 | Status: DISCONTINUED | OUTPATIENT
Start: 2024-01-23 | End: 2024-01-24

## 2024-01-23 RX ORDER — ERYTHROPOIETIN 10000 [IU]/ML
4000 INJECTION, SOLUTION INTRAVENOUS; SUBCUTANEOUS
Refills: 0 | Status: DISCONTINUED | OUTPATIENT
Start: 2024-01-23 | End: 2024-01-24

## 2024-01-23 RX ADMIN — Medication 650 MILLIGRAM(S): at 08:32

## 2024-01-23 RX ADMIN — Medication 650 MILLIGRAM(S): at 01:59

## 2024-01-23 RX ADMIN — Medication 50 MILLIGRAM(S): at 02:02

## 2024-01-23 RX ADMIN — Medication 50 MILLIGRAM(S): at 17:54

## 2024-01-23 RX ADMIN — Medication 200 MILLIGRAM(S): at 05:09

## 2024-01-23 RX ADMIN — Medication 2001 MILLIGRAM(S): at 17:54

## 2024-01-23 RX ADMIN — Medication 200 MILLIGRAM(S): at 17:54

## 2024-01-23 RX ADMIN — Medication 50 MILLIGRAM(S): at 08:59

## 2024-01-23 RX ADMIN — AMLODIPINE BESYLATE 5 MILLIGRAM(S): 2.5 TABLET ORAL at 05:10

## 2024-01-23 RX ADMIN — Medication 200 MILLIGRAM(S): at 21:38

## 2024-01-23 RX ADMIN — ATORVASTATIN CALCIUM 40 MILLIGRAM(S): 80 TABLET, FILM COATED ORAL at 21:39

## 2024-01-23 RX ADMIN — CHLORHEXIDINE GLUCONATE 1 APPLICATION(S): 213 SOLUTION TOPICAL at 12:21

## 2024-01-23 NOTE — PROGRESS NOTE ADULT - TIME BILLING
Review of laboratory data, radiology results, consultants' recommendations, documentation in [place], discussion with patient/house staff and interdisciplinary staff (such as , social workers, etc). Interventions were performed as documented above.

## 2024-01-23 NOTE — PROGRESS NOTE ADULT - PROBLEM SELECTOR PLAN 3
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

## 2024-01-23 NOTE — PROGRESS NOTE ADULT - ASSESSMENT
57yo M with no known PMHx (not followed by PCP), but FHx (brother) w/ESRD s/p transplant who presented to [facility] with n/v/abdominal pain, admitted for renal failure and r/o ACS. Hospital course c/b flu A, sepsis 2/2 ETEC, anemia and NSTEMI evaluation s/p stress test. Now pt transferred to [facility] and pending renal bx and perm HD cath placement.   59yo M with no known PMHx (not followed by PCP), but FHx (brother) w/ESRD s/p transplant who presented to [facility] with n/v/abdominal pain, admitted for renal failure and r/o ACS. Hospital course c/b flu A, sepsis 2/2 ETEC, anemia and NSTEMI evaluation s/p stress test. Now pt transferred to [facility] and pending renal bx and perm HD cath placed

## 2024-01-23 NOTE — PROGRESS NOTE ADULT - PROBLEM SELECTOR PLAN 7
RESOLVED    NSTEMI in the setting of demand ischemia in the setting of HTN emergency  - presented at  with N/V/D and 4 years of chest pain withe exertion, progressively worsening and no prior PCP or physician evaluation; on admission at , elevated troponin with peak to 715.  - EKG nonischemic, Cr elevated +/- demand i/s/o HTN urgency; echo with mod pulm htn, nuclear stress test unremarkable, most likely i/s/o demand ischemia   - Echo: consistent with normal LVEF 65-70%, LVH and moderate pulm HTN  -Cards recommended non-invasive ischemic eval given contrast induced nephropathy and new renal failure: Stress test on 1/9/23: LVEF 66%  -- Given normal myocardial perfusion, CV stable  - C/W statin, and BB and outpt cardiology follow-up with [facility]   - held hep for 1/13 AM given possibility for bx/HD placement DVT PPx: SQ; will hold prior to IR procedure  Diet: Renal  Code: Full  Dispo: PT/OT Pending Hospital Course

## 2024-01-23 NOTE — PROGRESS NOTE ADULT - ATTENDING COMMENTS
continue to discuss with patient each day about his kidney condition.  he understands that he will likely require long term dialysis but that kidney biopsy is important to prognosticate and hopefully obtain underlying diagnosis.s

## 2024-01-23 NOTE — CHART NOTE - NSCHARTNOTEFT_GEN_A_CORE
PRE-INTERVENTIONAL RADIOLOGY PROCEDURE NOTE      Patient Age: 59 Y    Patient Gender: M    Procedure: Kidney Cortical biopsy     Diagnosis/Indication: Elevated Cr    Interventional Radiology Attending Physician: Xochitl    Ordering Attending Physician: Lorena       Pertinent labs:                    9.3    4.80  )-----------( 241      ( 23 Jan 2024 05:39 )             27.5       01-23    132<L>  |  96<L>  |  46<H>  ----------------------------<  99  5.0   |  25  |  7.57<H>    Ca    7.7<L>      23 Jan 2024 05:39  Phos  4.5     01-23  Mg     2.40     01-23    TPro  6.5  /  Alb  3.5  /  TBili  0.3  /  DBili  x   /  AST  22  /  ALT  20  /  AlkPhos  107  01-23      PT/INR - ( 23 Jan 2024 05:39 )   PT: 9.6 sec;   INR: <0.90 ratio         PTT - ( 23 Jan 2024 05:39 )  PTT:27.3 sec        Patient and Family Aware ? Yes

## 2024-01-23 NOTE — PROGRESS NOTE ADULT - PROBLEM SELECTOR PLAN 2
- New ESRD with temp HD cath placement on 1/6/24  - Record of HD: 1/6, 1/8, 1/10, 1/11, 1/12 @ [facility]  - Transfer to [facility] 1/12 for HD cath placement (perm)  - [facility]: Chikis Laird [phone]  - IR consult for HD placement and renal biopsy in the setting of nephrotic proteinuria  - Nephrology consulted  - Vitamin D hydroxy low, PTH elevated  - Started Vit D supplementation  - Kidney biopsy to occur 1/23

## 2024-01-23 NOTE — PROGRESS NOTE ADULT - PROBLEM SELECTOR PLAN 5
RESOLVED    - p/w abd pain/n/v, CT abd/pelv c/f distended prox colonic loops consistent with colitis, no bowel obstruction.  - pt found to be +ETEC stool positive, s/p ceftriaxone and metronidazole course   - 1/12: afebrile, euvolemic, no diarrhea, no abd pain/n/v - Managed conservatively  - on RA, c/w oral hydration

## 2024-01-23 NOTE — PROGRESS NOTE ADULT - ATTENDING COMMENTS
58M with no prior medical care who presents with acute renal failure, course complicated by flu A and ETEC, now resolved. Pt transferred from  to [facility] for IR renal biopsy and permacath placement.     Reports feeling well.     # Acute renal failure  - s/p permacath placement 1/18  - plan for renal bx on 1/24. Spoke to nephro, plan for follow up of pathology reports o/p  - HD per nephrology: already accepted at o/p HD center    # HTN  - amlodipine restarted  - c/w hydralazine and labetalol  - goal BP <150/90    # +syphilis screen  - treponema testing +, RPR negative  - pt with no previous known diagnosis  - may be latent infection, s/p PCN G IM on 1/18. Will need completion of 3 doses q 7 days. Plan to give early dose tomorrow, plan to transition pt to doxy o/p.      #Dispo:   -d/c tomorrow with o/p HD finalization

## 2024-01-23 NOTE — PROGRESS NOTE ADULT - PROBLEM SELECTOR PLAN 8
Pt denies any previous hx of HTN, however, per chart review pt has hx of taking hypertensive medication    - Initially admitted to  for hypertensive emergency and renal failure, s/p hydralazine and cardio eval   - c/w labetalol 200q8 and hydralazine 50q8 with goal of <150/90 prior to renal biopsy  - Start home amlodipine 5mg qd Patient would like full code status for now after explaining options. He is also considering whether he wants to pursue permanent HD.

## 2024-01-23 NOTE — PROGRESS NOTE ADULT - PROBLEM SELECTOR PLAN 6
- Managed conservatively  - on RA, c/w oral hydration Pt denies any previous hx of HTN, however, per chart review pt has hx of taking hypertensive medication    - Initially admitted to  for hypertensive emergency and renal failure, s/p hydralazine and cardio eval   - c/w labetalol 200q8 and hydralazine 50q8 with goal of <150/90 prior to renal biopsy  - Start home amlodipine 5mg qd

## 2024-01-23 NOTE — PROGRESS NOTE ADULT - PROBLEM SELECTOR PLAN 4
At [facility] pt with anemia, hgb 6.5, s/p 1u pRBC i/s/o renal failure    - Hx of blood transfusion at  during admission: 1/4/24 and 1/8/24   - Hgb stable at 9-10

## 2024-01-23 NOTE — PROCEDURE NOTE - PLAN
-2hr recovery  -4hr bedrest  -f/u CBC @ 7pm  -Hold DVT ppx for 24 hr  -Page IR if clinical status change

## 2024-01-23 NOTE — PROGRESS NOTE ADULT - SUBJECTIVE AND OBJECTIVE BOX
PROGRESS NOTE:   Authored by Dr. Bria Adam MD (PGY-1). Pager [facility] [phone]/ [facility]     Patient is a 59y old  Male who presents with a chief complaint of ESRD on HD transferred for placement of permanent HD catheter and renal biopsy (22 Jan 2024 12:13)      SUBJECTIVE / OVERNIGHT EVENTS:  No acute events overnight.     All other ROS neg except as above in HPI    MEDICATIONS  (STANDING):  acetaminophen     Tablet .. 650 milliGRAM(s) Oral every 6 hours  amLODIPine   Tablet 5 milliGRAM(s) Oral daily  atorvastatin 40 milliGRAM(s) Oral at bedtime  calcium acetate 2001 milliGRAM(s) Oral three times a day with meals  chlorhexidine 2% Cloths 1 Application(s) Topical daily  chlorhexidine 4% Liquid 1 Application(s) Topical <User Schedule>  ergocalciferol 72804 Unit(s) Oral every week  hydrALAZINE 50 milliGRAM(s) Oral every 8 hours  influenza   Vaccine 0.5 milliLiter(s) IntraMuscular once  labetalol 200 milliGRAM(s) Oral every 8 hours  penicillin   G benzathine Injectable 2.4 Million Unit(s) IntraMuscular every 7 days    MEDICATIONS  (PRN):  aluminum hydroxide/magnesium hydroxide/simethicone Suspension 30 milliLiter(s) Oral every 4 hours PRN Dyspepsia  melatonin 3 milliGRAM(s) Oral at bedtime PRN Insomnia  sodium chloride 0.9% lock flush 10 milliLiter(s) IV Push every 1 hour PRN Pre/post blood products, medications, blood draw, and to maintain line patency      CAPILLARY BLOOD GLUCOSE        I&O's Summary    22 Jan 2024 07:01  -  23 Jan 2024 07:00  --------------------------------------------------------  IN: 510 mL / OUT: 2450 mL / NET: -1940 mL        PHYSICAL EXAM:  Vital Signs Last 24 Hrs  T(C): 36.4 (23 Jan 2024 05:00), Max: 36.8 (22 Jan 2024 08:45)  T(F): 97.5 (23 Jan 2024 05:00), Max: 98.3 (22 Jan 2024 08:45)  HR: 72 (23 Jan 2024 05:00) (67 - 76)  BP: 149/87 (23 Jan 2024 05:00) (128/72 - 158/72)  BP(mean): --  RR: 18 (23 Jan 2024 05:00) (16 - 18)  SpO2: 99% (23 Jan 2024 05:00) (98% - 99%)    Parameters below as of 23 Jan 2024 05:00  Patient On (Oxygen Delivery Method): room air        CONSTITUTIONAL: NAD, well-developed  HEET: MMM, EOMI, PERRLA  NECK: supple  RESPIRATORY: Normal respiratory effort; lungs are clear to auscultation bilaterally  CARDIOVASCULAR: Regular rate and rhythm, normal S1 and S2, no murmur/rub/gallop; No lower extremity edema; Peripheral pulses are 2+ bilaterally  ABDOMEN: Nontender to palpation, normoactive bowel sounds, no rebound/guarding; No hepatosplenomegaly  MUSCULOSKELETAL: no clubbing or cyanosis of digits; no joint swelling or tenderness to palpation  NEURO: Moving all four extremities, sensation grossly intact  PSYCH: A+O to person, place, and time; affect appropriate  SKIN: No rash    LABS:                        9.3    4.80  )-----------( 241      ( 23 Jan 2024 05:39 )             27.5     01-23    132<L>  |  96<L>  |  46<H>  ----------------------------<  99  5.0   |  25  |  7.57<H>    Ca    7.7<L>      23 Jan 2024 05:39  Phos  4.5     01-23  Mg     2.40     01-23    TPro  6.5  /  Alb  3.5  /  TBili  0.3  /  DBili  x   /  AST  22  /  ALT  20  /  AlkPhos  107  01-23    PT/INR - ( 23 Jan 2024 05:39 )   PT: 9.6 sec;   INR: <0.90 ratio         PTT - ( 23 Jan 2024 05:39 )  PTT:27.3 sec      Urinalysis Basic - ( 23 Jan 2024 05:39 )    Color: x / Appearance: x / SG: x / pH: x  Gluc: 99 mg/dL / Ketone: x  / Bili: x / Urobili: x   Blood: x / Protein: x / Nitrite: x   Leuk Esterase: x / RBC: x / WBC x   Sq Epi: x / Non Sq Epi: x / Bacteria: x          Tele Reviewed:    RADIOLOGY & ADDITIONAL TESTS:  Results Reviewed:   Imaging Personally Reviewed:  Electrocardiogram Personally Reviewed:     PROGRESS NOTE:   Authored by Dr. Bria Adam MD (PGY-1). Pager [facility] [phone]/ [facility]     Patient is a 59y old  Male who presents with a chief complaint of ESRD on HD transferred for placement of permanent HD catheter and renal biopsy (22 Jan 2024 12:13)      SUBJECTIVE / OVERNIGHT EVENTS:  No acute events overnight. Denies pain     All other ROS neg except as above in HPI    MEDICATIONS  (STANDING):  acetaminophen     Tablet .. 650 milliGRAM(s) Oral every 6 hours  amLODIPine   Tablet 5 milliGRAM(s) Oral daily  atorvastatin 40 milliGRAM(s) Oral at bedtime  calcium acetate 2001 milliGRAM(s) Oral three times a day with meals  chlorhexidine 2% Cloths 1 Application(s) Topical daily  chlorhexidine 4% Liquid 1 Application(s) Topical <User Schedule>  ergocalciferol 71835 Unit(s) Oral every week  hydrALAZINE 50 milliGRAM(s) Oral every 8 hours  influenza   Vaccine 0.5 milliLiter(s) IntraMuscular once  labetalol 200 milliGRAM(s) Oral every 8 hours  penicillin   G benzathine Injectable 2.4 Million Unit(s) IntraMuscular every 7 days    MEDICATIONS  (PRN):  aluminum hydroxide/magnesium hydroxide/simethicone Suspension 30 milliLiter(s) Oral every 4 hours PRN Dyspepsia  melatonin 3 milliGRAM(s) Oral at bedtime PRN Insomnia  sodium chloride 0.9% lock flush 10 milliLiter(s) IV Push every 1 hour PRN Pre/post blood products, medications, blood draw, and to maintain line patency      CAPILLARY BLOOD GLUCOSE        I&O's Summary    22 Jan 2024 07:01  -  23 Jan 2024 07:00  --------------------------------------------------------  IN: 510 mL / OUT: 2450 mL / NET: -1940 mL        PHYSICAL EXAM:  Vital Signs Last 24 Hrs  T(C): 36.4 (23 Jan 2024 05:00), Max: 36.8 (22 Jan 2024 08:45)  T(F): 97.5 (23 Jan 2024 05:00), Max: 98.3 (22 Jan 2024 08:45)  HR: 72 (23 Jan 2024 05:00) (67 - 76)  BP: 149/87 (23 Jan 2024 05:00) (128/72 - 158/72)  BP(mean): --  RR: 18 (23 Jan 2024 05:00) (16 - 18)  SpO2: 99% (23 Jan 2024 05:00) (98% - 99%)    Parameters below as of 23 Jan 2024 05:00  Patient On (Oxygen Delivery Method): room air        CONSTITUTIONAL: NAD, well-developed  HEET: MMM, EOMI, PERRLA  NECK: supple  RESPIRATORY: Normal respiratory effort; lungs are clear to auscultation bilaterally  CARDIOVASCULAR: Regular rate and rhythm, normal S1 and S2, no murmur/rub/gallop; No lower extremity edema; Peripheral pulses are 2+ bilaterally  ABDOMEN: Nontender to palpation, normoactive bowel sounds, no rebound/guarding; No hepatosplenomegaly  MUSCULOSKELETAL: no clubbing or cyanosis of digits; no joint swelling or tenderness to palpation  NEURO: Moving all four extremities, sensation grossly intact  PSYCH: A+O to person, place, and time; affect appropriate  SKIN: No rash    LABS:                        9.3    4.80  )-----------( 241      ( 23 Jan 2024 05:39 )             27.5     01-23    132<L>  |  96<L>  |  46<H>  ----------------------------<  99  5.0   |  25  |  7.57<H>    Ca    7.7<L>      23 Jan 2024 05:39  Phos  4.5     01-23  Mg     2.40     01-23    TPro  6.5  /  Alb  3.5  /  TBili  0.3  /  DBili  x   /  AST  22  /  ALT  20  /  AlkPhos  107  01-23    PT/INR - ( 23 Jan 2024 05:39 )   PT: 9.6 sec;   INR: <0.90 ratio         PTT - ( 23 Jan 2024 05:39 )  PTT:27.3 sec      Urinalysis Basic - ( 23 Jan 2024 05:39 )    Color: x / Appearance: x / SG: x / pH: x  Gluc: 99 mg/dL / Ketone: x  / Bili: x / Urobili: x   Blood: x / Protein: x / Nitrite: x   Leuk Esterase: x / RBC: x / WBC x   Sq Epi: x / Non Sq Epi: x / Bacteria: x          Tele Reviewed:    RADIOLOGY & ADDITIONAL TESTS:  Results Reviewed:   Imaging Personally Reviewed:  Electrocardiogram Personally Reviewed:

## 2024-01-23 NOTE — PROGRESS NOTE ADULT - PROBLEM SELECTOR PLAN 1
EDSIREE with proteinuria. R/o GN. No known baseline renal fx available. Recent admission at [facility] (Froy 3), received contrast (Froy 3) for cardiac work up. Urine positive for 3.6 g protein and RBC 12 / HPF. S/p placement of temp HD cath to initiate dialysis on 1/6/24.  Pt  transferred to [facility] for placement of tunneled HD catheter and work up for DESIERE.  Serology at [facility] was positive for DAVID 1:160 homogenous and speckled with indeterminate ANCA +. Kidney biopsy is warranted. Biopsy schedule as per IR today. Resumed amlodipine 5 for better BP control. BP better today.  Pt was explained about his kidney condition and the plan of management using  service. Patient tolerating  HD sessions well. Plan for HD tomorrow. Patient needs Strict I/O and avoid nephrotoxins as much as much as possible. Dose the medications as dialysis. Rest of the management as per the primary team.

## 2024-01-23 NOTE — PROGRESS NOTE ADULT - SUBJECTIVE AND OBJECTIVE BOX
[facility] Division of Kidney Diseases & Hypertension  FOLLOW UP NOTE  [phone]--------------------------------------------------------------------------------  Chief Complaint: Chronic kidney disease    24 hour events/subjective:  Pt was seen and examined at the bedside. No acute overnight events. No fresh complaints. Pt was dialyzed yesterday. Tolerated it well. Pt was explained about his condition, probable requirement of long term dialysis, renal biopsy using pacific interpreters. Pt denies SOB/ Constipation/ Diarrhea/ Nausea/ Vomiting/ abdominal pain/ chest pain/ tingling/ numbness.         PAST HISTORY  --------------------------------------------------------------------------------  No significant changes to PMH, PSH, FHx, SHx, unless otherwise noted    ALLERGIES & MEDICATIONS  --------------------------------------------------------------------------------  Allergies    No Known Allergies    Intolerances      Standing Inpatient Medications  acetaminophen     Tablet .. 650 milliGRAM(s) Oral every 6 hours  amLODIPine   Tablet 5 milliGRAM(s) Oral daily  atorvastatin 40 milliGRAM(s) Oral at bedtime  calcium acetate 2001 milliGRAM(s) Oral three times a day with meals  chlorhexidine 2% Cloths 1 Application(s) Topical daily  chlorhexidine 4% Liquid 1 Application(s) Topical <User Schedule>  ergocalciferol 27146 Unit(s) Oral every week  hydrALAZINE 50 milliGRAM(s) Oral every 8 hours  influenza   Vaccine 0.5 milliLiter(s) IntraMuscular once  labetalol 200 milliGRAM(s) Oral every 8 hours  penicillin   G benzathine Injectable 2.4 Million Unit(s) IntraMuscular every 7 days    PRN Inpatient Medications  aluminum hydroxide/magnesium hydroxide/simethicone Suspension 30 milliLiter(s) Oral every 4 hours PRN  melatonin 3 milliGRAM(s) Oral at bedtime PRN  sodium chloride 0.9% lock flush 10 milliLiter(s) IV Push every 1 hour PRN      REVIEW OF SYSTEMS  --------------------------------------------------------------------------------  as above.     VITALS/PHYSICAL EXAM  --------------------------------------------------------------------------------  T(C): 36.4 (01-23-24 @ 05:00), Max: 36.8 (01-22-24 @ 13:40)  HR: 64 (01-23-24 @ 08:40) (64 - 76)  BP: 122/73 (01-23-24 @ 08:40) (122/73 - 149/87)  RR: 18 (01-23-24 @ 05:00) (16 - 18)  SpO2: 99% (01-23-24 @ 05:00) (98% - 99%)  Wt(kg): --        01-22-24 @ 07:01  -  01-23-24 @ 07:00  --------------------------------------------------------  IN: 510 mL / OUT: 2450 mL / NET: -1940 mL      Physical Exam:  Gen: NAD  HEENT: anicteric  Pulm: CTA B/L  CV: RRR, S1S2; no rub  Abd: soft  : No suprapubic tenderness  Skin: Warm, without rashes  Rt IJ THD cath.    LABS/STUDIES  --------------------------------------------------------------------------------              9.3    4.80  >-----------<  241      [01-23-24 @ 05:39]              27.5     132  |  96  |  46  ----------------------------<  99      [01-23-24 @ 05:39]  5.0   |  25  |  7.57        Ca     7.7     [01-23-24 @ 05:39]      Mg     2.40     [01-23-24 @ 05:39]      Phos  4.5     [01-23-24 @ 05:39]    TPro  6.5  /  Alb  3.5  /  TBili  0.3  /  DBili  x   /  AST  22  /  ALT  20  /  AlkPhos  107  [01-23-24 @ 05:39]    PT/INR: PT 9.6  , INR <0.90      [01-23-24 @ 05:39]  PTT: 27.3       [01-23-24 @ 05:39]      Creatinine Trend:  SCr 7.57 [01-23 @ 05:39]  SCr 10.49 [01-22 @ 04:36]  SCr 10.23 [01-22 @ 02:55]  SCr 9.00 [01-21 @ 05:45]  SCr 6.92 [01-20 @ 06:50]    Urinalysis - [01-23-24 @ 05:39]      Color  / Appearance  / SG  / pH       Gluc 99 / Ketone   / Bili  / Urobili        Blood  / Protein  / Leuk Est  / Nitrite       RBC  / WBC  / Hyaline  / Gran  / Sq Epi  / Non Sq Epi  / Bacteria         HBsAb 29.1      [01-19-24 @ 10:45]     [facility] Division of Kidney Diseases & Hypertension  FOLLOW UP NOTE  [phone]--------------------------------------------------------------------------------  Chief Complaint: Chronic kidney disease    24 hour events/subjective:  Pt was seen and examined at the bedside. No acute overnight events. No fresh complaints. Pt was dialyzed yesterday. Tolerated it well. Pt was explained about his condition, probable requirement of long term dialysis, renal biopsy using pacific interpreters. Pt denies SOB/ Constipation/ Diarrhea/ Nausea/ Vomiting/ abdominal pain/ chest pain/ tingling/ numbness.     PAST HISTORY  --------------------------------------------------------------------------------  No significant changes to PMH, PSH, FHx, SHx, unless otherwise noted    ALLERGIES & MEDICATIONS  --------------------------------------------------------------------------------  Allergies    No Known Allergies    Intolerances      Standing Inpatient Medications  acetaminophen     Tablet .. 650 milliGRAM(s) Oral every 6 hours  amLODIPine   Tablet 5 milliGRAM(s) Oral daily  atorvastatin 40 milliGRAM(s) Oral at bedtime  calcium acetate 2001 milliGRAM(s) Oral three times a day with meals  chlorhexidine 2% Cloths 1 Application(s) Topical daily  chlorhexidine 4% Liquid 1 Application(s) Topical <User Schedule>  ergocalciferol 50786 Unit(s) Oral every week  hydrALAZINE 50 milliGRAM(s) Oral every 8 hours  influenza   Vaccine 0.5 milliLiter(s) IntraMuscular once  labetalol 200 milliGRAM(s) Oral every 8 hours  penicillin   G benzathine Injectable 2.4 Million Unit(s) IntraMuscular every 7 days    PRN Inpatient Medications  aluminum hydroxide/magnesium hydroxide/simethicone Suspension 30 milliLiter(s) Oral every 4 hours PRN  melatonin 3 milliGRAM(s) Oral at bedtime PRN  sodium chloride 0.9% lock flush 10 milliLiter(s) IV Push every 1 hour PRN      REVIEW OF SYSTEMS  --------------------------------------------------------------------------------  as above.     VITALS/PHYSICAL EXAM  --------------------------------------------------------------------------------  T(C): 36.4 (01-23-24 @ 05:00), Max: 36.8 (01-22-24 @ 13:40)  HR: 64 (01-23-24 @ 08:40) (64 - 76)  BP: 122/73 (01-23-24 @ 08:40) (122/73 - 149/87)  RR: 18 (01-23-24 @ 05:00) (16 - 18)  SpO2: 99% (01-23-24 @ 05:00) (98% - 99%)  Wt(kg): --        01-22-24 @ 07:01  -  01-23-24 @ 07:00  --------------------------------------------------------  IN: 510 mL / OUT: 2450 mL / NET: -1940 mL      Physical Exam:  Gen: NAD  HEENT: anicteric  Pulm: CTA B/L  CV: RRR, S1S2; no rub  Abd: soft  : No suprapubic tenderness  Skin: Warm, without rashes  Rt IJ THD cath.    LABS/STUDIES  --------------------------------------------------------------------------------              9.3    4.80  >-----------<  241      [01-23-24 @ 05:39]              27.5     132  |  96  |  46  ----------------------------<  99      [01-23-24 @ 05:39]  5.0   |  25  |  7.57        Ca     7.7     [01-23-24 @ 05:39]      Mg     2.40     [01-23-24 @ 05:39]      Phos  4.5     [01-23-24 @ 05:39]    TPro  6.5  /  Alb  3.5  /  TBili  0.3  /  DBili  x   /  AST  22  /  ALT  20  /  AlkPhos  107  [01-23-24 @ 05:39]    PT/INR: PT 9.6  , INR <0.90      [01-23-24 @ 05:39]  PTT: 27.3       [01-23-24 @ 05:39]      Creatinine Trend:  SCr 7.57 [01-23 @ 05:39]  SCr 10.49 [01-22 @ 04:36]  SCr 10.23 [01-22 @ 02:55]  SCr 9.00 [01-21 @ 05:45]  SCr 6.92 [01-20 @ 06:50]    Urinalysis - [01-23-24 @ 05:39]      Color  / Appearance  / SG  / pH       Gluc 99 / Ketone   / Bili  / Urobili        Blood  / Protein  / Leuk Est  / Nitrite       RBC  / WBC  / Hyaline  / Gran  / Sq Epi  / Non Sq Epi  / Bacteria         HBsAb 29.1      [01-19-24 @ 10:45]

## 2024-01-23 NOTE — PROCEDURE NOTE - PROCEDURE FINDINGS AND DETAILS
Successful 18G core biopsy. Small retroperitoneal hematoma 1.5cm, stable in size grossly at 5 min delayed imaging

## 2024-01-24 ENCOUNTER — TRANSCRIPTION ENCOUNTER (OUTPATIENT)
Age: 59
End: 2024-01-24

## 2024-01-24 DIAGNOSIS — D64.9 ANEMIA, UNSPECIFIED: ICD-10-CM

## 2024-01-24 LAB
ANION GAP SERPL CALC-SCNC: 12 MMOL/L — SIGNIFICANT CHANGE UP (ref 7–14)
ANION GAP SERPL CALC-SCNC: 14 MMOL/L — SIGNIFICANT CHANGE UP (ref 7–14)
BASOPHILS # BLD AUTO: 0 K/UL — SIGNIFICANT CHANGE UP (ref 0–0.2)
BASOPHILS NFR BLD AUTO: 0 % — SIGNIFICANT CHANGE UP (ref 0–2)
BUN SERPL-MCNC: 25 MG/DL — HIGH (ref 7–23)
BUN SERPL-MCNC: 63 MG/DL — HIGH (ref 7–23)
CALCIUM SERPL-MCNC: 8.1 MG/DL — LOW (ref 8.4–10.5)
CALCIUM SERPL-MCNC: 8.1 MG/DL — LOW (ref 8.4–10.5)
CHLORIDE SERPL-SCNC: 94 MMOL/L — LOW (ref 98–107)
CHLORIDE SERPL-SCNC: 98 MMOL/L — SIGNIFICANT CHANGE UP (ref 98–107)
CO2 SERPL-SCNC: 21 MMOL/L — LOW (ref 22–31)
CO2 SERPL-SCNC: 27 MMOL/L — SIGNIFICANT CHANGE UP (ref 22–31)
CREAT SERPL-MCNC: 5.04 MG/DL — HIGH (ref 0.5–1.3)
CREAT SERPL-MCNC: 8.96 MG/DL — HIGH (ref 0.5–1.3)
EGFR: 12 ML/MIN/1.73M2 — LOW
EGFR: 6 ML/MIN/1.73M2 — LOW
EOSINOPHIL # BLD AUTO: 0 K/UL — SIGNIFICANT CHANGE UP (ref 0–0.5)
EOSINOPHIL NFR BLD AUTO: 0 % — SIGNIFICANT CHANGE UP (ref 0–6)
GLUCOSE SERPL-MCNC: 118 MG/DL — HIGH (ref 70–99)
GLUCOSE SERPL-MCNC: 95 MG/DL — SIGNIFICANT CHANGE UP (ref 70–99)
HCT VFR BLD CALC: 26.4 % — LOW (ref 39–50)
HGB BLD-MCNC: 9 G/DL — LOW (ref 13–17)
IANC: 5.13 K/UL — SIGNIFICANT CHANGE UP (ref 1.8–7.4)
IMM GRANULOCYTES NFR BLD AUTO: 0.5 % — SIGNIFICANT CHANGE UP (ref 0–0.9)
LYMPHOCYTES # BLD AUTO: 0.67 K/UL — LOW (ref 1–3.3)
LYMPHOCYTES # BLD AUTO: 10.8 % — LOW (ref 13–44)
MAGNESIUM SERPL-MCNC: 2.2 MG/DL — SIGNIFICANT CHANGE UP (ref 1.6–2.6)
MAGNESIUM SERPL-MCNC: 2.5 MG/DL — SIGNIFICANT CHANGE UP (ref 1.6–2.6)
MCHC RBC-ENTMCNC: 27.5 PG — SIGNIFICANT CHANGE UP (ref 27–34)
MCHC RBC-ENTMCNC: 34.1 GM/DL — SIGNIFICANT CHANGE UP (ref 32–36)
MCV RBC AUTO: 80.7 FL — SIGNIFICANT CHANGE UP (ref 80–100)
MONOCYTES # BLD AUTO: 0.4 K/UL — SIGNIFICANT CHANGE UP (ref 0–0.9)
MONOCYTES NFR BLD AUTO: 6.4 % — SIGNIFICANT CHANGE UP (ref 2–14)
NEUTROPHILS # BLD AUTO: 5.13 K/UL — SIGNIFICANT CHANGE UP (ref 1.8–7.4)
NEUTROPHILS NFR BLD AUTO: 82.3 % — HIGH (ref 43–77)
NRBC # BLD: 0 /100 WBCS — SIGNIFICANT CHANGE UP (ref 0–0)
NRBC # FLD: 0 K/UL — SIGNIFICANT CHANGE UP (ref 0–0)
PHOSPHATE SERPL-MCNC: 2.8 MG/DL — SIGNIFICANT CHANGE UP (ref 2.5–4.5)
PHOSPHATE SERPL-MCNC: 3.9 MG/DL — SIGNIFICANT CHANGE UP (ref 2.5–4.5)
PLATELET # BLD AUTO: 240 K/UL — SIGNIFICANT CHANGE UP (ref 150–400)
POTASSIUM SERPL-MCNC: 4.2 MMOL/L — SIGNIFICANT CHANGE UP (ref 3.5–5.3)
POTASSIUM SERPL-MCNC: 6.1 MMOL/L — HIGH (ref 3.5–5.3)
POTASSIUM SERPL-SCNC: 4.2 MMOL/L — SIGNIFICANT CHANGE UP (ref 3.5–5.3)
POTASSIUM SERPL-SCNC: 6.1 MMOL/L — HIGH (ref 3.5–5.3)
RBC # BLD: 3.27 M/UL — LOW (ref 4.2–5.8)
RBC # FLD: 14.9 % — HIGH (ref 10.3–14.5)
SODIUM SERPL-SCNC: 129 MMOL/L — LOW (ref 135–145)
SODIUM SERPL-SCNC: 137 MMOL/L — SIGNIFICANT CHANGE UP (ref 135–145)
WBC # BLD: 6.23 K/UL — SIGNIFICANT CHANGE UP (ref 3.8–10.5)
WBC # FLD AUTO: 6.23 K/UL — SIGNIFICANT CHANGE UP (ref 3.8–10.5)

## 2024-01-24 PROCEDURE — 99232 SBSQ HOSP IP/OBS MODERATE 35: CPT | Mod: GC

## 2024-01-24 PROCEDURE — 90935 HEMODIALYSIS ONE EVALUATION: CPT

## 2024-01-24 RX ORDER — ERYTHROPOIETIN 10000 [IU]/ML
4000 INJECTION, SOLUTION INTRAVENOUS; SUBCUTANEOUS
Refills: 0 | Status: DISCONTINUED | OUTPATIENT
Start: 2024-01-24 | End: 2024-01-25

## 2024-01-24 RX ORDER — SODIUM ZIRCONIUM CYCLOSILICATE 10 G/10G
5 POWDER, FOR SUSPENSION ORAL ONCE
Refills: 0 | Status: COMPLETED | OUTPATIENT
Start: 2024-01-24 | End: 2024-01-24

## 2024-01-24 RX ORDER — DEXTROSE 50 % IN WATER 50 %
50 SYRINGE (ML) INTRAVENOUS ONCE
Refills: 0 | Status: DISCONTINUED | OUTPATIENT
Start: 2024-01-24 | End: 2024-01-24

## 2024-01-24 RX ORDER — CALCIUM ACETATE 667 MG
3 TABLET ORAL
Qty: 30 | Refills: 0
Start: 2024-01-24

## 2024-01-24 RX ORDER — PENICILLIN G BENZATHINE 1200000 [IU]/2ML
2.4 INJECTION, SUSPENSION INTRAMUSCULAR ONCE
Refills: 0 | Status: COMPLETED | OUTPATIENT
Start: 2024-01-24 | End: 2024-01-24

## 2024-01-24 RX ORDER — ERYTHROPOIETIN 10000 [IU]/ML
4000 INJECTION, SOLUTION INTRAVENOUS; SUBCUTANEOUS
Qty: 1 | Refills: 0
Start: 2024-01-24

## 2024-01-24 RX ORDER — INSULIN HUMAN 100 [IU]/ML
5 INJECTION, SOLUTION SUBCUTANEOUS ONCE
Refills: 0 | Status: DISCONTINUED | OUTPATIENT
Start: 2024-01-24 | End: 2024-01-24

## 2024-01-24 RX ORDER — LABETALOL HCL 100 MG
1 TABLET ORAL
Qty: 90 | Refills: 0
Start: 2024-01-24 | End: 2024-02-22

## 2024-01-24 RX ORDER — HYDRALAZINE HCL 50 MG
1 TABLET ORAL
Qty: 90 | Refills: 1
Start: 2024-01-24 | End: 2024-03-23

## 2024-01-24 RX ORDER — AMLODIPINE BESYLATE 2.5 MG/1
1 TABLET ORAL
Qty: 30 | Refills: 1
Start: 2024-01-24 | End: 2024-03-23

## 2024-01-24 RX ORDER — PENICILLIN G BENZATHINE 1200000 [IU]/2ML
0 INJECTION, SUSPENSION INTRAMUSCULAR
Qty: 0 | Refills: 0 | DISCHARGE
Start: 2024-01-24

## 2024-01-24 RX ADMIN — ERYTHROPOIETIN 4000 UNIT(S): 10000 INJECTION, SOLUTION INTRAVENOUS; SUBCUTANEOUS at 08:39

## 2024-01-24 RX ADMIN — Medication 2001 MILLIGRAM(S): at 13:44

## 2024-01-24 RX ADMIN — PENICILLIN G BENZATHINE 2.4 MILLION UNIT(S): 1200000 INJECTION, SUSPENSION INTRAMUSCULAR at 17:29

## 2024-01-24 RX ADMIN — Medication 650 MILLIGRAM(S): at 13:44

## 2024-01-24 RX ADMIN — Medication 650 MILLIGRAM(S): at 21:40

## 2024-01-24 RX ADMIN — Medication 50 MILLIGRAM(S): at 17:29

## 2024-01-24 RX ADMIN — ATORVASTATIN CALCIUM 40 MILLIGRAM(S): 80 TABLET, FILM COATED ORAL at 21:41

## 2024-01-24 RX ADMIN — Medication 2001 MILLIGRAM(S): at 17:29

## 2024-01-24 RX ADMIN — CHLORHEXIDINE GLUCONATE 1 APPLICATION(S): 213 SOLUTION TOPICAL at 13:43

## 2024-01-24 RX ADMIN — Medication 200 MILLIGRAM(S): at 13:43

## 2024-01-24 RX ADMIN — Medication 200 MILLIGRAM(S): at 21:40

## 2024-01-24 NOTE — DISCHARGE NOTE NURSING/CASE MANAGEMENT/SOCIAL WORK - NSDCFUADDAPPT_GEN_ALL_CORE_FT
[facility] ([id number] [address]) Phone: [phone]  Located at [address] [place] [address]. Dialysis scheduled for Monday/Wednesday/Friday at 10am. Admission set  for Friday 1/26 at 10am.

## 2024-01-24 NOTE — PROGRESS NOTE ADULT - SUBJECTIVE AND OBJECTIVE BOX
INTERVAL HPI/OVERNIGHT EVENTS:  Patient was seen and examined at bedside. As per nurse and patient, no o/n events, patient resting comfortably. No complaints at this time. Patient denies: fever, chills, dizziness, weakness, HA, Changes in vision, CP, palpitations, SOB, cough, N/V/D/C, dysuria, changes in bowel movements, LE edema. ROS otherwise negative.    VITAL SIGNS:  T(F): 98.5 (01-24-24 @ 06:15)  HR: 76 (01-24-24 @ 06:15)  BP: 141/79 (01-24-24 @ 06:15)  RR: 16 (01-24-24 @ 06:15)  SpO2: 97% (01-24-24 @ 05:00)  Wt(kg): --    PHYSICAL EXAM:    General: WN/WD NAD  Neurology: A&Ox3, nonfocal, SCHAEFER x 4  Eyes: PERRLA/ EOMI, Gross vision intact  ENT/Neck: Neck supple, trachea midline, No JVD, Gross hearing intact  Respiratory: CTA B/L, No wheezing, rales, rhonchi  CV: RRR, +S1/S2, -S3/S4, no murmurs, rubs or gallops  Abdominal: Soft, NT, ND +BS, No HSM  MSK: 5/5 strength UE/LE bilaterally  Extremities: No edema, 2+ peripheral pulses  Skin: No Rashes, Hematoma, Ecchymosis  Incisions:   Tubes:    MEDICATIONS  (STANDING):  acetaminophen     Tablet .. 650 milliGRAM(s) Oral every 6 hours  amLODIPine   Tablet 5 milliGRAM(s) Oral daily  atorvastatin 40 milliGRAM(s) Oral at bedtime  calcium acetate 2001 milliGRAM(s) Oral three times a day with meals  chlorhexidine 2% Cloths 1 Application(s) Topical daily  chlorhexidine 4% Liquid 1 Application(s) Topical <User Schedule>  epoetin stcaie-epbx (RETACRIT) Injectable 4000 Unit(s) IV Push <User Schedule>  ergocalciferol 41556 Unit(s) Oral every week  hydrALAZINE 50 milliGRAM(s) Oral every 8 hours  influenza   Vaccine 0.5 milliLiter(s) IntraMuscular once  labetalol 200 milliGRAM(s) Oral every 8 hours  penicillin   G benzathine Injectable 2.4 Million Unit(s) IntraMuscular every 7 days    MEDICATIONS  (PRN):  aluminum hydroxide/magnesium hydroxide/simethicone Suspension 30 milliLiter(s) Oral every 4 hours PRN Dyspepsia  fentaNYL    Injectable 50 MICROGram(s) IV Push every 15 minutes PRN Severe Pain (7 - 10)  melatonin 3 milliGRAM(s) Oral at bedtime PRN Insomnia  ondansetron Injectable 4 milliGRAM(s) IV Push once PRN Nausea and/or Vomiting  sodium chloride 0.9% lock flush 10 milliLiter(s) IV Push every 1 hour PRN Pre/post blood products, medications, blood draw, and to maintain line patency      Allergies    No Known Allergies    Intolerances        LABS:                        10.3   6.75  )-----------( 258      ( 23 Jan 2024 18:24 )             29.7     01-23    132<L>  |  96<L>  |  46<H>  ----------------------------<  99  5.0   |  25  |  7.57<H>    Ca    7.7<L>      23 Jan 2024 05:39  Phos  4.5     01-23  Mg     2.40     01-23    TPro  6.5  /  Alb  3.5  /  TBili  0.3  /  DBili  x   /  AST  22  /  ALT  20  /  AlkPhos  107  01-23    PT/INR - ( 23 Jan 2024 05:39 )   PT: 9.6 sec;   INR: <0.90 ratio         PTT - ( 23 Jan 2024 05:39 )  PTT:27.3 sec  Urinalysis Basic - ( 23 Jan 2024 05:39 )    Color: x / Appearance: x / SG: x / pH: x  Gluc: 99 mg/dL / Ketone: x  / Bili: x / Urobili: x   Blood: x / Protein: x / Nitrite: x   Leuk Esterase: x / RBC: x / WBC x   Sq Epi: x / Non Sq Epi: x / Bacteria: x        RADIOLOGY & ADDITIONAL TESTS:  Reviewed : Carlos; ID: [id number]    INTERVAL HPI/OVERNIGHT EVENTS:  Patient was seen and examined at bedside. As per nurse and patient, no o/n events, patient resting comfortably. No complaints at this time. Patient denies: fever, chills, dizziness, weakness, HA, Changes in vision, CP, palpitations, SOB, cough, N/V/D/C, dysuria, changes in bowel movements, LE edema. ROS otherwise negative.    VITAL SIGNS:  T(F): 98.5 (01-24-24 @ 06:15)  HR: 76 (01-24-24 @ 06:15)  BP: 141/79 (01-24-24 @ 06:15)  RR: 16 (01-24-24 @ 06:15)  SpO2: 97% (01-24-24 @ 05:00)  Wt(kg): --    PHYSICAL EXAM:    General: WN/WD NAD  Neurology: A&Ox3, nonfocal, SCHAEFER x 4  Eyes: PERRLA/ EOMI, Gross vision intact  ENT/Neck: Neck supple, trachea midline, No JVD, Gross hearing intact  Respiratory: CTA B/L, No wheezing, rales, rhonchi  CV: RRR, +S1/S2, -S3/S4, no murmurs, rubs or gallops  Abdominal: Soft, NT, ND +BS, No HSM  Extremities: No edema, 2+ peripheral pulses  Skin: No Rashes, Hematoma, Ecchymosis      MEDICATIONS  (STANDING):  acetaminophen     Tablet .. 650 milliGRAM(s) Oral every 6 hours  amLODIPine   Tablet 5 milliGRAM(s) Oral daily  atorvastatin 40 milliGRAM(s) Oral at bedtime  calcium acetate 2001 milliGRAM(s) Oral three times a day with meals  chlorhexidine 2% Cloths 1 Application(s) Topical daily  chlorhexidine 4% Liquid 1 Application(s) Topical <User Schedule>  epoetin stacie-epbx (RETACRIT) Injectable 4000 Unit(s) IV Push <User Schedule>  ergocalciferol 90145 Unit(s) Oral every week  hydrALAZINE 50 milliGRAM(s) Oral every 8 hours  influenza   Vaccine 0.5 milliLiter(s) IntraMuscular once  labetalol 200 milliGRAM(s) Oral every 8 hours  penicillin   G benzathine Injectable 2.4 Million Unit(s) IntraMuscular every 7 days    MEDICATIONS  (PRN):  aluminum hydroxide/magnesium hydroxide/simethicone Suspension 30 milliLiter(s) Oral every 4 hours PRN Dyspepsia  fentaNYL    Injectable 50 MICROGram(s) IV Push every 15 minutes PRN Severe Pain (7 - 10)  melatonin 3 milliGRAM(s) Oral at bedtime PRN Insomnia  ondansetron Injectable 4 milliGRAM(s) IV Push once PRN Nausea and/or Vomiting  sodium chloride 0.9% lock flush 10 milliLiter(s) IV Push every 1 hour PRN Pre/post blood products, medications, blood draw, and to maintain line patency      Allergies    No Known Allergies    Intolerances        LABS:                        10.3   6.75  )-----------( 258      ( 23 Jan 2024 18:24 )             29.7     01-23    132<L>  |  96<L>  |  46<H>  ----------------------------<  99  5.0   |  25  |  7.57<H>    Ca    7.7<L>      23 Jan 2024 05:39  Phos  4.5     01-23  Mg     2.40     01-23    TPro  6.5  /  Alb  3.5  /  TBili  0.3  /  DBili  x   /  AST  22  /  ALT  20  /  AlkPhos  107  01-23    PT/INR - ( 23 Jan 2024 05:39 )   PT: 9.6 sec;   INR: <0.90 ratio         PTT - ( 23 Jan 2024 05:39 )  PTT:27.3 sec  Urinalysis Basic - ( 23 Jan 2024 05:39 )    Color: x / Appearance: x / SG: x / pH: x  Gluc: 99 mg/dL / Ketone: x  / Bili: x / Urobili: x   Blood: x / Protein: x / Nitrite: x   Leuk Esterase: x / RBC: x / WBC x   Sq Epi: x / Non Sq Epi: x / Bacteria: x        RADIOLOGY & ADDITIONAL TESTS:  Reviewed

## 2024-01-24 NOTE — PROGRESS NOTE ADULT - ATTENDING COMMENTS
58M with no prior medical care who presents with acute renal failure, course complicated by flu A and ETEC, now resolved. Pt transferred from  to [facility] for IR renal biopsy and permacath placement. S/p renal bx, d/c home with o/p HD.       # Acute renal failure  - s/p permacath placement 1/18  - s/p renal bx Spoke to nephro, plan for follow up of pathology reports o/p  - HD per nephrology: already accepted at o/p HD center  -o/p vascular eval for fistula    # HTN  - amlodipine restarted  - c/w hydralazine and labetalol  - goal BP <150/90    # +syphilis screen  - treponema testing +, RPR negative  - pt with no previous known diagnosis  - 2nd dose of IM PCN today, 1/24. Will need one more dose. Spoke to  family medicine practice; pt is willing to go there for f/u. He needs third dose then       #Dispo:   -d/c today  with o/p HD finalization    Pt is medically stable for discharge. A total of 38 minutes were spent on discharge coordination.

## 2024-01-24 NOTE — DISCHARGE NOTE NURSING/CASE MANAGEMENT/SOCIAL WORK - PATIENT PORTAL LINK FT
You can access the FollowMyHealth Patient Portal offered by [facility] by registering at the following website: [email or web address]. By joining [facility]’s FollowMyHealth portal, you will also be able to view your health information using other applications (apps) compatible with our system.

## 2024-01-24 NOTE — PROGRESS NOTE ADULT - SUBJECTIVE AND OBJECTIVE BOX
[facility] Division of Kidney Diseases & Hypertension  FOLLOW UP NOTE  [phone]--------------------------------------------------------------------------------  Chief Complaint: Chronic kidney disease    24 hour events/subjective:  Pt was seen at the bedside during HD. Pt got renal biopsy and has bleeding was noted after the procedure. He is hemodynamically stable for now. No other acute overnight events. No fresh complaints.    Pt denies SOB/ Constipation/ Diarrhea/ Nausea/ Vomiting/ abdominal pain/ chest pain/ tingling/ numbness.         PAST HISTORY  --------------------------------------------------------------------------------  No significant changes to PMH, PSH, FHx, SHx, unless otherwise noted    ALLERGIES & MEDICATIONS  --------------------------------------------------------------------------------  Allergies    No Known Allergies    Intolerances      Standing Inpatient Medications  acetaminophen     Tablet .. 650 milliGRAM(s) Oral every 6 hours  amLODIPine   Tablet 5 milliGRAM(s) Oral daily  atorvastatin 40 milliGRAM(s) Oral at bedtime  calcium acetate 2001 milliGRAM(s) Oral three times a day with meals  chlorhexidine 2% Cloths 1 Application(s) Topical daily  chlorhexidine 4% Liquid 1 Application(s) Topical <User Schedule>  epoetin stacie (EPOGEN) Injectable 4000 Unit(s) IV Push <User Schedule>  ergocalciferol 76533 Unit(s) Oral every week  hydrALAZINE 50 milliGRAM(s) Oral every 8 hours  influenza   Vaccine 0.5 milliLiter(s) IntraMuscular once  labetalol 200 milliGRAM(s) Oral every 8 hours  penicillin   G benzathine Injectable 2.4 Million Unit(s) IntraMuscular every 7 days  sodium zirconium cyclosilicate 5 Gram(s) Oral once    PRN Inpatient Medications  aluminum hydroxide/magnesium hydroxide/simethicone Suspension 30 milliLiter(s) Oral every 4 hours PRN  fentaNYL    Injectable 50 MICROGram(s) IV Push every 15 minutes PRN  melatonin 3 milliGRAM(s) Oral at bedtime PRN  ondansetron Injectable 4 milliGRAM(s) IV Push once PRN  sodium chloride 0.9% lock flush 10 milliLiter(s) IV Push every 1 hour PRN      REVIEW OF SYSTEMS  --------------------------------------------------------------------------------  as above.     VITALS/PHYSICAL EXAM  --------------------------------------------------------------------------------  T(C): 37.2 (01-24-24 @ 09:57), Max: 37.2 (01-24-24 @ 09:57)  HR: 78 (01-24-24 @ 09:57) (72 - 86)  BP: 144/71 (01-24-24 @ 09:57) (129/69 - 164/93)  RR: 16 (01-24-24 @ 09:57) (16 - 18)  SpO2: 97% (01-24-24 @ 05:00) (97% - 100%)  Wt(kg): --        Physical Exam:  Gen: NAD  HEENT: anicteric  Pulm: CTA B/L  CV: RRR, S1S2; no rub  Abd: soft  : No suprapubic tenderness  Skin: Warm, without rashes  Rt IJ THD cath.    LABS/STUDIES  --------------------------------------------------------------------------------              9.0    6.23  >-----------<  240      [01-24-24 @ 07:06]              26.4     129  |  94  |  63  ----------------------------<  118      [01-24-24 @ 07:06]  6.1   |  21  |  8.96        Ca     8.1     [01-24-24 @ 07:06]      Mg     2.50     [01-24-24 @ 07:06]      Phos  2.8     [01-24-24 @ 07:06]    TPro  6.5  /  Alb  3.5  /  TBili  0.3  /  DBili  x   /  AST  22  /  ALT  20  /  AlkPhos  107  [01-23-24 @ 05:39]    PT/INR: PT 9.6  , INR <0.90      [01-23-24 @ 05:39]  PTT: 27.3       [01-23-24 @ 05:39]      Creatinine Trend:  SCr 8.96 [01-24 @ 07:06]  SCr 7.57 [01-23 @ 05:39]  SCr 10.49 [01-22 @ 04:36]  SCr 10.23 [01-22 @ 02:55]  SCr 9.00 [01-21 @ 05:45]    Urinalysis - [01-24-24 @ 07:06]      Color  / Appearance  / SG  / pH       Gluc 118 / Ketone   / Bili  / Urobili        Blood  / Protein  / Leuk Est  / Nitrite       RBC  / WBC  / Hyaline  / Gran  / Sq Epi  / Non Sq Epi  / Bacteria         HBsAb 29.1      [01-19-24 @ 10:45]

## 2024-01-24 NOTE — PROGRESS NOTE ADULT - PROBLEM SELECTOR PLAN 2
- New ESRD with temp HD cath placement on 1/6/24  - Record of HD: 1/6, 1/8, 1/10, 1/11, 1/12 @ [facility]  - Transfer to [facility] 1/12 for HD cath placement (perm)  - [facility]: Chikis Laird [phone]  - IR consult for HD placement and renal biopsy in the setting of nephrotic proteinuria  - Nephrology consulted  - Vitamin D hydroxy low, PTH elevated  - Started Vit D supplementation  - Kidney biopsy to occur 1/23 - New ESRD with temp HD cath placement on 1/6/24  - Record of HD: 1/6, 1/8, 1/10, 1/11, 1/12 @ [facility]  - Transfer to [facility] 1/12 for HD cath placement (perm)  - [facility]: Chikis Laird [phone]  - IR consult for HD placement and renal biopsy in the setting of nephrotic proteinuria  - Nephrology following  - Vitamin D hydroxy low, PTH elevated  - Started Vit D supplementation  - Kidney biopsy as above

## 2024-01-24 NOTE — PROGRESS NOTE ADULT - PROBLEM SELECTOR PLAN 6
Pt denies any previous hx of HTN, however, per chart review pt has hx of taking hypertensive medication    - Initially admitted to  for hypertensive emergency and renal failure, s/p hydralazine and cardio eval   - c/w labetalol 200q8 and hydralazine 50q8 with goal of <150/90 prior to renal biopsy  - Start home amlodipine 5mg qd

## 2024-01-24 NOTE — PROGRESS NOTE ADULT - PROBLEM SELECTOR PLAN 1
DESIREE with proteinuria. R/o GN. No known baseline renal fx available. Recent admission at [place] [facility] (Froy 3), received contrast (Froy 3) for cardiac work up. Urine positive for 3.6 g protein and RBC 12 / HPF. S/p placement of temp HD cath to initiate dialysis on 1/6/24.  Pt  transferred to [facility] for placement of tunneled HD catheter and work up for DESIREE.  Serology at [facility] was positive for DAVID 1:160 homogenous and speckled with indeterminate ANCA +. Kidney biopsy was done yesterday and there was bleeding. He is hemodynamically stable now.  Resumed amlodipine 5 for better BP control. BP better.  Patient tolerating  HD sessions well. He was on HD and was tolerating the HD well. Patient needs Strict I/O and avoid nephrotoxins as much as much as possible. Dose the medications to ESRD dosing. Rest of the management as per the primary team. DESIREE with proteinuria. R/o GN. No known baseline renal fx available. Recent admission at [place] [facility] (Froy 3), received contrast (Froy 3) for cardiac work up. Urine positive for 3.6 g protein and RBC 12 / HPF. S/p placement of temp HD cath to initiate dialysis on 1/6/24.  Pt  transferred to [facility] for placement of tunneled HD catheter and work up for DESIREE.  Serology at [facility] was positive for DAVID 1:160 homogenous and speckled with indeterminate ANCA +. Kidney biopsy was done yesterday and there was minor hematoma.  He is hemodynamically stable now.  Resumed amlodipine 5 for better BP control. BP better.  Patient tolerating  HD sessions well. He was on HD and was tolerating the HD well this morning. Patient needs Strict I/O and avoid nephrotoxins as much as much as possible. Dose the medications to ESRD dosing. Rest of the management as per the primary team.

## 2024-01-24 NOTE — DISCHARGE NOTE NURSING/CASE MANAGEMENT/SOCIAL WORK - NSDCCRNAME_GEN_ALL_CORE_FT
[facility] ([id number] [address]) Phone: [phone]  Located at [address] [place] [address]. Dialysis scheduled for Monday/Wednesday/Friday at 10am. Admission set  for Friday 1/26 at 10am.

## 2024-01-24 NOTE — PROGRESS NOTE ADULT - ASSESSMENT
59yo M with no known PMHx (not followed by PCP), but FHx (brother) w/ESRD s/p transplant who presented to [facility] with n/v/abdominal pain, admitted for renal failure and r/o ACS. Hospital course c/b flu A, sepsis 2/2 ETEC, anemia and NSTEMI evaluation s/p stress test. Now pt transferred to [facility] and pending renal bx and perm HD cath placed

## 2024-01-24 NOTE — PROGRESS NOTE ADULT - PROBLEM SELECTOR PLAN 1
Admitted to  for renal failure on CKD stage 3 likely 2/2 dehydration and hypertensive emergency from 1/3/24    - S/p CT w/IV dye 1/3/23 possibly contributing to rise in Cr per  nephrology notes  - 24 hour urine collection positive for nephrotic range proteinuria; SPEP, renal serologies negative   - CT A/P w/o hydro; Started on HD 1/6 via temp HD cath  - Pt requires chronic HD, transition from temp to perm HD cath and renal bx, HD catheter placed 1/06  - Transferred to [facility] on 1/12 for HD perm and bx, IR consult order place, nephrology consulted as well to inform of transfer  - s/p permcath placement 1/18; renal biopsy was canceled at that time due to poor window and hypertension     - CT electrolytes, creatinine, strict I/Os, and cath site   - Renal biopsy scheduled for 1/23 Admitted to  for renal failure on CKD stage 3 likely 2/2 dehydration and hypertensive emergency from 1/3/24    - S/p CT w/IV dye 1/3/23 possibly contributing to rise in Cr per  nephrology notes  - 24 hour urine collection positive for nephrotic range proteinuria; SPEP, renal serologies negative   - CT A/P w/o hydro; Started on HD 1/6 via temp HD cath  - Pt requires chronic HD, transition from temp to perm HD cath and renal bx, HD catheter placed 1/06  - Transferred to [facility] on 1/12 for HD perm and bx, IR consult order place, nephrology consulted as well to inform of transfer  - s/p permcath placement 1/18; renal biopsy was canceled at that time due to poor window and hypertension     - CTM electrolytes, creatinine, strict I/Os, and cath site   - Renal biopsy occurred 1/23, returned with evidence of chronic changes  - Per nephrology, patient requires evaluation from vascular surgery for AV fistula planning prior to discharge and acceptance at dialysis center  - Will consult vascular surgery in AM

## 2024-01-24 NOTE — PROGRESS NOTE ADULT - ATTENDING COMMENTS
seen and evaluated on HD this morning.  tolerating well.  no flank pain.  tolerated biopsy.  will follow up results.

## 2024-01-24 NOTE — PROGRESS NOTE ADULT - PROBLEM SELECTOR PLAN 3
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

## 2024-01-24 NOTE — DISCHARGE NOTE NURSING/CASE MANAGEMENT/SOCIAL WORK - NSDCPEFALRISK_GEN_ALL_CORE
For information on Fall & Injury Prevention, visit: [email or web address] OR  [email or web address] OR  https://www.cdc.gov/steadi/patient.html

## 2024-01-25 VITALS — SYSTOLIC BLOOD PRESSURE: 152 MMHG | HEART RATE: 76 BPM | DIASTOLIC BLOOD PRESSURE: 94 MMHG

## 2024-01-25 DIAGNOSIS — E87.5 HYPERKALEMIA: ICD-10-CM

## 2024-01-25 LAB
ANION GAP SERPL CALC-SCNC: 12 MMOL/L — SIGNIFICANT CHANGE UP (ref 7–14)
BUN SERPL-MCNC: 35 MG/DL — HIGH (ref 7–23)
CALCIUM SERPL-MCNC: 7.7 MG/DL — LOW (ref 8.4–10.5)
CHLORIDE SERPL-SCNC: 98 MMOL/L — SIGNIFICANT CHANGE UP (ref 98–107)
CO2 SERPL-SCNC: 26 MMOL/L — SIGNIFICANT CHANGE UP (ref 22–31)
CREAT SERPL-MCNC: 6.42 MG/DL — HIGH (ref 0.5–1.3)
EGFR: 9 ML/MIN/1.73M2 — LOW
GLUCOSE SERPL-MCNC: 74 MG/DL — SIGNIFICANT CHANGE UP (ref 70–99)
HCT VFR BLD CALC: 27.2 % — LOW (ref 39–50)
HGB BLD-MCNC: 9.3 G/DL — LOW (ref 13–17)
MAGNESIUM SERPL-MCNC: 2.1 MG/DL — SIGNIFICANT CHANGE UP (ref 1.6–2.6)
MCHC RBC-ENTMCNC: 28.3 PG — SIGNIFICANT CHANGE UP (ref 27–34)
MCHC RBC-ENTMCNC: 34.2 GM/DL — SIGNIFICANT CHANGE UP (ref 32–36)
MCV RBC AUTO: 82.7 FL — SIGNIFICANT CHANGE UP (ref 80–100)
NRBC # BLD: 0 /100 WBCS — SIGNIFICANT CHANGE UP (ref 0–0)
NRBC # FLD: 0 K/UL — SIGNIFICANT CHANGE UP (ref 0–0)
PHOSPHATE SERPL-MCNC: 4.1 MG/DL — SIGNIFICANT CHANGE UP (ref 2.5–4.5)
PLATELET # BLD AUTO: 257 K/UL — SIGNIFICANT CHANGE UP (ref 150–400)
POTASSIUM SERPL-MCNC: 4.9 MMOL/L — SIGNIFICANT CHANGE UP (ref 3.5–5.3)
POTASSIUM SERPL-SCNC: 4.9 MMOL/L — SIGNIFICANT CHANGE UP (ref 3.5–5.3)
RBC # BLD: 3.29 M/UL — LOW (ref 4.2–5.8)
RBC # FLD: 15.3 % — HIGH (ref 10.3–14.5)
SODIUM SERPL-SCNC: 136 MMOL/L — SIGNIFICANT CHANGE UP (ref 135–145)
WBC # BLD: 8 K/UL — SIGNIFICANT CHANGE UP (ref 3.8–10.5)
WBC # FLD AUTO: 8 K/UL — SIGNIFICANT CHANGE UP (ref 3.8–10.5)

## 2024-01-25 PROCEDURE — 99232 SBSQ HOSP IP/OBS MODERATE 35: CPT

## 2024-01-25 PROCEDURE — 99239 HOSP IP/OBS DSCHRG MGMT >30: CPT | Mod: GC

## 2024-01-25 RX ADMIN — Medication 2001 MILLIGRAM(S): at 08:46

## 2024-01-25 RX ADMIN — Medication 50 MILLIGRAM(S): at 17:37

## 2024-01-25 RX ADMIN — Medication 200 MILLIGRAM(S): at 06:18

## 2024-01-25 RX ADMIN — Medication 2001 MILLIGRAM(S): at 17:37

## 2024-01-25 RX ADMIN — Medication 650 MILLIGRAM(S): at 08:45

## 2024-01-25 RX ADMIN — Medication 50 MILLIGRAM(S): at 08:49

## 2024-01-25 RX ADMIN — AMLODIPINE BESYLATE 5 MILLIGRAM(S): 2.5 TABLET ORAL at 06:18

## 2024-01-25 NOTE — PROGRESS NOTE ADULT - ATTENDING COMMENTS
58M with no prior medical care who presents with acute renal failure, course complicated by flu A and ETEC, now resolved. Pt transferred from  to [facility] for IR renal biopsy and permacath placement. S/p renal bx, d/c home with o/p HD.     Pt's d/c yesterday was postponed as nephrology requested a vascular consult. Pt seen by vascular and plan for d/c home.     # Acute renal failure  - s/p permacath placement 1/18  - s/p renal bx Spoke to nephro, plan for follow up of pathology reports o/p  - HD per nephrology: already accepted at o/p HD center  -o/p vascular eval for fistula    # HTN  - amlodipine restarted  - c/w hydralazine and labetalol  - goal BP <150/90    # +syphilis screen  - treponema testing +, RPR negative  - pt with no previous known diagnosis  - 2nd dose of IM PCN today, 1/24. Will need one more dose. Spoke to  family medicine practice; pt is willing to go there for f/u. He needs third dose then       #Dispo:   -d/c today with HD. Documented appts for primary care at [place] resident clinic and vascular.     Pt is medically stable for discharge. A total of 38 minutes were spent on discharge coordination.

## 2024-01-25 NOTE — CONSULT NOTE ADULT - ATTENDING COMMENTS
59 year old man with end stage renal disease dependent on renal dialysis  long term hemodialysis access planning  right hand dominant  left upper extremity precautions  obtain bilateral upper extremity vein mapping  appreciate medical and cardiac risk stratification and optimization  will tentatively plan for OR on Tuesday 01/30 for left upper extremity arteriovenous fistula creation, possible graft  will follow

## 2024-01-25 NOTE — PROGRESS NOTE ADULT - PROBLEM SELECTOR PLAN 3
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

## 2024-01-25 NOTE — PROGRESS NOTE ADULT - PROBLEM SELECTOR PLAN 1
Admitted to  for renal failure on CKD stage 3 likely 2/2 dehydration and hypertensive emergency from 1/3/24    - S/p CT w/IV dye 1/3/23 possibly contributing to rise in Cr per  nephrology notes  - 24 hour urine collection positive for nephrotic range proteinuria; SPEP, renal serologies negative   - CT A/P w/o hydro; Started on HD 1/6 via temp HD cath  - Pt requires chronic HD, transition from temp to perm HD cath and renal bx, HD catheter placed 1/06  - Transferred to [facility] on 1/12 for HD perm and bx, IR consult order place, nephrology consulted as well to inform of transfer  - s/p permcath placement 1/18; renal biopsy was canceled at that time due to poor window and hypertension     - CTM electrolytes, creatinine, strict I/Os, and cath site   - Renal biopsy occurred 1/23, returned with evidence of chronic changes  - Per nephrology, patient requires evaluation from vascular surgery for AV fistula planning prior to discharge and acceptance at dialysis center  - Will consult vascular surgery in AM Admitted to  for renal failure on CKD stage 3 likely 2/2 dehydration and hypertensive emergency from 1/3/24    - S/p CT w/IV dye 1/3/23 possibly contributing to rise in Cr per  nephrology notes  - 24 hour urine collection positive for nephrotic range proteinuria; SPEP, renal serologies negative   - CT A/P w/o hydro; Started on HD 1/6 via temp HD cath  - Pt requires chronic HD, transition from temp to perm HD cath and renal bx, HD catheter placed 1/06  - Transferred to [facility] on 1/12 for HD perm and bx, IR consult order place, nephrology consulted as well to inform of transfer  - s/p permcath placement 1/18; renal biopsy was canceled at that time due to poor window and hypertension     - CTM electrolytes, creatinine, strict I/Os, and cath site   - Renal biopsy occurred 1/23, returned with evidence of chronic changes  - Per nephrology, patient requires evaluation from vascular surgery for AV fistula planning prior to discharge and acceptance at dialysis center  - Vascular consulted, outpatient appointment made

## 2024-01-25 NOTE — PROGRESS NOTE ADULT - PROBLEM SELECTOR PROBLEM 1
DESIREE (acute kidney injury)
Acute renal failure
DESIREE (acute kidney injury)
Acute renal failure

## 2024-01-25 NOTE — PROGRESS NOTE ADULT - SUBJECTIVE AND OBJECTIVE BOX
[facility] Division of Kidney Diseases & Hypertension  FOLLOW UP NOTE  [phone]--------------------------------------------------------------------------------  Chief Complaint: Chronic kidney disease    24 hour events/subjective:   Pt was seen and examined at the bedside. No acute overnight events. No fresh complaints. We conveyed the biopsy results using [employer] services. He verbalized the understanding.   Pt denies SOB/ Constipation/ Diarrhea/ Nausea/ Vomiting/ abdominal pain/ chest pain/ tingling/ numbness.         PAST HISTORY  --------------------------------------------------------------------------------  No significant changes to PMH, PSH, FHx, SHx, unless otherwise noted    ALLERGIES & MEDICATIONS  --------------------------------------------------------------------------------  Allergies    No Known Allergies    Intolerances      Standing Inpatient Medications  acetaminophen     Tablet .. 650 milliGRAM(s) Oral every 6 hours  amLODIPine   Tablet 5 milliGRAM(s) Oral daily  atorvastatin 40 milliGRAM(s) Oral at bedtime  calcium acetate 2001 milliGRAM(s) Oral three times a day with meals  chlorhexidine 2% Cloths 1 Application(s) Topical daily  chlorhexidine 4% Liquid 1 Application(s) Topical <User Schedule>  epoetin stacie (EPOGEN) Injectable 4000 Unit(s) IV Push <User Schedule>  ergocalciferol 22440 Unit(s) Oral every week  hydrALAZINE 50 milliGRAM(s) Oral every 8 hours  influenza   Vaccine 0.5 milliLiter(s) IntraMuscular once  labetalol 200 milliGRAM(s) Oral every 8 hours    PRN Inpatient Medications  aluminum hydroxide/magnesium hydroxide/simethicone Suspension 30 milliLiter(s) Oral every 4 hours PRN  melatonin 3 milliGRAM(s) Oral at bedtime PRN  sodium chloride 0.9% lock flush 10 milliLiter(s) IV Push every 1 hour PRN      REVIEW OF SYSTEMS  --------------------------------------------------------------------------------  as above.     VITALS/PHYSICAL EXAM  --------------------------------------------------------------------------------  T(C): 36.6 (01-25-24 @ 05:37), Max: 36.9 (01-25-24 @ 01:03)  HR: 73 (01-25-24 @ 08:45) (71 - 86)  BP: 146/88 (01-25-24 @ 08:45) (128/72 - 168/98)  RR: 18 (01-25-24 @ 05:37) (17 - 18)  SpO2: 100% (01-25-24 @ 05:37) (97% - 100%)  Wt(kg): --        Physical Exam:  Gen: NAD  HEENT: anicteric  Pulm: CTA B/L  CV: RRR, S1S2; no rub  Abd: soft  : No suprapubic tenderness  Skin: Warm, without rashes  Rt IJ THD cath.    LABS/STUDIES  --------------------------------------------------------------------------------              9.3    8.00  >-----------<  257      [01-25-24 @ 06:29]              27.2     136  |  98  |  35  ----------------------------<  74      [01-25-24 @ 06:29]  4.9   |  26  |  6.42        Ca     7.7     [01-25-24 @ 06:29]      Mg     2.10     [01-25-24 @ 06:29]      Phos  4.1     [01-25-24 @ 06:29]            Creatinine Trend:  SCr 6.42 [01-25 @ 06:29]  SCr 5.04 [01-24 @ 16:34]  SCr 8.96 [01-24 @ 07:06]  SCr 7.57 [01-23 @ 05:39]  SCr 10.49 [01-22 @ 04:36]    Urinalysis - [01-25-24 @ 06:29]      Color  / Appearance  / SG  / pH       Gluc 74 / Ketone   / Bili  / Urobili        Blood  / Protein  / Leuk Est  / Nitrite       RBC  / WBC  / Hyaline  / Gran  / Sq Epi  / Non Sq Epi  / Bacteria         HBsAb 29.1      [01-19-24 @ 10:45]     [facility] Division of Kidney Diseases & Hypertension  FOLLOW UP NOTE  [phone]--------------------------------------------------------------------------------    Chief Complaint: Chronic kidney disease    24 hour events/subjective:  Pt was seen and examined at the bedside. No acute overnight events. No fresh complaints. We conveyed the biopsy results using [employer] services. He verbalized the understanding.   Pt denies SOB/ Constipation/ Diarrhea/ Nausea/ Vomiting/ abdominal pain/ chest pain/ tingling/ numbness.     PAST HISTORY  --------------------------------------------------------------------------------  No significant changes to PMH, PSH, FHx, SHx, unless otherwise noted    ALLERGIES & MEDICATIONS  --------------------------------------------------------------------------------  Allergies    No Known Allergies    Intolerances      Standing Inpatient Medications  acetaminophen     Tablet .. 650 milliGRAM(s) Oral every 6 hours  amLODIPine   Tablet 5 milliGRAM(s) Oral daily  atorvastatin 40 milliGRAM(s) Oral at bedtime  calcium acetate 2001 milliGRAM(s) Oral three times a day with meals  chlorhexidine 2% Cloths 1 Application(s) Topical daily  chlorhexidine 4% Liquid 1 Application(s) Topical <User Schedule>  epoetin stacie (EPOGEN) Injectable 4000 Unit(s) IV Push <User Schedule>  ergocalciferol 67408 Unit(s) Oral every week  hydrALAZINE 50 milliGRAM(s) Oral every 8 hours  influenza   Vaccine 0.5 milliLiter(s) IntraMuscular once  labetalol 200 milliGRAM(s) Oral every 8 hours    PRN Inpatient Medications  aluminum hydroxide/magnesium hydroxide/simethicone Suspension 30 milliLiter(s) Oral every 4 hours PRN  melatonin 3 milliGRAM(s) Oral at bedtime PRN  sodium chloride 0.9% lock flush 10 milliLiter(s) IV Push every 1 hour PRN      REVIEW OF SYSTEMS  --------------------------------------------------------------------------------  as above.     VITALS/PHYSICAL EXAM  --------------------------------------------------------------------------------  T(C): 36.6 (01-25-24 @ 05:37), Max: 36.9 (01-25-24 @ 01:03)  HR: 73 (01-25-24 @ 08:45) (71 - 86)  BP: 146/88 (01-25-24 @ 08:45) (128/72 - 168/98)  RR: 18 (01-25-24 @ 05:37) (17 - 18)  SpO2: 100% (01-25-24 @ 05:37) (97% - 100%)  Wt(kg): --        Physical Exam:  Gen: NAD  HEENT: anicteric  Pulm: CTA B/L  CV: RRR, S1S2; no rub  Abd: soft  : No suprapubic tenderness  Skin: Warm, without rashes  Rt IJ THD cath.    LABS/STUDIES  --------------------------------------------------------------------------------              9.3    8.00  >-----------<  257      [01-25-24 @ 06:29]              27.2     136  |  98  |  35  ----------------------------<  74      [01-25-24 @ 06:29]  4.9   |  26  |  6.42        Ca     7.7     [01-25-24 @ 06:29]      Mg     2.10     [01-25-24 @ 06:29]      Phos  4.1     [01-25-24 @ 06:29]            Creatinine Trend:  SCr 6.42 [01-25 @ 06:29]  SCr 5.04 [01-24 @ 16:34]  SCr 8.96 [01-24 @ 07:06]  SCr 7.57 [01-23 @ 05:39]  SCr 10.49 [01-22 @ 04:36]    Urinalysis - [01-25-24 @ 06:29]      Color  / Appearance  / SG  / pH       Gluc 74 / Ketone   / Bili  / Urobili        Blood  / Protein  / Leuk Est  / Nitrite       RBC  / WBC  / Hyaline  / Gran  / Sq Epi  / Non Sq Epi  / Bacteria         HBsAb 29.1      [01-19-24 @ 10:45]

## 2024-01-25 NOTE — PROGRESS NOTE ADULT - ASSESSMENT
57yo M with no known PMHx (not followed by PCP), but FHx (brother) w/ESRD s/p transplant who presented to [facility] with n/v/abdominal pain, admitted for renal failure and r/o ACS. Hospital course c/b flu A, sepsis 2/2 ETEC, anemia and NSTEMI evaluation s/p stress test. Now pt transferred to [facility] and pending renal bx and perm HD cath placed

## 2024-01-25 NOTE — PROGRESS NOTE ADULT - PROBLEM SELECTOR PLAN 3
Pt had hyperkalemia. He was given lokelma intermittently. But pt was drinking a lot of coconut water. Pt was educated about high K in coconut water. We recommend Low K diet. Rest of the management as per the primary team.

## 2024-01-25 NOTE — PROGRESS NOTE ADULT - ATTENDING SUPERVISION STATEMENT
Fellow
Resident

## 2024-01-25 NOTE — PROGRESS NOTE ADULT - PROBLEM SELECTOR PLAN 1
DESIREE with proteinuria. R/o GN. No known baseline renal fx available. Recent admission at [place] Cr 17 (Froy 3), received contrast (Froy 3) for cardiac work up. Urine positive for 3.6 g protein and RBC 12 / HPF. S/p placement of temp HD cath to initiate dialysis on 1/6/24.  Pt  transferred to [facility] for placement of tunneled HD catheter and work up for DESIREE.  Serology at [facility] was positive for DAVID 1:160 homogenous and speckled with indeterminate ANCA +. Kidney biopsy was done yesterday and there was minor hematoma. Biopsy results - Prelim showed severe IFTA. He is hemodynamically stable now.  Resumed amlodipine 5 for better BP control. BP better.  Patient tolerating  HD sessions well. He needs vascular surgery assessment for AVF. Patient needs Strict I/O and avoid nephrotoxins as much as much as possible. Dose the medications to ESRD dosing. Rest of the management as per the primary team. DESIREE with proteinuria. R/o GN. No known baseline renal fx available. Recent admission at [facility] Cr 17 (Froy 3), received contrast (Froy 3) for cardiac work up. Urine positive for 3.6 g protein and RBC 12 / HPF. S/p placement of temp HD cath to initiate dialysis on 1/6/24.  Pt  transferred to [facility] for placement of tunneled HD catheter and work up for DESIREE.  Serology at [facility] was positive for DAVID 1:160 homogenous and speckled with indeterminate ANCA +. Kidney biopsy was done prelim showed severe IFTA.  Patient tolerated HD session yesterday.  He needs vascular surgery assessment for AVF which can be done as an outpatient. Plan next HD tomorrow in patient vs outpatient.

## 2024-01-25 NOTE — PROGRESS NOTE ADULT - PROVIDER SPECIALTY LIST ADULT
Anesthesia
Internal Medicine
Nephrology
Nephrology
Internal Medicine
Nephrology
Internal Medicine
Nephrology
Internal Medicine
Nephrology
Nephrology
Internal Medicine
Nephrology
Internal Medicine

## 2024-01-25 NOTE — CONSULT NOTE ADULT - SUBJECTIVE AND OBJECTIVE BOX
General Surgery Consult  Consulting surgical team: C Team  Consulting attending: Dr. Hallman    HPI:  59M hx cholecystectomy with newfound CKD3 and presumed chronic HTN (no PCP/questionable previous anti-HTN use) who presented to [place] on 1/3/24 with abdominal pain with assoc n/v/d and chest pain with exertion over four years with no physician follow-up. The patient was found to have Cr 17+, with no known baseline cr and HTN urgency with SBP +200 requiring hydralazine and admitted for renal failure and r/o ACS. At [place], the pt had CT with IV dye on 1/3/24 with concerns for contrast induced nephropathy with subsequent significant proteinuria, ACD requiring 1u pRBC, and placement of temp HD cath to initiate dialysis w/EPO on 1/6/24. Hospital course c/b ETEC sepsis treated with abx/IVF, influenza A managed conservatively, and elevated troponin with peak to 715. Cardiology recommended pursuing non-invasive cardiac workup iso contrast induced nephropathy and had an echo c/f moderate pulm HTN and subsequent nuclear stress test with LVEF 65% and no c/f ischemic injury to myocardial tissue. The patient's cardiology workup was completed and cleared at  and nephrology recommended renal biopsy and perm HD catheter placement with transfer to [facility] for higher level of care on 1/12. He received a tunnelled catheter with IR on 1/25, and underwent a right kidney biopsy with IR on 1/23.     Vascular surgery consulted for AVF creation. Per primary team, nephrology verbally informed them that preliminary biopsy results showed chronic changes, and patient would require vascular evaluation for creation of permanent HD access. Patient is right hand dominant, denies history of surgery or trauma to LUE. Denies hx of PPM.    PAST MEDICAL HISTORY:  H/O gastroesophageal reflux (GERD)  Hypertension  ESRD on dialysis    PAST SURGICAL HISTORY:  S/P Cholecystectomy      MEDICATIONS:  acetaminophen     Tablet .. 650 milliGRAM(s) Oral every 6 hours  aluminum hydroxide/magnesium hydroxide/simethicone Suspension 30 milliLiter(s) Oral every 4 hours PRN  amLODIPine   Tablet 5 milliGRAM(s) Oral daily  atorvastatin 40 milliGRAM(s) Oral at bedtime  calcium acetate 2001 milliGRAM(s) Oral three times a day with meals  chlorhexidine 2% Cloths 1 Application(s) Topical daily  chlorhexidine 4% Liquid 1 Application(s) Topical <User Schedule>  epoetin stacie (EPOGEN) Injectable 4000 Unit(s) IV Push <User Schedule>  ergocalciferol 22082 Unit(s) Oral every week  hydrALAZINE 50 milliGRAM(s) Oral every 8 hours  influenza   Vaccine 0.5 milliLiter(s) IntraMuscular once  labetalol 200 milliGRAM(s) Oral every 8 hours  melatonin 3 milliGRAM(s) Oral at bedtime PRN  sodium chloride 0.9% lock flush 10 milliLiter(s) IV Push every 1 hour PRN      ALLERGIES:  No Known Allergies      VITALS & I/Os:  Vital Signs Last 24 Hrs  T(C): 36.6 (25 Jan 2024 05:37), Max: 37.2 (24 Jan 2024 09:57)  T(F): 97.9 (25 Jan 2024 05:37), Max: 98.9 (24 Jan 2024 09:57)  HR: 71 (25 Jan 2024 05:37) (71 - 86)  BP: 132/83 (25 Jan 2024 05:37) (128/72 - 168/98)  BP(mean): --  RR: 18 (25 Jan 2024 05:37) (16 - 18)  SpO2: 100% (25 Jan 2024 05:37) (97% - 100%)    Parameters below as of 25 Jan 2024 05:37  Patient On (Oxygen Delivery Method): room air        I&O's Summary      PHYSICAL EXAM:  General: Not acutely distressed  Respiratory: Nonlabored respirations  Cardiovascular: Pulse present  Abdominal: Soft, nondistended, nontender. No rebound or guarding. No organomegaly, no palpable mass  Extremities: Warm. LUE with IV in forearm. Radial and ulnar pulses palpable    LABS:                        9.0    6.23  )-----------( 240      ( 24 Jan 2024 07:06 )             26.4     01-24    137  |  98  |  25<H>  ----------------------------<  95  4.2   |  27  |  5.04<H>    Ca    8.1<L>      24 Jan 2024 16:34  Phos  3.9     01-24  Mg     2.20     01-24      Lactate:              Urinalysis Basic - ( 24 Jan 2024 16:34 )    Color: x / Appearance: x / SG: x / pH: x  Gluc: 95 mg/dL / Ketone: x  / Bili: x / Urobili: x   Blood: x / Protein: x / Nitrite: x   Leuk Esterase: x / RBC: x / WBC x   Sq Epi: x / Non Sq Epi: x / Bacteria: x        IMAGING:

## 2024-01-25 NOTE — PROGRESS NOTE ADULT - SUBJECTIVE AND OBJECTIVE BOX
INTERVAL HPI/OVERNIGHT EVENTS:  Patient was seen and examined at bedside. As per nurse and patient, no o/n events, patient resting comfortably. No complaints at this time. Patient denies: fever, chills, dizziness, weakness, HA, Changes in vision, CP, palpitations, SOB, cough, N/V/D/C, dysuria, changes in bowel movements, LE edema. ROS otherwise negative.    VITAL SIGNS:  T(F): 97.9 (01-25-24 @ 05:37)  HR: 71 (01-25-24 @ 05:37)  BP: 132/83 (01-25-24 @ 05:37)  RR: 18 (01-25-24 @ 05:37)  SpO2: 100% (01-25-24 @ 05:37)  Wt(kg): --    PHYSICAL EXAM:    General: WN/WD NAD  Neurology: A&Ox3, nonfocal, SCHAEFER x 4  Eyes: PERRLA/ EOMI, Gross vision intact  ENT/Neck: Neck supple, trachea midline, No JVD, Gross hearing intact  Respiratory: CTA B/L, No wheezing, rales, rhonchi  CV: RRR, +S1/S2, -S3/S4, no murmurs, rubs or gallops  Abdominal: Soft, NT, ND +BS, No HSM  MSK: 5/5 strength UE/LE bilaterally  Extremities: No edema, 2+ peripheral pulses  Skin: No Rashes, Hematoma, Ecchymosis  Incisions:   Tubes:    MEDICATIONS  (STANDING):  acetaminophen     Tablet .. 650 milliGRAM(s) Oral every 6 hours  amLODIPine   Tablet 5 milliGRAM(s) Oral daily  atorvastatin 40 milliGRAM(s) Oral at bedtime  calcium acetate 2001 milliGRAM(s) Oral three times a day with meals  chlorhexidine 2% Cloths 1 Application(s) Topical daily  chlorhexidine 4% Liquid 1 Application(s) Topical <User Schedule>  epoetin stacie (EPOGEN) Injectable 4000 Unit(s) IV Push <User Schedule>  ergocalciferol 53832 Unit(s) Oral every week  hydrALAZINE 50 milliGRAM(s) Oral every 8 hours  influenza   Vaccine 0.5 milliLiter(s) IntraMuscular once  labetalol 200 milliGRAM(s) Oral every 8 hours    MEDICATIONS  (PRN):  aluminum hydroxide/magnesium hydroxide/simethicone Suspension 30 milliLiter(s) Oral every 4 hours PRN Dyspepsia  melatonin 3 milliGRAM(s) Oral at bedtime PRN Insomnia  sodium chloride 0.9% lock flush 10 milliLiter(s) IV Push every 1 hour PRN Pre/post blood products, medications, blood draw, and to maintain line patency      Allergies    No Known Allergies    Intolerances        LABS:                        9.0    6.23  )-----------( 240      ( 24 Jan 2024 07:06 )             26.4     01-24    137  |  98  |  25<H>  ----------------------------<  95  4.2   |  27  |  5.04<H>    Ca    8.1<L>      24 Jan 2024 16:34  Phos  3.9     01-24  Mg     2.20     01-24        Urinalysis Basic - ( 24 Jan 2024 16:34 )    Color: x / Appearance: x / SG: x / pH: x  Gluc: 95 mg/dL / Ketone: x  / Bili: x / Urobili: x   Blood: x / Protein: x / Nitrite: x   Leuk Esterase: x / RBC: x / WBC x   Sq Epi: x / Non Sq Epi: x / Bacteria: x        RADIOLOGY & ADDITIONAL TESTS:  Reviewed : Wei [id number]    INTERVAL HPI/OVERNIGHT EVENTS:  Patient was seen and examined at bedside. As per nurse and patient, no o/n events, patient resting comfortably. No complaints at this time. Patient denies: fever, chills, dizziness, weakness, HA, Changes in vision, CP, palpitations, SOB, cough, N/V/D/C, dysuria, changes in bowel movements, LE edema. ROS otherwise negative.    VITAL SIGNS:  T(F): 97.9 (01-25-24 @ 05:37)  HR: 71 (01-25-24 @ 05:37)  BP: 132/83 (01-25-24 @ 05:37)  RR: 18 (01-25-24 @ 05:37)  SpO2: 100% (01-25-24 @ 05:37)  Wt(kg): --    PHYSICAL EXAM:    General: WN/WD NAD  Neurology: A&Ox3, nonfocal, SCHAEFER x 4  Eyes: PERRLA/ EOMI, Gross vision intact  ENT/Neck: Neck supple, trachea midline, No JVD, Gross hearing intact  Respiratory: CTA B/L, No wheezing, rales, rhonchi  CV: RRR, +S1/S2, -S3/S4, no murmurs, rubs or gallops  Abdominal: Soft, NT, ND +BS, No HSM  Extremities: No edema, 2+ peripheral pulses  Skin: No Rashes, Hematoma, Ecchymosis    MEDICATIONS  (STANDING):  acetaminophen     Tablet .. 650 milliGRAM(s) Oral every 6 hours  amLODIPine   Tablet 5 milliGRAM(s) Oral daily  atorvastatin 40 milliGRAM(s) Oral at bedtime  calcium acetate 2001 milliGRAM(s) Oral three times a day with meals  chlorhexidine 2% Cloths 1 Application(s) Topical daily  chlorhexidine 4% Liquid 1 Application(s) Topical <User Schedule>  epoetin stacie (EPOGEN) Injectable 4000 Unit(s) IV Push <User Schedule>  ergocalciferol 20908 Unit(s) Oral every week  hydrALAZINE 50 milliGRAM(s) Oral every 8 hours  influenza   Vaccine 0.5 milliLiter(s) IntraMuscular once  labetalol 200 milliGRAM(s) Oral every 8 hours    MEDICATIONS  (PRN):  aluminum hydroxide/magnesium hydroxide/simethicone Suspension 30 milliLiter(s) Oral every 4 hours PRN Dyspepsia  melatonin 3 milliGRAM(s) Oral at bedtime PRN Insomnia  sodium chloride 0.9% lock flush 10 milliLiter(s) IV Push every 1 hour PRN Pre/post blood products, medications, blood draw, and to maintain line patency      Allergies    No Known Allergies    Intolerances        LABS:                        9.0    6.23  )-----------( 240      ( 24 Jan 2024 07:06 )             26.4     01-24    137  |  98  |  25<H>  ----------------------------<  95  4.2   |  27  |  5.04<H>    Ca    8.1<L>      24 Jan 2024 16:34  Phos  3.9     01-24  Mg     2.20     01-24        Urinalysis Basic - ( 24 Jan 2024 16:34 )    Color: x / Appearance: x / SG: x / pH: x  Gluc: 95 mg/dL / Ketone: x  / Bili: x / Urobili: x   Blood: x / Protein: x / Nitrite: x   Leuk Esterase: x / RBC: x / WBC x   Sq Epi: x / Non Sq Epi: x / Bacteria: x        RADIOLOGY & ADDITIONAL TESTS:  Reviewed

## 2024-01-25 NOTE — PROGRESS NOTE ADULT - PROBLEM SELECTOR PROBLEM 2
Encounter for hemodialysis for ESRD
Encounter for hemodialysis for ESRD
Anemia
Encounter for hemodialysis for ESRD
Anemia
Encounter for hemodialysis for ESRD

## 2024-01-25 NOTE — PROGRESS NOTE ADULT - ATTENDING COMMENTS
seen and evaluated this morning at bedside.  coordinated with outpatient HD unit.  patient to start at dialysis tomorrow [facility].  no objection to discharge.

## 2024-01-25 NOTE — CONSULT NOTE ADULT - REASON FOR ADMISSION
ESRD on HD transferred for placement of permanent HD catheter and renal biopsy

## 2024-01-25 NOTE — PROGRESS NOTE ADULT - REASON FOR ADMISSION
ESRD on HD transferred for placement of permanent HD catheter and renal biopsy

## 2024-01-25 NOTE — CONSULT NOTE ADULT - ASSESSMENT
59M hx cholecystectomy with newfound CKD3 and presumed chronic HTN (no PCP/questionable previous anti-HTN use) who presented to [place] on 1/3/24 with abdominal pain with assoc n/v/d and chest pain with exertion over four years with no physician follow-up. The patient was found to have Cr 17+, with no known baseline cr and HTN urgency with SBP +200 requiring hydralazine and admitted for renal failure and r/o ACS. At [place], the pt had CT with IV dye on 1/3/24 with concerns for contrast induced nephropathy with subsequent significant proteinuria, ACD requiring 1u pRBC, and placement of temp HD cath to initiate dialysis w/EPO on 1/6/24. Hospital course c/b ETEC sepsis treated with abx/IVF, influenza A managed conservatively, and elevated troponin with peak to 715. Cardiology recommended pursuing non-invasive cardiac workup iso contrast induced nephropathy and had an echo c/f moderate pulm HTN and subsequent nuclear stress test with LVEF 65% and no c/f ischemic injury to myocardial tissue. The patient's cardiology workup was completed and cleared at  and nephrology recommended renal biopsy and perm HD catheter placement with transfer to [facility] for higher level of care on 1/12. He received a tunnelled catheter with IR on 1/25, and underwent a right kidney biopsy with IR on 1/23.     Vascular surgery consulted for AVF creation. Per primary team, nephrology verbally informed them that preliminary biopsy results showed chronic changes, and patient would require vascular evaluation for creation of permanent HD access. Patient is right hand dominant, denies history of surgery or trauma to LUE. Denies hx of PPM.    Recommendations:  -Protect LUE: no IVs, blood draws, injections, or blood pressure cuffs. Pink band placed on left wrist.  -F/u vein mapping.  -Please document medical and cardiac clearance for AVF creation.  -Please document renal requirement for long term dialysis creation.    To be discussed with vascular surgery fellow on behalf of Dr. Hallman.    C Team/Vascular Surgery  Please page [phone] for all questions   59M hx cholecystectomy with newfound CKD3 and presumed chronic HTN (no PCP/questionable previous anti-HTN use) who presented to [place] on 1/3/24 with abdominal pain with assoc n/v/d and chest pain with exertion over four years with no physician follow-up. The patient was found to have Cr 17+, with no known baseline cr and HTN urgency with SBP +200 requiring hydralazine and admitted for renal failure and r/o ACS. At [place], the pt had CT with IV dye on 1/3/24 with concerns for contrast induced nephropathy with subsequent significant proteinuria, ACD requiring 1u pRBC, and placement of temp HD cath to initiate dialysis w/EPO on 1/6/24. Hospital course c/b ETEC sepsis treated with abx/IVF, influenza A managed conservatively, and elevated troponin with peak to 715. Cardiology recommended pursuing non-invasive cardiac workup iso contrast induced nephropathy and had an echo c/f moderate pulm HTN and subsequent nuclear stress test with LVEF 65% and no c/f ischemic injury to myocardial tissue. The patient's cardiology workup was completed and cleared at  and nephrology recommended renal biopsy and perm HD catheter placement with transfer to [facility] for higher level of care on 1/12. He received a tunnelled catheter with IR on 1/25, and underwent a right kidney biopsy with IR on 1/23.     Vascular surgery consulted for AVF creation. Per primary team, nephrology verbally informed them that preliminary biopsy results showed chronic changes, and patient would require vascular evaluation for creation of permanent HD access. Patient is right hand dominant, denies history of surgery or trauma to LUE. Denies hx of PPM.    Recommendations:  -Protect LUE: no IVs, blood draws, injections, or blood pressure cuffs. Pink band placed on left wrist.  -F/u vein mapping.  -Please document medical and cardiac clearance for AVF creation on 1/30/24.  -Please document renal requirement for long term dialysis creation on 1/30/24.    To be discussed with vascular surgery fellow on behalf of Dr. Hallman.    C Team/Vascular Surgery  Please page [phone] for all questions   59M hx cholecystectomy with newfound CKD3 and presumed chronic HTN (no PCP/questionable previous anti-HTN use) who presented to [place] on 1/3/24 with abdominal pain with assoc n/v/d and chest pain with exertion over four years with no physician follow-up. The patient was found to have Cr 17+, with no known baseline cr and HTN urgency with SBP +200 requiring hydralazine and admitted for renal failure and r/o ACS. At [place], the pt had CT with IV dye on 1/3/24 with concerns for contrast induced nephropathy with subsequent significant proteinuria, ACD requiring 1u pRBC, and placement of temp HD cath to initiate dialysis w/EPO on 1/6/24. Hospital course c/b ETEC sepsis treated with abx/IVF, influenza A managed conservatively, and elevated troponin with peak to 715. Cardiology recommended pursuing non-invasive cardiac workup iso contrast induced nephropathy and had an echo c/f moderate pulm HTN and subsequent nuclear stress test with LVEF 65% and no c/f ischemic injury to myocardial tissue. The patient's cardiology workup was completed and cleared at  and nephrology recommended renal biopsy and perm HD catheter placement with transfer to [facility] for higher level of care on 1/12. He received a tunnelled catheter with IR on 1/25, and underwent a right kidney biopsy with IR on 1/23.     Vascular surgery consulted for AVF creation. Per primary team, nephrology verbally informed them that preliminary biopsy results showed chronic changes, and patient would require vascular evaluation for creation of permanent HD access. Patient is right hand dominant, denies history of surgery or trauma to LUE. Denies hx of PPM.    Recommendations:  -To remain inpatient at present for fistula planning.  -Protect LUE: no IVs, blood draws, injections, or blood pressure cuffs. Pink band placed on left wrist.  -F/u vein mapping.  -Please document medical and cardiac clearance for AVF creation on 1/30/24.  -Please document renal requirement for long term dialysis creation on 1/30/24.    To be discussed with vascular surgery fellow on behalf of Dr. Hallman.    C Team/Vascular Surgery  Please page [phone] for all questions

## 2024-01-25 NOTE — PROGRESS NOTE ADULT - PROBLEM/PLAN-4
Discharge Instructions: Eye Surgery    Kar Huerta MD    1. Begin post operative drop regimen today and wear shield over operated eye at bedtime for 3 days    2. Take two Tylenol tablets every 4-6 hours for discomfort if needed. If the pain is severe or not relieved by Tylenol, call Dr. Huerta on his cell phone at [phone].  If unable to reach Dr. Huerta on his cell phone please call the office at [phone] to have the on-call doctor paged.    3. If at any time you develop severe pain, increased eye redness, eye swelling with discharge, fever or sudden loss of vision, call the [facility] in [place] office immediately.     4. You may use your eyes for reading, TV and hobbies.    5. Resume your regular activities as soon as you feel comfortable to do so. Bending over is allowed. No lifting over 25 pounds and No swimming for the next week.    6. You may shower and wash your hair once the eye patch is removed. Take care not to bump your eye or get soap in your eye. If your hair is styled by a beautician, ask her to avoid bumping the eye.    7. You may wear sunglasses in the bright light, especially outdoors, for the first week. If the eye is sensitive to light or wind, you may continue to wear sunglasses at your discretion.    8. You may resume driving when you feel your vision is good enough.    9. Remember to bring your eye medications with you to all your post-operative appointments. If your eye is patched after surgery, do not attempt to use the eyedrops until the patch is removed. If your eye is NOT patched, you may give yourself the eyedrops as directed.    10.  In most cases, you will be measured for new glasses 4-6 weeks after surgery. Although some people may not need glasses after surgery, most patients need glasses of some sort to obtain their best vision.        
DISPLAY PLAN FREE TEXT

## 2024-01-25 NOTE — PROGRESS NOTE ADULT - PROBLEM SELECTOR PLAN 2
- New ESRD with temp HD cath placement on 1/6/24  - Record of HD: 1/6, 1/8, 1/10, 1/11, 1/12 @ [place]  - Transfer to [facility] 1/12 for HD cath placement (perm)  - [facility]: Chikis Laird [phone]  - IR consult for HD placement and renal biopsy in the setting of nephrotic proteinuria  - Nephrology following  - Vitamin D hydroxy low, PTH elevated  - Started Vit D supplementation  - Kidney biopsy as above

## 2024-01-26 PROBLEM — N18.6 END STAGE RENAL DISEASE: Chronic | Status: ACTIVE | Noted: 2024-01-13

## 2024-01-26 NOTE — CHART NOTE - NSCHARTNOTEFT_GEN_A_CORE
Patient was outreached but did not answer. A voicemail was left for the patient to return our call. [phone]. was advised that there is no solomon at the number  [phone] 01/26/24

## 2024-01-26 NOTE — CHART NOTE - NSCHARTNOTESELECT_GEN_ALL_CORE
Event Note
IR Pre-procedure Renal Biopsy
Nephrology/Event Note
Pre-procedure Note/Event Note
dc note/Event Note
IR
IR- renal bx cx
IR Pre-Procedure Note/Event Note

## 2024-01-30 ENCOUNTER — APPOINTMENT (OUTPATIENT)
Dept: VASCULAR SURGERY | Facility: HOSPITAL | Age: 59
End: 2024-01-30

## 2024-01-31 PROCEDURE — 50200 RENAL BIOPSY PERQ: CPT | Mod: RT

## 2024-01-31 PROCEDURE — 77012 CT SCAN FOR NEEDLE BIOPSY: CPT | Mod: 26

## 2024-01-31 RX ORDER — PENICILLIN G BENZATHINE 1200000 [IU]/2ML
2.4 INJECTION, SUSPENSION INTRAMUSCULAR
Qty: 1 | Refills: 0
Start: 2024-01-31 | End: 2024-01-31

## 2024-02-02 ENCOUNTER — OUTPATIENT (OUTPATIENT)
Dept: OUTPATIENT SERVICES | Facility: HOSPITAL | Age: 59
LOS: 1 days | End: 2024-02-02
Payer: MEDICAID

## 2024-02-02 ENCOUNTER — APPOINTMENT (OUTPATIENT)
Dept: FAMILY MEDICINE | Facility: HOSPITAL | Age: 59
End: 2024-02-02

## 2024-02-02 VITALS
RESPIRATION RATE: 15 BRPM | TEMPERATURE: 97.4 F | DIASTOLIC BLOOD PRESSURE: 67 MMHG | SYSTOLIC BLOOD PRESSURE: 109 MMHG | OXYGEN SATURATION: 99 % | HEART RATE: 71 BPM | BODY MASS INDEX: 23.89 KG/M2 | WEIGHT: 137 LBS

## 2024-02-02 DIAGNOSIS — Z86.59 PERSONAL HISTORY OF OTHER MENTAL AND BEHAVIORAL DISORDERS: ICD-10-CM

## 2024-02-02 DIAGNOSIS — R07.89 OTHER CHEST PAIN: ICD-10-CM

## 2024-02-02 DIAGNOSIS — A53.9 SYPHILIS, UNSPECIFIED: ICD-10-CM

## 2024-02-02 DIAGNOSIS — Z87.438 PERSONAL HISTORY OF OTHER DISEASES OF MALE GENITAL ORGANS: ICD-10-CM

## 2024-02-02 DIAGNOSIS — Z00.00 ENCOUNTER FOR GENERAL ADULT MEDICAL EXAMINATION WITHOUT ABNORMAL FINDINGS: ICD-10-CM

## 2024-02-02 DIAGNOSIS — I10 ESSENTIAL (PRIMARY) HYPERTENSION: ICD-10-CM

## 2024-02-02 DIAGNOSIS — N18.30 CHRONIC KIDNEY DISEASE, STAGE 3 UNSPECIFIED: ICD-10-CM

## 2024-02-02 DIAGNOSIS — Z87.898 PERSONAL HISTORY OF OTHER SPECIFIED CONDITIONS: ICD-10-CM

## 2024-02-02 DIAGNOSIS — N18.6 END STAGE RENAL DISEASE: ICD-10-CM

## 2024-02-02 DIAGNOSIS — N26.9 RENAL SCLEROSIS, UNSPECIFIED: ICD-10-CM

## 2024-02-02 PROCEDURE — G0463: CPT

## 2024-02-02 RX ADMIN — PENICILLIN G BENZATHINE 0 UNIT/4ML: 2400000 INJECTION, SUSPENSION INTRAMUSCULAR at 00:00

## 2024-02-04 PROBLEM — A53.9 SYPHILIS: Status: ACTIVE | Noted: 2024-02-02

## 2024-02-04 NOTE — REVIEW OF SYSTEMS
[Dysuria] : no dysuria [Joint Pain] : no joint pain [Headache] : no headache [Dizziness] : no dizziness [Negative] : Gastrointestinal

## 2024-02-04 NOTE — HISTORY OF PRESENT ILLNESS
[Post-hospitalization from ___ Hospital] : Post-hospitalization from [unfilled] Hospital [Admitted on: ___] : The patient was admitted on [unfilled] [Discharged on ___] : discharged on [unfilled] [FreeTextEntry2] : 59 year old M with PMHx of CKD 3 and presumed HTN presented to [facility] on 01/03/2024 for abdominal pain and chest pain with exertion x4 years. Patient was found to have a Cr of 17. Hospital course complicated by acute anemia and elevated trops to 715. He also developed ETEC Sepsis and Influenza A which was managed with Abx/IVF. Patient underwent Echo which showed moderate Pulm HTN and had nuclear test with LVEF 65% and no ischemic changes. Patient was then transferred to [facility] for renal biopsy which showed chronic renal atrophy. He had a permacath placed on 01/18 and will need chronic dialysis. He was advised to follow up with Vascular surgery for AV fistula placement. Hospitalization also significant for latent Syphillis for which he received 2 units of Penicillin G (1 week apart).  At this time patinet is due for his 3rd and final dose. Of significance, patient is unsure of his post-hospitalization appointments. Per EMR he cancelled his appointment with vascular surgery however patient states he is not sure who, where or when his appointment is. Patient also states he is unsure of who his nephrologist is and he knows only to go to dialysis 3 times a week M/W/F.

## 2024-02-04 NOTE — PHYSICAL EXAM
[No Carotid Bruits] : no carotid bruits [Pedal Pulses Present] : the pedal pulses are present [No Edema] : there was no peripheral edema [Normal] : soft, non-tender, non-distended, no masses palpated, no HSM and normal bowel sounds [No CVA Tenderness] : no CVA  tenderness [Normal Gait] : normal gait [Normal Affect] : the affect was normal [de-identified] : +right chest wall cath visualized

## 2024-02-11 ENCOUNTER — NON-APPOINTMENT (OUTPATIENT)
Age: 59
End: 2024-02-11

## 2024-02-12 LAB — SURGICAL PATHOLOGY STUDY: SIGNIFICANT CHANGE UP

## 2024-02-17 ENCOUNTER — APPOINTMENT (OUTPATIENT)
Dept: FAMILY MEDICINE | Facility: HOSPITAL | Age: 59
End: 2024-02-17

## 2024-02-17 ENCOUNTER — OUTPATIENT (OUTPATIENT)
Dept: OUTPATIENT SERVICES | Facility: HOSPITAL | Age: 59
LOS: 1 days | End: 2024-02-17
Payer: SELF-PAY

## 2024-02-17 VITALS
WEIGHT: 134 LBS | BODY MASS INDEX: 23.36 KG/M2 | OXYGEN SATURATION: 100 % | HEART RATE: 75 BPM | TEMPERATURE: 98 F | DIASTOLIC BLOOD PRESSURE: 60 MMHG | SYSTOLIC BLOOD PRESSURE: 94 MMHG | RESPIRATION RATE: 16 BRPM

## 2024-02-17 DIAGNOSIS — N18.6 END STAGE RENAL DISEASE: ICD-10-CM

## 2024-02-17 DIAGNOSIS — Z00.00 ENCOUNTER FOR GENERAL ADULT MEDICAL EXAMINATION WITHOUT ABNORMAL FINDINGS: ICD-10-CM

## 2024-02-17 PROCEDURE — G0463: CPT

## 2024-02-17 NOTE — PHYSICAL EXAM
[Normal] : soft, non-tender, non-distended, no masses palpated, no HSM and normal bowel sounds [de-identified] : +permacath in place on left chest

## 2024-02-17 NOTE — ASSESSMENT
[FreeTextEntry1] : 59M with PMHx ESRD on HD MWF here for  #ESRD -still has permacath in place -missed vascular appointment a few weeks ago as he did not know he had appointment -needs vascular appointment for placement of AVF  -vascular referral  RTC 2 weeks for f/u  Case discussed with Dr Austin

## 2024-02-17 NOTE — HISTORY OF PRESENT ILLNESS
[FreeTextEntry1] : ESRD [de-identified] : 59M with PMHx ESRD on HD MWF here for f/u. pt still has permacath and states he is waiting for vascular to call him with appointment time. pt last HD session yesterday. no other questions or concerns.

## 2024-03-09 ENCOUNTER — MED ADMIN CHARGE (OUTPATIENT)
Age: 59
End: 2024-03-09

## 2024-03-09 ENCOUNTER — OUTPATIENT (OUTPATIENT)
Dept: OUTPATIENT SERVICES | Facility: HOSPITAL | Age: 59
LOS: 1 days | End: 2024-03-09
Payer: SELF-PAY

## 2024-03-09 ENCOUNTER — APPOINTMENT (OUTPATIENT)
Dept: FAMILY MEDICINE | Facility: HOSPITAL | Age: 59
End: 2024-03-09

## 2024-03-09 VITALS
RESPIRATION RATE: 16 BRPM | WEIGHT: 135 LBS | OXYGEN SATURATION: 99 % | TEMPERATURE: 97.5 F | BODY MASS INDEX: 23.54 KG/M2 | HEART RATE: 92 BPM | SYSTOLIC BLOOD PRESSURE: 107 MMHG | DIASTOLIC BLOOD PRESSURE: 69 MMHG

## 2024-03-09 DIAGNOSIS — N18.6 END STAGE RENAL DISEASE: ICD-10-CM

## 2024-03-09 DIAGNOSIS — Z23 ENCOUNTER FOR IMMUNIZATION: ICD-10-CM

## 2024-03-09 DIAGNOSIS — Z00.00 ENCOUNTER FOR GENERAL ADULT MEDICAL EXAMINATION WITHOUT ABNORMAL FINDINGS: ICD-10-CM

## 2024-03-09 PROCEDURE — G0463: CPT

## 2024-03-18 NOTE — PHYSICAL EXAM
[Normal] : normal rate, regular rhythm, normal S1 and S2 and no murmur heard [de-identified] : +permacath in place on left chest

## 2024-03-18 NOTE — HISTORY OF PRESENT ILLNESS
[FreeTextEntry1] : ESRD [de-identified] : 59M with PMHx ESRD on HD MWF here for f/u. pt still has permacath and states he is did not know to call vascular to make appointment. pt last HD session yesterday. no other questions or concerns.

## 2024-03-18 NOTE — ASSESSMENT
[FreeTextEntry1] : 59M with PMHx ESRD on HD MWF here for  #ESRD -still has permacath in place -needs vascular appointment for placement of AVF; counseled pt to call phone number on referral sheet; pt is amenable to doing so  -vascular referral  RTC 2 weeks for f/u  Case discussed with Dr Koch.

## 2024-03-23 ENCOUNTER — OUTPATIENT (OUTPATIENT)
Dept: OUTPATIENT SERVICES | Facility: HOSPITAL | Age: 59
LOS: 1 days | End: 2024-03-23
Payer: SELF-PAY

## 2024-03-23 ENCOUNTER — APPOINTMENT (OUTPATIENT)
Dept: FAMILY MEDICINE | Facility: HOSPITAL | Age: 59
End: 2024-03-23

## 2024-03-23 VITALS
DIASTOLIC BLOOD PRESSURE: 81 MMHG | OXYGEN SATURATION: 99 % | TEMPERATURE: 98.3 F | SYSTOLIC BLOOD PRESSURE: 120 MMHG | BODY MASS INDEX: 24.76 KG/M2 | RESPIRATION RATE: 17 BRPM | HEART RATE: 91 BPM | WEIGHT: 142 LBS

## 2024-03-23 DIAGNOSIS — N18.6 END STAGE RENAL DISEASE: ICD-10-CM

## 2024-03-23 DIAGNOSIS — Z00.00 ENCOUNTER FOR GENERAL ADULT MEDICAL EXAMINATION WITHOUT ABNORMAL FINDINGS: ICD-10-CM

## 2024-03-23 LAB
ALBUMIN SERPL ELPH-MCNC: 4.4 G/DL
ALP BLD-CCNC: 119 U/L
ALT SERPL-CCNC: 13 U/L
ANION GAP SERPL CALC-SCNC: 15 MMOL/L
APTT BLD: 31.4 SEC
AST SERPL-CCNC: 17 U/L
BILIRUB SERPL-MCNC: 0.4 MG/DL
BUN SERPL-MCNC: 20 MG/DL
CALCIUM SERPL-MCNC: 8.6 MG/DL
CHLORIDE SERPL-SCNC: 96 MMOL/L
CHOLEST SERPL-MCNC: 123 MG/DL
CO2 SERPL-SCNC: 24 MMOL/L
CREAT SERPL-MCNC: 6.93 MG/DL
EGFR: 8 ML/MIN/1.73M2
GLUCOSE SERPL-MCNC: 90 MG/DL
HCT VFR BLD CALC: 34 %
HDLC SERPL-MCNC: 78 MG/DL
HGB BLD-MCNC: 11.7 G/DL
INR PPP: 0.98 RATIO
LDLC SERPL CALC-MCNC: 32 MG/DL
MCHC RBC-ENTMCNC: 28.9 PG
MCHC RBC-ENTMCNC: 34.4 GM/DL
MCV RBC AUTO: 84 FL
NONHDLC SERPL-MCNC: 45 MG/DL
PLATELET # BLD AUTO: 286 K/UL
POTASSIUM SERPL-SCNC: 5.9 MMOL/L
PROT SERPL-MCNC: 7.3 G/DL
PT BLD: 11.2 SEC
RBC # BLD: 4.05 M/UL
RBC # FLD: 14.9 %
SODIUM SERPL-SCNC: 135 MMOL/L
TRIGL SERPL-MCNC: 59 MG/DL
TSH SERPL-ACNC: 2.56 UIU/ML
WBC # FLD AUTO: 6.25 K/UL

## 2024-03-23 PROCEDURE — G0463: CPT

## 2024-03-23 NOTE — PHYSICAL EXAM
[Normal] : no respiratory distress, lungs were clear to auscultation bilaterally and no accessory muscle use [de-identified] : +permacath in place on left chest .

## 2024-03-23 NOTE — HISTORY OF PRESENT ILLNESS
[FreeTextEntry1] : HD  [de-identified] : 59M with PMHx ESRD on HD MWF here for f/u. pt still has permacath and states he  did not  call vascular to make appointment. pt last HD session yesterday. no other questions or concerns.

## 2024-03-26 RX ORDER — LOSARTAN POTASSIUM 25 MG/1
25 TABLET, FILM COATED ORAL DAILY
Qty: 30 | Refills: 1 | Status: DISCONTINUED | COMMUNITY
End: 2024-03-26

## 2024-03-26 RX ORDER — LABETALOL HYDROCHLORIDE 200 MG/1
200 TABLET, FILM COATED ORAL
Qty: 60 | Refills: 0 | Status: DISCONTINUED | COMMUNITY
Start: 2024-03-26 | End: 2024-03-26

## 2024-04-06 ENCOUNTER — OUTPATIENT (OUTPATIENT)
Dept: OUTPATIENT SERVICES | Facility: HOSPITAL | Age: 59
LOS: 1 days | End: 2024-04-06
Payer: SELF-PAY

## 2024-04-06 ENCOUNTER — APPOINTMENT (OUTPATIENT)
Dept: FAMILY MEDICINE | Facility: HOSPITAL | Age: 59
End: 2024-04-06

## 2024-04-06 VITALS
BODY MASS INDEX: 24.76 KG/M2 | TEMPERATURE: 98.1 F | DIASTOLIC BLOOD PRESSURE: 66 MMHG | HEART RATE: 78 BPM | WEIGHT: 142 LBS | RESPIRATION RATE: 16 BRPM | SYSTOLIC BLOOD PRESSURE: 102 MMHG | OXYGEN SATURATION: 97 %

## 2024-04-06 DIAGNOSIS — Z00.00 ENCOUNTER FOR GENERAL ADULT MEDICAL EXAMINATION WITHOUT ABNORMAL FINDINGS: ICD-10-CM

## 2024-04-06 DIAGNOSIS — N18.6 END STAGE RENAL DISEASE: ICD-10-CM

## 2024-04-06 PROCEDURE — G0463: CPT

## 2024-04-06 NOTE — HISTORY OF PRESENT ILLNESS
[FreeTextEntry1] : ESRD f/u [de-identified] : 59M with PMH of ESRD on HD MWF here for f/u. Pt was last seen two weeks ago for ESRD f/u. Pt had permacath in placed. Vascular referral was provided for placement of AVF. Pt currently does not have an appt with vascular. Per , all paper work submitted and pending call from vascular. Pt denies fever, pain/discharge/warmth around permacath.

## 2024-04-06 NOTE — PHYSICAL EXAM
[No Acute Distress] : no acute distress [Well Nourished] : well nourished [Well Developed] : well developed [Well-Appearing] : well-appearing [No Respiratory Distress] : no respiratory distress  [No Accessory Muscle Use] : no accessory muscle use [Clear to Auscultation] : lungs were clear to auscultation bilaterally [Normal Rate] : normal rate  [Regular Rhythm] : with a regular rhythm [Normal S1, S2] : normal S1 and S2 [No Murmur] : no murmur heard [Soft] : abdomen soft [Non Tender] : non-tender [Non-distended] : non-distended [No Masses] : no abdominal mass palpated [Normal Bowel Sounds] : normal bowel sounds [Alert and Oriented x3] : oriented to person, place, and time [de-identified] : permacath in placed on right chest, no erythema, no discharge, no warmth

## 2024-04-18 ENCOUNTER — APPOINTMENT (OUTPATIENT)
Dept: FAMILY MEDICINE | Facility: HOSPITAL | Age: 59
End: 2024-04-18

## 2024-04-18 ENCOUNTER — OUTPATIENT (OUTPATIENT)
Dept: OUTPATIENT SERVICES | Facility: HOSPITAL | Age: 59
LOS: 1 days | End: 2024-04-18
Payer: SELF-PAY

## 2024-04-18 VITALS
HEART RATE: 86 BPM | OXYGEN SATURATION: 99 % | DIASTOLIC BLOOD PRESSURE: 74 MMHG | WEIGHT: 149 LBS | TEMPERATURE: 98.2 F | BODY MASS INDEX: 25.98 KG/M2 | SYSTOLIC BLOOD PRESSURE: 106 MMHG | RESPIRATION RATE: 16 BRPM

## 2024-04-18 DIAGNOSIS — Z00.00 ENCOUNTER FOR GENERAL ADULT MEDICAL EXAMINATION WITHOUT ABNORMAL FINDINGS: ICD-10-CM

## 2024-04-18 DIAGNOSIS — N18.6 END STAGE RENAL DISEASE: ICD-10-CM

## 2024-04-18 PROCEDURE — G0463: CPT

## 2024-04-21 NOTE — HISTORY OF PRESENT ILLNESS
[FreeTextEntry1] : ESRD f/u  [de-identified] : 59M with PMH of ESRD on HD MWF here for f/u. Pt was last seen on 4/6/24 for ESRD f/u. Pt had permacath in placed. Vascular referral was provided for placement of AVF.   called vascular today, no appt available until 5/6/24. Pt denies fever, pain/discharge/warmth around permacath.

## 2024-04-30 ENCOUNTER — NON-APPOINTMENT (OUTPATIENT)
Age: 59
End: 2024-04-30

## 2024-05-09 ENCOUNTER — OUTPATIENT (OUTPATIENT)
Dept: OUTPATIENT SERVICES | Facility: HOSPITAL | Age: 59
LOS: 1 days | End: 2024-05-09
Payer: SELF-PAY

## 2024-05-09 ENCOUNTER — APPOINTMENT (OUTPATIENT)
Dept: FAMILY MEDICINE | Facility: HOSPITAL | Age: 59
End: 2024-05-09

## 2024-05-09 VITALS
RESPIRATION RATE: 16 BRPM | TEMPERATURE: 97.9 F | DIASTOLIC BLOOD PRESSURE: 77 MMHG | HEART RATE: 79 BPM | OXYGEN SATURATION: 99 % | SYSTOLIC BLOOD PRESSURE: 122 MMHG

## 2024-05-09 DIAGNOSIS — Z00.00 ENCOUNTER FOR GENERAL ADULT MEDICAL EXAMINATION WITHOUT ABNORMAL FINDINGS: ICD-10-CM

## 2024-05-09 PROCEDURE — G0463: CPT

## 2024-05-10 DIAGNOSIS — N18.6 END STAGE RENAL DISEASE: ICD-10-CM

## 2024-05-10 NOTE — PHYSICAL EXAM
[No Acute Distress] : no acute distress [Well Nourished] : well nourished [Well Developed] : well developed [Well-Appearing] : well-appearing [No Respiratory Distress] : no respiratory distress  [No Accessory Muscle Use] : no accessory muscle use [Clear to Auscultation] : lungs were clear to auscultation bilaterally [Normal Rate] : normal rate  [Regular Rhythm] : with a regular rhythm [Normal S1, S2] : normal S1 and S2 [No Murmur] : no murmur heard [No Edema] : there was no peripheral edema [Soft] : abdomen soft [Non Tender] : non-tender [Non-distended] : non-distended [Normal Bowel Sounds] : normal bowel sounds [No CVA Tenderness] : no CVA  tenderness [Grossly Normal Strength/Tone] : grossly normal strength/tone [Alert and Oriented x3] : oriented to person, place, and time [de-identified] : permacath in placed on right chest, no erythema, no discharge, no warmth.

## 2024-05-10 NOTE — HISTORY OF PRESENT ILLNESS
[FreeTextEntry1] : ESRD f/u  [de-identified] : 59M with PMH of ESRD on HD MWF here for f/u. Pt was last seen on 4/18/24 for ESRD f/u. Pt had permacath in placed. Vascular referral was provided for placement of AVF and advised to come in for f/u if no vasc appt is made or having any signs/symptoms of infections.   Per EMR, pt should be on amlodipine 5mg QD, Hydralazine 50 mg TID, and Labetalol 200mg TID. However, pt reports he is not taking any medication for HTN. BP today 122/77. BP has been under control since discharged from hospital in 1/2024, in which pt was hospitalized due to hypertensive crisis wit renal failure. Today, pt denies headache, vision changes, chest pain, sob, palpitation, n/v.

## 2024-05-16 ENCOUNTER — APPOINTMENT (OUTPATIENT)
Dept: CARDIOLOGY | Facility: HOSPITAL | Age: 59
End: 2024-05-16

## 2024-05-16 ENCOUNTER — OUTPATIENT (OUTPATIENT)
Dept: OUTPATIENT SERVICES | Facility: HOSPITAL | Age: 59
LOS: 1 days | End: 2024-05-16
Payer: SELF-PAY

## 2024-05-16 VITALS
HEIGHT: 63.5 IN | BODY MASS INDEX: 25.2 KG/M2 | HEART RATE: 66 BPM | OXYGEN SATURATION: 100 % | SYSTOLIC BLOOD PRESSURE: 138 MMHG | WEIGHT: 144 LBS | DIASTOLIC BLOOD PRESSURE: 81 MMHG

## 2024-05-16 DIAGNOSIS — I25.10 ATHEROSCLEROTIC HEART DISEASE OF NATIVE CORONARY ARTERY WITHOUT ANGINA PECTORIS: ICD-10-CM

## 2024-05-16 PROCEDURE — G0463: CPT

## 2024-05-16 PROCEDURE — 93005 ELECTROCARDIOGRAM TRACING: CPT

## 2024-05-16 RX ORDER — ASPIRIN ENTERIC COATED TABLETS 81 MG 81 MG/1
81 TABLET, DELAYED RELEASE ORAL DAILY
Qty: 90 | Refills: 2 | Status: ACTIVE | COMMUNITY
Start: 1900-01-01 | End: 1900-01-01

## 2024-05-20 ENCOUNTER — APPOINTMENT (OUTPATIENT)
Dept: VASCULAR SURGERY | Facility: HOSPITAL | Age: 59
End: 2024-05-20

## 2024-05-20 ENCOUNTER — OUTPATIENT (OUTPATIENT)
Dept: OUTPATIENT SERVICES | Facility: HOSPITAL | Age: 59
LOS: 1 days | End: 2024-05-20

## 2024-05-20 VITALS
DIASTOLIC BLOOD PRESSURE: 87 MMHG | HEART RATE: 60 BPM | SYSTOLIC BLOOD PRESSURE: 160 MMHG | TEMPERATURE: 96.5 F | OXYGEN SATURATION: 100 % | RESPIRATION RATE: 14 BRPM | WEIGHT: 145 LBS | BODY MASS INDEX: 25.28 KG/M2

## 2024-05-20 DIAGNOSIS — I73.9 PERIPHERAL VASCULAR DISEASE, UNSPECIFIED: ICD-10-CM

## 2024-05-20 PROCEDURE — 99203 OFFICE O/P NEW LOW 30 MIN: CPT

## 2024-05-20 PROCEDURE — G0463: CPT

## 2024-05-22 NOTE — CARDIOLOGY SUMMARY
[de-identified] : NST pharmacologic (01/09/24) -   SPECT Myocardial Perfusion Imaging post rest and post vasodilator revealed normal left ventricular perfusion. There is mild diaphragmatic attenuation artifact visualized which reduces sensitivity of the study findings.   No scan evidence of reversible or fixed perfusion defects.   Normal left ventricular wall motion with ejection fraction of 66 % (normal: 50% or greater).   No regional wall motion abnormalities.  [de-identified] : TTE limited (01/03/24) -   1. Biatrial enlargement   2. Left ventricular ejection fraction, by visual estimation, is 65 to 70%.   3. Normal global left ventricular systolic function.   4. Increased LV wall thickness.   5. Normal left ventricular internal cavity size.   6. Spectral Doppler shows impaired relaxation pattern of left ventricular myocardial filling (Grade I diastolic dysfunction).   7. Trivial pericardial effusion.   8. Mild to moderate mitral valve regurgitation.   9. Mild-moderate tricuspid regurgitation.   10. Estimated pulmonary artery systolic pressure is 54.8 mmHg assuming a right atrial pressure of 3 mmHg, which is consistent with moderate pulmonary hypertension.      TTE (03/31/20) -   1. Left ventricular ejection fraction, by visual estimation, is 55 to 60%.   2. Normal global left ventricular systolic function.   3. Increased LV wall thickness.   4. Spectral Doppler shows impaired relaxation pattern of left ventricular myocardial filling (Grade I diastolic dysfunction).   5. Mild thickening and calcification of the anterior and posterior mitral valve leaflets.   6. Pulmonary hypertension is absent.   7. LA volume Index is 22.9 ml/m ml/m2.

## 2024-05-22 NOTE — PHYSICAL EXAM
[Well Developed] : well developed [Well Nourished] : well nourished [No Acute Distress] : no acute distress [Normal Conjunctiva] : normal conjunctiva [Normal Venous Pressure] : normal venous pressure [No Carotid Bruit] : no carotid bruit [Normal S1, S2] : normal S1, S2 [No Murmur] : no murmur [No Rub] : no rub [No Gallop] : no gallop [Clear Lung Fields] : clear lung fields [Good Air Entry] : good air entry [No Respiratory Distress] : no respiratory distress  [Soft] : abdomen soft [Non Tender] : non-tender [No Masses/organomegaly] : no masses/organomegaly [Normal Bowel Sounds] : normal bowel sounds [Normal Gait] : normal gait [No Edema] : no edema [No Cyanosis] : no cyanosis [No Clubbing] : no clubbing [No Varicosities] : no varicosities [No Rash] : no rash [No Skin Lesions] : no skin lesions [Moves all extremities] : moves all extremities [No Focal Deficits] : no focal deficits [Normal Speech] : normal speech [Alert and Oriented] : alert and oriented [Normal memory] : normal memory [de-identified] : permacath over R chest, bandaged c/d/i

## 2024-05-22 NOTE — ASSESSMENT
[FreeTextEntry1] : # ASCVD - large artery calcifications noted incidentally on cross-sectional imaging - c/t atorvastatin 40 mg PO daily for secondary prevention  # HTN - goal <130/90, reviewed DASH diet and lifestyle modifications to reduce BP - increase amlodipine to 10 mg PO daily, counselled re AE of LE swelling - c/t hydralazine 50 mg PO TID, c/t labetalol 200 mg PO TID - recommended to purchase BP cuff from pharmacy and to take regular AM BP readings at home  # non-cardiac chest pains - long-standing history of non-cardiac chest pain symptoms - pharmacologic NST performed 01/2024 was normal, without perfusion abnormalities  # pre-procedural risk stratification - asked for cardiac risk stratification prior to planned AVF creation - pt is low risk for this low risk procedure, no further cardiovascular testing necessary for further clarification of risk prior to procedure  RTC 3 weeks

## 2024-05-23 DIAGNOSIS — I10 ESSENTIAL (PRIMARY) HYPERTENSION: ICD-10-CM

## 2024-05-30 ENCOUNTER — APPOINTMENT (OUTPATIENT)
Dept: CARDIOLOGY | Facility: HOSPITAL | Age: 59
End: 2024-05-30

## 2024-06-01 ENCOUNTER — OUTPATIENT (OUTPATIENT)
Dept: OUTPATIENT SERVICES | Facility: HOSPITAL | Age: 59
LOS: 1 days | End: 2024-06-01
Payer: SELF-PAY

## 2024-06-01 ENCOUNTER — APPOINTMENT (OUTPATIENT)
Dept: FAMILY MEDICINE | Facility: HOSPITAL | Age: 59
End: 2024-06-01

## 2024-06-01 VITALS
DIASTOLIC BLOOD PRESSURE: 58 MMHG | RESPIRATION RATE: 14 BRPM | OXYGEN SATURATION: 99 % | HEART RATE: 78 BPM | SYSTOLIC BLOOD PRESSURE: 97 MMHG | TEMPERATURE: 97.5 F | BODY MASS INDEX: 25.11 KG/M2 | WEIGHT: 144 LBS

## 2024-06-01 DIAGNOSIS — Z00.00 ENCOUNTER FOR GENERAL ADULT MEDICAL EXAMINATION WITHOUT ABNORMAL FINDINGS: ICD-10-CM

## 2024-06-01 DIAGNOSIS — I10 ESSENTIAL (PRIMARY) HYPERTENSION: ICD-10-CM

## 2024-06-01 DIAGNOSIS — E87.5 HYPERKALEMIA: ICD-10-CM

## 2024-06-01 DIAGNOSIS — D64.9 ANEMIA, UNSPECIFIED: ICD-10-CM

## 2024-06-01 DIAGNOSIS — N18.6 END STAGE RENAL DISEASE: ICD-10-CM

## 2024-06-01 PROCEDURE — 80053 COMPREHEN METABOLIC PANEL: CPT

## 2024-06-01 PROCEDURE — 83540 ASSAY OF IRON: CPT

## 2024-06-01 PROCEDURE — 83550 IRON BINDING TEST: CPT

## 2024-06-01 PROCEDURE — 85027 COMPLETE CBC AUTOMATED: CPT

## 2024-06-01 PROCEDURE — 82728 ASSAY OF FERRITIN: CPT

## 2024-06-01 PROCEDURE — G0463: CPT

## 2024-06-01 RX ORDER — AMLODIPINE BESYLATE 10 MG/1
10 TABLET ORAL DAILY
Qty: 60 | Refills: 0 | Status: DISCONTINUED | COMMUNITY
End: 2024-06-01

## 2024-06-01 NOTE — PHYSICAL EXAM
[No Acute Distress] : no acute distress [Well Nourished] : well nourished [Well Developed] : well developed [Normal] : normal rate, regular rhythm, normal S1 and S2 and no murmur heard [Normal Gait] : normal gait [Normal Affect] : the affect was normal [Normal Insight/Judgement] : insight and judgment were intact

## 2024-06-11 ENCOUNTER — OUTPATIENT (OUTPATIENT)
Dept: OUTPATIENT SERVICES | Facility: HOSPITAL | Age: 59
LOS: 1 days | End: 2024-06-11

## 2024-06-11 DIAGNOSIS — E87.5 HYPERKALEMIA: ICD-10-CM

## 2024-06-11 DIAGNOSIS — N18.6 END STAGE RENAL DISEASE: ICD-10-CM

## 2024-06-11 DIAGNOSIS — I10 ESSENTIAL (PRIMARY) HYPERTENSION: ICD-10-CM

## 2024-06-11 DIAGNOSIS — D64.9 ANEMIA, UNSPECIFIED: ICD-10-CM

## 2024-06-13 ENCOUNTER — NON-APPOINTMENT (OUTPATIENT)
Age: 59
End: 2024-06-13

## 2024-06-13 LAB
ALBUMIN SERPL ELPH-MCNC: 4.8 G/DL
ALP BLD-CCNC: 113 U/L
ALT SERPL-CCNC: 19 U/L
ANION GAP SERPL CALC-SCNC: 16 MMOL/L
AST SERPL-CCNC: 18 U/L
BILIRUB SERPL-MCNC: 0.5 MG/DL
BUN SERPL-MCNC: 15 MG/DL
CALCIUM SERPL-MCNC: 8.6 MG/DL
CHLORIDE SERPL-SCNC: 90 MMOL/L
CO2 SERPL-SCNC: 22 MMOL/L
CREAT SERPL-MCNC: 7.24 MG/DL
EGFR: 8 ML/MIN/1.73M2
FERRITIN SERPL-MCNC: 712 NG/ML
GLUCOSE SERPL-MCNC: 94 MG/DL
HCT VFR BLD CALC: 31.5 %
HGB BLD-MCNC: 11.6 G/DL
IRON SATN MFR SERPL: 71 %
IRON SERPL-MCNC: 188 UG/DL
MCHC RBC-ENTMCNC: 28.9 PG
MCHC RBC-ENTMCNC: 36.8 GM/DL
MCV RBC AUTO: 78.4 FL
PLATELET # BLD AUTO: 262 K/UL
POTASSIUM SERPL-SCNC: 5.5 MMOL/L
PROT SERPL-MCNC: 7.3 G/DL
RBC # BLD: 4.02 M/UL
RBC # FLD: 15.3 %
SODIUM SERPL-SCNC: 128 MMOL/L
TIBC SERPL-MCNC: 263 UG/DL
UIBC SERPL-MCNC: 75 UG/DL
WBC # FLD AUTO: 4.52 K/UL

## 2024-06-22 ENCOUNTER — OUTPATIENT (OUTPATIENT)
Dept: OUTPATIENT SERVICES | Facility: HOSPITAL | Age: 59
LOS: 1 days | End: 2024-06-22
Payer: SELF-PAY

## 2024-06-22 ENCOUNTER — APPOINTMENT (OUTPATIENT)
Dept: FAMILY MEDICINE | Facility: HOSPITAL | Age: 59
End: 2024-06-22

## 2024-06-22 VITALS
DIASTOLIC BLOOD PRESSURE: 70 MMHG | WEIGHT: 145 LBS | OXYGEN SATURATION: 98 % | RESPIRATION RATE: 14 BRPM | SYSTOLIC BLOOD PRESSURE: 116 MMHG | HEART RATE: 77 BPM | TEMPERATURE: 97.7 F | BODY MASS INDEX: 25.28 KG/M2

## 2024-06-22 DIAGNOSIS — D64.9 ANEMIA, UNSPECIFIED: ICD-10-CM

## 2024-06-22 DIAGNOSIS — Z00.00 ENCOUNTER FOR GENERAL ADULT MEDICAL EXAMINATION WITHOUT ABNORMAL FINDINGS: ICD-10-CM

## 2024-06-22 DIAGNOSIS — N18.6 END STAGE RENAL DISEASE: ICD-10-CM

## 2024-06-22 DIAGNOSIS — Z99.2 END STAGE RENAL DISEASE: ICD-10-CM

## 2024-06-22 PROCEDURE — G0463: CPT

## 2024-06-22 RX ORDER — LABETALOL HYDROCHLORIDE 200 MG/1
200 TABLET, FILM COATED ORAL 3 TIMES DAILY
Qty: 90 | Refills: 0 | Status: ACTIVE | COMMUNITY
Start: 2024-03-26 | End: 1900-01-01

## 2024-06-22 RX ORDER — AMLODIPINE BESYLATE 5 MG/1
5 TABLET ORAL DAILY
Qty: 30 | Refills: 0 | Status: ACTIVE | COMMUNITY
Start: 2024-03-26 | End: 1900-01-01

## 2024-06-22 RX ORDER — HYDRALAZINE HYDROCHLORIDE 50 MG/1
50 TABLET ORAL 3 TIMES DAILY
Qty: 90 | Refills: 0 | Status: ACTIVE | COMMUNITY
Start: 2024-03-26 | End: 1900-01-01

## 2024-06-22 RX ORDER — ATORVASTATIN CALCIUM 20 MG/1
20 TABLET, FILM COATED ORAL
Qty: 30 | Refills: 0 | Status: ACTIVE | COMMUNITY
Start: 1900-01-01 | End: 1900-01-01

## 2024-06-22 NOTE — HISTORY OF PRESENT ILLNESS
[FreeTextEntry1] : esrd, anemia  [de-identified] : 59M here for esrd f/u. still has permacath in place. recently saw vascular who recommended AVF but pt states they did not make appointment for antonio. saw cardio who cleared pt for AVF. last HD on friday. next HD on monday.

## 2024-06-22 NOTE — ASSESSMENT
[FreeTextEntry1] : 59M here for  #ESRD -HD MWF -will need AVF placement; vascular referral  RTC 2-3 weeks; needs close f/u until AVF placed  case discussed with dr vaughn

## 2024-06-27 NOTE — HISTORY OF PRESENT ILLNESS
[FreeTextEntry1] : 59M ESRD on HD via right chest wall permacath. Referred for evaluation of AVF creation. No implanted cardiac devices, right hand dominant. No infection at permcath site.

## 2024-06-27 NOTE — HISTORY OF PRESENT ILLNESS
[FreeTextEntry1] : blood pressure [de-identified] : 59M with PMH of HTN, ESRD on HD MWF via R chest wall permacath, here for f/u BP. He is following with vascular surgery with goal of AVF formation, but no follow up date or surgical date at this time. Patient has a hard time keeping track of his appointments and what his treatment goals are because he says when the offices call him they can't speak to him in [place]. No complaints.

## 2024-06-27 NOTE — ASSESSMENT
[FreeTextEntry1] : 59M ESRD patient presented for AVF evaluation. Currently on HD 3 times a week via right chest wall permcath  Will get bilateral vein mapping for AVF planning Will book for left possible right AVF creation Need medical clearance for procedure from PCP

## 2024-07-13 ENCOUNTER — APPOINTMENT (OUTPATIENT)
Dept: FAMILY MEDICINE | Facility: HOSPITAL | Age: 59
End: 2024-07-13

## 2024-07-13 ENCOUNTER — OUTPATIENT (OUTPATIENT)
Dept: OUTPATIENT SERVICES | Facility: HOSPITAL | Age: 59
LOS: 1 days | End: 2024-07-13
Payer: SELF-PAY

## 2024-07-13 ENCOUNTER — OUTPATIENT (OUTPATIENT)
Dept: OUTPATIENT SERVICES | Facility: HOSPITAL | Age: 59
LOS: 1 days | End: 2024-07-13
Payer: COMMERCIAL

## 2024-07-13 VITALS
SYSTOLIC BLOOD PRESSURE: 152 MMHG | HEART RATE: 63 BPM | DIASTOLIC BLOOD PRESSURE: 78 MMHG | WEIGHT: 140 LBS | BODY MASS INDEX: 24.41 KG/M2 | RESPIRATION RATE: 16 BRPM | OXYGEN SATURATION: 100 % | TEMPERATURE: 98 F

## 2024-07-13 VITALS — DIASTOLIC BLOOD PRESSURE: 74 MMHG | SYSTOLIC BLOOD PRESSURE: 139 MMHG

## 2024-07-13 VITALS — SYSTOLIC BLOOD PRESSURE: 135 MMHG | DIASTOLIC BLOOD PRESSURE: 72 MMHG

## 2024-07-13 DIAGNOSIS — Z12.11 ENCOUNTER FOR SCREENING FOR MALIGNANT NEOPLASM OF COLON: ICD-10-CM

## 2024-07-13 DIAGNOSIS — Z99.2 END STAGE RENAL DISEASE: ICD-10-CM

## 2024-07-13 DIAGNOSIS — N18.6 END STAGE RENAL DISEASE: ICD-10-CM

## 2024-07-13 DIAGNOSIS — Z00.00 ENCOUNTER FOR GENERAL ADULT MEDICAL EXAMINATION WITHOUT ABNORMAL FINDINGS: ICD-10-CM

## 2024-07-13 PROCEDURE — 82274 ASSAY TEST FOR BLOOD FECAL: CPT

## 2024-07-13 PROCEDURE — G0463: CPT

## 2024-07-27 ENCOUNTER — APPOINTMENT (OUTPATIENT)
Dept: FAMILY MEDICINE | Facility: HOSPITAL | Age: 59
End: 2024-07-27

## 2024-07-27 NOTE — HISTORY OF PRESENT ILLNESS
[FreeTextEntry1] : ESRD f/u [de-identified] : 59M her for ESRD (HD MWF) f/u. s/p permacath 1/18/24 during hospitalization for work up for chest pain and Cr of 17. Patient underwent Echo which showed moderate Pulm HTN and had nuclear test with LVEF 65% and no ischemic changes. During that hospitalization pt had renal biopsy which showed chronic renal atrophy and had permacath placed. Pt was discharged and advised to follow up with vascular for AV fistula placement. Pt saw vascular on 5/20/24 and planning for AVF creation. Pt will also need vein mapping prior to AVF formation. Pt was cleared by cardio for the procedure. Denies fever, chills, n/v, pain/swelling/warmth on or around permacath.

## 2024-09-05 ENCOUNTER — NON-APPOINTMENT (OUTPATIENT)
Age: 59
End: 2024-09-05

## 2024-10-19 ENCOUNTER — EMERGENCY (EMERGENCY)
Facility: HOSPITAL | Age: 59
LOS: 1 days | Discharge: HOME | End: 2024-10-19
Attending: EMERGENCY MEDICINE | Admitting: EMERGENCY MEDICINE
Payer: MEDICAID

## 2024-10-19 VITALS
OXYGEN SATURATION: 99 % | RESPIRATION RATE: 18 BRPM | HEART RATE: 78 BPM | SYSTOLIC BLOOD PRESSURE: 155 MMHG | DIASTOLIC BLOOD PRESSURE: 88 MMHG

## 2024-10-19 VITALS
HEIGHT: 64 IN | WEIGHT: 145.06 LBS | HEART RATE: 63 BPM | OXYGEN SATURATION: 99 % | TEMPERATURE: 98 F | DIASTOLIC BLOOD PRESSURE: 90 MMHG | RESPIRATION RATE: 18 BRPM | SYSTOLIC BLOOD PRESSURE: 153 MMHG

## 2024-10-19 LAB
ALBUMIN SERPL ELPH-MCNC: 3.7 G/DL — SIGNIFICANT CHANGE UP (ref 3.3–5)
ALP SERPL-CCNC: 136 U/L — HIGH (ref 40–120)
ALT FLD-CCNC: 29 U/L — SIGNIFICANT CHANGE UP (ref 10–45)
ANION GAP SERPL CALC-SCNC: 9 MMOL/L — SIGNIFICANT CHANGE UP (ref 5–17)
AST SERPL-CCNC: 21 U/L — SIGNIFICANT CHANGE UP (ref 10–40)
BASOPHILS # BLD AUTO: 0.02 K/UL — SIGNIFICANT CHANGE UP (ref 0–0.2)
BASOPHILS NFR BLD AUTO: 0.4 % — SIGNIFICANT CHANGE UP (ref 0–2)
BILIRUB SERPL-MCNC: 0.7 MG/DL — SIGNIFICANT CHANGE UP (ref 0.2–1.2)
BUN SERPL-MCNC: 18 MG/DL — SIGNIFICANT CHANGE UP (ref 7–23)
CALCIUM SERPL-MCNC: 7.6 MG/DL — LOW (ref 8.4–10.5)
CHLORIDE SERPL-SCNC: 96 MMOL/L — SIGNIFICANT CHANGE UP (ref 96–108)
CO2 SERPL-SCNC: 31 MMOL/L — SIGNIFICANT CHANGE UP (ref 22–31)
CREAT SERPL-MCNC: 5.36 MG/DL — HIGH (ref 0.5–1.3)
EGFR: 11 ML/MIN/1.73M2 — LOW
EOSINOPHIL # BLD AUTO: 0.26 K/UL — SIGNIFICANT CHANGE UP (ref 0–0.5)
EOSINOPHIL NFR BLD AUTO: 5 % — SIGNIFICANT CHANGE UP (ref 0–6)
GLUCOSE SERPL-MCNC: 125 MG/DL — HIGH (ref 70–99)
HCT VFR BLD CALC: 29.8 % — LOW (ref 39–50)
HGB BLD-MCNC: 11 G/DL — LOW (ref 13–17)
IMM GRANULOCYTES NFR BLD AUTO: 0.6 % — SIGNIFICANT CHANGE UP (ref 0–0.9)
LYMPHOCYTES # BLD AUTO: 1.05 K/UL — SIGNIFICANT CHANGE UP (ref 1–3.3)
LYMPHOCYTES # BLD AUTO: 20.1 % — SIGNIFICANT CHANGE UP (ref 13–44)
MAGNESIUM SERPL-MCNC: 1.9 MG/DL — SIGNIFICANT CHANGE UP (ref 1.6–2.6)
MCHC RBC-ENTMCNC: 29.3 PG — SIGNIFICANT CHANGE UP (ref 27–34)
MCHC RBC-ENTMCNC: 36.9 GM/DL — HIGH (ref 32–36)
MCV RBC AUTO: 79.5 FL — LOW (ref 80–100)
MONOCYTES # BLD AUTO: 0.74 K/UL — SIGNIFICANT CHANGE UP (ref 0–0.9)
MONOCYTES NFR BLD AUTO: 14.1 % — HIGH (ref 2–14)
NEUTROPHILS # BLD AUTO: 3.13 K/UL — SIGNIFICANT CHANGE UP (ref 1.8–7.4)
NEUTROPHILS NFR BLD AUTO: 59.8 % — SIGNIFICANT CHANGE UP (ref 43–77)
NRBC # BLD: 0 /100 WBCS — SIGNIFICANT CHANGE UP (ref 0–0)
PHOSPHATE SERPL-MCNC: 4.3 MG/DL — SIGNIFICANT CHANGE UP (ref 2.5–4.5)
PLATELET # BLD AUTO: 259 K/UL — SIGNIFICANT CHANGE UP (ref 150–400)
POTASSIUM SERPL-MCNC: 3.4 MMOL/L — LOW (ref 3.5–5.3)
POTASSIUM SERPL-SCNC: 3.4 MMOL/L — LOW (ref 3.5–5.3)
PROT SERPL-MCNC: 7.8 G/DL — SIGNIFICANT CHANGE UP (ref 6–8.3)
RBC # BLD: 3.75 M/UL — LOW (ref 4.2–5.8)
RBC # FLD: 14.1 % — SIGNIFICANT CHANGE UP (ref 10.3–14.5)
SODIUM SERPL-SCNC: 136 MMOL/L — SIGNIFICANT CHANGE UP (ref 135–145)
TROPONIN I, HIGH SENSITIVITY RESULT: 83.8 NG/L — HIGH
TROPONIN I, HIGH SENSITIVITY RESULT: 84.2 NG/L — HIGH
WBC # BLD: 5.23 K/UL — SIGNIFICANT CHANGE UP (ref 3.8–10.5)
WBC # FLD AUTO: 5.23 K/UL — SIGNIFICANT CHANGE UP (ref 3.8–10.5)

## 2024-10-19 PROCEDURE — 71045 X-RAY EXAM CHEST 1 VIEW: CPT | Mod: 26

## 2024-10-19 PROCEDURE — 84100 ASSAY OF PHOSPHORUS: CPT

## 2024-10-19 PROCEDURE — 84484 ASSAY OF TROPONIN QUANT: CPT

## 2024-10-19 PROCEDURE — 71045 X-RAY EXAM CHEST 1 VIEW: CPT

## 2024-10-19 PROCEDURE — 80053 COMPREHEN METABOLIC PANEL: CPT

## 2024-10-19 PROCEDURE — 93005 ELECTROCARDIOGRAM TRACING: CPT

## 2024-10-19 PROCEDURE — 90715 TDAP VACCINE 7 YRS/> IM: CPT

## 2024-10-19 PROCEDURE — 93010 ELECTROCARDIOGRAM REPORT: CPT | Mod: 76

## 2024-10-19 PROCEDURE — 83735 ASSAY OF MAGNESIUM: CPT

## 2024-10-19 PROCEDURE — 96360 HYDRATION IV INFUSION INIT: CPT

## 2024-10-19 PROCEDURE — 70450 CT HEAD/BRAIN W/O DYE: CPT | Mod: 26,MC

## 2024-10-19 PROCEDURE — 90471 IMMUNIZATION ADMIN: CPT

## 2024-10-19 PROCEDURE — 70450 CT HEAD/BRAIN W/O DYE: CPT | Mod: MC

## 2024-10-19 PROCEDURE — 36415 COLL VENOUS BLD VENIPUNCTURE: CPT

## 2024-10-19 PROCEDURE — 99285 EMERGENCY DEPT VISIT HI MDM: CPT

## 2024-10-19 PROCEDURE — 85025 COMPLETE CBC W/AUTO DIFF WBC: CPT

## 2024-10-19 PROCEDURE — 99285 EMERGENCY DEPT VISIT HI MDM: CPT | Mod: 25

## 2024-10-19 RX ORDER — SODIUM CHLORIDE 0.9 % (FLUSH) 0.9 %
250 SYRINGE (ML) INJECTION ONCE
Refills: 0 | Status: COMPLETED | OUTPATIENT
Start: 2024-10-19 | End: 2024-10-19

## 2024-10-19 RX ORDER — TETANUS TOXOID, REDUCED DIPHTHERIA TOXOID AND ACELLULAR PERTUSSIS VACCINE, ADSORBED 5; 2.5; 8; 8; 2.5 [IU]/.5ML; [IU]/.5ML; UG/.5ML; UG/.5ML; UG/.5ML
0.5 SUSPENSION INTRAMUSCULAR ONCE
Refills: 0 | Status: COMPLETED | OUTPATIENT
Start: 2024-10-19 | End: 2024-10-19

## 2024-10-19 RX ADMIN — Medication 250 MILLILITER(S): at 15:49

## 2024-10-19 RX ADMIN — Medication 250 MILLILITER(S): at 16:49

## 2024-10-19 RX ADMIN — TETANUS TOXOID, REDUCED DIPHTHERIA TOXOID AND ACELLULAR PERTUSSIS VACCINE, ADSORBED 0.5 MILLILITER(S): 5; 2.5; 8; 8; 2.5 SUSPENSION INTRAMUSCULAR at 16:15

## 2024-10-19 NOTE — ED PROVIDER NOTE - CARE PROVIDERS DIRECT ADDRESSES
[email or web address]

## 2024-10-19 NOTE — ED PROVIDER NOTE - PATIENT PORTAL LINK FT
You can access the FollowMyHealth Patient Portal offered by [facility] by registering at the following website: [email or web address]. By joining [facility]’s FollowMyHealth portal, you will also be able to view your health information using other applications (apps) compatible with our system.

## 2024-10-19 NOTE — ED PROVIDER NOTE - NSFOLLOWUPINSTRUCTIONS_ED_ALL_ED_FT
Vomiting, Adult  Vomiting is when stomach contents forcefully come out of the mouth. Many people notice nausea before vomiting. Vomiting can make you feel weak and cause you to become dehydrated.    Dehydration can make you feel tired and thirsty, cause you to have a dry mouth, and decrease how often you urinate. Older adults and people who have other diseases or a weak body defense system (immune system) are at higher risk for dehydration. It is important to treat vomiting as told by your health care provider.    Follow these instructions at home:  Washing hands with soap and water.  Watch your symptoms for any changes. Tell your health care provider about them.    Eating and drinking    Bananas next to a bowl of applesauce.  A bottle of clear fruit juice and glass of water.  Follow these recommendations as told by your health care provider:  Take an oral rehydration solution (ORS). This is a drink that is sold at pharmacies and retail stores.  Eat bland, easy-to-digest foods in small amounts as you are able. These foods include bananas, applesauce, rice, lean meats, toast, and crackers.  Drink clear fluids slowly and in small amounts as you are able. Clear fluids include water, ice chips, low-calorie sports drinks, and fruit juice that has water added (diluted fruit juice).  Avoid drinking fluids that contain a lot of sugar or caffeine, such as energy drinks, sports drinks, and soda.  Avoid alcohol.  Avoid spicy or fatty foods.  General instructions    Wash your hands often using soap and water for at least 20 seconds. If soap and water are not available, use hand .  Make sure that everyone in your household washes their hands frequently.  Take over-the-counter and prescription medicines only as told by your health care provider.  Rest at home while you recover.  Watch your condition for any changes.  Keep all follow-up visits. This is important.  Contact a health care provider if:  Your vomiting gets worse.  You have new symptoms.  You have a fever.  You cannot drink fluids without vomiting.  You feel light-headed or dizzy.  You have a headache.  You have muscle cramps.  You have a rash.  You have pain while urinating.  Get help right away if:  You have pain in your chest, neck, arm, or jaw.  Your heart is beating very quickly.  You have trouble breathing or you are breathing very quickly.  You feel extremely weak or you faint.  Your skin feels cold and clammy.  You feel confused.  You have persistent vomiting.  You have vomit that is bright red or looks like black coffee grounds.  You have stools (feces) that are bloody or black, or stools that look like tar.  You have a severe headache, a stiff neck, or both.  You have severe pain, cramping, or bloating in your abdomen.  You have signs of dehydration, such as:  Dark urine, very little urine, or no urine.  Cracked lips.  Dry mouth.  Sunken eyes.  Sleepiness.  Weakness.  These symptoms may be an emergency. Get help right away. Call 911.  Do not wait to see if the symptoms will go away.  Do not drive yourself to the hospital.  Summary  Vomiting is when stomach contents forcefully come out of the mouth. Vomiting can cause you to become dehydrated.  It is important to treat vomiting as told by your health care provider. Follow your health care provider's instructions about eating and drinking.  Wash your hands often using soap and water for at least 20 seconds. If soap and water are not available, use hand .  Watch your condition for any changes and for signs of dehydration.  Keep all follow-up visits. This is important. follow-up at your scheduled dialysis session on 10/22  Follow-up with your primary care physician Dr Koch and your nephrologist Dr Laird in 1-3 days  Return to emergency department if chest pain, trouble breathing, lightheadedness, dizziness, passing out,  fever, chills, or other symptoms    Vomiting, Adult  Vomiting is when stomach contents forcefully come out of the mouth. Many people notice nausea before vomiting. Vomiting can make you feel weak and cause you to become dehydrated.    Dehydration can make you feel tired and thirsty, cause you to have a dry mouth, and decrease how often you urinate. Older adults and people who have other diseases or a weak body defense system (immune system) are at higher risk for dehydration. It is important to treat vomiting as told by your health care provider.    Follow these instructions at home:  Washing hands with soap and water.  Watch your symptoms for any changes. Tell your health care provider about them.    Eating and drinking    Bananas next to a bowl of applesauce.  A bottle of clear fruit juice and glass of water.  Follow these recommendations as told by your health care provider:  Take an oral rehydration solution (ORS). This is a drink that is sold at pharmacies and retail stores.  Eat bland, easy-to-digest foods in small amounts as you are able. These foods include bananas, applesauce, rice, lean meats, toast, and crackers.  Drink clear fluids slowly and in small amounts as you are able. Clear fluids include water, ice chips, low-calorie sports drinks, and fruit juice that has water added (diluted fruit juice).  Avoid drinking fluids that contain a lot of sugar or caffeine, such as energy drinks, sports drinks, and soda.  Avoid alcohol.  Avoid spicy or fatty foods.  General instructions    Wash your hands often using soap and water for at least 20 seconds. If soap and water are not available, use hand .  Make sure that everyone in your household washes their hands frequently.  Take over-the-counter and prescription medicines only as told by your health care provider.  Rest at home while you recover.  Watch your condition for any changes.  Keep all follow-up visits. This is important.  Contact a health care provider if:  Your vomiting gets worse.  You have new symptoms.  You have a fever.  You cannot drink fluids without vomiting.  You feel light-headed or dizzy.  You have a headache.  You have muscle cramps.  You have a rash.  You have pain while urinating.  Get help right away if:  You have pain in your chest, neck, arm, or jaw.  Your heart is beating very quickly.  You have trouble breathing or you are breathing very quickly.  You feel extremely weak or you faint.  Your skin feels cold and clammy.  You feel confused.  You have persistent vomiting.  You have vomit that is bright red or looks like black coffee grounds.  You have stools (feces) that are bloody or black, or stools that look like tar.  You have a severe headache, a stiff neck, or both.  You have severe pain, cramping, or bloating in your abdomen.  You have signs of dehydration, such as:  Dark urine, very little urine, or no urine.  Cracked lips.  Dry mouth.  Sunken eyes.  Sleepiness.  Weakness.  These symptoms may be an emergency. Get help right away. Call 911.  Do not wait to see if the symptoms will go away.  Do not drive yourself to the hospital.  Summary  Vomiting is when stomach contents forcefully come out of the mouth. Vomiting can cause you to become dehydrated.  It is important to treat vomiting as told by your health care provider. Follow your health care provider's instructions about eating and drinking.  Wash your hands often using soap and water for at least 20 seconds. If soap and water are not available, use hand .  Watch your condition for any changes and for signs of dehydration.  Keep all follow-up visits. This is important.

## 2024-10-19 NOTE — ED PROVIDER NOTE - CARE PROVIDER_API CALL
Kiarra Koch  [facility]  [address] [place] [address]  Phone: [phone]  Fax: [phone]  Follow Up Time:     Chikis Laird  Nephrology  [address] [facility], [address] [place] [id number]  Phone: [phone]  Fax: [phone]  Follow Up Time:

## 2024-10-19 NOTE — ED PROVIDER NOTE - PATIENT/CAREGIVER OFFERED  INTERPRETER SERVICES
Anesthesia Pre Eval Note    Anesthesia ROS/Med Hx        Anesthetic Complication History:  Patient does not have a history of anesthetic complications      Pulmonary Review:  Patient does not have a pulmonary history      Neuro/Psych Review:  Patient does not have a neuro/psych history       Cardiovascular Review:  Patient does not have a cardiovascular history       GI/HEPATIC/RENAL Review:  Patient does not have a GI/hepatic/renalhistory       End/Other Review:  Patient does not have an endo/other history      Overall Review of Systems Comments:  Npo>8hr  Additional Results:     ALLERGIES:  No Known Allergies     History reviewed. No pertinent past medical history.    History reviewed. No pertinent surgical history.       Prior to Admission medications :  Medication finasteride (PROSCAR) 5 MG tablet, Sig Take 1 tablet by mouth daily. Collaborating MD: Dr. Gautam Kaur MD, Start Date 8/31/22, End Date , Taking? Yes, Authorizing Provider Kelin Coffey PA-C    Medication ciprofloxacin (CIPRO) 500 MG tablet, Sig Take 1 tablet by mouth in the morning and 1 tablet in the evening. Do all this for 14 days. Collaborating MD: Dr. Gautam Kaur MD, Start Date 8/31/22, End Date 9/14/22, Taking? Yes, Authorizing Provider Kelin Coffey PA-C    Medication bisacodyl (Dulcolax) 5 MG EC tablet, Sig See Colonoscopy Instructions., Start Date 9/2/22, End Date 10/17/22, Taking? , Authorizing Provider Darrell Villarreal MD    Medication simethicone (MYLICON) 125 MG chewable tablet, Sig See Colonoscopy Instructions., Start Date 9/2/22, End Date 10/17/22, Taking? , Authorizing Provider Darrell Villarreal MD    Medication Sodium Sulfate-Mag Sulfate-KCl 7559-750-540 MG Tab, Sig Take 12 tablets by mouth as directed. See Colonoscopy Instructions., Start Date 9/2/22, End Date , Taking? , Authorizing Provider Darrell Villarreal MD         Patient Vitals in the past 24 hrs:  09/12/22 1045, BP:112/65, Temp:36.4 °C (97.6 °F), Pulse:64, Resp:16,  SpO2:99 %, Height:6' (1.829 m), Weight:90.3 kg (199 lb)      Relevant Problems   No relevant active problems       Physical Exam     Airway   Mallampati: I  TM Distance: >3 FB  Neck ROM: Full  Neck: Non-tender and Able to place in sniff position  TMJ Mobility: Good    Cardiovascular  Cardiovascular exam normal  Cardio Rhythm: Regular  Cardio Rate: Normal    Head Assessment  Head assessment: Normocephalic and Atraumatic    General Assessment  General Assessment: Alert and oriented and No acute distress    Dental Exam  Dental exam normal    Pulmonary Exam  Pulmonary exam normal  Breath sounds clear to auscultation:  Yes    Abdominal Exam  Abdominal exam normal      Anesthesia Plan:    ASA Status: 2  Anesthesia Type: MAC    Induction: Intravenous  Maintenance: TIVA  Premedication: None      Postoperative analgesia plan does NOT include opiods    Checklist  Reviewed: NPO Status, Allergies, Medications, Problem list, Past Med History and Patient Summary  Consent/Risks Discussed Statement:  The proposed anesthetic plan, including its risks and benefits, have been discussed with the Patient along with the risks and benefits of alternatives. Questions were encouraged and answered and the patient and/or representative understands and agrees to proceed.        I discussed with the patient (and/or patient's legal representative) the risks and benefits of the proposed anesthesia plan, MAC, which may include services performed by other anesthesia providers.    Alternative anesthesia plans, if available, were reviewed with the patient (and/or patient's legal representative). Discussion has been held with the patient (and/or patient's legal representative) regarding risks of anesthesia, which include Intra-operative Awareness and emergent situations that may require change in anesthesia plan.    The patient (and/or patient's legal representative) has indicated understanding, his/her questions have been answered, and he/she wishes  to proceed with the planned anesthetic.    Blood Products: Not Anticipated     yes

## 2024-10-19 NOTE — ED PROVIDER NOTE - PHYSICAL EXAMINATION
Gen:  alert, awake, no acute distress  Head:   superficial abrasion over forehead, normocephalic  HEENT: PERRLA, EOMI, normal nose, dry oropharynx, no tonsillar edema, erythema, or exudate,  no midline or paraspinal cervical tenderness to palpation  CV:  rrr, nl S1, S2, no m/r/g  Pulm:  lungs CTA b/l,   Hemodialysis catheter right chest,  site CDI  Abd: s/nt/nd, +BS  MSK:  moving all extremities, no back midline ttp, no stepoffs, no cva TTP  Neuro:  grossly intact, no focal deficits  Skin:  clear, dry,  abrasion forehead  Psych: AOx3, normal affect, no apparent risk to self or others

## 2024-10-19 NOTE — ED PROVIDER NOTE - CLINICAL SUMMARY MEDICAL DECISION MAKING FREE TEXT BOX
Physical exam and plan discussed via  Satya ID number [id number]  Patient with history of end-stage renal disease on dialysis, receives dialysis every Tuesday Thursday and Saturday had full dose earlier today.  Patient brought by ambulance after being found in the convenience store.  Patient states he ate something earlier today and then remembers feeling nausea and vomited 1 time.  States he was feeling lightheaded and felt as if he was about to pass out.  States he thinks he passed out.  Denies any chest pain lightheadedness or shortness of breath at that time.   Patient currently denies any symptoms.   Denies fevers chills shortness of breath.     EKG CT head and C-spine, CBC BMP IV fluids,  reassess Physical exam and plan discussed via  Satya ID number [id number]  Patient with history of end-stage renal disease on dialysis, receives dialysis every Tuesday Thursday and Saturday had full dose earlier today.  Patient brought by ambulance after being found in the convenience store.  Patient states he ate something earlier today and then went to a store remembers feeling nausea and vomited 1 time.  States he was feeling lightheaded and felt as if he was about to pass out.  States he thinks he passed out.  Denies any chest pain back pain, dizziness or shortness of breath at that time, denies diaphoresis.   Patient currently denies any symptoms.   Denies fevers chills shortness of breath.     EKG CT head and C-spine, CBC BMP IV fluids,  reassess  Patient states feeling much better, denies lightheadedness shortness of breath or chest pain at this time,   Has not vomited since being in ED states he would like to go home, will follow-up with his primary care physician and is scheduled for dialysis on 10/22 Physical exam and plan discussed via  Satya ID number [id number]  Patient with history of end-stage renal disease on dialysis, receives dialysis every Tuesday Thursday and Saturday had full dose earlier today.  Patient brought by ambulance after being found in the convenience store.  Patient states he ate something earlier today and then went to a store remembers feeling nausea and vomited 1 time.  States he was feeling lightheaded and felt as if he was about to pass out.  States he thinks he passed out.  Denies any chest pain back pain, dizziness or shortness of breath at that time, denies diaphoresis.   Patient currently denies any symptoms.   Denies fevers chills shortness of breath.     EKG CT head and C-spine, CBC BMP IV fluids,  reassess  Patient declines translation services for discharge  Patient states feeling much better, denies lightheadedness shortness of breath or chest pain at this time,   Has not vomited since being in ED states he would like to go home, will follow-up with his primary care physician and is scheduled for dialysis on 10/22 Physical exam and plan discussed via  Satya ID number [id number]  Patient with history of end-stage renal disease on dialysis, receives dialysis every Tuesday Thursday and Saturday had full dose earlier today.  Patient brought by ambulance after being found in the convenience store.  Patient states he ate something earlier today and then went to a store remembers feeling nausea and vomited 1 time.  States he was feeling lightheaded and felt as if he was about to pass out.  States he thinks he passed out.  Denies any chest pain back pain, dizziness or shortness of breath at that time, denies diaphoresis.   Patient currently denies any symptoms.   Denies fevers chills shortness of breath.     EKG CT head and C-spine, CBC BMP IV fluids,  reassess  Patient declines translation services for discharge  Patient states feeling much better, denies lightheadedness shortness of breath or chest pain at this time,   Has not vomited since being in ED states he would like to go home, will follow-up with his primary care physician and is scheduled for dialysis on 10/22  Patient's first set of troponin is 84 seconds at 83.4 patient is end-stage renal disease patient is comfortable tolerating p.o. repeat EKG QT intervals better plan to discharge the patient on reevaluation patient denied any abdominal pain no chest pain no dizziness tolerated p.o. dinner well Physical exam and plan discussed via  Satya ID number [id number]  Patient with history of end-stage renal disease on dialysis, receives dialysis every Tuesday Thursday and Saturday had full dose earlier today.  Patient brought by ambulance after being found in the convenience store.  Patient states he ate something earlier today and then went to a store remembers feeling nausea and vomited 1 time.  States he was feeling lightheaded and felt as if he was about to pass out.  States he thinks he passed out.  Denies any chest pain back pain, dizziness or shortness of breath at that time, denies diaphoresis.   Patient currently denies any symptoms.   Denies fevers chills shortness of breath.     EKG CT head and C-spine, CBC BMP IV fluids,  reassess  Patient declines translation services for discharge  Patient states feeling much better, denies lightheadedness shortness of breath or chest pain at this time,   Has not vomited since being in ED states he would like to go home, will follow-up with his primary care physician and is scheduled for dialysis on 10/22  sign out dr martin: patient with prolonged qtc, will repeat ekg, and order troponin, po challenge, dr martin to re-assess    Patient's first set of troponin is 84 seconds at 83.4 patient is end-stage renal disease patient is comfortable tolerating p.o. repeat EKG QT intervals better plan to discharge the patient on reevaluation patient denied any abdominal pain no chest pain no dizziness tolerated p.o. dinner well

## 2024-10-19 NOTE — ED ADULT NURSE NOTE - NSFALLHARMRISKINTERV_ED_ALL_ED
Assistance OOB with selected safe patient handling equipment if applicable/Communicate risk of Fall with Harm to all staff, patient, and family/Monitor gait and stability/Provide patient with walking aids/Provide visual cue: red socks, yellow wristband, yellow gown, etc/Reinforce activity limits and safety measures with patient and family/Bed in lowest position, wheels locked, appropriate side rails in place/Call bell, personal items and telephone in reach/Instruct patient to call for assistance before getting out of bed/chair/stretcher/Non-slip footwear applied when patient is off stretcher/[place] to call system/Physically safe environment - no spills, clutter or unnecessary equipment/Purposeful Proactive Rounding/Room/bathroom lighting operational, light cord in reach

## 2024-10-19 NOTE — ED ADULT NURSE NOTE - OBJECTIVE STATEMENT
Pt BIB EMS from a store for syncopal episode. Pt with hx ESRD- gets HD - T/R/Sa. Pt reports getting HD earlier today, had something to eat after, vomited x1. Then went to a store, all of a sudden felt dizzy, sweaty and syncopized, Pt with (+) head injury,  Denies blood thinner use. Laceration noted to the forehead. Pt denies dizziness, vision changes or pain/discomfort at this time. Unsure of last tdap shot.

## 2024-10-19 NOTE — ED PROVIDER NOTE - OBJECTIVE STATEMENT
Physical exam and plan discussed via  Satya ID number [id number]  Patient with history of end-stage renal disease on dialysis, receives dialysis every Tuesday Thursday and Saturday had full dose earlier today.  Patient brought by ambulance after being found in the convenience store.  Patient states he ate something earlier today and then remembers feeling nausea and vomited 1 time.  States he was feeling lightheaded and felt as if he was about to pass out.  States he thinks he passed out.  Denies any chest pain lightheadedness or shortness of breath at that time.   Patient currently denies any symptoms.   Denies fevers chills shortness of breath.

## 2024-10-19 NOTE — ED PROVIDER NOTE - NS ED ROS FT
Except as otherwise indicated in HPI:  CONSTITUTIONAL: Neg  HEENT: neg  CV: neg  Resp: neg  GI: emesis  : Neg  MSK: Neg  SKIN: Neg  NEURO: loc  PSYCHIATRIC: Neg  Heme/Onc: Neg

## 2024-10-22 NOTE — PROGRESS NOTE ADULT - PROBLEM SELECTOR PLAN 1
Admitted to  for renal failure on CKD stage 3 likely 2/2 dehydration and hypertensive emergency from 1/3/24    - S/p CT w/IV dye 1/3/23 possibly contributing to rise in Cr per  nephrology notes  - 24 hour urine collection positive for nephrotic range proteinuria; SPEP, renal serologies negative   - CT A/P w/o hydro; Started on HD 1/6 via temp HD cath  - Pt requires chronic HD, transition from temp to perm HD cath and renal bx, HD catheter placed 1/06  - Transferred to [facility] on 1/12 for HD perm and bx, IR consult order place, nephrology consulted as well to inform of transfer  - s/p permcath placement 1/18; renal biopsy was canceled at that time due to poor window and hypertension     - CT electrolytes, creatinine, strict I/Os, and cath site   - Renal biopsy scheduled for 1/23 (0) Absent

## 2024-10-25 ENCOUNTER — APPOINTMENT (OUTPATIENT)
Dept: FAMILY MEDICINE | Facility: HOSPITAL | Age: 59
End: 2024-10-25

## 2024-10-25 ENCOUNTER — OUTPATIENT (OUTPATIENT)
Dept: OUTPATIENT SERVICES | Facility: HOSPITAL | Age: 59
LOS: 1 days | End: 2024-10-25
Payer: SELF-PAY

## 2024-10-25 VITALS
WEIGHT: 141 LBS | HEART RATE: 82 BPM | BODY MASS INDEX: 24.59 KG/M2 | OXYGEN SATURATION: 97 % | TEMPERATURE: 98.2 F | DIASTOLIC BLOOD PRESSURE: 85 MMHG | SYSTOLIC BLOOD PRESSURE: 146 MMHG | RESPIRATION RATE: 16 BRPM

## 2024-10-25 DIAGNOSIS — I10 ESSENTIAL (PRIMARY) HYPERTENSION: ICD-10-CM

## 2024-10-25 DIAGNOSIS — R55 SYNCOPE AND COLLAPSE: ICD-10-CM

## 2024-10-25 DIAGNOSIS — N18.6 END STAGE RENAL DISEASE: ICD-10-CM

## 2024-10-25 DIAGNOSIS — Z23 ENCOUNTER FOR IMMUNIZATION: ICD-10-CM

## 2024-10-25 DIAGNOSIS — Z99.2 END STAGE RENAL DISEASE: ICD-10-CM

## 2024-10-25 DIAGNOSIS — Z00.00 ENCOUNTER FOR GENERAL ADULT MEDICAL EXAMINATION WITHOUT ABNORMAL FINDINGS: ICD-10-CM

## 2024-10-25 PROCEDURE — G0463: CPT

## 2024-11-04 ENCOUNTER — OUTPATIENT (OUTPATIENT)
Dept: OUTPATIENT SERVICES | Facility: HOSPITAL | Age: 59
LOS: 1 days | End: 2024-11-04

## 2024-11-04 ENCOUNTER — APPOINTMENT (OUTPATIENT)
Dept: VASCULAR SURGERY | Facility: HOSPITAL | Age: 59
End: 2024-11-04

## 2024-11-04 VITALS
BODY MASS INDEX: 25.69 KG/M2 | TEMPERATURE: 98.1 F | WEIGHT: 145 LBS | SYSTOLIC BLOOD PRESSURE: 188 MMHG | HEART RATE: 67 BPM | RESPIRATION RATE: 16 BRPM | HEIGHT: 62.99 IN | OXYGEN SATURATION: 100 % | DIASTOLIC BLOOD PRESSURE: 98 MMHG

## 2024-11-04 DIAGNOSIS — I73.9 PERIPHERAL VASCULAR DISEASE, UNSPECIFIED: ICD-10-CM

## 2024-11-04 PROCEDURE — G0463: CPT

## 2024-11-05 ENCOUNTER — OUTPATIENT (OUTPATIENT)
Dept: OUTPATIENT SERVICES | Facility: HOSPITAL | Age: 59
LOS: 1 days | End: 2024-11-05
Payer: SELF-PAY

## 2024-11-05 ENCOUNTER — APPOINTMENT (OUTPATIENT)
Dept: CARDIOLOGY | Facility: HOSPITAL | Age: 59
End: 2024-11-05

## 2024-11-05 ENCOUNTER — NON-APPOINTMENT (OUTPATIENT)
Age: 59
End: 2024-11-05

## 2024-11-05 VITALS
HEIGHT: 62.99 IN | OXYGEN SATURATION: 100 % | SYSTOLIC BLOOD PRESSURE: 173 MMHG | HEART RATE: 62 BPM | BODY MASS INDEX: 25.69 KG/M2 | DIASTOLIC BLOOD PRESSURE: 82 MMHG | WEIGHT: 145 LBS

## 2024-11-05 DIAGNOSIS — I10 ESSENTIAL (PRIMARY) HYPERTENSION: ICD-10-CM

## 2024-11-05 DIAGNOSIS — R55 SYNCOPE AND COLLAPSE: ICD-10-CM

## 2024-11-05 PROCEDURE — G0463: CPT

## 2024-11-05 PROCEDURE — 93005 ELECTROCARDIOGRAM TRACING: CPT

## 2024-11-05 RX ORDER — HYDRALAZINE HYDROCHLORIDE 25 MG/1
25 TABLET ORAL 3 TIMES DAILY
Qty: 180 | Refills: 3 | Status: ACTIVE | COMMUNITY
Start: 2024-11-05 | End: 1900-01-01

## 2024-11-07 ENCOUNTER — OUTPATIENT (OUTPATIENT)
Dept: OUTPATIENT SERVICES | Facility: HOSPITAL | Age: 59
LOS: 1 days | End: 2024-11-07
Payer: SELF-PAY

## 2024-11-07 ENCOUNTER — RESULT REVIEW (OUTPATIENT)
Age: 59
End: 2024-11-07

## 2024-11-07 DIAGNOSIS — N18.6 END STAGE RENAL DISEASE: ICD-10-CM

## 2024-11-07 PROCEDURE — 93970 EXTREMITY STUDY: CPT

## 2024-11-07 PROCEDURE — 93970 EXTREMITY STUDY: CPT | Mod: 26

## 2024-11-08 ENCOUNTER — NON-APPOINTMENT (OUTPATIENT)
Age: 59
End: 2024-11-08

## 2024-11-21 ENCOUNTER — APPOINTMENT (OUTPATIENT)
Dept: FAMILY MEDICINE | Facility: HOSPITAL | Age: 59
End: 2024-11-21

## 2024-12-03 ENCOUNTER — APPOINTMENT (OUTPATIENT)
Dept: CARDIOLOGY | Facility: HOSPITAL | Age: 59
End: 2024-12-03

## 2024-12-03 DIAGNOSIS — I10 ESSENTIAL (PRIMARY) HYPERTENSION: ICD-10-CM

## 2025-02-20 ENCOUNTER — OUTPATIENT (OUTPATIENT)
Dept: OUTPATIENT SERVICES | Facility: HOSPITAL | Age: 60
LOS: 1 days | End: 2025-02-20
Payer: SELF-PAY

## 2025-02-20 ENCOUNTER — APPOINTMENT (OUTPATIENT)
Age: 60
End: 2025-02-20

## 2025-02-20 VITALS — SYSTOLIC BLOOD PRESSURE: 122 MMHG | DIASTOLIC BLOOD PRESSURE: 62 MMHG

## 2025-02-20 VITALS
TEMPERATURE: 97.9 F | SYSTOLIC BLOOD PRESSURE: 147 MMHG | OXYGEN SATURATION: 99 % | WEIGHT: 144 LBS | DIASTOLIC BLOOD PRESSURE: 74 MMHG | HEART RATE: 70 BPM | BODY MASS INDEX: 25.52 KG/M2 | RESPIRATION RATE: 16 BRPM

## 2025-02-20 DIAGNOSIS — Z99.2 END STAGE RENAL DISEASE: ICD-10-CM

## 2025-02-20 DIAGNOSIS — I10 ESSENTIAL (PRIMARY) HYPERTENSION: ICD-10-CM

## 2025-02-20 DIAGNOSIS — Z00.00 ENCOUNTER FOR GENERAL ADULT MEDICAL EXAMINATION WITHOUT ABNORMAL FINDINGS: ICD-10-CM

## 2025-02-20 DIAGNOSIS — N18.6 END STAGE RENAL DISEASE: ICD-10-CM

## 2025-02-20 PROCEDURE — G0463: CPT

## 2025-03-31 ENCOUNTER — NON-APPOINTMENT (OUTPATIENT)
Age: 60
End: 2025-03-31

## 2025-04-07 ENCOUNTER — OUTPATIENT (OUTPATIENT)
Dept: OUTPATIENT SERVICES | Facility: HOSPITAL | Age: 60
LOS: 1 days | End: 2025-04-07
Payer: SELF-PAY

## 2025-04-07 ENCOUNTER — APPOINTMENT (OUTPATIENT)
Dept: VASCULAR SURGERY | Facility: HOSPITAL | Age: 60
End: 2025-04-07
Payer: COMMERCIAL

## 2025-04-07 VITALS
RESPIRATION RATE: 18 BRPM | TEMPERATURE: 97.7 F | HEART RATE: 74 BPM | BODY MASS INDEX: 24.16 KG/M2 | DIASTOLIC BLOOD PRESSURE: 87 MMHG | OXYGEN SATURATION: 97 % | WEIGHT: 145 LBS | SYSTOLIC BLOOD PRESSURE: 146 MMHG | HEIGHT: 64.96 IN

## 2025-04-07 DIAGNOSIS — I73.9 PERIPHERAL VASCULAR DISEASE, UNSPECIFIED: ICD-10-CM

## 2025-04-07 PROCEDURE — G0463: CPT

## 2025-04-07 PROCEDURE — 99214 OFFICE O/P EST MOD 30 MIN: CPT

## 2025-04-08 DIAGNOSIS — N18.6 END STAGE RENAL DISEASE: ICD-10-CM

## 2025-04-24 ENCOUNTER — OUTPATIENT (OUTPATIENT)
Dept: OUTPATIENT SERVICES | Facility: HOSPITAL | Age: 60
LOS: 1 days | End: 2025-04-24
Payer: SELF-PAY

## 2025-04-24 VITALS
TEMPERATURE: 98 F | WEIGHT: 145.06 LBS | SYSTOLIC BLOOD PRESSURE: 152 MMHG | RESPIRATION RATE: 18 BRPM | OXYGEN SATURATION: 98 % | DIASTOLIC BLOOD PRESSURE: 86 MMHG | HEIGHT: 64.5 IN | HEART RATE: 68 BPM

## 2025-04-24 DIAGNOSIS — N18.6 END STAGE RENAL DISEASE: ICD-10-CM

## 2025-04-24 DIAGNOSIS — I10 ESSENTIAL (PRIMARY) HYPERTENSION: ICD-10-CM

## 2025-04-24 DIAGNOSIS — Z01.818 ENCOUNTER FOR OTHER PREPROCEDURAL EXAMINATION: ICD-10-CM

## 2025-04-24 LAB
ANION GAP SERPL CALC-SCNC: 18 MMOL/L — HIGH (ref 5–17)
BLD GP AB SCN SERPL QL: NEGATIVE — SIGNIFICANT CHANGE UP
BUN SERPL-MCNC: 30 MG/DL — HIGH (ref 7–23)
CALCIUM SERPL-MCNC: 6.4 MG/DL — CRITICAL LOW (ref 8.4–10.5)
CHLORIDE SERPL-SCNC: 95 MMOL/L — LOW (ref 96–108)
CO2 SERPL-SCNC: 22 MMOL/L — SIGNIFICANT CHANGE UP (ref 22–31)
CREAT SERPL-MCNC: 8.23 MG/DL — HIGH (ref 0.5–1.3)
EGFR: 7 ML/MIN/1.73M2 — LOW
EGFR: 7 ML/MIN/1.73M2 — LOW
GLUCOSE SERPL-MCNC: 112 MG/DL — HIGH (ref 70–99)
HCT VFR BLD CALC: 30.7 % — LOW (ref 39–50)
HGB BLD-MCNC: 10.8 G/DL — LOW (ref 13–17)
MCHC RBC-ENTMCNC: 28.4 PG — SIGNIFICANT CHANGE UP (ref 27–34)
MCHC RBC-ENTMCNC: 35.2 G/DL — SIGNIFICANT CHANGE UP (ref 32–36)
MCV RBC AUTO: 80.8 FL — SIGNIFICANT CHANGE UP (ref 80–100)
NRBC BLD AUTO-RTO: 0 /100 WBCS — SIGNIFICANT CHANGE UP (ref 0–0)
PLATELET # BLD AUTO: 225 K/UL — SIGNIFICANT CHANGE UP (ref 150–400)
POTASSIUM SERPL-MCNC: 4.7 MMOL/L — SIGNIFICANT CHANGE UP (ref 3.5–5.3)
POTASSIUM SERPL-SCNC: 4.7 MMOL/L — SIGNIFICANT CHANGE UP (ref 3.5–5.3)
RBC # BLD: 3.8 M/UL — LOW (ref 4.2–5.8)
RBC # FLD: 15.1 % — HIGH (ref 10.3–14.5)
RH IG SCN BLD-IMP: POSITIVE — SIGNIFICANT CHANGE UP
SODIUM SERPL-SCNC: 135 MMOL/L — SIGNIFICANT CHANGE UP (ref 135–145)
WBC # BLD: 4.3 K/UL — SIGNIFICANT CHANGE UP (ref 3.8–10.5)
WBC # FLD AUTO: 4.3 K/UL — SIGNIFICANT CHANGE UP (ref 3.8–10.5)

## 2025-04-24 PROCEDURE — 80048 BASIC METABOLIC PNL TOTAL CA: CPT

## 2025-04-24 PROCEDURE — 85027 COMPLETE CBC AUTOMATED: CPT

## 2025-04-24 PROCEDURE — 86850 RBC ANTIBODY SCREEN: CPT

## 2025-04-24 PROCEDURE — 86901 BLOOD TYPING SEROLOGIC RH(D): CPT

## 2025-04-24 PROCEDURE — 86900 BLOOD TYPING SEROLOGIC ABO: CPT

## 2025-04-24 PROCEDURE — G0463: CPT

## 2025-04-24 PROCEDURE — 36415 COLL VENOUS BLD VENIPUNCTURE: CPT

## 2025-04-24 NOTE — H&P PST ADULT - HISTORY OF PRESENT ILLNESS
60 yo M h/o HTN, ASCVD (large artery plaque noted on CT A/P), ESRD (HD MWF currently via permacath, pending AVF creation), referred to cardiology clinic for HTN, here for follow up  ?  LCV 5/2024: Taking meds BID instead of TID, occasional dizziness after HD sessions, longstanding non cardiac CP x4 years, squeezing when laying down, can do 2 floors of stairs, NST neg, inc amlodipine from 5 to 10  ?  IE: Saw vascular surgery, referred for AVF, following PMD, admitted for syncope with negative work up, never got vein mapping,  ?  Today patient reports ok, he had HD today and has a slight HA from it. He has not had CP for about 2 weeks but states it is unchanged for years. He has printed labs with him from 10/15 notable for eleavted  K 4.8 Hb 9.2 and BP log wiht -190s/. He notes he never took his amlodipine (he thinks). Meds printed show: ASA 81 QDay, Atorva 20 QHS, Hydralazine 50 TID, Labetalol 200 TID, Calcitriol 1.25 MWF Mircera 50 mcg Q2wks, Tubersol, venofer. Spoke to patient about improved BP control, will increase hydralazine to 75 TID as HR 60s on labetalol 200 TID.  ?    Patient is a 60-year-old male with ESRD on HD presenting for long term HD access creation. Patient was last seen in the clinic in November 2024 (and in May 2024) and was supposed to be schedule for AVF creation vs AVG placement at that time following medical/cardiac optimization; however, patient was not scheduled for surgery and did not follow up. Patient still has R IJ tunneled dialysis catheter that has not been replaced - has been in place for over 1 year. Patient is right-handed. Denies pacemakers and ICDs. Vein mapping completed in November 2024.   59 yo M h/o HTN, ASCVD (large artery plaque noted on CT A/P), ESRD (HD MWF currently via permacath presenting to [facility]              referred to cardiology clinic for HTN, here for follow up  ?  LCV 5/2024: Taking meds BID instead of TID, occasional dizziness after HD sessions, longstanding non cardiac CP x4 years, squeezing when laying down, can do 2 floors of stairs, NST neg, inc amlodipine from 5 to 10  ?  IE: Saw vascular surgery, referred for AVF, following PMD, admitted for syncope with negative work up, never got vein mapping,  ?  Today patient reports ok, he had HD today and has a slight HA from it. He has not had CP for about 2 weeks but states it is unchanged for years. He has printed labs with him from 10/15 notable for eleavted  K 4.8 Hb 9.2 and BP log wiht -190s/. He notes he never took his amlodipine (he thinks). Meds printed show: ASA 81 QDay, Atorva 20 QHS, Hydralazine 50 TID, Labetalol 200 TID, Calcitriol 1.25 MWF Mircera 50 mcg Q2wks, Tubersol, venofer. Spoke to patient about improved BP control, will increase hydralazine to 75 TID as HR 60s on labetalol 200 TID.  ?    Patient is a 60-year-old male with ESRD on HD presenting for long term HD access creation. Patient was last seen in the clinic in November 2024 (and in May 2024) and was supposed to be schedule for AVF creation vs AVG placement at that time following medical/cardiac optimization; however, patient was not scheduled for surgery and did not follow up. Patient still has R IJ tunneled dialysis catheter that has not been replaced - has been in place for over 1 year. Patient is right-handed. Denies pacemakers and ICDs. Vein mapping completed in November 2024.   Patient is Poor Historian.      59 yo M h/o HTN, ASCVD (large artery plaque noted on CT A/P), ESRD (HD MWF currently via permacath [facility]              referred to cardiology clinic for HTN, here for follow up  ?  LCV 5/2024: Taking meds BID instead of TID, occasional dizziness after HD sessions, longstanding non cardiac CP x4 years, squeezing when laying down, can do 2 floors of stairs, NST neg, inc amlodipine from 5 to 10  ?  IE: Saw vascular surgery, referred for AVF, following PMD, admitted for syncope with negative work up, never got vein mapping,  ?  Today patient reports ok, he had HD today and has a slight HA from it. He has not had CP for about 2 weeks but states it is unchanged for years. He has printed labs with him from 10/15 notable for eleavted  K 4.8 Hb 9.2 and BP log wiht -190s/. He notes he never took his amlodipine (he thinks). Meds printed show: ASA 81 QDay, Atorva 20 QHS, Hydralazine 50 TID, Labetalol 200 TID, Calcitriol 1.25 MWF Mircera 50 mcg Q2wks, Tubersol, venofer. Spoke to patient about improved BP control, will increase hydralazine to 75 TID as HR 60s on labetalol 200 TID.  ?    Patient is a 60-year-old male with ESRD on HD presenting for long term HD access creation. Patient was last seen in the clinic in November 2024 (and in May 2024) and was supposed to be schedule for AVF creation vs AVG placement at that time following medical/cardiac optimization; however, patient was not scheduled for surgery and did not follow up. Patient still has R IJ tunneled dialysis catheter that has not been replaced - has been in place for over 1 year. Patient is right-handed. Denies pacemakers and ICDs. Vein mapping completed in November 2024.   Patient is a  Poor Historian.  61 yo M h/o HTN, ASCVD (large artery plaque noted on CT A/P), ESRD (on HD MWF currently via permacath ) presenting to PST prior to scheduled Left Arm AV fistula creation on 5/8/25 with Dr. Cunningham.             ?     Patient is a  Poor Historian.  59 yo M h/o HTN, ASCVD (large artery plaque noted on CT A/P), ESRD (on HD MWF currently via permacath ) presenting to PST prior to scheduled Left Arm AV fistula creation on 5/8/25 with Dr. Cunningham.       4/24/25 Received call from lab (Austin Lopez) ** Critical lab result Ca: 6.4, lab sent to PCP office.             ?

## 2025-04-24 NOTE — H&P PST ADULT - NSCAFFEAMTFREQ_GEN_ALL_CORE_SD
Problem: Patient Care Overview  Goal: Plan of Care Review  Outcome: Ongoing (interventions implemented as appropriate)   03/15/18 6724   Coping/Psychosocial   Plan of Care Reviewed With patient   Plan of Care Review   Progress improving   OTHER   Outcome Summary Pt is increasing with strength and bed mobility to get to EOB and then to transfer to RWX and amb with vc's for safety          1-2 cups/cans per day

## 2025-04-24 NOTE — H&P PST ADULT - LAST ECHOCARDIOGRAM
TTE limited 1/3/24 EF 65-70% pulm artery systolic pressure 54.8-mod pulm continue on antihypertensive medications. Grade 1 dysstolic dysfunction

## 2025-04-24 NOTE — H&P PST ADULT - ASSESSMENT
DASI score:  DASI activity:  Loose teeth or denture:     DASI score:7.23  DASI activity: Walking   Loose teeth or denture:denies     DASI score:7.23  DASI activity: Can walk 1-2 flights of stairs without cardiac symptoms.   Loose teeth or denture: denies

## 2025-04-24 NOTE — H&P PST ADULT - PROBLEM SELECTOR PLAN 1
Scheduled for Left Arm AV Fistula creation  Pre-operative instructions and chlorhexidine given.  Labs: cbc, bmp, T/S drawn in PST  DOS: Stat VBG for K+ Scheduled for Left Arm AV Fistula creation  Pre-operative instructions and chlorhexidine given.  Labs: cbc, bmp, T/S drawn in PST  DOS: Stat VBG for K+  On aspirin 81mg will remain until surgery.

## 2025-04-24 NOTE — H&P PST ADULT - CARDIOVASCULAR
negative regular rate and rhythm/S1 S2 present regular rate and rhythm/S1 S2 present/peripheral edema/pedal edema

## 2025-04-28 ENCOUNTER — OUTPATIENT (OUTPATIENT)
Dept: OUTPATIENT SERVICES | Facility: HOSPITAL | Age: 60
LOS: 1 days | End: 2025-04-28
Payer: SELF-PAY

## 2025-04-28 ENCOUNTER — APPOINTMENT (OUTPATIENT)
Age: 60
End: 2025-04-28

## 2025-04-28 VITALS
HEART RATE: 84 BPM | OXYGEN SATURATION: 97 % | TEMPERATURE: 97.8 F | SYSTOLIC BLOOD PRESSURE: 149 MMHG | DIASTOLIC BLOOD PRESSURE: 82 MMHG | RESPIRATION RATE: 16 BRPM

## 2025-04-28 DIAGNOSIS — Z01.818 ENCOUNTER FOR OTHER PREPROCEDURAL EXAMINATION: ICD-10-CM

## 2025-04-28 DIAGNOSIS — Z00.00 ENCOUNTER FOR GENERAL ADULT MEDICAL EXAMINATION WITHOUT ABNORMAL FINDINGS: ICD-10-CM

## 2025-04-28 PROCEDURE — G0463: CPT

## 2025-05-02 ENCOUNTER — APPOINTMENT (OUTPATIENT)
Age: 60
End: 2025-05-02
Payer: COMMERCIAL

## 2025-05-02 ENCOUNTER — OUTPATIENT (OUTPATIENT)
Dept: OUTPATIENT SERVICES | Facility: HOSPITAL | Age: 60
LOS: 1 days | End: 2025-05-02
Payer: SELF-PAY

## 2025-05-02 VITALS
BODY MASS INDEX: 24.49 KG/M2 | TEMPERATURE: 97.8 F | DIASTOLIC BLOOD PRESSURE: 76 MMHG | WEIGHT: 147 LBS | OXYGEN SATURATION: 98 % | SYSTOLIC BLOOD PRESSURE: 131 MMHG | HEART RATE: 75 BPM

## 2025-05-02 DIAGNOSIS — Z00.00 ENCOUNTER FOR GENERAL ADULT MEDICAL EXAMINATION WITHOUT ABNORMAL FINDINGS: ICD-10-CM

## 2025-05-02 DIAGNOSIS — Z01.818 ENCOUNTER FOR OTHER PREPROCEDURAL EXAMINATION: ICD-10-CM

## 2025-05-02 PROCEDURE — 93010 ELECTROCARDIOGRAM REPORT: CPT | Mod: NC

## 2025-05-02 PROCEDURE — G0463: CPT

## 2025-05-02 PROCEDURE — 93005 ELECTROCARDIOGRAM TRACING: CPT

## 2025-05-08 ENCOUNTER — OUTPATIENT (OUTPATIENT)
Dept: OUTPATIENT SERVICES | Facility: HOSPITAL | Age: 60
LOS: 1 days | End: 2025-05-08
Payer: SELF-PAY

## 2025-05-08 ENCOUNTER — TRANSCRIPTION ENCOUNTER (OUTPATIENT)
Age: 60
End: 2025-05-08

## 2025-05-08 ENCOUNTER — APPOINTMENT (OUTPATIENT)
Dept: VASCULAR SURGERY | Facility: HOSPITAL | Age: 60
End: 2025-05-08

## 2025-05-08 VITALS
TEMPERATURE: 98 F | DIASTOLIC BLOOD PRESSURE: 90 MMHG | HEART RATE: 72 BPM | HEIGHT: 64.5 IN | WEIGHT: 145.06 LBS | SYSTOLIC BLOOD PRESSURE: 149 MMHG | OXYGEN SATURATION: 97 % | RESPIRATION RATE: 15 BRPM

## 2025-05-08 VITALS
OXYGEN SATURATION: 97 % | RESPIRATION RATE: 16 BRPM | DIASTOLIC BLOOD PRESSURE: 73 MMHG | SYSTOLIC BLOOD PRESSURE: 152 MMHG | HEART RATE: 82 BPM | TEMPERATURE: 98 F

## 2025-05-08 DIAGNOSIS — N18.6 END STAGE RENAL DISEASE: ICD-10-CM

## 2025-05-08 DIAGNOSIS — Z95.828 PRESENCE OF OTHER VASCULAR IMPLANTS AND GRAFTS: Chronic | ICD-10-CM

## 2025-05-08 LAB
ANION GAP SERPL CALC-SCNC: 18 MMOL/L — HIGH (ref 5–17)
BUN SERPL-MCNC: 26 MG/DL — HIGH (ref 7–23)
CALCIUM SERPL-MCNC: 7.4 MG/DL — LOW (ref 8.4–10.5)
CHLORIDE SERPL-SCNC: 95 MMOL/L — LOW (ref 96–108)
CO2 SERPL-SCNC: 24 MMOL/L — SIGNIFICANT CHANGE UP (ref 22–31)
CREAT SERPL-MCNC: 9.01 MG/DL — HIGH (ref 0.5–1.3)
EGFR: 6 ML/MIN/1.73M2 — LOW
EGFR: 6 ML/MIN/1.73M2 — LOW
GLUCOSE SERPL-MCNC: 92 MG/DL — SIGNIFICANT CHANGE UP (ref 70–99)
POTASSIUM BLDV-SCNC: 5.1 MMOL/L — SIGNIFICANT CHANGE UP (ref 3.5–5.1)
POTASSIUM SERPL-MCNC: 5.5 MMOL/L — HIGH (ref 3.5–5.3)
POTASSIUM SERPL-SCNC: 5.5 MMOL/L — HIGH (ref 3.5–5.3)
RH IG SCN BLD-IMP: POSITIVE — SIGNIFICANT CHANGE UP
SODIUM SERPL-SCNC: 137 MMOL/L — SIGNIFICANT CHANGE UP (ref 135–145)

## 2025-05-08 PROCEDURE — 36818 AV FUSE UPPR ARM CEPHALIC: CPT | Mod: LT

## 2025-05-08 PROCEDURE — 36821 AV FUSION DIRECT ANY SITE: CPT | Mod: LT

## 2025-05-08 PROCEDURE — 80048 BASIC METABOLIC PNL TOTAL CA: CPT

## 2025-05-08 PROCEDURE — C1889: CPT

## 2025-05-08 PROCEDURE — 84132 ASSAY OF SERUM POTASSIUM: CPT

## 2025-05-08 DEVICE — CLIP APPLIER COVIDIEN SURGICLIP III 9" SM: Type: IMPLANTABLE DEVICE | Site: LEFT | Status: FUNCTIONAL

## 2025-05-08 DEVICE — SURGIFOAM 8 X 12.5CM X 10MM (100): Type: IMPLANTABLE DEVICE | Site: LEFT | Status: FUNCTIONAL

## 2025-05-08 DEVICE — SURGICEL FIBRILLAR 2 X 4": Type: IMPLANTABLE DEVICE | Site: LEFT | Status: FUNCTIONAL

## 2025-05-08 DEVICE — CLIP APPLIER COVIDIEN SURGICLIP II 9.75" MEDIUM: Type: IMPLANTABLE DEVICE | Site: LEFT | Status: FUNCTIONAL

## 2025-05-08 RX ORDER — LIDOCAINE HCL/PF 10 MG/ML
0.2 VIAL (ML) INJECTION ONCE
Refills: 0 | Status: DISCONTINUED | OUTPATIENT
Start: 2025-05-08 | End: 2025-05-08

## 2025-05-08 RX ORDER — ONDANSETRON HCL/PF 4 MG/2 ML
4 VIAL (ML) INJECTION ONCE
Refills: 0 | Status: DISCONTINUED | OUTPATIENT
Start: 2025-05-08 | End: 2025-05-08

## 2025-05-08 RX ORDER — CEFAZOLIN SODIUM IN 0.9 % NACL 3 G/100 ML
2000 INTRAVENOUS SOLUTION, PIGGYBACK (ML) INTRAVENOUS ONCE
Refills: 0 | Status: COMPLETED | OUTPATIENT
Start: 2025-05-08 | End: 2025-05-08

## 2025-05-08 RX ORDER — HYDROMORPHONE/SOD CHLOR,ISO/PF 2 MG/10 ML
0.25 SYRINGE (ML) INJECTION
Refills: 0 | Status: DISCONTINUED | OUTPATIENT
Start: 2025-05-08 | End: 2025-05-08

## 2025-05-08 NOTE — ASU PREOP CHECKLIST - SURGICAL CONSENT
CTAP with IV contrast with distended gallbladder and mild CBD dilatation, measuring 8 mm in diameter, similar to prior study. Pt with no RUQ abdominal pain or tenderness on exam, also negative for Kennedy's sign. LFTs wnl. Low suspicion at this time for acute cholecystitis or cholangitis.   - On IV Zosyn as above  - Monitor with serial abdominal exams  - Monitor LFTs CTAP with IV contrast with distended gallbladder and mild CBD dilatation, measuring 8 mm in diameter, similar to prior study. Pt with no RUQ abdominal pain or tenderness on exam, also negative for Kennedy's sign. LFTs wnl. Low suspicion at this time for acute cholecystitis or cholangitis.   - Monitor with serial abdominal exams  - Monitor LFTs done

## 2025-05-08 NOTE — PRE-ANESTHESIA EVALUATION ADULT - NSANTHPMHFT_GEN_ALL_CORE
60M hx of CAD, ESRD (HD MWF via permacath), GERD, HTN, here for L AV fistula.   1/2024 TTE with EF 65-70%, mild-mod MR, mild-mod TR, PASP 54 with normal RV function. 1/2024 NST unremarkable.

## 2025-05-08 NOTE — CHART NOTE - NSCHARTNOTEFT_GEN_A_CORE
5/8/25 Pre-Anesthesia Evaluation  Please refer to pre-anesthesia note entered earlier this morning which was cancelled in error.
no

## 2025-05-08 NOTE — ASU DISCHARGE PLAN (ADULT/PEDIATRIC) - ASU DC SPECIAL INSTRUCTIONSFT
You may notice some drainage from the dressing. Please change it if it saturates through; otherwise remove the dressing 48 hours after surgery and you may shower afterward    You may resume your blood thinner medications tonight.     You may take over-the-counter tylenol for pain.

## 2025-05-08 NOTE — ASU DISCHARGE PLAN (ADULT/PEDIATRIC) - CALL YOUR DOCTOR IF YOU HAVE ANY OF THE FOLLOWING:
01/18/23 11:58 AM    The patient was called and a message was left to call the ordering provider's office regarding an open order  Thank you    Skye Hickey  [facility] VALUE BASED VIR Bleeding that does not stop/Swelling that gets worse/Pain not relieved by Medications/Wound/Surgical Site with redness, or foul smelling discharge or pus/Numbness, tingling, color or temperature change to extremity/Nausea and vomiting that does not stop/Inability to tolerate liquids or foods/Increased irritability or sluggishness

## 2025-05-08 NOTE — ASU DISCHARGE PLAN (ADULT/PEDIATRIC) - CARE PROVIDER_API CALL
Chloé Cunningham  Vascular Surgery  [address], [address]  [place] [address]  Phone: [phone]  Fax: [phone]  Follow Up Time: 2 weeks

## 2025-05-08 NOTE — ASU DISCHARGE PLAN (ADULT/PEDIATRIC) - FINANCIAL ASSISTANCE
[facility] provides services at a reduced cost to those who are determined to be eligible through [facility]’s financial assistance program. Information regarding [facility]’s financial assistance program can be found by going to [email or web address] or by calling [phone].

## 2025-08-26 ENCOUNTER — APPOINTMENT (OUTPATIENT)
Age: 60
End: 2025-08-26

## 2025-08-26 ENCOUNTER — OUTPATIENT (OUTPATIENT)
Dept: OUTPATIENT SERVICES | Facility: HOSPITAL | Age: 60
LOS: 1 days | End: 2025-08-26
Payer: SELF-PAY

## 2025-08-26 VITALS
RESPIRATION RATE: 16 BRPM | DIASTOLIC BLOOD PRESSURE: 85 MMHG | TEMPERATURE: 98.2 F | OXYGEN SATURATION: 97 % | BODY MASS INDEX: 23.99 KG/M2 | SYSTOLIC BLOOD PRESSURE: 161 MMHG | HEART RATE: 83 BPM | WEIGHT: 144 LBS

## 2025-08-26 VITALS — SYSTOLIC BLOOD PRESSURE: 136 MMHG | DIASTOLIC BLOOD PRESSURE: 78 MMHG

## 2025-08-26 DIAGNOSIS — I10 ESSENTIAL (PRIMARY) HYPERTENSION: ICD-10-CM

## 2025-08-26 DIAGNOSIS — D64.9 ANEMIA, UNSPECIFIED: ICD-10-CM

## 2025-08-26 DIAGNOSIS — N18.6 END STAGE RENAL DISEASE: ICD-10-CM

## 2025-08-26 DIAGNOSIS — E87.5 HYPERKALEMIA: ICD-10-CM

## 2025-08-26 DIAGNOSIS — Z00.00 ENCOUNTER FOR GENERAL ADULT MEDICAL EXAMINATION WITHOUT ABNORMAL FINDINGS: ICD-10-CM

## 2025-08-26 DIAGNOSIS — Z95.828 PRESENCE OF OTHER VASCULAR IMPLANTS AND GRAFTS: Chronic | ICD-10-CM

## 2025-08-26 PROCEDURE — 85027 COMPLETE CBC AUTOMATED: CPT

## 2025-08-26 PROCEDURE — 80048 BASIC METABOLIC PNL TOTAL CA: CPT

## 2025-08-26 PROCEDURE — 86803 HEPATITIS C AB TEST: CPT

## 2025-08-26 PROCEDURE — G0463: CPT

## 2025-08-26 PROCEDURE — 87389 HIV-1 AG W/HIV-1&-2 AB AG IA: CPT

## 2025-08-26 PROCEDURE — 83036 HEMOGLOBIN GLYCOSYLATED A1C: CPT

## 2025-08-26 PROCEDURE — 80061 LIPID PANEL: CPT

## 2025-08-26 PROCEDURE — 84443 ASSAY THYROID STIM HORMONE: CPT

## 2025-08-26 PROCEDURE — 36415 COLL VENOUS BLD VENIPUNCTURE: CPT

## 2025-08-27 ENCOUNTER — NON-APPOINTMENT (OUTPATIENT)
Age: 60
End: 2025-08-27

## 2025-08-27 LAB
ANION GAP SERPL CALC-SCNC: 19 MMOL/L
BUN SERPL-MCNC: 32 MG/DL
CALCIUM SERPL-MCNC: 7.1 MG/DL
CHLORIDE SERPL-SCNC: 100 MMOL/L
CHOLEST SERPL-MCNC: 110 MG/DL
CO2 SERPL-SCNC: 20 MMOL/L
CREAT SERPL-MCNC: 9.04 MG/DL
EGFRCR SERPLBLD CKD-EPI 2021: 6 ML/MIN/1.73M2
ESTIMATED AVERAGE GLUCOSE: 103 MG/DL
GLUCOSE SERPL-MCNC: 165 MG/DL
HBA1C MFR BLD HPLC: 5.2 %
HCT VFR BLD CALC: 35 %
HCV AB SER QL: NONREACTIVE
HCV S/CO RATIO: 0.28 S/CO
HDLC SERPL-MCNC: 62 MG/DL
HGB BLD-MCNC: 12 G/DL
HIV1+2 AB SPEC QL IA.RAPID: NONREACTIVE
LDLC SERPL-MCNC: 34 MG/DL
MCHC RBC-ENTMCNC: 29 PG
MCHC RBC-ENTMCNC: 34.3 G/DL
MCV RBC AUTO: 84.5 FL
NONHDLC SERPL-MCNC: 48 MG/DL
PLATELET # BLD AUTO: 246 K/UL
POTASSIUM SERPL-SCNC: 5.3 MMOL/L
RBC # BLD: 4.14 M/UL
RBC # FLD: 15.9 %
SODIUM SERPL-SCNC: 139 MMOL/L
TRIGL SERPL-MCNC: 68 MG/DL
TSH SERPL-ACNC: 2.75 UIU/ML
WBC # FLD AUTO: 6.95 K/UL

## 2025-09-04 ENCOUNTER — APPOINTMENT (OUTPATIENT)
Age: 60
End: 2025-09-04

## 2025-09-04 ENCOUNTER — OUTPATIENT (OUTPATIENT)
Dept: OUTPATIENT SERVICES | Facility: HOSPITAL | Age: 60
LOS: 1 days | End: 2025-09-04
Payer: SELF-PAY

## 2025-09-04 VITALS
WEIGHT: 145 LBS | DIASTOLIC BLOOD PRESSURE: 79 MMHG | HEART RATE: 78 BPM | OXYGEN SATURATION: 98 % | SYSTOLIC BLOOD PRESSURE: 131 MMHG | RESPIRATION RATE: 16 BRPM | TEMPERATURE: 97.8 F | BODY MASS INDEX: 24.16 KG/M2

## 2025-09-04 DIAGNOSIS — Z23 ENCOUNTER FOR IMMUNIZATION: ICD-10-CM

## 2025-09-04 DIAGNOSIS — Z00.00 ENCOUNTER FOR GENERAL ADULT MEDICAL EXAMINATION W/OUT ABNORMAL FINDINGS: ICD-10-CM

## 2025-09-04 DIAGNOSIS — Z99.2 END STAGE RENAL DISEASE: ICD-10-CM

## 2025-09-04 DIAGNOSIS — Z95.828 PRESENCE OF OTHER VASCULAR IMPLANTS AND GRAFTS: Chronic | ICD-10-CM

## 2025-09-04 DIAGNOSIS — I10 ESSENTIAL (PRIMARY) HYPERTENSION: ICD-10-CM

## 2025-09-04 DIAGNOSIS — N18.6 END STAGE RENAL DISEASE: ICD-10-CM

## 2025-09-04 DIAGNOSIS — Z00.00 ENCOUNTER FOR GENERAL ADULT MEDICAL EXAMINATION WITHOUT ABNORMAL FINDINGS: ICD-10-CM

## 2025-09-04 DIAGNOSIS — N18.4 CHRONIC KIDNEY DISEASE, STAGE 4 (SEVERE): ICD-10-CM

## 2025-09-04 PROCEDURE — G0463: CPT

## 2025-09-20 ENCOUNTER — APPOINTMENT (OUTPATIENT)
Age: 60
End: 2025-09-20

## 2025-09-20 VITALS
DIASTOLIC BLOOD PRESSURE: 83 MMHG | RESPIRATION RATE: 16 BRPM | BODY MASS INDEX: 24.07 KG/M2 | HEART RATE: 86 BPM | WEIGHT: 141 LBS | TEMPERATURE: 98 F | OXYGEN SATURATION: 100 % | HEIGHT: 64 IN | SYSTOLIC BLOOD PRESSURE: 172 MMHG

## 2025-09-20 VITALS — SYSTOLIC BLOOD PRESSURE: 161 MMHG | DIASTOLIC BLOOD PRESSURE: 86 MMHG

## 2025-09-20 DIAGNOSIS — Z12.11 ENCOUNTER FOR SCREENING FOR MALIGNANT NEOPLASM OF COLON: ICD-10-CM

## (undated) DEVICE — BLADE SCALPEL SAFETYLOCK #11

## (undated) DEVICE — STAPLER SKIN VISI-STAT 35 WIDE

## (undated) DEVICE — SUT PROLENE 6-0 4-30" C-1

## (undated) DEVICE — VENODYNE/SCD SLEEVE CALF MEDIUM

## (undated) DEVICE — SUT POLYSORB 3-0 30" V-20 UNDYED

## (undated) DEVICE — WARMING BLANKET LOWER ADULT

## (undated) DEVICE — DRAPE INSTRUMENT POUCH 6.75" X 11"

## (undated) DEVICE — PACK AV FISTULA

## (undated) DEVICE — BLADE SCALPEL SAFETYLOCK #15

## (undated) DEVICE — DRSG TEGADERM 6 X 8"

## (undated) DEVICE — VESSEL LOOP MAXI-BLUE 0.120" X 16"

## (undated) DEVICE — SUT BIOSYN 4-0 18" P-12

## (undated) DEVICE — SOL IRR POUR H2O 250ML

## (undated) DEVICE — PREP CHLORAPREP HI-LITE ORANGE 26ML

## (undated) DEVICE — VESSEL LOOP MINI-BLUE 0.075" X 16"

## (undated) DEVICE — SUT SILK 3-0 18" TIES

## (undated) DEVICE — CLAMP BULLDOG MIDI 45 DEGREE (GREEN) DISP

## (undated) DEVICE — GLV 6.5 PROTEXIS (WHITE)

## (undated) DEVICE — SUT SOFSILK 2-0 18" TIES

## (undated) DEVICE — DRAPE TOWEL BLUE 17" X 24"

## (undated) DEVICE — BLADE SCALPEL SAFETYLOCK #10

## (undated) DEVICE — DRAPE LIGHT HANDLE COVER (BLUE)

## (undated) DEVICE — SOL INJ NS 0.9% 500ML 1-PORT

## (undated) DEVICE — POSITIONER FOAM EGG CRATE ULNAR 2PCS (PINK)

## (undated) DEVICE — CLAMP BULLDOG MIDI STRAIGHT (YELLOW) DISP

## (undated) DEVICE — SUT PROLENE 7-0 24" BV-1

## (undated) DEVICE — MEDICATION LABELS W MARKER

## (undated) DEVICE — SUT SOFSILK 4-0 18" TIES

## (undated) DEVICE — SPECIMEN CONTAINER 100ML

## (undated) DEVICE — DRSG STERISTRIPS 0.5 X 4"

## (undated) DEVICE — DRSG OPSITE 13.75 X 4"

## (undated) DEVICE — SUCTION YANKAUER NO CONTROL VENT

## (undated) DEVICE — SUT PROLENE 6-0 4-30" BV-1